# Patient Record
Sex: FEMALE | Race: WHITE | Employment: OTHER | URBAN - METROPOLITAN AREA
[De-identification: names, ages, dates, MRNs, and addresses within clinical notes are randomized per-mention and may not be internally consistent; named-entity substitution may affect disease eponyms.]

---

## 2017-01-27 ENCOUNTER — CARE COORDINATION (OUTPATIENT)
Dept: CARE COORDINATION | Age: 37
End: 2017-01-27

## 2017-03-21 ENCOUNTER — CARE COORDINATION (OUTPATIENT)
Dept: CARE COORDINATION | Age: 37
End: 2017-03-21

## 2017-05-11 ENCOUNTER — CARE COORDINATION (OUTPATIENT)
Dept: CARE COORDINATION | Age: 37
End: 2017-05-11

## 2017-06-30 LAB — DIABETIC RETINOPATHY: NORMAL

## 2017-07-05 ENCOUNTER — CARE COORDINATION (OUTPATIENT)
Dept: CARE COORDINATION | Age: 37
End: 2017-07-05

## 2017-07-24 ENCOUNTER — OFFICE VISIT (OUTPATIENT)
Dept: FAMILY MEDICINE CLINIC | Age: 37
End: 2017-07-24

## 2017-07-24 VITALS
OXYGEN SATURATION: 98 % | WEIGHT: 216.2 LBS | DIASTOLIC BLOOD PRESSURE: 78 MMHG | HEIGHT: 61 IN | HEART RATE: 88 BPM | BODY MASS INDEX: 40.82 KG/M2 | SYSTOLIC BLOOD PRESSURE: 112 MMHG

## 2017-07-24 DIAGNOSIS — E11.9 DIABETES MELLITUS WITH COINCIDENT HYPERTENSION (HCC): ICD-10-CM

## 2017-07-24 DIAGNOSIS — E66.01 MORBID OBESITY WITH BMI OF 40.0-44.9, ADULT (HCC): ICD-10-CM

## 2017-07-24 DIAGNOSIS — G89.29 CHRONIC NECK PAIN: ICD-10-CM

## 2017-07-24 DIAGNOSIS — I10 DIABETES MELLITUS WITH COINCIDENT HYPERTENSION (HCC): ICD-10-CM

## 2017-07-24 DIAGNOSIS — E78.5 HYPERLIPIDEMIA ASSOCIATED WITH TYPE 2 DIABETES MELLITUS (HCC): ICD-10-CM

## 2017-07-24 DIAGNOSIS — M54.2 CHRONIC NECK PAIN: ICD-10-CM

## 2017-07-24 DIAGNOSIS — E11.69 HYPERLIPIDEMIA ASSOCIATED WITH TYPE 2 DIABETES MELLITUS (HCC): ICD-10-CM

## 2017-07-24 LAB
CREATININE URINE POCT: 100
HBA1C MFR BLD: 7.8 %
MICROALBUMIN/CREAT 24H UR: 30 MG/G{CREAT}
MICROALBUMIN/CREAT UR-RTO: NORMAL

## 2017-07-24 PROCEDURE — 3046F HEMOGLOBIN A1C LEVEL >9.0%: CPT | Performed by: FAMILY MEDICINE

## 2017-07-24 PROCEDURE — G8417 CALC BMI ABV UP PARAM F/U: HCPCS | Performed by: FAMILY MEDICINE

## 2017-07-24 PROCEDURE — 99214 OFFICE O/P EST MOD 30 MIN: CPT | Performed by: FAMILY MEDICINE

## 2017-07-24 PROCEDURE — 4004F PT TOBACCO SCREEN RCVD TLK: CPT | Performed by: FAMILY MEDICINE

## 2017-07-24 PROCEDURE — G8427 DOCREV CUR MEDS BY ELIG CLIN: HCPCS | Performed by: FAMILY MEDICINE

## 2017-07-24 PROCEDURE — 83036 HEMOGLOBIN GLYCOSYLATED A1C: CPT | Performed by: FAMILY MEDICINE

## 2017-07-24 PROCEDURE — 82044 UR ALBUMIN SEMIQUANTITATIVE: CPT | Performed by: FAMILY MEDICINE

## 2017-07-24 RX ORDER — TIZANIDINE 4 MG/1
4 TABLET ORAL EVERY 8 HOURS PRN
Qty: 60 TABLET | Refills: 1 | Status: SHIPPED | OUTPATIENT
Start: 2017-07-24 | End: 2021-04-03

## 2017-07-24 RX ORDER — NAPROXEN 500 MG/1
500 TABLET ORAL 2 TIMES DAILY
Qty: 15 TABLET | Refills: 0 | Status: CANCELLED | OUTPATIENT
Start: 2017-07-24

## 2017-07-24 RX ORDER — ATORVASTATIN CALCIUM 40 MG/1
40 TABLET, FILM COATED ORAL DAILY
Qty: 90 TABLET | Refills: 1 | Status: SHIPPED | OUTPATIENT
Start: 2017-07-24 | End: 2017-10-02 | Stop reason: SDUPTHER

## 2017-07-24 RX ORDER — GLIPIZIDE 5 MG/1
5 TABLET ORAL DAILY
Qty: 180 TABLET | Refills: 1 | Status: SHIPPED | OUTPATIENT
Start: 2017-07-24 | End: 2017-10-02 | Stop reason: SDUPTHER

## 2017-07-24 RX ORDER — LISINOPRIL 2.5 MG/1
TABLET ORAL
Qty: 90 TABLET | Refills: 1 | Status: SHIPPED | OUTPATIENT
Start: 2017-07-24 | End: 2017-10-02 | Stop reason: SDUPTHER

## 2017-08-04 ENCOUNTER — CARE COORDINATION (OUTPATIENT)
Dept: CASE MANAGEMENT | Age: 37
End: 2017-08-04

## 2017-08-05 ENCOUNTER — CARE COORDINATION (OUTPATIENT)
Dept: CASE MANAGEMENT | Age: 37
End: 2017-08-05

## 2017-08-07 ENCOUNTER — CARE COORDINATION (OUTPATIENT)
Dept: CARE COORDINATION | Age: 37
End: 2017-08-07

## 2017-08-09 ENCOUNTER — OFFICE VISIT (OUTPATIENT)
Dept: BARIATRICS/WEIGHT MGMT | Age: 37
End: 2017-08-09

## 2017-08-09 ENCOUNTER — HOSPITAL ENCOUNTER (OUTPATIENT)
Dept: OTHER | Age: 37
Discharge: HOME | End: 2017-08-09
Attending: NURSE PRACTITIONER | Admitting: NURSE PRACTITIONER

## 2017-08-09 ENCOUNTER — OFFICE VISIT (OUTPATIENT)
Dept: OTHER | Age: 37
End: 2017-08-09

## 2017-08-09 VITALS
DIASTOLIC BLOOD PRESSURE: 75 MMHG | BODY MASS INDEX: 41.22 KG/M2 | HEART RATE: 76 BPM | SYSTOLIC BLOOD PRESSURE: 121 MMHG | WEIGHT: 218.3 LBS | HEIGHT: 61 IN

## 2017-08-09 VITALS
WEIGHT: 216 LBS | BODY MASS INDEX: 40.81 KG/M2 | SYSTOLIC BLOOD PRESSURE: 100 MMHG | DIASTOLIC BLOOD PRESSURE: 59 MMHG | OXYGEN SATURATION: 97 % | HEART RATE: 73 BPM

## 2017-08-09 DIAGNOSIS — M72.6 NECROTIZING FASCIITIS (HCC): ICD-10-CM

## 2017-08-09 DIAGNOSIS — E66.01 MORBID OBESITY WITH BMI OF 40.0-44.9, ADULT (HCC): Primary | ICD-10-CM

## 2017-08-09 DIAGNOSIS — L03.315 CELLULITIS OF PERINEUM: Primary | ICD-10-CM

## 2017-08-09 DIAGNOSIS — A41.9 SEPSIS, DUE TO UNSPECIFIED ORGANISM: ICD-10-CM

## 2017-08-09 DIAGNOSIS — R11.0 NAUSEA: ICD-10-CM

## 2017-08-09 DIAGNOSIS — L02.215 ABSCESS, PERINEUM: ICD-10-CM

## 2017-08-09 PROCEDURE — 1111F DSCHRG MED/CURRENT MED MERGE: CPT | Performed by: NURSE PRACTITIONER

## 2017-08-09 PROCEDURE — 99214 OFFICE O/P EST MOD 30 MIN: CPT | Performed by: SURGERY

## 2017-08-09 PROCEDURE — G8427 DOCREV CUR MEDS BY ELIG CLIN: HCPCS | Performed by: SURGERY

## 2017-08-09 PROCEDURE — 4004F PT TOBACCO SCREEN RCVD TLK: CPT | Performed by: NURSE PRACTITIONER

## 2017-08-09 PROCEDURE — G8427 DOCREV CUR MEDS BY ELIG CLIN: HCPCS | Performed by: NURSE PRACTITIONER

## 2017-08-09 PROCEDURE — G8417 CALC BMI ABV UP PARAM F/U: HCPCS | Performed by: SURGERY

## 2017-08-09 PROCEDURE — 99214 OFFICE O/P EST MOD 30 MIN: CPT | Performed by: NURSE PRACTITIONER

## 2017-08-09 PROCEDURE — 4004F PT TOBACCO SCREEN RCVD TLK: CPT | Performed by: SURGERY

## 2017-08-09 PROCEDURE — G8417 CALC BMI ABV UP PARAM F/U: HCPCS | Performed by: NURSE PRACTITIONER

## 2017-08-09 PROCEDURE — 1111F DSCHRG MED/CURRENT MED MERGE: CPT | Performed by: SURGERY

## 2017-08-09 PROCEDURE — 15852 DRESSING CHANGE NOT FOR BURN: CPT | Performed by: NURSE PRACTITIONER

## 2017-08-09 RX ORDER — ONDANSETRON 4 MG/1
4 TABLET, FILM COATED ORAL EVERY 8 HOURS PRN
Qty: 30 TABLET | Refills: 0 | Status: SHIPPED | OUTPATIENT
Start: 2017-08-09 | End: 2020-05-05 | Stop reason: ALTCHOICE

## 2017-08-09 ASSESSMENT — ENCOUNTER SYMPTOMS
BACK PAIN: 0
BLOOD IN STOOL: 0
TROUBLE SWALLOWING: 0
SHORTNESS OF BREATH: 0
COUGH: 0
SORE THROAT: 0
COLOR CHANGE: 0
DIARRHEA: 0
VOMITING: 1
WHEEZING: 0
NAUSEA: 1
PHOTOPHOBIA: 0
ANAL BLEEDING: 0
VOMITING: 0
CONSTIPATION: 0
ABDOMINAL PAIN: 0
VOICE CHANGE: 0
WHEEZING: 0
DIARRHEA: 0
NAUSEA: 0
SORE THROAT: 0
COUGH: 0
SHORTNESS OF BREATH: 0
RESPIRATORY NEGATIVE: 1

## 2017-08-14 ENCOUNTER — CARE COORDINATION (OUTPATIENT)
Dept: CARE COORDINATION | Age: 37
End: 2017-08-14

## 2017-08-17 ENCOUNTER — CARE COORDINATION (OUTPATIENT)
Dept: CARE COORDINATION | Age: 37
End: 2017-08-17

## 2017-08-23 ENCOUNTER — OFFICE VISIT (OUTPATIENT)
Dept: BARIATRICS/WEIGHT MGMT | Age: 37
End: 2017-08-23

## 2017-08-23 VITALS
SYSTOLIC BLOOD PRESSURE: 110 MMHG | WEIGHT: 216.9 LBS | HEART RATE: 65 BPM | DIASTOLIC BLOOD PRESSURE: 72 MMHG | HEIGHT: 61 IN | BODY MASS INDEX: 40.95 KG/M2

## 2017-08-23 DIAGNOSIS — M72.6 NECROTIZING FASCIITIS (HCC): ICD-10-CM

## 2017-08-23 DIAGNOSIS — L02.214 GROIN ABSCESS: Primary | ICD-10-CM

## 2017-08-23 PROCEDURE — 1111F DSCHRG MED/CURRENT MED MERGE: CPT | Performed by: SURGERY

## 2017-08-23 PROCEDURE — 99213 OFFICE O/P EST LOW 20 MIN: CPT | Performed by: SURGERY

## 2017-08-23 PROCEDURE — G8427 DOCREV CUR MEDS BY ELIG CLIN: HCPCS | Performed by: SURGERY

## 2017-08-23 PROCEDURE — G8417 CALC BMI ABV UP PARAM F/U: HCPCS | Performed by: SURGERY

## 2017-08-23 PROCEDURE — 4004F PT TOBACCO SCREEN RCVD TLK: CPT | Performed by: SURGERY

## 2017-09-05 ENCOUNTER — CARE COORDINATION (OUTPATIENT)
Dept: CARE COORDINATION | Age: 37
End: 2017-09-05

## 2017-10-02 ENCOUNTER — CARE COORDINATOR VISIT (OUTPATIENT)
Dept: CARE COORDINATION | Age: 37
End: 2017-10-02

## 2017-10-02 ENCOUNTER — OFFICE VISIT (OUTPATIENT)
Dept: FAMILY MEDICINE CLINIC | Age: 37
End: 2017-10-02

## 2017-10-02 VITALS
SYSTOLIC BLOOD PRESSURE: 120 MMHG | OXYGEN SATURATION: 98 % | BODY MASS INDEX: 42.18 KG/M2 | DIASTOLIC BLOOD PRESSURE: 70 MMHG | HEART RATE: 77 BPM | HEIGHT: 61 IN | RESPIRATION RATE: 16 BRPM | WEIGHT: 223.4 LBS

## 2017-10-02 DIAGNOSIS — E11.69 HYPERLIPIDEMIA ASSOCIATED WITH TYPE 2 DIABETES MELLITUS (HCC): ICD-10-CM

## 2017-10-02 DIAGNOSIS — I10 DIABETES MELLITUS WITH COINCIDENT HYPERTENSION (HCC): ICD-10-CM

## 2017-10-02 DIAGNOSIS — E78.5 HYPERLIPIDEMIA ASSOCIATED WITH TYPE 2 DIABETES MELLITUS (HCC): ICD-10-CM

## 2017-10-02 DIAGNOSIS — Z23 NEEDS FLU SHOT: ICD-10-CM

## 2017-10-02 DIAGNOSIS — E11.9 DIABETES MELLITUS WITH COINCIDENT HYPERTENSION (HCC): ICD-10-CM

## 2017-10-02 PROBLEM — L02.215 ABSCESS, PERINEUM: Status: RESOLVED | Noted: 2017-08-09 | Resolved: 2017-10-02

## 2017-10-02 LAB — HBA1C MFR BLD: 7.4 %

## 2017-10-02 PROCEDURE — G8417 CALC BMI ABV UP PARAM F/U: HCPCS | Performed by: FAMILY MEDICINE

## 2017-10-02 PROCEDURE — 83036 HEMOGLOBIN GLYCOSYLATED A1C: CPT | Performed by: FAMILY MEDICINE

## 2017-10-02 PROCEDURE — G0008 ADMIN INFLUENZA VIRUS VAC: HCPCS | Performed by: FAMILY MEDICINE

## 2017-10-02 PROCEDURE — 3046F HEMOGLOBIN A1C LEVEL >9.0%: CPT | Performed by: FAMILY MEDICINE

## 2017-10-02 PROCEDURE — 90688 IIV4 VACCINE SPLT 0.5 ML IM: CPT | Performed by: FAMILY MEDICINE

## 2017-10-02 PROCEDURE — 1036F TOBACCO NON-USER: CPT | Performed by: FAMILY MEDICINE

## 2017-10-02 PROCEDURE — 99213 OFFICE O/P EST LOW 20 MIN: CPT | Performed by: FAMILY MEDICINE

## 2017-10-02 PROCEDURE — G8484 FLU IMMUNIZE NO ADMIN: HCPCS | Performed by: FAMILY MEDICINE

## 2017-10-02 PROCEDURE — G8427 DOCREV CUR MEDS BY ELIG CLIN: HCPCS | Performed by: FAMILY MEDICINE

## 2017-10-02 RX ORDER — GLIPIZIDE 5 MG/1
5 TABLET ORAL DAILY
Qty: 180 TABLET | Refills: 1 | Status: SHIPPED | OUTPATIENT
Start: 2017-10-02 | End: 2018-07-16 | Stop reason: SDUPTHER

## 2017-10-02 RX ORDER — LISINOPRIL 2.5 MG/1
TABLET ORAL
Qty: 90 TABLET | Refills: 3 | Status: SHIPPED | OUTPATIENT
Start: 2017-10-02 | End: 2018-01-02 | Stop reason: SDUPTHER

## 2017-10-02 RX ORDER — ATORVASTATIN CALCIUM 40 MG/1
40 TABLET, FILM COATED ORAL DAILY
Qty: 90 TABLET | Refills: 3 | Status: SHIPPED | OUTPATIENT
Start: 2017-10-02 | End: 2018-01-02 | Stop reason: SDUPTHER

## 2017-10-02 NOTE — PROGRESS NOTES
SpO2 98%  BMI 42.21 kg/m2  BP Readings from Last 3 Encounters:   10/02/17 120/70   08/23/17 110/72   08/09/17 121/75     Wt Readings from Last 3 Encounters:   10/02/17 223 lb 6.4 oz (101.3 kg)   08/23/17 216 lb 14.4 oz (98.4 kg)   08/09/17 218 lb 4.8 oz (99 kg)     Body mass index is 42.21 kg/(m^2). Constitutional: She is oriented to person, place, and time. She  In good spirits today. Morbid obese habitus but apparently has lost weight. Here with mom   CASSANDRA: Head: Normocephalic and atraumatic. Ears normal bilaterally Nose: Nose normal. No sinus tenderness  Mouth/Throat: Oropharynx is clear and moist.   Eyes: Conjunctivae, EOM and lids are normal. Pupils are equal, round, and reactive to light. Neck: Trachea normal. Neck supple. Mild TTP paraspinal cervical muscles. Cardiovascular: Normal rate, regular rhythm, S1 normal, S2 normal and normal heart sounds. No murmur heard. Pulmonary/Chest: Breath sounds normal. No accessory muscle usage. No respiratory distress. She  has no wheezes. Abdominal: Soft. Normal appearance and bowel sounds are normal.  No tenderness. Neurological: She  is alert and oriented to person, place, and time. Skin: She  is not diaphoretic. Psychiatric: She  has a normal mood and affect. Musculoskeletal:  Walks without antalgic gait. No LE edema      Diagnosis Assessment and Associated Orders:     1. Uncontrolled type 2 diabetes mellitus with complication, with long-term current use of insulin ([facility])  POCT glycosylated hemoglobin (Hb A1C)   Praised patient for ongoing efforts. She will continue to see Dr. Duc Montague. Glipizide, novolog, and lantus. glipiZIDE (GLUCOTROL) 5 MG tablet    lisinopril (PRINIVIL;ZESTRIL) 2.5 MG tablet   2. Hyperlipidemia associated with type 2 diabetes mellitus ([address] - Stable on current regimen. Refill given on current medication   atorvastatin (LIPITOR) 40 MG tablet   3. Diabetes mellitus with coincident hypertension ([address] - controlled.  Stable on current

## 2017-10-02 NOTE — CARE COORDINATION
Ambulatory Care Coordination Note  10/2/2017  CM Risk Score: 2  Tracey Mortality Risk Score:      ACC: Tedra Schirmer, RN    Summary Note: Met with pt at her PCP OV. Pt is here with her daughter and granddaughter. Pt states she is doing better. A1C today is 7.4, down from 7.8 in July. Reviewed with pt that goal for A1C is 7.0 or less. Pt states she continues to watch portion sizes and is eliminating sweets from her diet & that is what she credits the improvement to her A1C. Pt denies any skin issues or other health concerns at this time. We discussed that slow healing wounds is common with diabetes so prevention is key. Also reminded pt about keeping up with vaccinations such as the flu vaccine & pt states she got it at today's appt. Pt continues to follow with Dr. Ritu Escobar. Will continue to monitor for additional needs. Care Coordination Interventions    Program Enrollment:  Rising Risk   Referral from Primary Care Provider:  No   Suggested Interventions and Community Resources   Diabetes Education: In Process   Zone Management Tools: In Process             Goals Addressed             Most Recent       Patient Stated     A1C less than 7.0 (pt-stated)   Improving (10/2/2017)             Patient will achieve an A1C of less than 7.0    Barriers: lack of support  Plan for overcoming my barriers: Patient will work with Care Coordinator to get support in following a diabetic diet and medication management. Confidence: 6/10  Anticipated Goal Completion Date: 10/17/2017         Other     Care Coordination Self Management   Improving (10/2/2017)             CC Self Management Goal  Patient Goal (What steps will patient take to achieve goal?)  Patient is able to discuss self-management of condition(s):  Pt demonstrates adherence to medications  Pt demonstrates understanding of self-monitoring  Weight less than 200 lbs.        Reduce pop intake to 1 can per day   On track (10/2/2017)          Prior to barriers with adherence to non-pharmacologic self-management interventions?  (Nutrition/Exercise/Self-Monitoring):  Yes   Have you ever had to go to the ED for symptoms of low blood sugar?:  No      No patient-reported symptoms   Do you have hyperglycemia symptoms?:  No   Do you have hypoglycemia symptoms?:  No   Last Blood Sugar Value:  117   Blood Sugar Monitoring Regimen:  Morning Fasting, Before Meals, At Bedtime   Blood Sugar Trends:  No Change           Moncho Purdy RN  Nurse Care Coordinator  [phone]

## 2017-10-02 NOTE — MR AVS SNAPSHOT
your BMI, the greater your risk of heart disease, high blood pressure, type 2 diabetes, stroke, gallstones, arthritis, sleep apnea, and certain cancers. BMI is not perfect. It may overestimate body fat in athletes and people who are more muscular. Even a small weight loss (between 5 and 10 percent of your current weight) by decreasing your calorie intake and becoming more physically active will help lower your risk of developing or worsening diseases associated with obesity. Learn more at: [email or web address]             Today's Medication Changes          These changes are accurate as of: 10/2/17 11:22 AM.  If you have any questions, ask your nurse or doctor. STOP taking these medications           metFORMIN 500 MG tablet   Commonly known as:  GLUCOPHAGE   Stopped by:  Ryland Tong MD            Where to Get Your Medications      These medications were sent to [facility], [address] [place] [address]. [address] [place], [address] [place] [address] Dr     Phone:  [phone]     atorvastatin 40 MG tablet    glipiZIDE 5 MG tablet    lisinopril 2.5 MG tablet               Your Current Medications Are              glipiZIDE (GLUCOTROL) 5 MG tablet Take 1 tablet by mouth daily With largest meal    atorvastatin (LIPITOR) 40 MG tablet Take 1 tablet by mouth daily    lisinopril (PRINIVIL;ZESTRIL) 2.5 MG tablet TAKE ONE (1) TABLET BY MOUTH DAILY.     ondansetron (ZOFRAN) 4 MG tablet Take 1 tablet by mouth every 8 hours as needed for Nausea or Vomiting    insulin glargine (LANTUS SOLOSTAR) 100 UNIT/ML injection pen Inject 15 Units into the skin nightly Injection 10-30 units nightly as instructed    tiZANidine (ZANAFLEX) 4 MG tablet Take 1 tablet by mouth every 8 hours as needed (for muscle spasms)    fluticasone (FLONASE) 50 MCG/ACT nasal spray 2 sprays by Nasal route daily as needed for Rhinitis Care Coordination Self Management   No change        Notes    CC Self Management Goal  Patient Goal (What steps will patient take to achieve goal?)  Patient is able to discuss self-management of condition(s):  Pt demonstrates adherence to medications  Pt demonstrates understanding of self-monitoring  Weight less than 200 lbs. Immunizations as of 10/2/2017     Name Date    Influenza Virus Vaccine 10/13/2015, 10/6/2014, 11/4/2013, 9/15/2012, 9/13/2011    Influenza, [place] Sandrine, 3 Years and older, IM 9/20/2016    Pneumococcal Polysaccharide ([id number]) 10/13/2015    Tdap (Boostrix, Adacel) 9/20/2016      Preventive Care        Date Due    HIV screening is recommended for all people regardless of risk factors  aged 15-65 years at least once (lifetime) who have never been HIV tested. 9/22/1993    Pap Smear 9/22/1999    Cholesterol Screening 7/30/2014    Diabetic Foot Exam 6/9/2017    Yearly Flu Vaccine (1) 9/1/2017    Eye Exam By An Eye Doctor 12/20/2017    Hemoglobin A1C (Test For Long-Term Glucose Control) 7/24/2018    Urine Check For Kidney Problems 7/24/2018    Tetanus Combination Vaccine (2 - Td) 9/20/2026            MyChart Signup           [employer] allows you to send messages to your doctor, view your test results, renew your prescriptions, schedule appointments, view visit notes, and more. How Do I Sign Up? 1. In your Internet browser, go to [email or web address]  2. Click on the Sign Up Now link in the Sign In box. You will see the New Member Sign Up page. 3. Enter your [employer] Access Code exactly as it appears below. You will not need to use this code after youve completed the sign-up process. If you do not sign up before the expiration date, you must request a new code. [employer] Access Code: [email or web address]  Expires: 12/1/2017 11:22 AM    4. Enter your Social Security Number ([id number]) and Date of Birth (mm/dd/yyyy) as indicated and click Submit.  You will be taken to the next

## 2017-10-18 LAB
ALBUMIN SERPL-MCNC: 4.1 G/DL
ALP BLD-CCNC: 78 U/L
ALT SERPL-CCNC: 26 U/L
ANION GAP SERPL CALCULATED.3IONS-SCNC: NORMAL MMOL/L
AST SERPL-CCNC: 17 U/L
AVERAGE GLUCOSE: 174
BILIRUB SERPL-MCNC: 0.9 MG/DL (ref 0.1–1.4)
BUN BLDV-MCNC: 6 MG/DL
CALCIUM SERPL-MCNC: 9.2 MG/DL
CHLORIDE BLD-SCNC: 100 MMOL/L
CHOLESTEROL, TOTAL: 168 MG/DL
CHOLESTEROL/HDL RATIO: 3.2
CO2: 29 MMOL/L
CREAT SERPL-MCNC: 0.6 MG/DL
GFR CALCULATED: NORMAL
GLUCOSE BLD-MCNC: 184 MG/DL
HBA1C MFR BLD: 7.7 %
HDLC SERPL-MCNC: 53 MG/DL (ref 35–70)
LDL CHOLESTEROL CALCULATED: 100 MG/DL (ref 0–160)
POTASSIUM SERPL-SCNC: 3.8 MMOL/L
SODIUM BLD-SCNC: 139 MMOL/L
TOTAL PROTEIN: 7.1
TRIGL SERPL-MCNC: 76 MG/DL
VLDLC SERPL CALC-MCNC: 15 MG/DL

## 2017-11-22 ENCOUNTER — CARE COORDINATION (OUTPATIENT)
Dept: CARE COORDINATION | Age: 37
End: 2017-11-22

## 2017-11-22 NOTE — CARE COORDINATION
Call to pt to follow up on POC. SHAQ Patel who states that pt is not home right now, won't be back until late tonight. Left contact information in case pt has needs, otherwise will plan outreach for next week.   Brook Fowler RN  Nurse Care Coordinator  [phone]

## 2018-01-02 ENCOUNTER — OFFICE VISIT (OUTPATIENT)
Dept: FAMILY MEDICINE CLINIC | Age: 38
End: 2018-01-02

## 2018-01-02 VITALS
SYSTOLIC BLOOD PRESSURE: 126 MMHG | OXYGEN SATURATION: 98 % | BODY MASS INDEX: 43.61 KG/M2 | WEIGHT: 231 LBS | HEART RATE: 76 BPM | DIASTOLIC BLOOD PRESSURE: 74 MMHG | RESPIRATION RATE: 18 BRPM | HEIGHT: 61 IN

## 2018-01-02 DIAGNOSIS — E11.9 DIABETES MELLITUS WITH COINCIDENT HYPERTENSION (HCC): ICD-10-CM

## 2018-01-02 DIAGNOSIS — E78.5 HYPERLIPIDEMIA ASSOCIATED WITH TYPE 2 DIABETES MELLITUS (HCC): ICD-10-CM

## 2018-01-02 DIAGNOSIS — E11.69 HYPERLIPIDEMIA ASSOCIATED WITH TYPE 2 DIABETES MELLITUS (HCC): ICD-10-CM

## 2018-01-02 DIAGNOSIS — E66.01 MORBID OBESITY WITH BMI OF 40.0-44.9, ADULT (HCC): ICD-10-CM

## 2018-01-02 DIAGNOSIS — I10 DIABETES MELLITUS WITH COINCIDENT HYPERTENSION (HCC): ICD-10-CM

## 2018-01-02 PROCEDURE — 99214 OFFICE O/P EST MOD 30 MIN: CPT | Performed by: FAMILY MEDICINE

## 2018-01-02 RX ORDER — LISINOPRIL 2.5 MG/1
TABLET ORAL
Qty: 90 TABLET | Refills: 3 | Status: SHIPPED | OUTPATIENT
Start: 2018-01-02 | End: 2018-10-15 | Stop reason: SDUPTHER

## 2018-01-02 RX ORDER — ATORVASTATIN CALCIUM 40 MG/1
40 TABLET, FILM COATED ORAL DAILY
Qty: 90 TABLET | Refills: 3 | Status: SHIPPED | OUTPATIENT
Start: 2018-01-02 | End: 2018-10-15 | Stop reason: SDUPTHER

## 2018-01-02 ASSESSMENT — PATIENT HEALTH QUESTIONNAIRE - PHQ9
SUM OF ALL RESPONSES TO PHQ9 QUESTIONS 1 & 2: 0
1. LITTLE INTEREST OR PLEASURE IN DOING THINGS: 0
2. FEELING DOWN, DEPRESSED OR HOPELESS: 0
SUM OF ALL RESPONSES TO PHQ QUESTIONS 1-9: 0

## 2018-01-02 NOTE — PROGRESS NOTES
Chief Complaint   Patient presents with   Ebb Carty Diabetes     Dr Raudel Mejia takes care of A1C   Seeing Dr. Raudel Mejia every 3 months with upcoming appt in a few weeks. Reports her A1c is between 7 and 8. Has gained some weight since last visit due to increase in regular soda but has now switched to diet soda. Taking Novolog 5 units morning, 5 with lunch 8 units with dinner. Lantus 10 units with bedtime     HTN- Patient denies any exertional chest pain, dyspnea, palpitations, syncope, orthopnea, edema or paroxysmal nocturnal dyspnea. No side effects for blood pressure medications. HLD- on statin and tolerating. Treatment Adherence:   Medication compliance: improved compliance from last visit. reports that she quit smoking about 6 months ago. Her smoking use included Cigarettes. She smoked 0.50 packs per day. She has never used smokeless tobacco.   reports that she does not drink alcohol. Diabetes Mellitus Type 2: Current symptoms/problems include none. Diabetes Management   Topic Date Due    Diabetic foot exam  06/09/2017    Diabetic retinal exam  12/20/2017       Tobacco history: She  reports that she quit smoking about 6 months ago. Her smoking use included Cigarettes. She smoked 0.50 packs per day. She has never used smokeless tobacco.   Daily Aspirin?  Yes  Known diabetic complications: none    Lab Results   Component Value Date    LABA1C 7.7 10/18/2017    LABA1C 7.4 10/02/2017    LABA1C 7.8 07/24/2017     Lab Results   Component Value Date    CREATININE 0.6 10/18/2017     Lab Results   Component Value Date    ALT 26 10/18/2017    AST 17 10/18/2017     Lab Results   Component Value Date    CHOL 168 10/18/2017    TRIG 76 10/18/2017    HDL 53 10/18/2017    LDLCALC 100 10/18/2017    LDLDIRECT 114 (H) 07/13/2012          Physical Exam   Nursing note reviewed  /74 (Site: Left Arm, Position: Sitting, Cuff Size: Large Adult)   Pulse 76   Resp 18   Ht 5' 1\" (1.549 m)   Wt 231 lb

## 2018-01-29 ENCOUNTER — CARE COORDINATION (OUTPATIENT)
Dept: CARE COORDINATION | Age: 38
End: 2018-01-29

## 2018-01-29 NOTE — CARE COORDINATION
medications    Medication Sig Start Date End Date Taking? Authorizing Provider   atorvastatin (LIPITOR) 40 MG tablet Take 1 tablet by mouth daily 1/2/18   Jagdeep Campoverde MD   lisinopril (PRINIVIL;ZESTRIL) 2.5 MG tablet TAKE ONE (1) TABLET BY MOUTH DAILY. 1/2/18   Jagdeep Campoverde MD   glipiZIDE (GLUCOTROL) 5 MG tablet Take 1 tablet by mouth daily With largest meal 10/2/17   Jagdeep Campoverde MD   ondansetron (ZOFRAN) 4 MG tablet Take 1 tablet by mouth every 8 hours as needed for Nausea or Vomiting 8/9/17   Liz Hurst CNP   insulin glargine (LANTUS SOLOSTAR) 100 UNIT/ML injection pen Inject 15 Units into the skin nightly Injection 10-30 units nightly as instructed 7/24/17   Jagdeep Campoverde MD   tiZANidine (ZANAFLEX) 4 MG tablet Take 1 tablet by mouth every 8 hours as needed (for muscle spasms) 7/24/17   Jagdeep Campoverde MD   fluticasone (FLONASE) 50 MCG/ACT nasal spray 2 sprays by Nasal route daily as needed for Rhinitis 9/20/16   Jagdeep Campoverde MD   glucose blood VI test strips (EXACTECH TEST) strip 1 each by In Vitro route daily Embrace test strips to test BS 3x daily 6/9/16   Jagdeep Campoverde MD   Insulin Pen Needle 32G X 5 MM MISC To use with Lantus pen 6/9/16   Jagdeep Campoverde MD   aspirin EC 81 MG EC tablet Take 1 tablet by mouth daily 6/9/16   Jagdeep Campoverde MD   cetirizine (ZYRTEC) 5 MG tablet Take 5 mg by mouth daily.     Historical Provider, MD   insulin aspart (NOVOLOG FLEXPEN) 100 UNIT/ML injection pen 4 units with dinner 12/8/14   Jagdeep Campoverde MD       Future Appointments  Date Time Provider Cynthia Breaux   7/3/2018 11:15 AM Jagdeep Campoverde MD [facility]     General Assessment    Do you have any symptoms that are causing concern?:  No      and   Diabetes Assessment    Medic Alert ID:  No  Meal Planning:  Other, Avoidance of concentrated sweets How often do you test your blood sugar?:  Daily, Meals   Do you have barriers with adherence to non-pharmacologic self-management interventions?  (Nutrition/Exercise/Self-Monitoring):  Yes   Have you ever had to go to the ED for symptoms of low blood sugar?:  No       No patient-reported symptoms      Ethel Dangelo, RN  Nurse Care Coordinator  [phone]

## 2018-04-05 ENCOUNTER — CARE COORDINATION (OUTPATIENT)
Dept: FAMILY MEDICINE CLINIC | Age: 38
End: 2018-04-05

## 2018-05-29 ENCOUNTER — CARE COORDINATION (OUTPATIENT)
Dept: FAMILY MEDICINE CLINIC | Age: 38
End: 2018-05-29

## 2018-07-16 ENCOUNTER — CARE COORDINATION (OUTPATIENT)
Dept: FAMILY MEDICINE CLINIC | Age: 38
End: 2018-07-16

## 2018-07-16 ENCOUNTER — OFFICE VISIT (OUTPATIENT)
Dept: FAMILY MEDICINE CLINIC | Age: 38
End: 2018-07-16

## 2018-07-16 VITALS
WEIGHT: 226.4 LBS | HEART RATE: 87 BPM | BODY MASS INDEX: 42.78 KG/M2 | DIASTOLIC BLOOD PRESSURE: 82 MMHG | OXYGEN SATURATION: 99 % | SYSTOLIC BLOOD PRESSURE: 100 MMHG

## 2018-07-16 PROCEDURE — 99213 OFFICE O/P EST LOW 20 MIN: CPT | Performed by: FAMILY MEDICINE

## 2018-07-16 PROCEDURE — 1036F TOBACCO NON-USER: CPT | Performed by: FAMILY MEDICINE

## 2018-07-16 PROCEDURE — G8427 DOCREV CUR MEDS BY ELIG CLIN: HCPCS | Performed by: FAMILY MEDICINE

## 2018-07-16 PROCEDURE — 3046F HEMOGLOBIN A1C LEVEL >9.0%: CPT | Performed by: FAMILY MEDICINE

## 2018-07-16 PROCEDURE — G8417 CALC BMI ABV UP PARAM F/U: HCPCS | Performed by: FAMILY MEDICINE

## 2018-07-16 PROCEDURE — 2022F DILAT RTA XM EVC RTNOPTHY: CPT | Performed by: FAMILY MEDICINE

## 2018-07-16 RX ORDER — GLIPIZIDE 5 MG/1
5 TABLET ORAL DAILY
Qty: 180 TABLET | Refills: 1 | Status: SHIPPED | OUTPATIENT
Start: 2018-07-16 | End: 2018-10-15 | Stop reason: ALTCHOICE

## 2018-07-16 RX ORDER — LANCETS 30 GAUGE
EACH MISCELLANEOUS
Qty: 300 EACH | Refills: 5 | Status: SHIPPED | OUTPATIENT
Start: 2018-07-16 | End: 2021-04-14 | Stop reason: SDUPTHER

## 2018-07-16 NOTE — CARE COORDINATION
her medications for diabetes for several months. Prior to Admission medications    Medication Sig Start Date End Date Taking? Authorizing Provider   glipiZIDE (GLUCOTROL) 5 MG tablet Take 1 tablet by mouth daily With largest meal 7/16/18   Naomi Foster MD   insulin glargine (LANTUS SOLOSTAR) 100 UNIT/ML injection pen Inject 10 Units into the skin nightly Injection 10-30 units nightly as instructed 7/16/18   Naomi Foster MD   insulin aspart (NOVOLOG FLEXPEN) 100 UNIT/ML injection pen 5 units with each meal three times per day 7/16/18   Naomi Foster MD   Insulin Pen Needle 32G X 5 MM MISC To use with Lantus pen and Novolog pen 7/16/18   Naomi Foster MD   blood glucose test strips (EXACTECH TEST) strip 1 each by In Vitro route daily Embrace test strips to test BS 4x daily 7/16/18   Naomi Foster MD   Lancets MISC To test up to 4 times per day- nighttime and with each meal 7/16/18   Naomi Foster MD   atorvastatin (LIPITOR) 40 MG tablet Take 1 tablet by mouth daily 1/2/18   Naomi Foster MD   lisinopril (PRINIVIL;ZESTRIL) 2.5 MG tablet TAKE ONE (1) TABLET BY MOUTH DAILY. 1/2/18   Naomi Foster MD   ondansetron (ZOFRAN) 4 MG tablet Take 1 tablet by mouth every 8 hours as needed for Nausea or Vomiting 8/9/17   ALISTAIR Winn CNP   tiZANidine (ZANAFLEX) 4 MG tablet Take 1 tablet by mouth every 8 hours as needed (for muscle spasms) 7/24/17   Naomi Foster MD   aspirin EC 81 MG EC tablet Take 1 tablet by mouth daily 6/9/16   Naomi Foster MD   cetirizine (ZYRTEC) 5 MG tablet Take 5 mg by mouth daily.     Historical Provider, MD       Future Appointments  Date Time Provider Cynthia Breaux   10/15/2018 10:30 AM Naomi Foster MD [facility]     ,   Diabetes Assessment    Medic Alert ID:  No  Meal Planning:  Other, Avoidance of concentrated sweets   How often do you test your blood sugar?:  Daily, Meals   Do you have barriers with adherence to non-pharmacologic self-management interventions? (Nutrition/Exercise/Self-Monitoring):  Yes   Have you ever had to go to the ED for symptoms of low blood sugar?:  No       No patient-reported symptoms       and   General Assessment    Do you have any symptoms that are causing concern?:  No       Adam Oviedo RN  Nurse Care Coordinator  [phone] [email or web address]

## 2018-07-16 NOTE — PROGRESS NOTES
Chief Complaint   Patient presents with    Diabetes     States she has not been to see Dr. Jarno Myrick in three months. States she has been callling to get medication and a new appointment, has been out of medication for three months. Would like for Dr. Corine Lorenzana to restart DM care again. she was tking Novolog 5 units morning, 5 with lunch 5 units with dinner. Lantus 10 units with bedtime   SHe is not checking her blood sugars and reports running out of her supplies. HTN- Patient denies any exertional chest pain, dyspnea, palpitations, syncope, orthopnea, edema or paroxysmal nocturnal dyspnea. No side effects for blood pressure medications. HLD- on statin and tolerating. reports that she quit smoking about 12 months ago. Her smoking use included Cigarettes. She smoked 0.50 packs per day. She has never used smokeless tobacco.   reports that she does not drink alcohol. Diabetes Mellitus Type 2: Current symptoms/problems include none. Diabetes Management   Topic Date Due    Diabetic foot exam  06/09/2017    Diabetic retinal exam  06/30/2018    Diabetic microalbuminuria test  07/24/2018       Tobacco history: She  reports that she quit smoking about 12 months ago. Her smoking use included Cigarettes. She smoked 0.50 packs per day. She has never used smokeless tobacco.   Daily Aspirin?  Yes  Known diabetic complications: none    Lab Results   Component Value Date    LABA1C 7.7 10/18/2017    LABA1C 7.4 10/02/2017    LABA1C 7.8 07/24/2017     Lab Results   Component Value Date    CREATININE 0.6 10/18/2017     Lab Results   Component Value Date    ALT 26 10/18/2017    AST 17 10/18/2017     Lab Results   Component Value Date    CHOL 168 10/18/2017    TRIG 76 10/18/2017    HDL 53 10/18/2017    LDLCALC 100 10/18/2017    LDLDIRECT 114 (H) 07/13/2012          Physical Exam   Nursing note reviewed  /82 (Site: Left Arm, Position: Sitting, Cuff Size: Medium Adult)   Pulse 87   Wt 226 lb 6.4 oz (102.7 kg) for DM, HTN .

## 2018-08-23 ENCOUNTER — CARE COORDINATION (OUTPATIENT)
Dept: CARE COORDINATION | Age: 38
End: 2018-08-23

## 2018-08-23 NOTE — CARE COORDINATION
Ambulatory Care Coordination Note  8/23/2018  CM Risk Score: 2  Tracey Mortality Risk Score:      ACC: Mich Ham RN    Summary Note: Call to check on pt status. Pt has restarted diabetic medications since last OV. Pt remains non-compliant with diabetic diet, states she tries to avoid sweets but only because her son eats them first, still consuming regular soda on a regular basis as well as not considering carb intake. Educated pt on risks of continued dietary non-compliance. Pt is open to education regarding carb control diet, suggestions provided to replace soda. Pt denies barriers to medications. Will follow up this encounter by sending carb control diet information for pt to review & discuss at next encounter. Care Coordination Interventions    Program Enrollment:  Rising Risk  Referral from Primary Care Provider:  No  Suggested Interventions and Community Resources  Diabetes Education: In Process  Zone Management Tools: In Process         Goals Addressed             Most Recent       Patient Stated     A1C less than 7.0 (pt-stated)   No change (8/23/2018)             Patient will achieve an A1C of less than 7.0    Barriers: lack of support  Plan for overcoming my barriers: Patient will work with Care Coordinator to get support in following a diabetic diet and medication management. Confidence: 6/10  Anticipated Goal Completion Date: 09/16/18 7/16/18 - No A1C testing this date. Prior to Admission medications    Medication Sig Start Date End Date Taking?  Authorizing Provider   glipiZIDE (GLUCOTROL) 5 MG tablet Take 1 tablet by mouth daily With largest meal 7/16/18   Neo Ku MD   insulin glargine (LANTUS SOLOSTAR) 100 UNIT/ML injection pen Inject 10 Units into the skin nightly Injection 10-30 units nightly as instructed 7/16/18   Neo Ku MD   insulin aspart (NOVOLOG FLEXPEN) 100 UNIT/ML injection pen 5 units with each meal three times

## 2018-10-12 ENCOUNTER — CARE COORDINATION (OUTPATIENT)
Dept: CARE COORDINATION | Age: 38
End: 2018-10-12

## 2018-10-12 NOTE — CARE COORDINATION
Call to check on pt status. Left message with contact information & asked pt to return call. Noted that pt is scheduled for PCP OV on 10/15, will plan to meet with pt then. Abimael Hidalgo RN  Nurse Care Coordinator  [phone] [email or web address]

## 2018-10-15 ENCOUNTER — OFFICE VISIT (OUTPATIENT)
Dept: FAMILY MEDICINE CLINIC | Age: 38
End: 2018-10-15
Payer: MEDICARE

## 2018-10-15 VITALS
SYSTOLIC BLOOD PRESSURE: 116 MMHG | BODY MASS INDEX: 43.01 KG/M2 | HEIGHT: 61 IN | DIASTOLIC BLOOD PRESSURE: 78 MMHG | OXYGEN SATURATION: 97 % | WEIGHT: 227.8 LBS | HEART RATE: 99 BPM

## 2018-10-15 DIAGNOSIS — E11.69 HYPERLIPIDEMIA ASSOCIATED WITH TYPE 2 DIABETES MELLITUS (HCC): ICD-10-CM

## 2018-10-15 DIAGNOSIS — Z23 NEED FOR INFLUENZA VACCINATION: ICD-10-CM

## 2018-10-15 DIAGNOSIS — I10 DIABETES MELLITUS WITH COINCIDENT HYPERTENSION (HCC): ICD-10-CM

## 2018-10-15 DIAGNOSIS — E78.5 HYPERLIPIDEMIA ASSOCIATED WITH TYPE 2 DIABETES MELLITUS (HCC): ICD-10-CM

## 2018-10-15 DIAGNOSIS — E11.9 DIABETES MELLITUS WITH COINCIDENT HYPERTENSION (HCC): ICD-10-CM

## 2018-10-15 LAB
CREATININE URINE POCT: ABNORMAL
HBA1C MFR BLD: 12.9 %
MICROALBUMIN/CREAT 24H UR: ABNORMAL MG/G{CREAT}
MICROALBUMIN/CREAT UR-RTO: ABNORMAL

## 2018-10-15 PROCEDURE — G8482 FLU IMMUNIZE ORDER/ADMIN: HCPCS | Performed by: FAMILY MEDICINE

## 2018-10-15 PROCEDURE — 82044 UR ALBUMIN SEMIQUANTITATIVE: CPT | Performed by: FAMILY MEDICINE

## 2018-10-15 PROCEDURE — G0008 ADMIN INFLUENZA VIRUS VAC: HCPCS | Performed by: FAMILY MEDICINE

## 2018-10-15 PROCEDURE — 99214 OFFICE O/P EST MOD 30 MIN: CPT | Performed by: FAMILY MEDICINE

## 2018-10-15 PROCEDURE — 2022F DILAT RTA XM EVC RTNOPTHY: CPT | Performed by: FAMILY MEDICINE

## 2018-10-15 PROCEDURE — 90686 IIV4 VACC NO PRSV 0.5 ML IM: CPT | Performed by: FAMILY MEDICINE

## 2018-10-15 PROCEDURE — 83036 HEMOGLOBIN GLYCOSYLATED A1C: CPT | Performed by: FAMILY MEDICINE

## 2018-10-15 PROCEDURE — G8427 DOCREV CUR MEDS BY ELIG CLIN: HCPCS | Performed by: FAMILY MEDICINE

## 2018-10-15 PROCEDURE — 1036F TOBACCO NON-USER: CPT | Performed by: FAMILY MEDICINE

## 2018-10-15 PROCEDURE — G8417 CALC BMI ABV UP PARAM F/U: HCPCS | Performed by: FAMILY MEDICINE

## 2018-10-15 PROCEDURE — 3046F HEMOGLOBIN A1C LEVEL >9.0%: CPT | Performed by: FAMILY MEDICINE

## 2018-10-15 RX ORDER — ATORVASTATIN CALCIUM 40 MG/1
40 TABLET, FILM COATED ORAL DAILY
Qty: 90 TABLET | Refills: 3 | Status: SHIPPED | OUTPATIENT
Start: 2018-10-15 | End: 2019-10-08 | Stop reason: SDUPTHER

## 2018-10-15 RX ORDER — GLIPIZIDE 5 MG/1
5 TABLET ORAL DAILY
Qty: 180 TABLET | Refills: 3 | Status: CANCELLED | OUTPATIENT
Start: 2018-10-15

## 2018-10-15 RX ORDER — GLIPIZIDE 10 MG/1
10 TABLET, FILM COATED, EXTENDED RELEASE ORAL DAILY
Qty: 90 TABLET | Refills: 3 | Status: SHIPPED | OUTPATIENT
Start: 2018-10-15 | End: 2019-10-08 | Stop reason: SDUPTHER

## 2018-10-15 RX ORDER — LISINOPRIL 2.5 MG/1
TABLET ORAL
Qty: 90 TABLET | Refills: 3 | Status: SHIPPED | OUTPATIENT
Start: 2018-10-15 | End: 2019-10-08 | Stop reason: SDUPTHER

## 2018-11-16 ENCOUNTER — OFFICE VISIT (OUTPATIENT)
Dept: FAMILY MEDICINE CLINIC | Age: 38
End: 2018-11-16
Payer: MEDICARE

## 2018-11-16 ENCOUNTER — CARE COORDINATION (OUTPATIENT)
Dept: CARE COORDINATION | Age: 38
End: 2018-11-16

## 2018-11-16 VITALS
OXYGEN SATURATION: 99 % | HEART RATE: 87 BPM | HEIGHT: 63 IN | DIASTOLIC BLOOD PRESSURE: 82 MMHG | SYSTOLIC BLOOD PRESSURE: 124 MMHG | WEIGHT: 227.4 LBS | BODY MASS INDEX: 40.29 KG/M2 | TEMPERATURE: 98.3 F

## 2018-11-16 DIAGNOSIS — N76.0 VAGINITIS AND VULVOVAGINITIS: ICD-10-CM

## 2018-11-16 DIAGNOSIS — Z20.2 EXPOSURE TO SEXUALLY TRANSMITTED DISEASE (STD): ICD-10-CM

## 2018-11-16 DIAGNOSIS — R39.9 UTI SYMPTOMS: Primary | ICD-10-CM

## 2018-11-16 LAB
BILIRUBIN, POC: NEGATIVE
BLOOD URINE, POC: ABNORMAL
CLARITY, POC: ABNORMAL
COLOR, POC: YELLOW
GLUCOSE URINE, POC: ABNORMAL
KETONES, POC: NEGATIVE
LEUKOCYTE EST, POC: ABNORMAL
NITRITE, POC: NEGATIVE
PH, POC: 5.5
PROTEIN, POC: NEGATIVE
SPECIFIC GRAVITY, POC: 1.01
UROBILINOGEN, POC: ABNORMAL

## 2018-11-16 PROCEDURE — 81002 URINALYSIS NONAUTO W/O SCOPE: CPT | Performed by: NURSE PRACTITIONER

## 2018-11-16 PROCEDURE — 99213 OFFICE O/P EST LOW 20 MIN: CPT | Performed by: NURSE PRACTITIONER

## 2018-11-16 RX ORDER — LIDOCAINE HCL 4 %
1 CREAM (GRAM) TOPICAL DAILY
Qty: 15 CAPSULE | Refills: 0 | Status: SHIPPED | OUTPATIENT
Start: 2018-11-16 | End: 2018-12-11 | Stop reason: ALTCHOICE

## 2018-11-16 ASSESSMENT — ENCOUNTER SYMPTOMS
CHEST TIGHTNESS: 0
NAUSEA: 0
WHEEZING: 0
ABDOMINAL PAIN: 1
ABDOMINAL PAIN: 0
SHORTNESS OF BREATH: 0
COLOR CHANGE: 0
VOMITING: 0
COUGH: 0

## 2018-11-16 NOTE — PROGRESS NOTES
with plan. Medications reviewed and reconciled. Continue current medications. Appropriate prescriptions are ordered. Risks and benefits of meds are discussed. After visit summary provided.

## 2018-11-17 LAB
CANDIDA SPECIES, DNA PROBE: ABNORMAL
GARDNERELLA VAGINALIS, DNA PROBE: ABNORMAL
TRICHOMONAS VAGINALIS DNA: ABNORMAL

## 2018-11-18 LAB
ORGANISM: ABNORMAL
URINE CULTURE, ROUTINE: ABNORMAL
URINE CULTURE, ROUTINE: ABNORMAL

## 2018-11-19 LAB
C. TRACHOMATIS DNA ,URINE: NEGATIVE
N. GONORRHOEAE DNA, URINE: NEGATIVE

## 2018-11-20 DIAGNOSIS — A59.9 TRICHOMONIASIS: Primary | ICD-10-CM

## 2018-11-20 RX ORDER — METRONIDAZOLE 500 MG/1
500 TABLET ORAL 2 TIMES DAILY
Qty: 14 TABLET | Refills: 0 | Status: SHIPPED | OUTPATIENT
Start: 2018-11-20 | End: 2018-11-27

## 2018-11-23 ENCOUNTER — OFFICE VISIT (OUTPATIENT)
Dept: FAMILY MEDICINE CLINIC | Age: 38
End: 2018-11-23
Payer: MEDICARE

## 2018-11-23 VITALS
HEART RATE: 84 BPM | SYSTOLIC BLOOD PRESSURE: 128 MMHG | HEIGHT: 62 IN | TEMPERATURE: 98.6 F | BODY MASS INDEX: 42.14 KG/M2 | WEIGHT: 229 LBS | DIASTOLIC BLOOD PRESSURE: 86 MMHG | OXYGEN SATURATION: 98 %

## 2018-11-23 DIAGNOSIS — R30.0 DYSURIA: ICD-10-CM

## 2018-11-23 DIAGNOSIS — A59.01 TRICHOMONAL VAGINITIS: Primary | ICD-10-CM

## 2018-11-23 PROCEDURE — 99213 OFFICE O/P EST LOW 20 MIN: CPT | Performed by: NURSE PRACTITIONER

## 2018-11-23 ASSESSMENT — ENCOUNTER SYMPTOMS
ABDOMINAL PAIN: 0
SHORTNESS OF BREATH: 0
VOMITING: 0
NAUSEA: 0
CHEST TIGHTNESS: 0
COUGH: 0

## 2018-11-23 NOTE — PROGRESS NOTES
Sage Jaramillo  1980  34 y.o. SUBJECT MARIZOL:    Chief Complaint   Patient presents with    Vaginal Discharge     Pt here for 1 week f/u . c/o Vaginal d/c and burning, symptoms not improved. Marilyn is a 34year old in for follow up regarding burning when she urinates. Also having continued vaginal discharge with itching. She did not obtain the antibiotic. She states she did not remember any call letting her know that she has an infection. Vaginal Discharge   The patient's primary symptoms include genital itching and vaginal discharge. The current episode started 1 to 4 weeks ago. The problem occurs constantly. The problem has been gradually worsening. The pain is moderate. She is not pregnant. Pertinent negatives include no abdominal pain, chills, dysuria, fever, flank pain, frequency, nausea, rash, urgency or vomiting. The vaginal discharge was clear. She has not been passing clots. She has not been passing tissue. It is unknown whether or not her partner has an STD. She uses nothing for contraception. There is no history of herpes simplex or an STD. Current Outpatient Prescriptions on File Prior to Visit   Medication Sig Dispense Refill    metroNIDAZOLE (FLAGYL) 500 MG tablet Take 1 tablet by mouth 2 times daily for 7 days 14 tablet 0    Cranberry-Vitamin C (AZO CRANBERRY URINARY TRACT) 250-60 MG CAPS Take 1 capsule by mouth daily for 15 days 15 capsule 0    lisinopril (PRINIVIL;ZESTRIL) 2.5 MG tablet TAKE ONE (1) TABLET BY MOUTH DAILY.  90 tablet 3    atorvastatin (LIPITOR) 40 MG tablet Take 1 tablet by mouth daily 90 tablet 3    insulin glargine (LANTUS SOLOSTAR) 100 UNIT/ML injection pen Inject 20 Units into the skin nightly 10 pen 3    insulin aspart (NOVOLOG FLEXPEN) 100 UNIT/ML injection pen 5-20 total units per Dr. Javier Newman sliding scale (base units + additional sliding scale units) 15 pen 5    glipiZIDE (GLUCOTROL XL) 10 MG extended release tablet Take 1 tablet by mouth daily Dose

## 2018-11-26 ENCOUNTER — HOSPITAL ENCOUNTER (EMERGENCY)
Age: 38
Discharge: HOME | End: 2018-11-26
Payer: MEDICARE

## 2018-11-26 VITALS
WEIGHT: 229 LBS | BODY MASS INDEX: 42.14 KG/M2 | HEART RATE: 113 BPM | OXYGEN SATURATION: 97 % | TEMPERATURE: 98.7 F | DIASTOLIC BLOOD PRESSURE: 91 MMHG | SYSTOLIC BLOOD PRESSURE: 134 MMHG | HEIGHT: 62 IN | RESPIRATION RATE: 16 BRPM

## 2018-11-26 DIAGNOSIS — L73.9 FOLLICULITIS: ICD-10-CM

## 2018-11-26 DIAGNOSIS — L02.91 ABSCESS: Primary | ICD-10-CM

## 2018-11-26 LAB
BACTERIA: ABNORMAL /HPF
BILIRUBIN URINE: NEGATIVE MG/DL
BLOOD, URINE: ABNORMAL
CLARITY: CLEAR
COLOR: YELLOW
GLUCOSE, URINE: >500 MG/DL
KETONES, URINE: NEGATIVE MG/DL
LEUKOCYTE ESTERASE, URINE: ABNORMAL
MUCUS: ABNORMAL HPF
NITRITE URINE, QUANTITATIVE: NEGATIVE
PH, URINE: 5 (ref 5–8)
PROTEIN UA: NEGATIVE MG/DL
RBC URINE: 1 /HPF (ref 0–6)
SPECIFIC GRAVITY UA: 1.03 (ref 1–1.03)
SQUAMOUS EPITHELIAL: 2 /HPF
TRANSITIONAL EPITHELIAL: <1 /HPF
TRICHOMONAS: ABNORMAL /HPF
UROBILINOGEN, URINE: NORMAL MG/DL (ref 0.2–1)
WBC UA: 1 /HPF (ref 0–5)

## 2018-11-26 PROCEDURE — 6370000000 HC RX 637 (ALT 250 FOR IP): Performed by: PHYSICIAN ASSISTANT

## 2018-11-26 PROCEDURE — 4500000027

## 2018-11-26 PROCEDURE — 99283 EMERGENCY DEPT VISIT LOW MDM: CPT

## 2018-11-26 PROCEDURE — 2500000003 HC RX 250 WO HCPCS: Performed by: PHYSICIAN ASSISTANT

## 2018-11-26 PROCEDURE — 81001 URINALYSIS AUTO W/SCOPE: CPT

## 2018-11-26 RX ORDER — DOXYCYCLINE HYCLATE 100 MG
100 TABLET ORAL 2 TIMES DAILY
Qty: 20 TABLET | Refills: 0 | Status: SHIPPED | OUTPATIENT
Start: 2018-11-26 | End: 2018-12-06

## 2018-11-26 RX ORDER — DOXYCYCLINE HYCLATE 100 MG
100 TABLET ORAL ONCE
Status: COMPLETED | OUTPATIENT
Start: 2018-11-26 | End: 2018-11-26

## 2018-11-26 RX ORDER — LIDOCAINE HYDROCHLORIDE 20 MG/ML
10 INJECTION, SOLUTION EPIDURAL; INFILTRATION; INTRACAUDAL; PERINEURAL ONCE
Status: COMPLETED | OUTPATIENT
Start: 2018-11-26 | End: 2018-11-26

## 2018-11-26 RX ORDER — HYDROCODONE BITARTRATE AND ACETAMINOPHEN 5; 325 MG/1; MG/1
1 TABLET ORAL ONCE
Status: COMPLETED | OUTPATIENT
Start: 2018-11-26 | End: 2018-11-26

## 2018-11-26 RX ADMIN — DOXYCYCLINE HYCLATE 100 MG: 100 TABLET, COATED ORAL at 16:08

## 2018-11-26 RX ADMIN — LIDOCAINE HYDROCHLORIDE 10 ML: 20 INJECTION, SOLUTION EPIDURAL; INFILTRATION; INTRACAUDAL; PERINEURAL at 15:04

## 2018-11-26 RX ADMIN — HYDROCODONE BITARTRATE AND ACETAMINOPHEN 1 TABLET: 5; 325 TABLET ORAL at 15:04

## 2018-11-26 ASSESSMENT — PAIN SCALES - GENERAL
PAINLEVEL_OUTOF10: 9
PAINLEVEL_OUTOF10: 9
PAINLEVEL_OUTOF10: 5

## 2018-11-26 ASSESSMENT — PAIN DESCRIPTION - LOCATION: LOCATION: GROIN

## 2018-11-26 ASSESSMENT — PAIN DESCRIPTION - ORIENTATION: ORIENTATION: RIGHT

## 2018-11-26 ASSESSMENT — PAIN DESCRIPTION - PAIN TYPE: TYPE: ACUTE PAIN

## 2018-11-26 NOTE — ED PROVIDER NOTES
Anxiety 10/24/2012    Atopic dermatitis     Dr. Shahida Sunshine- claritin    Back pain 10/24/2012    Depression     Diabetes mellitus ([facility]     Diabetes mellitus type II, uncontrolled ([facility]     Diabetic eye exam: 2011, Dr. Leilani Flores , needs record [V72.0T][    GERD (gastroesophageal reflux disease)     Headache(784.0)     Hyperlipidemia LDL goal < 100 2012    Hypertension     Morbid obesity ([facility]     No retinopathy on exam     Dr Leilani Flores 13 Diabetes with no significatn diabetic retinopathy found in both eyes    Non compliance w medication regimen 2014    Sprain of neck 10/4/2011       SURGICAL HISTORY    Past Surgical History:   Procedure Laterality Date     SECTION      HYSTERECTOMY      OTHER SURGICAL HISTORY  2017    I&D of right perineal abcess        CURRENT MEDICATIONS    Current Outpatient Rx   Medication Sig Dispense Refill    doxycycline hyclate (VIBRA-TABS) 100 MG tablet Take 1 tablet by mouth 2 times daily for 10 days 20 tablet 0    metroNIDAZOLE (FLAGYL) 500 MG tablet Take 1 tablet by mouth 2 times daily for 7 days 14 tablet 0    Cranberry-Vitamin C (AZO CRANBERRY URINARY TRACT) 250-60 MG CAPS Take 1 capsule by mouth daily for 15 days 15 capsule 0    lisinopril (PRINIVIL;ZESTRIL) 2.5 MG tablet TAKE ONE (1) TABLET BY MOUTH DAILY.  90 tablet 3    atorvastatin (LIPITOR) 40 MG tablet Take 1 tablet by mouth daily 90 tablet 3    insulin glargine (LANTUS SOLOSTAR) 100 UNIT/ML injection pen Inject 20 Units into the skin nightly 10 pen 3    insulin aspart (NOVOLOG FLEXPEN) 100 UNIT/ML injection pen 5-20 total units per Dr. Renetta Castillo sliding scale (base units + additional sliding scale units) 15 pen 5    glipiZIDE (GLUCOTROL XL) 10 MG extended release tablet Take 1 tablet by mouth daily Dose change as of 10/15/18 90 tablet 3    Insulin Pen Needle 32G X 5 MM MISC To use with Lantus pen and Novolog pen 30 each 11    blood glucose test strips (EXACTECH TEST) strip 1 Folliculitis           Patient agrees to have wound check in 48-72 hours. I discussed the recommendation of application of tea tree oil, especially in the early phase-patient can also use in bath during soak. Again the diagnosis, plan, medications, wound care and close followup were discussed in detail with patient who understands and agrees with the plan. Diagnosis and plan discussed in detail with patient who understands and agrees. Patient agrees to return emergency department if symptoms worsen or any new symptoms develop. Comment: Please note this report has been produced using speech recognition software and may contain errors related to that system including errors in grammar, punctuation, and spelling, as well as words and phrases that may be inappropriate. If there are any questions or concerns please feel free to contact the dictating provider for clarification.         Jared Chavez PA-C  11/26/18 [id number]

## 2018-11-26 NOTE — ED TRIAGE NOTES
Pt presents to ED for abscess to right groin that started on Saturday. Pt states she was started on an antibiotic 2 weeks ago for UTI and they called Friday and started her on Flagyl. Pt states she had abscess in the same area 1 years ago and required surgery for it.

## 2018-12-11 ENCOUNTER — OFFICE VISIT (OUTPATIENT)
Dept: FAMILY MEDICINE CLINIC | Age: 38
End: 2018-12-11
Payer: MEDICARE

## 2018-12-11 VITALS
HEART RATE: 100 BPM | DIASTOLIC BLOOD PRESSURE: 68 MMHG | SYSTOLIC BLOOD PRESSURE: 114 MMHG | HEIGHT: 64 IN | BODY MASS INDEX: 39.3 KG/M2 | OXYGEN SATURATION: 98 % | WEIGHT: 230.2 LBS

## 2018-12-11 LAB — HBA1C MFR BLD: 10.8 %

## 2018-12-11 PROCEDURE — 99213 OFFICE O/P EST LOW 20 MIN: CPT | Performed by: FAMILY MEDICINE

## 2018-12-11 PROCEDURE — 83036 HEMOGLOBIN GLYCOSYLATED A1C: CPT | Performed by: FAMILY MEDICINE

## 2018-12-11 PROCEDURE — 2022F DILAT RTA XM EVC RTNOPTHY: CPT | Performed by: FAMILY MEDICINE

## 2018-12-11 PROCEDURE — G8417 CALC BMI ABV UP PARAM F/U: HCPCS | Performed by: FAMILY MEDICINE

## 2018-12-11 PROCEDURE — G8427 DOCREV CUR MEDS BY ELIG CLIN: HCPCS | Performed by: FAMILY MEDICINE

## 2018-12-11 PROCEDURE — 3046F HEMOGLOBIN A1C LEVEL >9.0%: CPT | Performed by: FAMILY MEDICINE

## 2018-12-11 PROCEDURE — G8482 FLU IMMUNIZE ORDER/ADMIN: HCPCS | Performed by: FAMILY MEDICINE

## 2018-12-11 PROCEDURE — 1036F TOBACCO NON-USER: CPT | Performed by: FAMILY MEDICINE

## 2019-01-17 ENCOUNTER — CARE COORDINATION (OUTPATIENT)
Dept: CARE COORDINATION | Age: 39
End: 2019-01-17

## 2019-02-21 ENCOUNTER — CARE COORDINATION (OUTPATIENT)
Dept: CARE COORDINATION | Age: 39
End: 2019-02-21

## 2019-03-14 ENCOUNTER — TELEPHONE (OUTPATIENT)
Dept: FAMILY MEDICINE CLINIC | Age: 39
End: 2019-03-14

## 2019-03-21 ENCOUNTER — CARE COORDINATION (OUTPATIENT)
Dept: CARE COORDINATION | Age: 39
End: 2019-03-21

## 2019-04-01 ENCOUNTER — OFFICE VISIT (OUTPATIENT)
Dept: FAMILY MEDICINE CLINIC | Age: 39
End: 2019-04-01
Payer: MEDICARE

## 2019-04-01 VITALS
RESPIRATION RATE: 14 BRPM | BODY MASS INDEX: 39.88 KG/M2 | SYSTOLIC BLOOD PRESSURE: 130 MMHG | WEIGHT: 233.6 LBS | HEIGHT: 64 IN | OXYGEN SATURATION: 98 % | DIASTOLIC BLOOD PRESSURE: 72 MMHG | HEART RATE: 82 BPM

## 2019-04-01 DIAGNOSIS — E66.01 MORBID OBESITY WITH BMI OF 40.0-44.9, ADULT (HCC): ICD-10-CM

## 2019-04-01 DIAGNOSIS — I10 DIABETES MELLITUS WITH COINCIDENT HYPERTENSION (HCC): ICD-10-CM

## 2019-04-01 DIAGNOSIS — E11.9 DIABETES MELLITUS WITH COINCIDENT HYPERTENSION (HCC): ICD-10-CM

## 2019-04-01 DIAGNOSIS — E78.5 HYPERLIPIDEMIA ASSOCIATED WITH TYPE 2 DIABETES MELLITUS (HCC): ICD-10-CM

## 2019-04-01 DIAGNOSIS — E11.69 HYPERLIPIDEMIA ASSOCIATED WITH TYPE 2 DIABETES MELLITUS (HCC): ICD-10-CM

## 2019-04-01 LAB — HBA1C MFR BLD: 11.9 %

## 2019-04-01 PROCEDURE — G8417 CALC BMI ABV UP PARAM F/U: HCPCS | Performed by: FAMILY MEDICINE

## 2019-04-01 PROCEDURE — 99214 OFFICE O/P EST MOD 30 MIN: CPT | Performed by: FAMILY MEDICINE

## 2019-04-01 PROCEDURE — 1036F TOBACCO NON-USER: CPT | Performed by: FAMILY MEDICINE

## 2019-04-01 PROCEDURE — 3046F HEMOGLOBIN A1C LEVEL >9.0%: CPT | Performed by: FAMILY MEDICINE

## 2019-04-01 PROCEDURE — 2022F DILAT RTA XM EVC RTNOPTHY: CPT | Performed by: FAMILY MEDICINE

## 2019-04-01 PROCEDURE — 83036 HEMOGLOBIN GLYCOSYLATED A1C: CPT | Performed by: FAMILY MEDICINE

## 2019-04-01 PROCEDURE — G8427 DOCREV CUR MEDS BY ELIG CLIN: HCPCS | Performed by: FAMILY MEDICINE

## 2019-04-01 ASSESSMENT — PATIENT HEALTH QUESTIONNAIRE - PHQ9
SUM OF ALL RESPONSES TO PHQ9 QUESTIONS 1 & 2: 0
SUM OF ALL RESPONSES TO PHQ QUESTIONS 1-9: 0
2. FEELING DOWN, DEPRESSED OR HOPELESS: 0
SUM OF ALL RESPONSES TO PHQ QUESTIONS 1-9: 0
1. LITTLE INTEREST OR PLEASURE IN DOING THINGS: 0

## 2019-04-01 NOTE — PROGRESS NOTES
Chief Complaint   Patient presents with    3 Month Follow-Up    Diabetes     Only taking glipizide at this point. Stopped lantus and novolog 4 week ago due to skin issues with injection    HTN- Patient denies any exertional chest pain, dyspnea, palpitations, syncope, orthopnea, edema or paroxysmal nocturnal dyspnea. No side effects for blood pressure medications. HLD- on statin and tolerating. reports that she quit smoking about 21 months ago. Her smoking use included cigarettes. She smoked 0.50 packs per day. She has never used smokeless tobacco.   reports that she drinks alcohol. Diabetes Mellitus Type 2: Current symptoms/problems include none. Diabetes Management   Topic Date Due    Diabetic foot exam  06/09/2017    Lipid screen  10/18/2018       Tobacco history: She  reports that she quit smoking about 21 months ago. Her smoking use included cigarettes. She smoked 0.50 packs per day. She has never used smokeless tobacco.   Daily Aspirin?  Yes  Known diabetic complications: none    Lab Results   Component Value Date    LABA1C 11.9 04/01/2019    LABA1C 10.8 12/11/2018    LABA1C 12.9 10/15/2018     Lab Results   Component Value Date    CREATININE 0.6 10/18/2017     Lab Results   Component Value Date    ALT 26 10/18/2017    AST 17 10/18/2017     Lab Results   Component Value Date    CHOL 168 10/18/2017    TRIG 76 10/18/2017    HDL 53 10/18/2017    LDLCALC 100 10/18/2017    LDLDIRECT 114 (H) 07/13/2012          Physical Exam   Nursing note reviewed  /72 (Site: Right Upper Arm, Position: Sitting, Cuff Size: Large Adult)   Pulse 82   Resp 14   Ht 5' 3.5\" (1.613 m)   Wt 233 lb 9.6 oz (106 kg)   SpO2 98%   BMI 40.73 kg/m²   BP Readings from Last 3 Encounters:   04/01/19 130/72   12/11/18 114/68   11/26/18 (!) 134/91     Wt Readings from Last 3 Encounters:   04/01/19 233 lb 9.6 oz (106 kg)   12/11/18 230 lb 3.2 oz (104.4 kg)   11/26/18 229 lb (103.9 kg)     Body mass index is 40.73 kg/m². Constitutional: She is oriented to person, place, and time. She  In good spirits today. HENT: Head: Normocephalic and atraumatic. Ears normal bilaterally Nose: Nose normal. No sinus tenderness  Mouth/Throat: Oropharynx is clear and moist.   Cardiovascular: Normal rate, regular rhythm, S1 normal, S2 normal and normal heart sounds. No murmur heard. Pulmonary/Chest: Breath sounds normal. No accessory muscle usage. No respiratory distress. She  has no wheezes. Abdominal: Soft. Normal appearance and bowel sounds are normal.  No tenderness. Neurological: She  is alert and oriented to person, place, and time. Skin: She  is not diaphoretic. Psychiatric: She  has a normal mood and affect. Musculoskeletal:  Walks without antalgic gait. No LE edema      Diagnosis Assessment and Associated Orders:      Diagnosis Orders   1. Uncontrolled type 2 diabetes mellitus with complication, with long-term current use of insulin ([facility])  POCT glycosylated hemoglobin (Hb A1C)    insulin glargine (LANTUS SOLOSTAR) 100 UNIT/ML injection pen    insulin aspart (NOVOLOG FLEXPEN) 100 UNIT/ML injection pen   2. Morbid obesity with BMI of 40.0-44.9, adult ([address]     3. Morbid obesity with BMI of 40.0-44.9, adult ([address]     4. Hyperlipidemia associated with type 2 diabetes mellitus ([address]     5. Diabetes mellitus with coincident hypertension ([address]         Noncompliant with insulins due to fear of skin infection from last injection in Feb. Patient will plan to restart her insulins with lantus at 25 units and novology  Base units + SSI per Dr. Durham Hines prevoius instructions and switch her to coverage during lunch and dinner, her two biggest meals   She will check into [facility] coverage. Quality & Risk Score Accuracy    Visit Dx:  T02.80, Z68.41 - Morbid obesity with BMI of 40.0-44.9, adult ([address]  The current BMI is 40.73 kg/m2 as of 04/02/19 13:01 EDT  Assessment and plan:   Worsening based upon symptoms and exam. Patient will work on cutting down portions  Visit Dx:  E11.69, E78.5 - Hyperlipidemia associated with type 2 diabetes mellitus ([email or web address]  Assessment and plan:  Stable based upon symptoms and exam. Continue current treatment plan with statin and follow up at least yearly. Visit Dx:  E11.9, I10 - Diabetes mellitus with coincident hypertension ([facility]  Assessment and plan:  Stable based upon symptoms and exam. Continue current treatment plan and follow up at least yearly. Last edited 04/02/19 13:03 EDT by Samira Harris MD             All patient questions answered. Pt voiced understanding. Return in about 3 months (around 7/1/2019) for diabetes, HTN, and will do blood work at that visit .

## 2019-05-13 ENCOUNTER — CARE COORDINATION (OUTPATIENT)
Dept: CARE COORDINATION | Age: 39
End: 2019-05-13

## 2019-05-13 NOTE — CARE COORDINATION
barriers: Patient will seek assistance from [address] [place] [address] to encourage adherence to recommended diet. Confidence: 5/10  Anticipated Goal Completion Date: 7/13/2019 5/13/19 - Pt reports improvement in diet, decreased appetite & items high in sugar no longer taste the same. Will await updated A1C before completing this goal.            Prior to Admission medications    Medication Sig Start Date End Date Taking? Authorizing Provider   insulin glargine (LANTUS SOLOSTAR) 100 UNIT/ML injection pen Inject 25 Units into the skin nightly As of 12/11/2018 4/1/19  Yes Mark To MD   Dulaglutide 0.75 MG/0.5ML SOPN Inject 0.75 mg into the skin every 7 days 4/1/19  Yes Mark To MD   lisinopril (PRINIVIL;ZESTRIL) 2.5 MG tablet TAKE ONE (1) TABLET BY MOUTH DAILY. 10/15/18  Yes Mark To MD   atorvastatin (LIPITOR) 40 MG tablet Take 1 tablet by mouth daily 10/15/18  Yes Mark To MD   glipiZIDE (GLUCOTROL XL) 10 MG extended release tablet Take 1 tablet by mouth daily Dose change as of 10/15/18 10/15/18  Yes Mark To MD   tiZANidine (ZANAFLEX) 4 MG tablet Take 1 tablet by mouth every 8 hours as needed (for muscle spasms) 7/24/17  Yes Mark To MD   aspirin EC 81 MG EC tablet Take 1 tablet by mouth daily 6/9/16  Yes Mark To MD   cetirizine (ZYRTEC) 5 MG tablet Take 5 mg by mouth daily.    Yes Historical Provider, MD   insulin aspart (NOVOLOG FLEXPEN) 100 UNIT/ML injection pen 5-20 total units per Dr. Bernardo Wise sliding scale (base units + additional sliding scale units) 4/1/19   Mark To MD   Insulin Pen Needle 32G X 5 MM MISC To use with Lantus pen and Novolog pen 7/16/18   Mark To MD   blood glucose test strips (EXACTECH TEST) strip 1 each by In Vitro route daily Embrace test strips to test BS 4x daily 7/16/18   Mark To MD Lancets MISC To test up to 4 times per day- nighttime and with each meal 7/16/18   Artur Lr MD   ondansetron [facility]) 4 MG tablet Take 1 tablet by mouth every 8 hours as needed for Nausea or Vomiting 8/9/17   ALISTAIR Perez - CNP       Future Appointments   Date Time Provider Cynthia Namita   7/1/2019 11:30 AM Artur Lr MD [facility]     ,   Diabetes Assessment    Medic Alert ID:  No  Meal Planning:  Avoidance of concentrated sweets   How often do you test your blood sugar?:  Daily (Comment: 1-2x daily)   Do you have barriers with adherence to non-pharmacologic self-management interventions? (Nutrition/Exercise/Self-Monitoring):  No   Have you ever had to go to the ED for symptoms of low blood sugar?:  No       Increase or Decrease trend in Blood Sugars   Do you have hyperglycemia symptoms?:  No   Do you have hypoglycemia symptoms?:  No   Last Blood Sugar Value:  120   Blood Sugar Trends:  Steady Decrease (Comment: fasting averages  & evening readings no more than 135)       and   General Assessment    Do you have any symptoms that are causing concern?:  No     Cecile Griggs RN  Nurse Care Coordinator  [phone] [email or web address]

## 2019-06-25 ENCOUNTER — CARE COORDINATION (OUTPATIENT)
Dept: CARE COORDINATION | Age: 39
End: 2019-06-25

## 2019-06-25 NOTE — CARE COORDINATION
Ambulatory Care Coordination Note  6/25/2019  CM Risk Score: 2  Tracey Mortality Risk Score:      ACC: Rockford Saint, RN    Summary Note: Call to check on pt status. Reminded pt of upcoming PCP OV scheduled for 7/1. Pt reports she is doing well. Home glucose readings are averaging between 100-150 per pt report. Pt denies any barriers to accessing medications or care. Will continue to monitor pending updated A1C at next OV. Care Coordination Interventions    Program Enrollment:  Rising Risk  Referral from Primary Care Provider:  No  Suggested Interventions and Community Resources  Diabetes Education: In Process  Registered Dietician:  Not Started         Goals Addressed                 This Visit's Progress       Patient Stated     Patient will achieve an A1C less than or equal to 9% (pt-stated)   Improving     Patient will achieve an A1C less than or equal to 9%    Barriers: lack of motivation and non adherent to recommended diet & medications  Plan for overcoming my barriers: Patient will be adherent to diet & medication recommendations. Confidence: 5/10  Anticipated Goal Completion Date: 7/13/2019 6/25/19 - Pt reports average glucose readings at home are between 100-150. Awaiting new A1C check at next OV scheduled for 7/1/19.  5/13/19 - Patient reports improvement in home glucose readings with addition of Trulicity, awaiting new A1C at next OV in July 2019. Other     Medication Management   Improving     I will notify my provider for advice before I stop taking any of my medication. Barriers: lack of education  Plan for overcoming my barriers: Patient will seek assistance from [facility] to communicate issues with medication compliance. Confidence: 6/10  Anticipated Goal Completion Date: 7/13/2019 6/25/19 - Pt reports compliance with all medications  5/13/19 - Pending review at next OV            Prior to Admission medications    Medication Sig Start Date End Date Taking?  Authorizing No  Meal Planning:  Avoidance of concentrated sweets   How often do you test your blood sugar?:  Daily (Comment: 1-2x daily)   Do you have barriers with adherence to non-pharmacologic self-management interventions? (Nutrition/Exercise/Self-Monitoring):  No   Have you ever had to go to the ED for symptoms of low blood sugar?:  No       No patient-reported symptoms   Blood Sugar Trends:  Steady Decrease (Comment: average readings between 100-150 per pt)       and   General Assessment    Do you have any symptoms that are causing concern?:  No     Yen Harris RN  Nurse Care Coordinator  [phone] office/cell  [email or web address]

## 2019-07-01 ENCOUNTER — OFFICE VISIT (OUTPATIENT)
Dept: FAMILY MEDICINE CLINIC | Age: 39
End: 2019-07-01
Payer: MEDICARE

## 2019-07-01 VITALS
HEART RATE: 83 BPM | WEIGHT: 232 LBS | OXYGEN SATURATION: 98 % | DIASTOLIC BLOOD PRESSURE: 78 MMHG | BODY MASS INDEX: 41.11 KG/M2 | HEIGHT: 63 IN | SYSTOLIC BLOOD PRESSURE: 110 MMHG

## 2019-07-01 DIAGNOSIS — Z00.00 ROUTINE GENERAL MEDICAL EXAMINATION AT A HEALTH CARE FACILITY: Primary | ICD-10-CM

## 2019-07-01 DIAGNOSIS — S81.811A SKIN TEAR OF LOWER LEG WITHOUT COMPLICATION, RIGHT, INITIAL ENCOUNTER: ICD-10-CM

## 2019-07-01 LAB
ANION GAP SERPL CALCULATED.3IONS-SCNC: 17 MMOL/L (ref 3–16)
BUN BLDV-MCNC: 7 MG/DL (ref 7–20)
CALCIUM SERPL-MCNC: 9.5 MG/DL (ref 8.3–10.6)
CHLORIDE BLD-SCNC: 106 MMOL/L (ref 99–110)
CHOLESTEROL, TOTAL: 86 MG/DL (ref 0–199)
CO2: 21 MMOL/L (ref 21–32)
CREAT SERPL-MCNC: 0.5 MG/DL (ref 0.6–1.1)
GFR AFRICAN AMERICAN: >60
GFR NON-AFRICAN AMERICAN: >60
GLUCOSE BLD-MCNC: 165 MG/DL (ref 70–99)
HBA1C MFR BLD: 9.4 %
HDLC SERPL-MCNC: 40 MG/DL (ref 40–60)
LDL CHOLESTEROL CALCULATED: 31 MG/DL
POTASSIUM SERPL-SCNC: 4 MMOL/L (ref 3.5–5.1)
SODIUM BLD-SCNC: 144 MMOL/L (ref 136–145)
TRIGL SERPL-MCNC: 73 MG/DL (ref 0–150)
VLDLC SERPL CALC-MCNC: 15 MG/DL

## 2019-07-01 PROCEDURE — G8417 CALC BMI ABV UP PARAM F/U: HCPCS | Performed by: FAMILY MEDICINE

## 2019-07-01 PROCEDURE — 3046F HEMOGLOBIN A1C LEVEL >9.0%: CPT | Performed by: FAMILY MEDICINE

## 2019-07-01 PROCEDURE — 1036F TOBACCO NON-USER: CPT | Performed by: FAMILY MEDICINE

## 2019-07-01 PROCEDURE — 83036 HEMOGLOBIN GLYCOSYLATED A1C: CPT | Performed by: FAMILY MEDICINE

## 2019-07-01 PROCEDURE — 2022F DILAT RTA XM EVC RTNOPTHY: CPT | Performed by: FAMILY MEDICINE

## 2019-07-01 PROCEDURE — 36415 COLL VENOUS BLD VENIPUNCTURE: CPT | Performed by: FAMILY MEDICINE

## 2019-07-01 PROCEDURE — G8427 DOCREV CUR MEDS BY ELIG CLIN: HCPCS | Performed by: FAMILY MEDICINE

## 2019-07-01 PROCEDURE — 99213 OFFICE O/P EST LOW 20 MIN: CPT | Performed by: FAMILY MEDICINE

## 2019-07-01 PROCEDURE — G0438 PPPS, INITIAL VISIT: HCPCS | Performed by: FAMILY MEDICINE

## 2019-07-01 ASSESSMENT — LIFESTYLE VARIABLES: HOW OFTEN DO YOU HAVE A DRINK CONTAINING ALCOHOL: 0

## 2019-07-01 ASSESSMENT — PATIENT HEALTH QUESTIONNAIRE - PHQ9
SUM OF ALL RESPONSES TO PHQ QUESTIONS 1-9: 0
SUM OF ALL RESPONSES TO PHQ QUESTIONS 1-9: 0

## 2019-07-01 ASSESSMENT — ANXIETY QUESTIONNAIRES: GAD7 TOTAL SCORE: 0

## 2019-07-01 NOTE — PROGRESS NOTES
[address] 10/18/2017    LDLDIRECT 114 (H) 07/13/2012          PHQ Scores 7/1/2019 4/1/2019 1/2/2018 12/19/2016 10/14/2015 10/13/2015 1/5/2015   PHQ2 Score 0 0 0 0 0 0 0   PHQ9 Score 0 0 0 0 3 0 3     Interpretation of Total Score Depression Severity: 1-4 = Minimal depression, 5-9 = Mild depression, 10-14 = Moderate depression, 15-19 = Moderately severe depression, 20-27 = Severe depression      ROS: Pertinent items are noted in HPI. All other ROS negative     reports that she quit smoking about 2 years ago. Her smoking use included cigarettes. She smoked 0.50 packs per day. She has never used smokeless tobacco.   reports that she drinks alcohol. Physical Exam   Nursing note reviewed  /78 (Site: Right Upper Arm, Position: Sitting, Cuff Size: Large Adult)   Pulse 83   Ht 5' 2.6\" (1.59 m)   Wt 232 lb (105.2 kg)   SpO2 98%   BMI 41.63 kg/m²   BP Readings from Last 3 Encounters:   07/01/19 110/78   04/01/19 130/72   12/11/18 114/68     Wt Readings from Last 3 Encounters:   07/01/19 232 lb (105.2 kg)   04/01/19 233 lb 9.6 oz (106 kg)   12/11/18 230 lb 3.2 oz (104.4 kg)     Results for POC orders placed in visit on 07/01/19   POCT glycosylated hemoglobin (Hb A1C)   Result Value Ref Range    Hemoglobin A1C 9.4 %       General appearance: cooperative   Neck: supple, symmetrical, trachea midline  Lungs: clear to auscultation bilaterally  Heart: regular rate and rhythm, S1, S2 normal,  Abdomen:  bowel sounds normal and soft, non-tender  \Neurologic: Grossly normal   Psych: Alert and oriented, appropriate affect.   Physical Exam   Musculoskeletal:        Feet:        Assessment and Plan: See above
9.6 oz (106 kg)   12/11/18 230 lb 3.2 oz (104.4 kg)     Vitals:    07/01/19 1127   BP: 110/78   Site: Right Upper Arm   Position: Sitting   Cuff Size: Large Adult   Pulse: 83   SpO2: 98%   Weight: 232 lb (105.2 kg)   Height: 5' 2.6\" (1.59 m)     Body mass index is 41.63 kg/m². Based upon direct observation of the patient, evaluation of cognition reveals recent and remote memory intact. Patient's complete Health Risk Assessment and screening values have been reviewed and are found in Flowsheets. The following problems were reviewed today and where indicated follow up appointments were made and/or referrals ordered. Positive Risk Factor Screenings with Interventions:     General Health:  General  In general, how would you say your health is?: Fair  In the past 7 days, have you experienced any of the following? New or Increased Pain, New or Increased Fatigue, Loneliness, Social Isolation, Stress or Anger?: (!) New or Increased Pain  Do you get the social and emotional support that you need?: (!) No  Do you have a Living Will?: (!) No  General Health Risk Interventions:  · see OV note for increased pain    Health Habits/Nutrition:  Health Habits/Nutrition  Do you exercise for at least 20 minutes 2-3 times per week?: Yes  Have you lost any weight without trying in the past 3 months?: (!) Yes  Do you eat fewer than 2 meals per day?: (!) Yes  Have you seen a dentist within the past year?: (!) No  Body mass index is 41.63 kg/m².   Health Habits/Nutrition Interventions:  · Inadequate physical activity:  patient is not ready to increase his/her physical activity level at this time    Safety:  Safety  Do you have working smoke detectors?: Yes  Have all throw rugs been removed or fastened?: Yes  Do you have non-slip mats in all bathtubs?: (!) No  Do all of your stairways have a railing or banister?: Yes  Are your doorways, halls and stairs free of clutter?: Yes  Do you always fasten your seatbelt when you are in a car?:

## 2019-07-09 RX ORDER — DULAGLUTIDE 0.75 MG/.5ML
INJECTION, SOLUTION SUBCUTANEOUS
Qty: 2 ML | Refills: 2 | Status: SHIPPED | OUTPATIENT
Start: 2019-07-09 | End: 2019-11-01 | Stop reason: SDUPTHER

## 2019-08-07 ENCOUNTER — CARE COORDINATION (OUTPATIENT)
Dept: CARE COORDINATION | Age: 39
End: 2019-08-07

## 2019-10-02 ENCOUNTER — CARE COORDINATION (OUTPATIENT)
Dept: CARE COORDINATION | Age: 39
End: 2019-10-02

## 2019-10-08 ENCOUNTER — OFFICE VISIT (OUTPATIENT)
Dept: FAMILY MEDICINE CLINIC | Age: 39
End: 2019-10-08
Payer: MEDICARE

## 2019-10-08 VITALS
BODY MASS INDEX: 39.19 KG/M2 | OXYGEN SATURATION: 99 % | HEIGHT: 63 IN | WEIGHT: 221.2 LBS | SYSTOLIC BLOOD PRESSURE: 120 MMHG | HEART RATE: 91 BPM | DIASTOLIC BLOOD PRESSURE: 68 MMHG

## 2019-10-08 DIAGNOSIS — I10 DIABETES MELLITUS WITH COINCIDENT HYPERTENSION (HCC): ICD-10-CM

## 2019-10-08 DIAGNOSIS — Z12.31 ENCOUNTER FOR SCREENING MAMMOGRAM FOR BREAST CANCER: ICD-10-CM

## 2019-10-08 DIAGNOSIS — E11.9 DIABETES MELLITUS WITH COINCIDENT HYPERTENSION (HCC): ICD-10-CM

## 2019-10-08 DIAGNOSIS — Z23 NEED FOR INFLUENZA VACCINATION: ICD-10-CM

## 2019-10-08 DIAGNOSIS — E78.5 HYPERLIPIDEMIA ASSOCIATED WITH TYPE 2 DIABETES MELLITUS (HCC): ICD-10-CM

## 2019-10-08 DIAGNOSIS — E11.69 HYPERLIPIDEMIA ASSOCIATED WITH TYPE 2 DIABETES MELLITUS (HCC): ICD-10-CM

## 2019-10-08 LAB — HBA1C MFR BLD: 9.1 %

## 2019-10-08 PROCEDURE — G8482 FLU IMMUNIZE ORDER/ADMIN: HCPCS | Performed by: FAMILY MEDICINE

## 2019-10-08 PROCEDURE — 1036F TOBACCO NON-USER: CPT | Performed by: FAMILY MEDICINE

## 2019-10-08 PROCEDURE — 3046F HEMOGLOBIN A1C LEVEL >9.0%: CPT | Performed by: FAMILY MEDICINE

## 2019-10-08 PROCEDURE — G0008 ADMIN INFLUENZA VIRUS VAC: HCPCS | Performed by: FAMILY MEDICINE

## 2019-10-08 PROCEDURE — 90686 IIV4 VACC NO PRSV 0.5 ML IM: CPT | Performed by: FAMILY MEDICINE

## 2019-10-08 PROCEDURE — 83036 HEMOGLOBIN GLYCOSYLATED A1C: CPT | Performed by: FAMILY MEDICINE

## 2019-10-08 PROCEDURE — G8427 DOCREV CUR MEDS BY ELIG CLIN: HCPCS | Performed by: FAMILY MEDICINE

## 2019-10-08 PROCEDURE — 99214 OFFICE O/P EST MOD 30 MIN: CPT | Performed by: FAMILY MEDICINE

## 2019-10-08 PROCEDURE — G8417 CALC BMI ABV UP PARAM F/U: HCPCS | Performed by: FAMILY MEDICINE

## 2019-10-08 PROCEDURE — 2022F DILAT RTA XM EVC RTNOPTHY: CPT | Performed by: FAMILY MEDICINE

## 2019-10-08 RX ORDER — ATORVASTATIN CALCIUM 40 MG/1
40 TABLET, FILM COATED ORAL EVERY MORNING
Qty: 90 TABLET | Refills: 3 | Status: SHIPPED | OUTPATIENT
Start: 2019-10-08 | End: 2020-09-11 | Stop reason: SDUPTHER

## 2019-10-08 RX ORDER — LISINOPRIL 2.5 MG/1
2.5 TABLET ORAL EVERY MORNING
Qty: 90 TABLET | Refills: 3 | Status: SHIPPED | OUTPATIENT
Start: 2019-10-08 | End: 2020-09-11 | Stop reason: SDUPTHER

## 2019-10-08 RX ORDER — GLIPIZIDE 10 MG/1
10 TABLET, FILM COATED, EXTENDED RELEASE ORAL EVERY MORNING
Qty: 90 TABLET | Refills: 3 | Status: SHIPPED | OUTPATIENT
Start: 2019-10-08 | End: 2020-09-11 | Stop reason: SDUPTHER

## 2019-10-28 ENCOUNTER — TELEPHONE (OUTPATIENT)
Dept: FAMILY MEDICINE CLINIC | Age: 39
End: 2019-10-28

## 2019-10-28 DIAGNOSIS — N63.20 LEFT BREAST MASS: Primary | ICD-10-CM

## 2019-10-28 DIAGNOSIS — N62 HYPERTROPHY OF BREAST: ICD-10-CM

## 2019-11-01 ENCOUNTER — HOSPITAL ENCOUNTER (OUTPATIENT)
Age: 39
Setting detail: SPECIMEN
Discharge: HOME | End: 2019-11-01
Payer: MEDICARE

## 2019-11-01 ENCOUNTER — HOSPITAL ENCOUNTER (OUTPATIENT)
Dept: ULTRASOUND IMAGING | Age: 39
Discharge: HOME | End: 2019-11-01
Payer: MEDICARE

## 2019-11-01 ENCOUNTER — HOSPITAL ENCOUNTER (OUTPATIENT)
Dept: WOMENS IMAGING | Age: 39
Discharge: HOME | End: 2019-11-01
Payer: MEDICARE

## 2019-11-01 DIAGNOSIS — N62 HYPERTROPHY OF BREAST: ICD-10-CM

## 2019-11-01 DIAGNOSIS — N63.20 LEFT BREAST MASS: ICD-10-CM

## 2019-11-01 LAB
CREATININE URINE: 200.7 MG/DL (ref 28–217)
MICROALBUMIN/CREAT 24H UR: 2.5 MG/DL
MICROALBUMIN/CREAT UR-RTO: 12.5 MG/G CREAT (ref 0–30)

## 2019-11-01 PROCEDURE — 82570 ASSAY OF URINE CREATININE: CPT

## 2019-11-01 PROCEDURE — 82043 UR ALBUMIN QUANTITATIVE: CPT

## 2019-11-01 PROCEDURE — 77066 DX MAMMO INCL CAD BI: CPT

## 2019-11-01 PROCEDURE — 76642 ULTRASOUND BREAST LIMITED: CPT

## 2019-11-01 RX ORDER — DULAGLUTIDE 0.75 MG/.5ML
INJECTION, SOLUTION SUBCUTANEOUS
Qty: 2 PEN | Refills: 2 | Status: SHIPPED | OUTPATIENT
Start: 2019-11-01 | End: 2020-01-09 | Stop reason: DRUGHIGH

## 2019-11-08 ENCOUNTER — CARE COORDINATION (OUTPATIENT)
Dept: CARE COORDINATION | Age: 39
End: 2019-11-08

## 2019-12-06 ENCOUNTER — CARE COORDINATION (OUTPATIENT)
Dept: CARE COORDINATION | Age: 39
End: 2019-12-06

## 2020-01-09 ENCOUNTER — OFFICE VISIT (OUTPATIENT)
Dept: FAMILY MEDICINE CLINIC | Age: 40
End: 2020-01-09
Payer: MEDICARE

## 2020-01-09 VITALS
SYSTOLIC BLOOD PRESSURE: 124 MMHG | WEIGHT: 217.2 LBS | HEART RATE: 82 BPM | RESPIRATION RATE: 16 BRPM | BODY MASS INDEX: 38.48 KG/M2 | DIASTOLIC BLOOD PRESSURE: 76 MMHG | OXYGEN SATURATION: 97 % | HEIGHT: 63 IN

## 2020-01-09 PROBLEM — M54.31 BILATERAL SCIATICA: Status: ACTIVE | Noted: 2020-01-09

## 2020-01-09 PROBLEM — M54.32 BILATERAL SCIATICA: Status: ACTIVE | Noted: 2020-01-09

## 2020-01-09 LAB — HBA1C MFR BLD: 10.3 %

## 2020-01-09 PROCEDURE — G8482 FLU IMMUNIZE ORDER/ADMIN: HCPCS | Performed by: FAMILY MEDICINE

## 2020-01-09 PROCEDURE — 2022F DILAT RTA XM EVC RTNOPTHY: CPT | Performed by: FAMILY MEDICINE

## 2020-01-09 PROCEDURE — G8427 DOCREV CUR MEDS BY ELIG CLIN: HCPCS | Performed by: FAMILY MEDICINE

## 2020-01-09 PROCEDURE — 3046F HEMOGLOBIN A1C LEVEL >9.0%: CPT | Performed by: FAMILY MEDICINE

## 2020-01-09 PROCEDURE — 83036 HEMOGLOBIN GLYCOSYLATED A1C: CPT | Performed by: FAMILY MEDICINE

## 2020-01-09 PROCEDURE — 1036F TOBACCO NON-USER: CPT | Performed by: FAMILY MEDICINE

## 2020-01-09 PROCEDURE — G8417 CALC BMI ABV UP PARAM F/U: HCPCS | Performed by: FAMILY MEDICINE

## 2020-01-09 PROCEDURE — 99214 OFFICE O/P EST MOD 30 MIN: CPT | Performed by: FAMILY MEDICINE

## 2020-01-09 ASSESSMENT — PATIENT HEALTH QUESTIONNAIRE - PHQ9
1. LITTLE INTEREST OR PLEASURE IN DOING THINGS: 0
SUM OF ALL RESPONSES TO PHQ QUESTIONS 1-9: 0
2. FEELING DOWN, DEPRESSED OR HOPELESS: 0
SUM OF ALL RESPONSES TO PHQ9 QUESTIONS 1 & 2: 0
SUM OF ALL RESPONSES TO PHQ QUESTIONS 1-9: 0

## 2020-05-05 ENCOUNTER — VIRTUAL VISIT (OUTPATIENT)
Dept: FAMILY MEDICINE CLINIC | Age: 40
End: 2020-05-05
Payer: MEDICARE

## 2020-05-05 PROCEDURE — G8428 CUR MEDS NOT DOCUMENT: HCPCS | Performed by: FAMILY MEDICINE

## 2020-05-05 PROCEDURE — 2022F DILAT RTA XM EVC RTNOPTHY: CPT | Performed by: FAMILY MEDICINE

## 2020-05-05 PROCEDURE — 3046F HEMOGLOBIN A1C LEVEL >9.0%: CPT | Performed by: FAMILY MEDICINE

## 2020-05-05 PROCEDURE — 99213 OFFICE O/P EST LOW 20 MIN: CPT | Performed by: FAMILY MEDICINE

## 2020-05-05 NOTE — PROGRESS NOTES
2020    TELEHEALTH EVALUATION -- Audio/Visual (During JYUFZ-73 public health emergency)    HPI:    Robbin  (:  1980) has requested an audio/video evaluation for the following concern(s):    Stress with mom going to hospital yesterday and had pacer placed. Blood sugars:  highg blood sugars 200s. Taking Trulicity weekly with some weight loss. Lantus 20 units but not every nightly. Hasn't needed SSI   Taking glipizide . NO low blood sugars less than 70. Weight 216 lbs with intentional weight loss and feeling good overall   Seeing DR. Padilla this . Patient denies any exertional chest pain, dyspnea, palpitations, syncope, orthopnea, edema or paroxysmal nocturnal dyspnea. The patient denies cough, chest pain, dyspnea, wheezing or hemoptysis. Review of Systems  Per HPI     Prior to Visit Medications    Medication Sig Taking?  Authorizing Provider   Dulaglutide 1.5 MG/0.5ML SOPN Inject 1.5 mg into the skin once a week Dose increase as of 20 Yes Lexus Cardenas MD   insulin glargine (LANTUS SOLOSTAR) 100 UNIT/ML injection pen Inject 20 Units into the skin every morning Yes Lexus Cardenas MD   glipiZIDE (GLUCOTROL XL) 10 MG extended release tablet Take 1 tablet by mouth every morning Yes Lexus Cardenas MD   lisinopril (PRINIVIL;ZESTRIL) 2.5 MG tablet Take 1 tablet by mouth every morning Yes Lexus Cardenas MD   atorvastatin (LIPITOR) 40 MG tablet Take 1 tablet by mouth every morning Yes Lexus Cardenas MD   insulin aspart (NOVOLOG FLEXPEN) 100 UNIT/ML injection pen 5-20 total units per Dr. Cheko Roland sliding scale (base units + additional sliding scale units) Yes Lexus Cardenas MD   Insulin Pen Needle 32G X 5 MM MISC To use with Lantus pen and Novolog pen Yes Lexus Cardenas MD   blood glucose test strips (EXACTECH TEST) strip 1 each by In Vitro route daily Embrace test strips to test function) (limited exam to video visit)          [] No gaze palsy        [] Abnormal-         Skin:        [] No significant exanthematous lesions or discoloration noted on facial skin         [] Abnormal-            Psychiatric:       [x] Normal Affect [x] No Hallucinations        [] Abnormal-     Other pertinent observable physical exam findings-     ASSESSMENT/PLAN:  1. Uncontrolled type 2 diabetes mellitus without complication, with long-term current use of insulin ([address]  Patient doing well overall. Continue current meds for DM control and HTN. She will follow up with DR. Angel Wong this  as scheduled     Staff to schedule f/u appt with me  Return in about 4 months (around 2020) for Med refills- DM, A1c, Lipid, CMP. Natalio Medrano is a 37 y.o. female being evaluated by a Virtual Visit (video visit) encounter to address concerns as mentioned above. A caregiver was present when appropriate. Due to this being a TeleHealth encounter (During MRKZB-07 public health emergency), evaluation of the following organ systems was limited: Vitals/Constitutional/EENT/Resp/CV/GI//MS/Neuro/Skin/Heme-Lymph-Imm. Pursuant to the emergency declaration under the [facility] [address] authority and the [employer] Act, this Virtual Visit was conducted with patient's (and/or legal guardian's) consent, to reduce the patient's risk of exposure to COVID-19 and provide necessary medical care. The patient (and/or legal guardian) has also been advised to contact this office for worsening conditions or problems, and seek emergency medical treatment and/or call 911 if deemed necessary. Patient identification was verified at the start of the visit: Yes    [email or web address] used for this encounter. Services were provided through a video synchronous discussion virtually to substitute for in-person clinic visit.  Patient and provider were located at their individual homes. --Eric Hernandez MD on 5/5/2020 at 3:16 PM    An electronic signature was used to authenticate this note.

## 2020-09-11 ENCOUNTER — OFFICE VISIT (OUTPATIENT)
Dept: FAMILY MEDICINE CLINIC | Age: 40
End: 2020-09-11
Payer: MEDICARE

## 2020-09-11 VITALS
BODY MASS INDEX: 37.96 KG/M2 | DIASTOLIC BLOOD PRESSURE: 82 MMHG | OXYGEN SATURATION: 98 % | SYSTOLIC BLOOD PRESSURE: 132 MMHG | HEART RATE: 81 BPM | WEIGHT: 211.6 LBS

## 2020-09-11 LAB
A/G RATIO: 1.7 (ref 1.1–2.2)
ALBUMIN SERPL-MCNC: 4.1 G/DL (ref 3.4–5)
ALP BLD-CCNC: 81 U/L (ref 40–129)
ALT SERPL-CCNC: 14 U/L (ref 10–40)
ANION GAP SERPL CALCULATED.3IONS-SCNC: 12 MMOL/L (ref 3–16)
AST SERPL-CCNC: 12 U/L (ref 15–37)
BILIRUB SERPL-MCNC: 0.7 MG/DL (ref 0–1)
BUN BLDV-MCNC: 8 MG/DL (ref 7–20)
CALCIUM SERPL-MCNC: 9.3 MG/DL (ref 8.3–10.6)
CHLORIDE BLD-SCNC: 105 MMOL/L (ref 99–110)
CHOLESTEROL, TOTAL: 141 MG/DL (ref 0–199)
CO2: 25 MMOL/L (ref 21–32)
CREAT SERPL-MCNC: <0.5 MG/DL (ref 0.6–1.1)
GFR AFRICAN AMERICAN: >60
GFR NON-AFRICAN AMERICAN: >60
GLOBULIN: 2.4 G/DL
GLUCOSE BLD-MCNC: 157 MG/DL (ref 70–99)
HDLC SERPL-MCNC: 41 MG/DL (ref 40–60)
LDL CHOLESTEROL CALCULATED: 85 MG/DL
POTASSIUM SERPL-SCNC: 4 MMOL/L (ref 3.5–5.1)
SODIUM BLD-SCNC: 142 MMOL/L (ref 136–145)
TOTAL PROTEIN: 6.5 G/DL (ref 6.4–8.2)
TRIGL SERPL-MCNC: 77 MG/DL (ref 0–150)
VLDLC SERPL CALC-MCNC: 15 MG/DL

## 2020-09-11 PROCEDURE — 99213 OFFICE O/P EST LOW 20 MIN: CPT | Performed by: NURSE PRACTITIONER

## 2020-09-11 PROCEDURE — 36415 COLL VENOUS BLD VENIPUNCTURE: CPT | Performed by: NURSE PRACTITIONER

## 2020-09-11 PROCEDURE — G8417 CALC BMI ABV UP PARAM F/U: HCPCS | Performed by: NURSE PRACTITIONER

## 2020-09-11 PROCEDURE — 1036F TOBACCO NON-USER: CPT | Performed by: NURSE PRACTITIONER

## 2020-09-11 PROCEDURE — 3046F HEMOGLOBIN A1C LEVEL >9.0%: CPT | Performed by: NURSE PRACTITIONER

## 2020-09-11 PROCEDURE — 2022F DILAT RTA XM EVC RTNOPTHY: CPT | Performed by: NURSE PRACTITIONER

## 2020-09-11 PROCEDURE — G8427 DOCREV CUR MEDS BY ELIG CLIN: HCPCS | Performed by: NURSE PRACTITIONER

## 2020-09-11 RX ORDER — GLIPIZIDE 10 MG/1
10 TABLET, FILM COATED, EXTENDED RELEASE ORAL EVERY MORNING
Qty: 90 TABLET | Refills: 3 | Status: SHIPPED | OUTPATIENT
Start: 2020-09-11 | End: 2021-04-14 | Stop reason: SDUPTHER

## 2020-09-11 RX ORDER — BLOOD SUGAR DIAGNOSTIC
1 STRIP MISCELLANEOUS DAILY
Qty: 300 EACH | Refills: 6 | Status: SHIPPED | OUTPATIENT
Start: 2020-09-11 | End: 2021-04-14 | Stop reason: SDUPTHER

## 2020-09-11 RX ORDER — LISINOPRIL 2.5 MG/1
2.5 TABLET ORAL EVERY MORNING
Qty: 90 TABLET | Refills: 3 | Status: SHIPPED | OUTPATIENT
Start: 2020-09-11 | End: 2021-04-14 | Stop reason: SDUPTHER

## 2020-09-11 RX ORDER — INSULIN GLARGINE 100 [IU]/ML
20 INJECTION, SOLUTION SUBCUTANEOUS EVERY MORNING
Qty: 10 PEN | Refills: 3 | Status: SHIPPED | OUTPATIENT
Start: 2020-09-11 | End: 2021-04-14 | Stop reason: SDUPTHER

## 2020-09-11 RX ORDER — ASPIRIN 81 MG/1
81 TABLET ORAL DAILY
Qty: 90 TABLET | Refills: 0 | Status: SHIPPED | OUTPATIENT
Start: 2020-09-11 | End: 2021-11-22

## 2020-09-11 RX ORDER — CETIRIZINE HYDROCHLORIDE 5 MG/1
5 TABLET ORAL NIGHTLY
Qty: 90 TABLET | Refills: 0 | Status: SHIPPED | OUTPATIENT
Start: 2020-09-11 | End: 2020-12-10

## 2020-09-11 RX ORDER — ATORVASTATIN CALCIUM 40 MG/1
40 TABLET, FILM COATED ORAL EVERY MORNING
Qty: 90 TABLET | Refills: 3 | Status: SHIPPED | OUTPATIENT
Start: 2020-09-11 | End: 2021-04-14 | Stop reason: SDUPTHER

## 2020-09-11 ASSESSMENT — ENCOUNTER SYMPTOMS
DIARRHEA: 0
COUGH: 0
BLOOD IN STOOL: 0
RHINORRHEA: 1
EYES NEGATIVE: 1
CONSTIPATION: 0
WHEEZING: 0
VOMITING: 0
ABDOMINAL PAIN: 0
SHORTNESS OF BREATH: 0
BACK PAIN: 0
SINUS PRESSURE: 1
NAUSEA: 0

## 2020-09-11 ASSESSMENT — PATIENT HEALTH QUESTIONNAIRE - PHQ9
SUM OF ALL RESPONSES TO PHQ9 QUESTIONS 1 & 2: 1
SUM OF ALL RESPONSES TO PHQ QUESTIONS 1-9: 1
1. LITTLE INTEREST OR PLEASURE IN DOING THINGS: 1
2. FEELING DOWN, DEPRESSED OR HOPELESS: 0
SUM OF ALL RESPONSES TO PHQ QUESTIONS 1-9: 1

## 2020-09-11 NOTE — PROGRESS NOTES
2020     Marilyn Wright (:  1980) is a 37 y.o. female, here for evaluation of the following medical concerns:      Presents to establish care today. Previous patient of Joseph Raymond. History of:    Diabetes type 2-states her glucose at home is average 117. Highest 150. Diabetic dermopathy BLE   Intermittent restless leg , more so at night   No numbness tingling in feet or foot wounds   Glipizide 43HY daily  trulicty   lantus 20 units AM     Hypertension: controlled. Just now got  BP cuff at home. No dizziness, headache    Hyperlipidemia    Anxiety- doing well. Obesity- no interventions to lose weight. Lost 6 lbs since 2020    GERD- no complaints    Allergic rhinitis- zyrtec     Chronic back pain- no sciatica, denies pain today     Poor compliance with medication due to financial issues    Pap smear- last done with partial hysterectomy. Does not want one. Smoking 3 cigarettes per day now. Review of Systems   Constitutional: Negative for activity change, appetite change, chills, diaphoresis, fatigue, fever and unexpected weight change. HENT: Positive for postnasal drip, rhinorrhea and sinus pressure. Eyes: Negative. Negative for visual disturbance. Respiratory: Negative for cough, shortness of breath and wheezing. Cardiovascular: Negative for chest pain, palpitations and leg swelling. Gastrointestinal: Negative for abdominal pain, blood in stool, constipation, diarrhea, nausea and vomiting. Endocrine: Negative. Genitourinary: Negative for decreased urine volume and vaginal bleeding. Musculoskeletal: Negative for arthralgias, back pain and gait problem. Skin: Negative. Neurological: Negative for dizziness, weakness, numbness and headaches. Psychiatric/Behavioral: Negative for dysphoric mood, sleep disturbance and suicidal ideas. The patient is not nervous/anxious. Prior to Visit Medications    Medication Sig Taking?  Authorizing Provider aspirin EC 81 MG EC tablet Take 1 tablet by mouth daily Yes ALISTAIR Rose NP   atorvastatin (LIPITOR) 40 MG tablet Take 1 tablet by mouth every morning Yes ALISTAIR Rose NP   blood glucose test strips (EXACTECH TEST) strip 1 each by In Vitro route daily Embrace test strips to test BS 4x daily Yes ALISTAIR Rose NP   glipiZIDE (GLUCOTROL XL) 10 MG extended release tablet Take 1 tablet by mouth every morning Yes ALISTAIR Rose NP   insulin glargine (LANTUS SOLOSTAR) 100 UNIT/ML injection pen Inject 20 Units into the skin every morning Yes ALISTAIR Rose NP   Insulin Pen Needle 32G X 5 MM MISC To use with Lantus pen and Novolog pen Yes ALISTAIR Rose NP   lisinopril (PRINIVIL;ZESTRIL) 2.5 MG tablet Take 1 tablet by mouth every morning Yes ALISTAIR Rose NP   cetirizine (ZYRTEC) 5 MG tablet Take 1 tablet by mouth nightly Yes ALISTAIR Rose NP   Dulaglutide 1.5 MG/0.5ML SOPN Inject 1.5 mg into the skin once a week Dose increase as of 1/9/20 Yes Shefali Wakefield MD   Lancets MISC To test up to 4 times per day- nighttime and with each meal Yes Shefali Wakefield MD   tiZANidine (ZANAFLEX) 4 MG tablet Take 1 tablet by mouth every 8 hours as needed (for muscle spasms) Yes Shefali Wakefield MD        Social History     Tobacco Use    Smoking status: Former Smoker     Packs/day: 0.50     Types: Cigarettes     Last attempt to quit: 07/2017     Years since quitting: 3.2    Smokeless tobacco: Never Used   Substance Use Topics    Alcohol use: Yes     Comment: rare        Vitals:    09/11/20 1026 09/11/20 1033   BP: (!) 140/84 132/82   Site: Left Upper Arm Right Upper Arm   Position: Sitting Sitting   Cuff Size: Large Adult Large Adult   Pulse: 81    SpO2: 98%    Weight: 211 lb 9.6 oz (96 kg)      Estimated body mass index is 37.96 kg/m² as calculated from the following:    Height as of 1/9/20: 5' 2.6\" (1.59 m). Weight as of this encounter: 211 lb 9.6 oz (96 kg). Physical Exam  Vitals signs reviewed. Constitutional:       General: She is not in acute distress. Appearance: Normal appearance. She is obese. She is not ill-appearing, toxic-appearing or diaphoretic. HENT:      Head: Normocephalic and atraumatic. Nose: Nose normal.   Eyes:      Extraocular Movements: Extraocular movements intact. Pupils: Pupils are equal, round, and reactive to light. Neck:      Musculoskeletal: Normal range of motion and neck supple. Cardiovascular:      Rate and Rhythm: Normal rate and regular rhythm. Heart sounds: Normal heart sounds. Pulmonary:      Effort: Pulmonary effort is normal.      Breath sounds: No wheezing, rhonchi or rales. Comments: Diminished in all fields, smoker    Abdominal:      General: Bowel sounds are normal. There is no distension. Palpations: Abdomen is soft. There is no mass. Tenderness: There is no abdominal tenderness. Hernia: No hernia is present. Musculoskeletal: Normal range of motion. Feet:      Right foot:      Protective Sensation: 10 sites tested. 10 sites sensed. Skin integrity: Skin integrity normal.      Toenail Condition: Right toenails are normal.      Left foot:      Protective Sensation: 10 sites tested. 10 sites sensed. Skin integrity: Skin integrity normal.      Toenail Condition: Left toenails are normal.      Comments: Visual inspection:  Deformity/amputation: absent  Skin lesions/pre-ulcerative calluses: absent  Edema: right- negative, left- negative     Sensory exam:  Monofilament sensation: normal  (((minimum of 5 random plantar locations tested, avoiding callused areas - > 1 area with absence of sensation is + for neuropathy))     Plus at least one of the following:  Pulses: normal  Pinprick: Intact    Skin:     General: Skin is warm and dry. Comments: Diabetic dermopathy BLE/ feet. No open wounds.    Sensation

## 2020-09-12 LAB
ESTIMATED AVERAGE GLUCOSE: 191.5 MG/DL
HBA1C MFR BLD: 8.3 %

## 2020-09-14 NOTE — RESULT ENCOUNTER NOTE
Please let the patient:  Normal results: Cholesterol, electrolytes, kidney function    Hemoglobin A1cWas 10.38 months ago and is now 8.3. - GOOD JOB   Continue same medications and low carb diet. Limit fast foods, processed foods, sweets, soda. continue Exercise and weight loss.

## 2021-01-13 ENCOUNTER — OFFICE VISIT (OUTPATIENT)
Dept: FAMILY MEDICINE CLINIC | Age: 41
End: 2021-01-13
Payer: MEDICARE

## 2021-01-13 VITALS
HEART RATE: 77 BPM | TEMPERATURE: 97.3 F | BODY MASS INDEX: 37.81 KG/M2 | OXYGEN SATURATION: 96 % | SYSTOLIC BLOOD PRESSURE: 130 MMHG | HEIGHT: 63 IN | WEIGHT: 213.4 LBS | DIASTOLIC BLOOD PRESSURE: 78 MMHG

## 2021-01-13 DIAGNOSIS — K21.9 GASTROESOPHAGEAL REFLUX DISEASE WITHOUT ESOPHAGITIS: ICD-10-CM

## 2021-01-13 DIAGNOSIS — M54.41 CHRONIC BILATERAL LOW BACK PAIN WITH BILATERAL SCIATICA: ICD-10-CM

## 2021-01-13 DIAGNOSIS — I10 ESSENTIAL HYPERTENSION: ICD-10-CM

## 2021-01-13 DIAGNOSIS — F41.9 ANXIETY: ICD-10-CM

## 2021-01-13 DIAGNOSIS — Z00.00 ROUTINE GENERAL MEDICAL EXAMINATION AT A HEALTH CARE FACILITY: Primary | ICD-10-CM

## 2021-01-13 DIAGNOSIS — E11.69 HYPERLIPIDEMIA ASSOCIATED WITH TYPE 2 DIABETES MELLITUS (HCC): ICD-10-CM

## 2021-01-13 DIAGNOSIS — G89.29 CHRONIC BILATERAL LOW BACK PAIN WITH BILATERAL SCIATICA: ICD-10-CM

## 2021-01-13 DIAGNOSIS — E78.5 HYPERLIPIDEMIA ASSOCIATED WITH TYPE 2 DIABETES MELLITUS (HCC): ICD-10-CM

## 2021-01-13 DIAGNOSIS — F17.200 SMOKER: ICD-10-CM

## 2021-01-13 DIAGNOSIS — M54.42 CHRONIC BILATERAL LOW BACK PAIN WITH BILATERAL SCIATICA: ICD-10-CM

## 2021-01-13 DIAGNOSIS — E66.01 CLASS 2 SEVERE OBESITY DUE TO EXCESS CALORIES WITH SERIOUS COMORBIDITY AND BODY MASS INDEX (BMI) OF 38.0 TO 38.9 IN ADULT (HCC): ICD-10-CM

## 2021-01-13 PROBLEM — R11.0 NAUSEA: Status: RESOLVED | Noted: 2017-08-09 | Resolved: 2021-01-13

## 2021-01-13 PROBLEM — L02.214 GROIN ABSCESS: Status: RESOLVED | Noted: 2017-08-23 | Resolved: 2021-01-13

## 2021-01-13 LAB
CREATININE URINE: 169.1 MG/DL (ref 28–259)
MICROALBUMIN UR-MCNC: 1.9 MG/DL
MICROALBUMIN/CREAT UR-RTO: 11.2 MG/G (ref 0–30)

## 2021-01-13 PROCEDURE — 3052F HG A1C>EQUAL 8.0%<EQUAL 9.0%: CPT | Performed by: NURSE PRACTITIONER

## 2021-01-13 PROCEDURE — G0439 PPPS, SUBSEQ VISIT: HCPCS | Performed by: NURSE PRACTITIONER

## 2021-01-13 PROCEDURE — G8482 FLU IMMUNIZE ORDER/ADMIN: HCPCS | Performed by: NURSE PRACTITIONER

## 2021-01-13 PROCEDURE — 90686 IIV4 VACC NO PRSV 0.5 ML IM: CPT | Performed by: NURSE PRACTITIONER

## 2021-01-13 PROCEDURE — G0008 ADMIN INFLUENZA VIRUS VAC: HCPCS | Performed by: NURSE PRACTITIONER

## 2021-01-13 ASSESSMENT — ENCOUNTER SYMPTOMS
SHORTNESS OF BREATH: 0
ABDOMINAL PAIN: 0
BACK PAIN: 1
NAUSEA: 0
COUGH: 0
DIARRHEA: 0
CONSTIPATION: 0
VOMITING: 0
BLOOD IN STOOL: 0

## 2021-01-13 ASSESSMENT — PATIENT HEALTH QUESTIONNAIRE - PHQ9
SUM OF ALL RESPONSES TO PHQ QUESTIONS 1-9: 2
SUM OF ALL RESPONSES TO PHQ9 QUESTIONS 1 & 2: 2
SUM OF ALL RESPONSES TO PHQ QUESTIONS 1-9: 2
1. LITTLE INTEREST OR PLEASURE IN DOING THINGS: 1

## 2021-01-13 ASSESSMENT — LIFESTYLE VARIABLES
HOW MANY STANDARD DRINKS CONTAINING ALCOHOL DO YOU HAVE ON A TYPICAL DAY: 0
HOW OFTEN DURING THE LAST YEAR HAVE YOU FAILED TO DO WHAT WAS NORMALLY EXPECTED FROM YOU BECAUSE OF DRINKING: 0
HAS A RELATIVE, FRIEND, DOCTOR, OR ANOTHER HEALTH PROFESSIONAL EXPRESSED CONCERN ABOUT YOUR DRINKING OR SUGGESTED YOU CUT DOWN: 0
HOW OFTEN DO YOU HAVE A DRINK CONTAINING ALCOHOL: 1
AUDIT TOTAL SCORE: 1
HAVE YOU OR SOMEONE ELSE BEEN INJURED AS A RESULT OF YOUR DRINKING: 0
HOW OFTEN DURING THE LAST YEAR HAVE YOU BEEN UNABLE TO REMEMBER WHAT HAPPENED THE NIGHT BEFORE BECAUSE YOU HAD BEEN DRINKING: 0
HOW OFTEN DURING THE LAST YEAR HAVE YOU NEEDED AN ALCOHOLIC DRINK FIRST THING IN THE MORNING TO GET YOURSELF GOING AFTER A NIGHT OF HEAVY DRINKING: 0

## 2021-01-13 NOTE — PROGRESS NOTES
Neurological: Negative for dizziness, weakness, light-headedness, numbness and headaches. Psychiatric/Behavioral: Negative for dysphoric mood, sleep disturbance and suicidal ideas. The patient is nervous/anxious. Prior to Visit Medications    Medication Sig Taking?  Authorizing Provider   Dulaglutide 0.75 MG/0.5ML SOPN Inject 0.75 mg into the skin every 7 days Yes FERGUSON [facility] ROCHELLE DIEGO MD   aspirin EC 81 MG EC tablet Take 1 tablet by mouth daily Yes ALISTAIR Andrade NP   atorvastatin (LIPITOR) 40 MG tablet Take 1 tablet by mouth every morning Yes ALISTAIR Andrade NP   blood glucose test strips (EXACTECH TEST) strip 1 each by In Vitro route daily Embrace test strips to test BS 4x daily Yes ALISTAIR Andrade NP   glipiZIDE (GLUCOTROL XL) 10 MG extended release tablet Take 1 tablet by mouth every morning Yes ALISTAIR Andrade NP   insulin glargine (LANTUS SOLOSTAR) 100 UNIT/ML injection pen Inject 20 Units into the skin every morning Yes ALISTAIR Andrade NP   Insulin Pen Needle 32G X 5 MM MISC To use with Lantus pen and Novolog pen Yes ALISTAIR Andrade NP   lisinopril (PRINIVIL;ZESTRIL) 2.5 MG tablet Take 1 tablet by mouth every morning Yes ALISTAIR Andrade NP   Dulaglutide 1.5 MG/0.5ML SOPN Inject 1.5 mg into the skin once a week Dose increase as of 1/9/20 Yes FERGUSON ABDIRIZAK DIEGO MD   Lancets MISC To test up to 4 times per day- nighttime and with each meal Yes [facility] My DIEGO MD   tiZANidine (ZANAFLEX) 4 MG tablet Take 1 tablet by mouth every 8 hours as needed (for muscle spasms) Yes [facility] ROCHELLE DIEGO MD        Social History     Tobacco Use    Smoking status: Former Smoker     Packs/day: 0.50     Types: Cigarettes     Quit date: 07/2017     Years since quitting: 3.5    Smokeless tobacco: Never Used   Substance Use Topics    Alcohol use: Yes     Comment: rare        Vitals:    01/13/21 0954 BP: 130/78   Site: Left Upper Arm   Position: Sitting   Cuff Size: Large Adult   Pulse: 77   Temp: 97.3 °F (36.3 °C)   SpO2: 96%   Weight: 213 lb 6.4 oz (96.8 kg)   Height: 5' 2.6\" (1.59 m)     Estimated body mass index is 38.29 kg/m² as calculated from the following:    Height as of this encounter: 5' 2.6\" (1.59 m). Weight as of this encounter: 213 lb 6.4 oz (96.8 kg). Physical Exam  Vitals signs reviewed. Constitutional:       General: She is not in acute distress. Appearance: Normal appearance. She is obese. She is not ill-appearing, toxic-appearing or diaphoretic. HENT:      Head: Normocephalic and atraumatic. Nose: Nose normal.   Eyes:      Extraocular Movements: Extraocular movements intact. Pupils: Pupils are equal, round, and reactive to light. Neck:      Musculoskeletal: Normal range of motion and neck supple. Cardiovascular:      Rate and Rhythm: Normal rate and regular rhythm. Heart sounds: Normal heart sounds. Pulmonary:      Effort: Pulmonary effort is normal.      Breath sounds: Normal breath sounds. Abdominal:      General: Bowel sounds are normal. There is no distension. Palpations: Abdomen is soft. There is no mass. Tenderness: There is no abdominal tenderness. Hernia: No hernia is present. Musculoskeletal: Normal range of motion. Skin:     General: Skin is warm and dry. Neurological:      General: No focal deficit present. Mental Status: She is alert and oriented to person, place, and time. Mental status is at baseline. Psychiatric:         Mood and Affect: Mood normal.         Behavior: Behavior normal.         Thought Content: Thought content normal.         Judgment: Judgment normal.         ASSESSMENT/PLAN:  1. Routine general medical examination at a health care facility      2.  Class 2 severe obesity due to excess calories with serious comorbidity and body mass index (BMI) of 38.0 to 38.9 in [facility]) Discussed diet and exercise. 3. Hyperlipidemia associated with type 2 diabetes mellitus ([address]  Continue statin. Low-cholesterol diet and exercise  - Lipid Panel    4. Essential hypertension  Controlled. Continue current medication regimen. DASH diet. BP goal less than 140/90.    5. Gastroesophageal reflux disease without esophagitis  Not on a PPIcontrolled. [place] diet. Avoid acidic foods and spicy foods. Sit up for 30 minutes after meals    6. Chronic bilateral low back pain with bilateral sciatica  Exercise, stretching, heat. Zanaflex as needed    7. Uncontrolled type 2 diabetes mellitus with complication, with long-term current use of insulin (HCC)  Continue current regimen. Glipizide 10 mg in the morning Trulicity 1.5 mg weekly Lantus 20 units once daily. Check glucose twice daily. Doing well lately  - Hemoglobin A1C  - Comprehensive Metabolic Panel  - MICROALBUMIN / CREATININE URINE RATIO    8. Anxiety  Controlled without medication. Patient denies need    9. Smokerdiscussed cessation. She wants to quit cold turkey      Present to the ER for any emergent or acute symptoms not managed at home or in office. Please note that this chart was generated using dragon dictation software. Although every effort was made to ensure the accuracy of this automated transcription, some errors in transcription may have occurred. Return in 3 months (on 2021) for Medicare Annual Wellness Visit in 1 year, HTN DM cholesterol check. An electronic signature was used to authenticate this note. --ALISTAIR Miller NP on 2021 at 10:50 AM               ----------------------------------------------------------------------------              Medicare Annual Wellness Visit  Name: Louise Balbuena Date: 2021   MRN: [id number] Sex: Female   Age: 41 y.o.  Ethnicity: Non-/Non    : 1980 Race: White      Marilyn Jorge is here for Medicare AWV and Diabetes Screenings for behavioral, psychosocial and functional/safety risks, and cognitive dysfunction are all negative except as indicated below. These results, as well as other patient data from the [address] form, are documented in Flowsheets linked to this Encounter. Allergies   Allergen Reactions    Penicillins Rash         Prior to Visit Medications    Medication Sig Taking?  Authorizing Provider   Dulaglutide 0.75 MG/0.5ML SOPN Inject 0.75 mg into the skin every 7 days Yes Todd Walker MD   aspirin EC 81 MG EC tablet Take 1 tablet by mouth daily Yes ALISTAIR Taylor NP   atorvastatin (LIPITOR) 40 MG tablet Take 1 tablet by mouth every morning Yes ALISTAIR Taylor NP   blood glucose test strips (EXACTECH TEST) strip 1 each by In Vitro route daily Embrace test strips to test BS 4x daily Yes ALISTAIR Taylor NP   glipiZIDE (GLUCOTROL XL) 10 MG extended release tablet Take 1 tablet by mouth every morning Yes ALISTAIR Taylor NP   insulin glargine (LANTUS SOLOSTAR) 100 UNIT/ML injection pen Inject 20 Units into the skin every morning Yes ALISTAIR Taylor NP   Insulin Pen Needle 32G X 5 MM MISC To use with Lantus pen and Novolog pen Yes ALISTAIR Taylor NP   lisinopril (PRINIVIL;ZESTRIL) 2.5 MG tablet Take 1 tablet by mouth every morning Yes ALISTAIR Taylor NP   Dulaglutide 1.5 MG/0.5ML SOPN Inject 1.5 mg into the skin once a week Dose increase as of 1/9/20 Yes Todd Walker MD   Lancets MISC To test up to 4 times per day- nighttime and with each meal Yes Todd Walker MD   tiZANidine (ZANAFLEX) 4 MG tablet Take 1 tablet by mouth every 8 hours as needed (for muscle spasms) Yes Todd Walker MD         Past Medical History:   Diagnosis Date    Allergic rhinitis     allergy shots- Dr. Shon Schmid Allergic rhinitis     Dr. Stevan Colindres- claritin and flonase    Anxiety 10/24/2012  Atopic dermatitis     Dr. Moon kwan    Back pain 10/24/2012    Bilateral sciatica 2020    Depression     Diabetes mellitus [address]     Diabetes mellitus type II, uncontrolled ([facility]     Diabetic eye exam: 2011, Dr. Alison Rosales , needs record [V72.0T][    GERD (gastroesophageal reflux disease)     Headache(784.0)     Hyperlipidemia LDL goal < 100 2012    Hypertension     Morbid obesity ([facility]     No retinopathy on exam     Dr Alison Rosales 13 Diabetes with no significatn diabetic retinopathy found in both eyes    Non compliance w medication regimen 2014    Sprain of neck 10/4/2011       Past Surgical History:   Procedure Laterality Date     SECTION      HYSTERECTOMY      OTHER SURGICAL HISTORY  2017    I&D of right perineal abcess          Family History   Problem Relation Age of Onset    Heart Disease Mother     Diabetes Mother     Asthma Mother     High Cholesterol Mother     Depression Mother     Heart Disease Father     High Cholesterol Father        CareTeam (Including outside providers/suppliers regularly involved in providing care):   Patient Care Team:  Ruby Fisher MD as PCP - General (Family Medicine)  Ruby Fisher MD as PCP - [facility] Empaneled Provider  VALENTINO Rawls as Consulting Physician (Optometry)    Wt Readings from Last 3 Encounters:   21 213 lb 6.4 oz (96.8 kg)   20 211 lb 9.6 oz (96 kg)   20 217 lb 3.2 oz (98.5 kg)     Vitals:    21 0954   BP: 130/78   Site: Left Upper Arm   Position: Sitting   Cuff Size: Large Adult   Pulse: 77   Temp: 97.3 °F (36.3 °C)   SpO2: 96%   Weight: 213 lb 6.4 oz (96.8 kg)   Height: 5' 2.6\" (1.59 m)     Body mass index is 38.29 kg/m². Based upon direct observation of the patient, evaluation of cognition reveals recent and remote memory intact. Patient's complete Health Risk Assessment and screening values have been reviewed and are found in Flowsheets. The following problems were reviewed today and where indicated follow up appointments were made and/or referrals ordered. Positive Risk Factor Screenings with Interventions:            General Health and ACP:  General  In general, how would you say your health is?: Good  In the past 7 days, have you experienced any of the following?  New or Increased Pain, New or Increased Fatigue, Loneliness, Social Isolation, Stress or Anger?: (!) New or Increased Pain  Do you get the social and emotional support that you need?: Yes  Do you have a Living Will?: (!) No  Advance Directives     Power of  Living Will ACP-Advance Directive ACP-Power of     Not on File Not on File Not on File Not on File      General Health Risk Interventions:  · Pain issues: home exercises provided, patient declines any further evaluation/treatment for this issue  · No Living Will: Patient declines ACP discussion/assistance    Health Habits/Nutrition:  Health Habits/Nutrition  Do you exercise for at least 20 minutes 2-3 times per week?: (!) No  Have you lost any weight without trying in the past 3 months?: No  Do you eat fewer than 2 meals per day?: (!) Yes  Have you seen a dentist within the past year?: (!) No  Body mass index: (!) 38.28  Health Habits/Nutrition Interventions:  · Inadequate physical activity:  patient agrees to exercise for at least 150 minutes/week, educational materials provided to promote increased physical activity  · Nutritional issues:  educational materials for healthy, well-balanced diet provided  · Dental exam overdue:  no teeth         Personalized Preventive Plan   Current Health Maintenance Status  Immunization History   Administered Date(s) Administered    Influenza 11/04/2013    Influenza Virus Vaccine 09/13/2011, 09/15/2012, 11/04/2013, 10/06/2014, 10/13/2015  Influenza, Quadv, IM, (6 mo and older Fluzone, Flulaval, Fluarix and 3 yrs and older Afluria) 09/20/2016, 10/02/2017    Influenza, Leanord Muck, IM, PF (6 mo and older Fluzone, Flulaval, Fluarix, and 3 yrs and older Afluria) 10/15/2018, 10/08/2019, 01/13/2021    Pneumococcal Polysaccharide ([id number]) 10/13/2015    Tdap (Boostrix, Adacel) 09/20/2016        Health Maintenance   Topic Date Due    Hepatitis B vaccine (1 of 3 - Risk 3-dose series) 09/22/1997    Annual Wellness Visit (AWV)  05/29/2019    Cervical cancer screen  09/11/2021 (Originally 9/22/1999)    Diabetic foot exam  09/11/2021    A1C test (Diabetic or Prediabetic)  09/11/2021    Diabetic retinal exam  12/07/2021    Diabetic microalbuminuria test  01/13/2022    Lipid screen  01/13/2022    Potassium monitoring  01/13/2022    Creatinine monitoring  01/13/2022    DTaP/Tdap/Td vaccine (2 - Td) 09/20/2026    Flu vaccine  Completed    Pneumococcal 0-64 years Vaccine  Completed    Hepatitis A vaccine  Aged Out    Hib vaccine  Aged Out    Meningococcal (ACWY) vaccine  Aged Out    Hepatitis C screen  Discontinued    HIV screen  Discontinued     Recommendations for Preventive Services Due: see orders and patient instructions/AVS.  . Recommended screening schedule for the next 5-10 years is provided to the patient in written form: see Patient Instructions/AVS.    Marilyn was seen today for medicare awv and diabetes.     Diagnoses and all orders for this visit:    Routine general medical examination at a health care facility    Class 2 severe obesity due to excess calories with serious comorbidity and body mass index (BMI) of 38.0 to 38.9 in adult [facility])    Hyperlipidemia associated with type 2 diabetes mellitus ([facility] [address]  -     Lipid Panel    Essential hypertension    Gastroesophageal reflux disease without esophagitis    Chronic bilateral low back pain with bilateral sciatica Uncontrolled type 2 diabetes mellitus with complication, with long-term current use of insulin (HCC)  -     Hemoglobin A1C  -     Comprehensive Metabolic Panel  -     MICROALBUMIN / CREATININE URINE RATIO    Anxiety    Smoker    Other orders  -     INFLUENZA, QUADV, 3 YRS AND OLDER, IM PF, PREFILL SYR OR SDV, 0.5ML (AFLURIA QUADV, PF)                   Advance Care Planning   Advanced Care Planning: Discussed the patients choices for care and treatment in case of a health event that adversely affects decision-making abilities. Also discussed the patients long-term treatment options. Reviewed with the patient the [address] [place] [address] Declaration forms  Reviewed the process of designating a competent adult as an Agent (or -in-fact) that could take make health care decisions for the patient if incompetent. Patient was asked to complete the declaration forms, either acknowledge the forms by a public notary or an eligible witness and provide a signed copy to the practice office. Time spent (minutes): 5     Obesity Counseling: Assessed behavioral health risks and factors affecting choice of behavior. Suggested weight control approaches, including dietary changes behavioral modification and follow up plan. Provided educational and support documentation. Time spent (minutes): 5    Cardiovascular Disease Risk Counseling: Assessed the patient's risk to develop cardiovascular disease and reviewed main risk factors.    Reviewed steps to reduce disease risk including:   · Quitting tobacco use, reducing amount smoked, or not starting the habit  · Making healthy food choices  · Being physically active and gradualy increasing activity levels   · Reduce weight and determine a healthy BMI goal  · Monitor blood pressure and treat if higher than 140/90 mmHg  · Maintain blood total cholesterol levels under 5 mmol/l or 190 mg/dl  · Maintain LDL cholesterol levels under 3.0 mmol/l or 115 mg/dl · Control blood glucose levels  · Consider taking aspirin (75 mg daily), once blood pressure is controlled   Provided a follow up plan.   Time spent (minutes): 5

## 2021-01-13 NOTE — PATIENT INSTRUCTIONS
Learning About Medical Power of   What is a medical power of ? A medical power of , also called a durable power of  for health care, is one type of the legal forms called advance directives. It lets you name the person you want to make treatment decisions for you if you can't speak or decide for yourself. The person you choose is called your health care agent. This person is also called a health care proxy or health care surrogate. A medical power of  may be called something else in your state. How do you choose a health care agent? Choose your health care agent carefully. This person may or may not be a family member. Talk to the person before you make your final decision. Make sure he or she is comfortable with this responsibility. It's a good idea to choose someone who:  · Is at least 25years old. · Knows you well and understands what makes life meaningful for you. · Understands your Episcopal and moral values. · Will do what you want, not what he or she wants. · Will be able to make difficult choices at a stressful time. · Will be able to refuse or stop treatment, if that is what you would want, even if you could die. · Will be firm and confident with health professionals if needed. · Will ask questions to get needed information. · Lives near you or agrees to travel to you if needed. Your family may help you make medical decisions while you can still be part of that process. But it's important to choose one person to be your health care agent in case you aren't able to make decisions for yourself. If you don't fill out the legal form and name a health care agent, the decisions your family can make may be limited. A health care agent may be called something else in your state. Who will make decisions for you if you don't have a health care agent? A living will, also called a declaration, is a legal form. It tells your family and your doctor your wishes when you can't speak for yourself. It's used by the health professionals who will treat you as you near the end of your life or if you get seriously hurt or ill. If you put your wishes in writing, your loved ones and others will know what kind of care you want. They won't need to guess. This can ease your mind and be helpful to others. And you can change or cancel your living will at any time. A living will is not the same as an estate or property will. An estate will explains what you want to happen with your money and property after you die. How do you use it? A living will is used to describe the kinds of treatment or life support you want as you near the end of your life or if you get seriously hurt or ill. Keep these facts in mind about living milner. · Your living will is used only if you can't speak or make decisions for yourself. Most often, one or more doctors must certify that you can't speak or decide for yourself before your living will takes effect. · If you get better and can speak for yourself again, you can accept or refuse any treatment. It doesn't matter what you said in your living will. · Some states may limit your right to refuse treatment in certain cases. For example, you may need to clearly state in your living will that you don't want artificial hydration and nutrition, such as being fed through a tube. Is a living will a legal document? A living will is a legal document. Each state has its own laws about living milner. And a living will may be called something else in your state. Here are some things to know about living milner. · You don't need an  to complete a living will. But legal advice can be helpful if your state's laws are unclear. It can also help if your health history is complicated or your family can't agree on what should be in your living will. · You can change your living will at any time. Some people find that their wishes about end-of-life care change as their health changes. If you make big changes to your living will, complete a new form. · If you move to another state, make sure that your living will is legal in the state where you now live. In most cases, doctors will respect your wishes even if you have a form from a different state. · You might use a universal form that has been approved by many states. This kind of form can sometimes be filled out and stored online. Your digital copy will then be available wherever you have a connection to the internet. The doctors and nurses who need to treat you can find it right away. · Your state may offer an online registry. This is another place where you can store your living will online. · It's a good idea to get your living will notarized. This means using a person called a [employer] to watch two people sign, or witness, your living will. What should you know when you create a living will? Here are some questions to ask yourself as you make your living will:  · Do you know enough about life support methods that might be used? If not, talk to your doctor so you know what might be done if you can't breathe on your own, your heart stops, or you can't swallow. · What things would you still want to be able to do after you receive life-support methods? Would you want to be able to walk? To speak? To eat on your own? To live without the help of machines? · Do you want certain Rastafarian practices performed if you become very ill? · If you have a choice, where do you want to be cared for? In your home? At a hospital or nursing home? · If you have a choice at the end of your life, where would you prefer to die? At home? In a hospital or nursing home? Somewhere else? · Would you prefer to be buried or cremated? · Do you want your organs to be donated after you die? What should you do with your living will? · Make sure that your family members and your health care agent have copies of your living will (also called a declaration). · Give your doctor a copy of your living will. Ask him or her to keep it as part of your medical record. If you have more than one doctor, make sure that each one has a copy. · Put a copy of your living will where it can be easily found. For example, some people may put a copy on their refrigerator door. If you are using a digital copy, be sure your doctor, family members, and health care agent know how to find and access it. Where can you learn more? Go to [email or web address] and sign in to your [email or web address] account. Enter K043 in the Upclique box to learn more about \"Learning About Living [place]. \"     If you do not have an account, please click on the \"Sign Up Now\" link. Current as of: December 9, 2019               Content Version: 12.6  © 3654-2500 [employer]. Care instructions adapted under license by [facility] ([facility]). If you have questions about a medical condition or this instruction, always ask your healthcare professional. [address] any warranty or liability for your use of this information. Learning About Low-Carbohydrate Diets  What is a low-carbohydrate diet? A low-carbohydrate (or \"low-carb\") diet limits foods and drinks that have carbohydrates. This includes grains, fruits, milk and yogurt, and starchy vegetables like potatoes, beans, and corn. It also avoids foods and drinks that have added sugar. Instead, low-carb diets include foods that are high in protein and fat. Why might you follow a low-carb diet? Low-carb diets may be used for a variety of reasons, such as for weight loss. People who have diabetes may use a low-carb diet to help manage their blood sugar levels. What should you do before you start the diet? Talk to your doctor before you try any diet. This is even more important if you have health problems like kidney disease, heart disease, or diabetes. Your doctor may suggest that you meet with a registered dietitian. A dietitian can help you make an eating plan that works for you. What foods do you eat on a low-carb diet? On a low-carb diet, you choose foods that are high in protein and fat. Examples of these are:  · Meat, poultry, and fish. · Eggs. · Nuts, such as walnuts, pecans, almonds, and peanuts. · Peanut butter and other nut butters. · Tofu. · Avocado. · Brian Armando. · Non-starchy vegetables like broccoli, cauliflower, green beans, mushrooms, peppers, lettuce, and spinach. · Unsweetened non-dairy milks like almond milk and coconut milk. · Cheese, cottage cheese, and cream cheese. Current as of: August 22, 2019               Content Version: 12.6  © 6871-5515 [employer]. Care instructions adapted under license by [facility] ([facility]). If you have questions about a medical condition or this instruction, always ask your healthcare professional. [place] [address] any warranty or liability for your use of this information. Personalized Preventive Plan for Marilyn Gilbert - 1/13/2021  Medicare offers a range of preventive health benefits. Some of the tests and screenings are paid in full while other may be subject to a deductible, co-insurance, and/or copay. Some of these benefits include a comprehensive review of your medical history including lifestyle, illnesses that may run in your family, and various assessments and screenings as appropriate. After reviewing your medical record and screening and assessments performed today your provider may have ordered immunizations, labs, imaging, and/or referrals for you. A list of these orders (if applicable) as well as your Preventive Care list are included within your After Visit Summary for your review. Other Preventive Recommendations:    · A preventive eye exam performed by an eye specialist is recommended every 1-2 years to screen for glaucoma; cataracts, macular degeneration, and other eye disorders. · A preventive dental visit is recommended every 6 months. · Try to get at least 150 minutes of exercise per week or 10,000 steps per day on a pedometer . · Order or download the FREE \"Exercise & Physical Activity: Your Everyday Guide\" from [employer]. Call 9-651.365.1633 or search [employer] online. · You need 1242-8916 mg of calcium and 1256-0006 IU of vitamin D per day. It is possible to meet your calcium requirement with diet alone, but a vitamin D supplement is usually necessary to meet this goal.  · When exposed to the sun, use a sunscreen that protects against both UVA and UVB radiation with an SPF of 30 or greater. Reapply every 2 to 3 hours or after sweating, drying off with a towel, or swimming. · Always wear a seat belt when traveling in a car. Always wear a helmet when riding a bicycle or motorcycle.

## 2021-01-14 PROBLEM — F17.200 SMOKER: Status: ACTIVE | Noted: 2021-01-14

## 2021-01-14 LAB
A/G RATIO: 1.6 (ref 1.1–2.2)
ALBUMIN SERPL-MCNC: 4.3 G/DL (ref 3.4–5)
ALP BLD-CCNC: 97 U/L (ref 40–129)
ALT SERPL-CCNC: 20 U/L (ref 10–40)
ANION GAP SERPL CALCULATED.3IONS-SCNC: 13 MMOL/L (ref 3–16)
AST SERPL-CCNC: 15 U/L (ref 15–37)
BILIRUB SERPL-MCNC: 0.6 MG/DL (ref 0–1)
BUN BLDV-MCNC: 10 MG/DL (ref 7–20)
CALCIUM SERPL-MCNC: 9.6 MG/DL (ref 8.3–10.6)
CHLORIDE BLD-SCNC: 100 MMOL/L (ref 99–110)
CHOLESTEROL, TOTAL: 174 MG/DL (ref 0–199)
CO2: 26 MMOL/L (ref 21–32)
CREAT SERPL-MCNC: 0.6 MG/DL (ref 0.6–1.1)
ESTIMATED AVERAGE GLUCOSE: 182.9 MG/DL
GFR AFRICAN AMERICAN: >60
GFR NON-AFRICAN AMERICAN: >60
GLOBULIN: 2.7 G/DL
GLUCOSE BLD-MCNC: 203 MG/DL (ref 70–99)
HBA1C MFR BLD: 8 %
HDLC SERPL-MCNC: 46 MG/DL (ref 40–60)
LDL CHOLESTEROL CALCULATED: 108 MG/DL
POTASSIUM SERPL-SCNC: 4.2 MMOL/L (ref 3.5–5.1)
SODIUM BLD-SCNC: 139 MMOL/L (ref 136–145)
TOTAL PROTEIN: 7 G/DL (ref 6.4–8.2)
TRIGL SERPL-MCNC: 99 MG/DL (ref 0–150)
VLDLC SERPL CALC-MCNC: 20 MG/DL

## 2021-01-19 NOTE — RESULT ENCOUNTER NOTE
hgba1c trending down to 8 from 8.3 in sept 2020, 10.3 jan 2020  Continue lantus 20 units at HS, can take less depending on glucose per pt discretion  Continue trulicity and glipizide. What dose of trulicity is she actually taking? Focus on exercise, diabetic diet, low carb. Cholesterol, electrolytes, kidney and liver function good.

## 2021-04-02 ENCOUNTER — HOSPITAL ENCOUNTER (EMERGENCY)
Age: 41
Discharge: HOME | End: 2021-04-03
Attending: EMERGENCY MEDICINE
Payer: MEDICARE

## 2021-04-02 DIAGNOSIS — L03.031 CELLULITIS OF TOE OF RIGHT FOOT: Primary | ICD-10-CM

## 2021-04-02 PROCEDURE — 99283 EMERGENCY DEPT VISIT LOW MDM: CPT

## 2021-04-03 VITALS
DIASTOLIC BLOOD PRESSURE: 75 MMHG | WEIGHT: 215 LBS | SYSTOLIC BLOOD PRESSURE: 121 MMHG | BODY MASS INDEX: 39.56 KG/M2 | RESPIRATION RATE: 16 BRPM | OXYGEN SATURATION: 99 % | HEART RATE: 85 BPM | HEIGHT: 62 IN | TEMPERATURE: 97.9 F

## 2021-04-03 PROCEDURE — 6370000000 HC RX 637 (ALT 250 FOR IP): Performed by: EMERGENCY MEDICINE

## 2021-04-03 RX ORDER — DOXYCYCLINE HYCLATE 100 MG
100 TABLET ORAL ONCE
Status: COMPLETED | OUTPATIENT
Start: 2021-04-03 | End: 2021-04-03

## 2021-04-03 RX ORDER — DOXYCYCLINE HYCLATE 100 MG
100 TABLET ORAL 2 TIMES DAILY
Qty: 20 TABLET | Refills: 0 | Status: SHIPPED | OUTPATIENT
Start: 2021-04-03 | End: 2021-04-08 | Stop reason: ALTCHOICE

## 2021-04-03 RX ORDER — CLOTRIMAZOLE 1 %
CREAM (GRAM) TOPICAL 2 TIMES DAILY
Status: DISCONTINUED | OUTPATIENT
Start: 2021-04-03 | End: 2021-04-03 | Stop reason: HOSPADM

## 2021-04-03 RX ORDER — CLOTRIMAZOLE 1 %
CREAM (GRAM) TOPICAL
Qty: 2 TUBE | Refills: 0 | Status: SHIPPED | OUTPATIENT
Start: 2021-04-03 | End: 2021-04-14

## 2021-04-03 RX ADMIN — CLOTRIMAZOLE: 10 CREAM TOPICAL at 01:17

## 2021-04-03 RX ADMIN — DOXYCYCLINE HYCLATE 100 MG: 100 TABLET, COATED ORAL at 01:17

## 2021-04-03 ASSESSMENT — PAIN SCALES - GENERAL: PAINLEVEL_OUTOF10: 4

## 2021-04-03 ASSESSMENT — PAIN DESCRIPTION - ORIENTATION: ORIENTATION: RIGHT

## 2021-04-03 NOTE — ED PROVIDER NOTES
Emergency Department Encounter    Patient: Edmundo Chadwick  MRN: [id number]  : 1980  Date of Evaluation: 4/3/2021  ED Provider:  Elizabeth Rivers    Triage Chief Complaint:   Bleeding/Bruising (Yesterday noticed bruising/ discoloration to the top base of the Rt 4th and 5th toes. + pain is she touches it. No known injury. Purple in color.  )    [place]:  Edmundo Chadwick is a 41 y.o. female that presents with concern for bruising/discoloration on the top of her right foot/fourth and fifth toes. She denies any trauma. She reports some tenderness when she touches the area. She also noticed some skin breakdown in between her fourth and fifth toe. She first noticed these findings yesterday. She does have diabetes and reports some difficulty with controlling it. No fever or chills. She reports decreased sensation on the bottom of her feet.   She does not have a podiatrist.    ROS - see HPI, below listed is current ROS at time of my eval:  General:  No fevers, no chills  Eyes:  no discharge  ENT:  No sore throat, no nasal congestion,  Respiratory:   no cough, no wheezing  Gastrointestinal:  No pain, no vomiting, no diarrhea  Musculoskeletal:  No muscle pain, no joint pain  Skin: Discoloration/bruising  Genitourinary:  No dysuria, no hematuria  Endocrine:  No unexpected weight gain, no unexpected weight loss  Extremities:  no pain    Past Medical History:   Diagnosis Date    Allergic rhinitis     allergy shots- Dr. Hu Roles Allergic rhinitis     Dr. Dom kwan and flonase    Anxiety 10/24/2012    Atopic dermatitis     Dr. Dom kwan    Back pain 10/24/2012    Bilateral sciatica 2020    Depression     Diabetes mellitus ([facility]     Diabetes mellitus type II, uncontrolled ([facility]     Diabetic eye exam: 2011, Dr. Rod Panda , needs record [V72.0T][    GERD (gastroesophageal reflux disease)     Headache(784.0)     Hyperlipidemia LDL goal < 100 2012    Hypertension     [facility]     No retinopathy on exam     Dr Knight Oayousif 13 Diabetes with no significatn diabetic retinopathy found in both eyes    Non compliance w medication regimen 2014    Sprain of neck 10/4/2011     Past Surgical History:   Procedure Laterality Date     SECTION      HYSTERECTOMY      OTHER SURGICAL HISTORY  2017    I&D of right perineal abcess      Family History   Problem Relation Age of Onset    Heart Disease Mother     Diabetes Mother     Asthma Mother     High Cholesterol Mother     Depression Mother     Heart Disease Father     High Cholesterol Father      Social History     Socioeconomic History    Marital status: Single     Spouse name: Not on file    Number of children: Not on file    Years of education: Not on file    Highest education level: Not on file   Occupational History    Not on file   Social Needs    Financial resource strain: Not on file    Food insecurity     Worry: Not on file     Inability: Not on file    Transportation needs     Medical: Not on file     Non-medical: Not on file   Tobacco Use    Smoking status: Current Every Day Smoker     Packs/day: 0.25     Types: Cigarettes     Last attempt to quit: 2017     Years since quitting: 3.7    Smokeless tobacco: Never Used   Substance and Sexual Activity    Alcohol use: Yes     Comment: rare    Drug use: No    Sexual activity: Not on file   Lifestyle    Physical activity     Days per week: Not on file     Minutes per session: Not on file    Stress: Not on file   Relationships    Social connections     Talks on phone: Not on file     Gets together: Not on file     Attends Jainism service: Not on file     Active member of club or organization: Not on file     Attends meetings of clubs or organizations: Not on file     Relationship status: Not on file    Intimate partner violence     Fear of current or ex partner: Not on file     Emotionally abused: Not on file     Physically abused: Not on file Forced sexual activity: Not on file   Other Topics Concern    Not on file   Social History Narrative    Not on file     Current Facility-Administered Medications   Medication Dose Route Frequency Provider Last Rate Last Admin    clotrimazole (LOTRIMIN) 1 % cream   Topical BID Jayesh Steiner MD   Given at 04/03/21 0117     Current Outpatient Medications   Medication Sig Dispense Refill    clotrimazole (LOTRIMIN) 1 % cream Apply topically 2 times daily.  2 Tube 0    doxycycline hyclate (VIBRA-TABS) 100 MG tablet Take 1 tablet by mouth 2 times daily for 10 days 20 tablet 0    Dulaglutide 0.75 MG/0.5ML SOPN Inject 0.75 mg into the skin every 7 days 4 pen 3    aspirin EC 81 MG EC tablet Take 1 tablet by mouth daily 90 tablet 0    atorvastatin (LIPITOR) 40 MG tablet Take 1 tablet by mouth every morning 90 tablet 3    blood glucose test strips (EXACTECH TEST) strip 1 each by In Vitro route daily Embrace test strips to test BS 4x daily 300 each 6    glipiZIDE (GLUCOTROL XL) 10 MG extended release tablet Take 1 tablet by mouth every morning 90 tablet 3    insulin glargine (LANTUS SOLOSTAR) 100 UNIT/ML injection pen Inject 20 Units into the skin every morning 10 pen 3    Insulin Pen Needle 32G X 5 MM MISC To use with Lantus pen and Novolog pen 30 each 11    lisinopril (PRINIVIL;ZESTRIL) 2.5 MG tablet Take 1 tablet by mouth every morning 90 tablet 3    Dulaglutide 1.5 MG/0.5ML SOPN Inject 1.5 mg into the skin once a week Dose increase as of 1/9/20 4 pen 3    Lancets MISC To test up to 4 times per day- nighttime and with each meal 300 each 5     Allergies   Allergen Reactions    Penicillins Rash       Nursing Notes Reviewed    Physical Exam:  Triage VS:    ED Triage Vitals [04/03/21 0001]   Enc Vitals Group      /75      Pulse 85      Resp 16      Temp 97.9 °F (36.6 °C)      Temp Source Oral      SpO2 99 %      Weight 215 lb (97.5 kg)      Height 5' 2\" (1.575 m)      Head Circumference       Peak Flow Pain Score       Pain Loc       Pain Edu? Excl. in [address]? My pulse ox interpretation is - normal    General appearance:  No acute distress. Skin:  Warm. Dry. Eye:  Extraocular movements intact. Ears, nose, mouth and throat:  Oral mucosa moist   Neck:  Trachea midline. Extremity: Noted to have some  ecchymoses over her fourth and fifth MTP. Minimal to no edema. She does have macerated skin with a small diabetic ulcer in between her fourth and fifth toes. Heart:  Regular  Perfusion:  intact  Respiratory:  Lungs clear to auscultation bilaterally. Respirations nonlabored. Abdominal:   Non distended. Neurological:  Alert and oriented    I have reviewed and interpreted all of the currently available lab results from this visit (if applicable):  No results found for this visit on 04/02/21. Radiographs (if obtained):  Radiologist's Report Reviewed:  No results found. MDM:  40-year-old female with poorly controlled diabetes type 2 found to have a small diabetic foot ulcer and cellulitis in between her fourth and fifth toes that is causing the discoloration over the fourth and fifth MTPs. Patient was given clotrimazole cream in the emergency department x1. Patient was also given a dose of doxycycline 100 mg p.o. x1 while in emergency department. Patient was given a prescription for both doxycycline and clotrimazole. Discussed importance of establishing care with a podiatrist/wound care. Discussed importance of closely controlling her blood sugars. Discussed importance of taking doxycycline as directed. Discussed importance of keeping wound clean and dry. Discussed the importance of wearing shoes. Strict ED return precautions were given which included but was not limited to worsening redness, swelling, pain or any medical concern. Patient acknowledged understanding and agreement with the plan of care. She was discharged in good condition with stable vitals.     Clinical

## 2021-04-03 NOTE — ED NOTES
+ purple bruising along the top of the 3rd, 4th and 5th toe digits. + soreness at that area also. + deeper ulceration about the size of a Q-tip head in between the 4th and 5th toes is present. Has 2x2 in between the digits.        Go Goddard RN  04/03/21 [id number]

## 2021-04-03 NOTE — ED NOTES
Pt is going home to soak foot in warm soapy water for 5 min, dry with clean towel. She was instructed to apply cream and 2x2's that were given to pt in between Rt 4th and 5th toes, wear clean socks and not to walk on floor without a foot a clean foot covering. Advised pt to call wound clinic and have foot evaluated along with contacting her doctors.        James Reilly RN  04/03/21 5547

## 2021-04-08 ENCOUNTER — OFFICE VISIT (OUTPATIENT)
Dept: FAMILY MEDICINE CLINIC | Age: 41
End: 2021-04-08
Payer: MEDICARE

## 2021-04-08 VITALS
SYSTOLIC BLOOD PRESSURE: 128 MMHG | HEART RATE: 88 BPM | OXYGEN SATURATION: 98 % | TEMPERATURE: 97 F | DIASTOLIC BLOOD PRESSURE: 76 MMHG | BODY MASS INDEX: 40.01 KG/M2 | WEIGHT: 217.4 LBS | HEIGHT: 62 IN

## 2021-04-08 DIAGNOSIS — S91.301S OPEN WOUND OF FOOT, RIGHT, SEQUELA: Primary | ICD-10-CM

## 2021-04-08 PROCEDURE — 99213 OFFICE O/P EST LOW 20 MIN: CPT | Performed by: NURSE PRACTITIONER

## 2021-04-08 PROCEDURE — G8427 DOCREV CUR MEDS BY ELIG CLIN: HCPCS | Performed by: NURSE PRACTITIONER

## 2021-04-08 PROCEDURE — G8417 CALC BMI ABV UP PARAM F/U: HCPCS | Performed by: NURSE PRACTITIONER

## 2021-04-08 PROCEDURE — 4004F PT TOBACCO SCREEN RCVD TLK: CPT | Performed by: NURSE PRACTITIONER

## 2021-04-08 RX ORDER — SULFAMETHOXAZOLE AND TRIMETHOPRIM 800; 160 MG/1; MG/1
1 TABLET ORAL 2 TIMES DAILY
Qty: 20 TABLET | Refills: 0 | Status: SHIPPED | OUTPATIENT
Start: 2021-04-08 | End: 2021-04-18

## 2021-04-08 NOTE — PROGRESS NOTES
2021     Marilyn Talamantes (:  1980) is a 41 y.o. female, here for evaluation of the following medical concerns:    Presents for ER follow up.  - she noticed the top of her right foot/fourth and fifth toes was red and tender. Went to the ER on  and was given doxycycline. Reports the pain has resolved and she has been cleaning and dressing the wound daily. Erythema has stayed the same. No drainage from wound. Minor swelling. No fever sweating chills. States glucose has been arounf 120. .. Does have additional wound on her right heel - grandchild ran into her with the grocery cart. This happened before her toe wound showed up. Does have some neuropathy and reports she does foot checks before bed nightly. Complaint of left forearm redness since yesterday being out in the sun - burnt. Not painful or changing at all. No itching or swelling of her mouth. Review of Systems    Prior to Visit Medications    Medication Sig Taking? Authorizing Provider   sulfamethoxazole-trimethoprim (BACTRIM DS) 800-160 MG per tablet Take 1 tablet by mouth 2 times daily for 10 days Yes ALISTAIR Blanton NP   clotrimazole (LOTRIMIN) 1 % cream Apply topically 2 times daily.  Yes Joce Tomlinson MD   Dulaglutide 0.75 MG/0.5ML SOPN Inject 0.75 mg into the skin every 7 days Yes Giacomo Loving MD   atorvastatin (LIPITOR) 40 MG tablet Take 1 tablet by mouth every morning Yes ALISTAIR Blanton NP   blood glucose test strips (EXACTECH TEST) strip 1 each by In Vitro route daily Embrace test strips to test BS 4x daily Yes ALISTAIR Blanton NP   glipiZIDE (GLUCOTROL XL) 10 MG extended release tablet Take 1 tablet by mouth every morning Yes ALISTAIR Blanton NP   insulin glargine (LANTUS SOLOSTAR) 100 UNIT/ML injection pen Inject 20 Units into the skin every morning Yes ALISTAIR Blanton NP   Insulin Pen Needle 32G X 5 MM MISC To use with Lantus pen and Novolog pen Yes Marce Day, APRN - NP   lisinopril (PRINIVIL;ZESTRIL) 2.5 MG tablet Take 1 tablet by mouth every morning Yes Marce Day, APRN - NP   Dulaglutide 1.5 MG/0.5ML SOPN Inject 1.5 mg into the skin once a week Dose increase as of 1/9/20 Yes Lucila Tillman MD   Lancets MISC To test up to 4 times per day- nighttime and with each meal Yes Lucila Tillman MD   aspirin EC 81 MG EC tablet Take 1 tablet by mouth daily  Marce Day, APRN - NP        Social History     Tobacco Use    Smoking status: Current Every Day Smoker     Packs/day: 0.25     Types: Cigarettes     Last attempt to quit: 07/2017     Years since quitting: 3.7    Smokeless tobacco: Never Used   Substance Use Topics    Alcohol use: Yes     Comment: rare        Vitals:    04/08/21 1007   BP: 128/76   Site: Left Upper Arm   Position: Sitting   Cuff Size: Large Adult   Pulse: 88   Temp: 97 °F (36.1 °C)   SpO2: 98%   Weight: 217 lb 6.4 oz (98.6 kg)   Height: 5' 2\" (1.575 m)     Estimated body mass index is 39.76 kg/m² as calculated from the following:    Height as of this encounter: 5' 2\" (1.575 m). Weight as of this encounter: 217 lb 6.4 oz (98.6 kg). Physical Exam  Vitals signs reviewed. Constitutional:       General: She is not in acute distress. Appearance: Normal appearance. She is normal weight. She is not ill-appearing, toxic-appearing or diaphoretic. HENT:      Head: Normocephalic and atraumatic. Nose: Nose normal.   Eyes:      Extraocular Movements: Extraocular movements intact. Pupils: Pupils are equal, round, and reactive to light. Neck:      Musculoskeletal: Normal range of motion. Pulmonary:      Effort: Pulmonary effort is normal.   Musculoskeletal: Normal range of motion. Feet:    Skin:     Coloration: Skin is not pale. Neurological:      General: No focal deficit present. Mental Status: She is alert and oriented to person, place, and time.  Mental status is at baseline. Psychiatric:         Mood and Affect: Mood normal.         Behavior: Behavior normal.         Thought Content: Thought content normal.         Judgment: Judgment normal.         ASSESSMENT/PLAN:  1. Open wound of foot, right, sequela  Stop doxycycline due to sun sensitivity and left arm \"rash\". Start bactrim. She has been on bactrim in the past. Continue to wash with soap and water daily. Keep covered and dry. Follow up with wound clinic on Tuesday. Follow up with me on 4/14  Stay out of the sun. Discussed osteomyelitis at length and monitoring glucose. Present to the ER for any emergent or acute symptoms not managed at home or in office. An electronic signature was used to authenticate this note.     --ALISTAIR Kendrick NP on 4/8/2021 at 12:56 PM

## 2021-04-13 ENCOUNTER — HOSPITAL ENCOUNTER (OUTPATIENT)
Dept: WOUND CARE | Age: 41
Discharge: HOME | End: 2021-04-13
Payer: MEDICARE

## 2021-04-13 VITALS
DIASTOLIC BLOOD PRESSURE: 92 MMHG | RESPIRATION RATE: 18 BRPM | HEART RATE: 90 BPM | SYSTOLIC BLOOD PRESSURE: 137 MMHG | TEMPERATURE: 97.5 F

## 2021-04-13 DIAGNOSIS — E11.621 DIABETIC ULCER OF RIGHT HEEL ASSOCIATED WITH TYPE 2 DIABETES MELLITUS, WITH FAT LAYER EXPOSED (HCC): ICD-10-CM

## 2021-04-13 DIAGNOSIS — E11.621 DIABETIC ULCER OF RIGHT FIFTH TOE (HCC): ICD-10-CM

## 2021-04-13 DIAGNOSIS — L97.519 DIABETIC ULCER OF RIGHT FIFTH TOE (HCC): ICD-10-CM

## 2021-04-13 DIAGNOSIS — L97.412 DIABETIC ULCER OF RIGHT HEEL ASSOCIATED WITH TYPE 2 DIABETES MELLITUS, WITH FAT LAYER EXPOSED (HCC): ICD-10-CM

## 2021-04-13 PROCEDURE — 87186 SC STD MICRODIL/AGAR DIL: CPT

## 2021-04-13 PROCEDURE — 99203 OFFICE O/P NEW LOW 30 MIN: CPT | Performed by: NURSE PRACTITIONER

## 2021-04-13 PROCEDURE — 99213 OFFICE O/P EST LOW 20 MIN: CPT

## 2021-04-13 PROCEDURE — 87077 CULTURE AEROBIC IDENTIFY: CPT

## 2021-04-13 PROCEDURE — 87070 CULTURE OTHR SPECIMN AEROBIC: CPT

## 2021-04-13 PROCEDURE — 11042 DBRDMT SUBQ TIS 1ST 20SQCM/<: CPT | Performed by: NURSE PRACTITIONER

## 2021-04-13 PROCEDURE — 87075 CULTR BACTERIA EXCEPT BLOOD: CPT

## 2021-04-13 PROCEDURE — 11042 DBRDMT SUBQ TIS 1ST 20SQCM/<: CPT

## 2021-04-13 RX ORDER — LIDOCAINE 50 MG/G
OINTMENT TOPICAL ONCE
Status: CANCELLED | OUTPATIENT
Start: 2021-04-13 | End: 2021-04-13

## 2021-04-13 RX ORDER — GENTAMICIN SULFATE 1 MG/G
OINTMENT TOPICAL ONCE
Status: CANCELLED | OUTPATIENT
Start: 2021-04-13 | End: 2021-04-13

## 2021-04-13 RX ORDER — LIDOCAINE HYDROCHLORIDE 40 MG/ML
SOLUTION TOPICAL ONCE
Status: CANCELLED | OUTPATIENT
Start: 2021-04-13 | End: 2021-04-13

## 2021-04-13 RX ORDER — LIDOCAINE HYDROCHLORIDE 20 MG/ML
JELLY TOPICAL ONCE
Status: CANCELLED | OUTPATIENT
Start: 2021-04-13 | End: 2021-04-13

## 2021-04-13 RX ORDER — BACITRACIN, NEOMYCIN, POLYMYXIN B 400; 3.5; 5 [USP'U]/G; MG/G; [USP'U]/G
OINTMENT TOPICAL ONCE
Status: CANCELLED | OUTPATIENT
Start: 2021-04-13 | End: 2021-04-13

## 2021-04-13 RX ORDER — BACITRACIN ZINC AND POLYMYXIN B SULFATE 500; 1000 [USP'U]/G; [USP'U]/G
OINTMENT TOPICAL ONCE
Status: CANCELLED | OUTPATIENT
Start: 2021-04-13 | End: 2021-04-13

## 2021-04-13 RX ORDER — GINSENG 100 MG
CAPSULE ORAL ONCE
Status: CANCELLED | OUTPATIENT
Start: 2021-04-13 | End: 2021-04-13

## 2021-04-13 RX ORDER — CLOBETASOL PROPIONATE 0.5 MG/G
OINTMENT TOPICAL ONCE
Status: CANCELLED | OUTPATIENT
Start: 2021-04-13 | End: 2021-04-13

## 2021-04-13 RX ORDER — LIDOCAINE 40 MG/G
CREAM TOPICAL ONCE
Status: CANCELLED | OUTPATIENT
Start: 2021-04-13 | End: 2021-04-13

## 2021-04-13 RX ORDER — BETAMETHASONE DIPROPIONATE 0.05 %
OINTMENT (GRAM) TOPICAL ONCE
Status: CANCELLED | OUTPATIENT
Start: 2021-04-13 | End: 2021-04-13

## 2021-04-13 NOTE — PLAN OF CARE
Problem: Wound:  Goal: Will show signs of wound healing; wound closure and no evidence of infection  Description: Will show signs of wound healing; wound closure and no evidence of infection  4/13/2021 1604 by Mehreen Watson LPN  Outcome: Ongoing  4/13/2021 1555 by Nick Norman RN  Outcome: Ongoing

## 2021-04-13 NOTE — PROGRESS NOTES
[facility] Initial Visit      Marilyn Sue  AGE: 41 y.o. GENDER: female  : 1980  EPISODE DATE:  2021   Referred by: ER staff     Subjective:     CHIEF COMPLAINT Non-healing diabetic wounds to right heel and right 5 toe      HISTORY of PRESENT ILLNESS      Lorena Culver is a 41 y.o. female who presents to the [address] [place] [address] for an initial visit for evaluation and treatment of Acute diabetic  ulcer(s) of  R heel and right 5th toe. The condition is of mild severity. The ulcer has been present for 2.5 weeks. The underlying cause is thought to be uncontrolled diabeties. The patients care to date has included a visit to the ED, where she was given doxy and Lotrimin. The doxy appeared ineffective to the patient, so her PCP placed her on Bactrim. She has also been cleaning wound with dial soap daily. The patient has significant underlying medical conditions as below. Patient is a daily smoker, and does not take an blood thinner. She is a diabetic, who recently started taking insulin. She checkgs her BG TID and states she rarely measures over 120. She has improved her A1C from 12 to 8.    Wound Pain Timing/Severity: none  Quality of pain: N/A  Severity of pain:  0 / 10   Modifying Factors: diabetes and poor glucose control  Associated Signs/Symptoms: none        PAST MEDICAL HISTORY        Diagnosis Date    Allergic rhinitis     allergy shots- Dr. Krzysztof Kingston Allergic rhinitis     Dr. Karen kwan and flonase    Anxiety 10/24/2012    Atopic dermatitis     Dr. Karen kwan    Back pain 10/24/2012    Bilateral sciatica 2020    Depression     Diabetes mellitus ([address]     Diabetes mellitus type II, uncontrolled ([address]     Diabetic eye exam: 2011, Dr. Jose G Salas , needs record [V72.0T][    GERD (gastroesophageal reflux disease)     Headache(784.0)     Hyperlipidemia LDL goal < 100 2012    Hypertension     Morbid obesity ([address]     No retinopathy on exam     Dr Jose G Salas 13 Diabetes with no significatn diabetic retinopathy found in both eyes    Non compliance w medication regimen 2014    Sprain of neck 10/4/2011       PAST SURGICAL HISTORY    Past Surgical History:   Procedure Laterality Date     SECTION      HYSTERECTOMY      OTHER SURGICAL HISTORY  2017    I&D of right perineal abcess        FAMILY HISTORY    Family History   Problem Relation Age of Onset    Heart Disease Mother     Diabetes Mother     Asthma Mother     High Cholesterol Mother     Depression Mother     Heart Disease Father     High Cholesterol Father        SOCIAL HISTORY    Social History     Tobacco Use    Smoking status: Current Every Day Smoker     Packs/day: 0.25     Types: Cigarettes     Last attempt to quit: 2017     Years since quitting: 3.7    Smokeless tobacco: Never Used   Substance Use Topics    Alcohol use: Yes     Comment: rare    Drug use: No       ALLERGIES    Allergies   Allergen Reactions    Penicillins Rash       MEDICATIONS    Current Outpatient Medications on File Prior to Encounter   Medication Sig Dispense Refill    sulfamethoxazole-trimethoprim (BACTRIM DS) 800-160 MG per tablet Take 1 tablet by mouth 2 times daily for 10 days 20 tablet 0    clotrimazole (LOTRIMIN) 1 % cream Apply topically 2 times daily.  2 Tube 0    Dulaglutide 0.75 MG/0.5ML SOPN Inject 0.75 mg into the skin every 7 days 4 pen 3    atorvastatin (LIPITOR) 40 MG tablet Take 1 tablet by mouth every morning 90 tablet 3    glipiZIDE (GLUCOTROL XL) 10 MG extended release tablet Take 1 tablet by mouth every morning 90 tablet 3    insulin glargine (LANTUS SOLOSTAR) 100 UNIT/ML injection pen Inject 20 Units into the skin every morning 10 pen 3    lisinopril (PRINIVIL;ZESTRIL) 2.5 MG tablet Take 1 tablet by mouth every morning 90 tablet 3    Dulaglutide 1.5 MG/0.5ML SOPN Inject 1.5 mg into the skin once a week Dose increase as of 20 4 pen 3    aspirin EC 81 MG EC tablet Take 1 tablet by mouth daily 90 tablet 0    blood glucose test strips (EXACTECH TEST) strip 1 each by In Vitro route daily Embrace test strips to test BS 4x daily 300 each 6    Insulin Pen Needle 32G X 5 MM MISC To use with Lantus pen and Novolog pen 30 each 11    Lancets MISC To test up to 4 times per day- nighttime and with each meal 300 each 5     No current facility-administered medications on file prior to encounter.         PROBLEM LIST    Patient Active Problem List   Diagnosis    Uncontrolled type 2 diabetes mellitus without complication, with long-term current use of insulin    Essential hypertension    Gastroesophageal reflux disease without esophagitis    Anxiety    Chronic neck pain    Chronic back pain    Hyperlipidemia associated with type 2 diabetes mellitus (HCC)    Allergic rhinitis    Atopic dermatitis    Class 2 severe obesity due to excess calories with serious comorbidity and body mass index (BMI) of 38.0 to 38.9 in adult [facility])    Uncontrolled type 2 diabetes mellitus with complication, with long-term current use of insulin (HCC)    Poor compliance with medication- due to financial issues    Bilateral sciatica    Smoker    WD-Diabetic ulcer of right heel with fat layer exposed ([address]    WD-Diabetic ulcer of right fifth toe (HCC)       REVIEW OF SYSTEMS    Constitutional: negative for anorexia, chills, fatigue, fevers, malaise, sweats and weight loss  Respiratory: negative for asthma, cough, emphysema, pleurisy/chest pain, shortness of breath, sputum, stridor and wheezing  Cardiovascular: negative for chest pain, claudication, dyspnea, fatigue, near-syncope, orthopnea, palpitations, paroxysmal nocturnal dyspnea, syncope and tachypnea  Integument/breast: positive for skin lesion(s)      Objective:      BP (!) 137/92   Pulse 90   Temp 97.5 °F (36.4 °C) (Temporal)   Resp 18     PHYSICAL EXAM  General Appearance: alert and oriented to person, place and time, well-developed and well-nourished, in no acute distress  Pulmonary/Chest: clear to auscultation bilaterally- no wheezes, rales or rhonchi, normal air movement, no respiratory distress  Cardiovascular: normal rate, normal S1 and S2, no gallops, intact distal pulses and no carotid bruits  Dermatologic exam: Visual inspection of the periwound reveals the skin to be normal in turgor and texture and dry  Wound exam: see wound description below in procedure note      Assessment:       Marilyn Sue  appears to have a non-healing wound of the right 5th toe and right heel. The etiology of the wound is felt to be diabetic. There are multiple complicating factors including diabetes and poor glucose control. A comprehensive wound management program would be helpful to heal this wound. Assessments completed include fall risk and nutritional, functional,and psychological status. At this time appropriate care would include: periodic debridement and wound care as below. Problem List Items Addressed This Visit     WD-Diabetic ulcer of right heel with fat layer exposed ([address]    WD-Diabetic ulcer of right fifth toe ([address]    Relevant Orders    Culture, Wound            Procedure Note    Indications:  Based on my examination of this patient's wound(s) today, sharp excision into necrotic subcutaneous tissue is required to promote healing and evaluate the extent of previous healing. Performed by: ALISTAIR Arambula CNP    Consent obtained: Yes    Time out taken:  Yes    Pain Control: Anesthetic  Anesthetic: 4% Lidocaine Liquid Topical     Debridement:Excisional Debridement    Using curette the wound(s) was/were sharply debrided down through and including the removal of subcutaneous tissue.         Devitalized Tissue Debrided:  fibrin, biofilm and slough    Pre Debridement Measurements:  Are located in the Wound Documentation Flow Sheet    All active wounds listed below with today's date are evaluated  Wound(s)    debrided this date include # : 1 and 2     Post Description Serosanguinous 04/13/21 1503   Odor None 04/13/21 1503   Yamilex-wound Assessment Maceration;Fragile 04/13/21 1503   Margins Defined edges 04/13/21 1503   Number of days: 0       Percent of Wound(s) Debrided: 100%    Total  Area  Debrided:  1.25 sq cm     Bleeding:  Minimal    Hemostasis Achieved:  by pressure    Procedural Pain:  0  / 10     Post Procedural Pain:  0 / 10     Response to treatment:  Well tolerated by patient. Patient appears to be healing. The bactrim she was placed on was an improvement. Educated patient on the risk of chronic nonhealing wounds associated with diabetes. Culture drawn to ensure proper antibiotics coverage. We will follow up with this when resulted. 1 week follow up. Patient is confident she can change dressings on her own. Supplies ordered. We will hold off on antibiotics ointment for now until culture comes back        Plan:     Discharge Instructions       PHYSICIAN ORDERS AND DISCHARGE INSTRUCTIONS    NOTE: Upon discharge from the [address], you will receive a patient experience survey. We would be grateful if you would take the time to fill this survey out. Wound care order history:     ROBIN's   Right 1.17      Left 1.2   Date 4/13/21   Vascular studies:      Cultures:  4/13/21              Antibiotics:                HbA1c:   8 1/13/21              Compression/Lymph Pumps:              Grafts:       Continuing wound care orders and information:              Residence:               Continue home health care with:    Your wound-care supplies will be provided by: Maggie Benitez Wound cleansing:      Do not scrub or use excessive force. Wash hands with soap and water before and after dressing changes. Prior to applying a clean dressing, cleanse wound with normal saline,    wound cleanser, or mild soap and water. Ask your physician or nurse before getting the wound(s) wet in the shower.    Daily Wound management:    Keep weight off wounds and reposition

## 2021-04-14 ENCOUNTER — OFFICE VISIT (OUTPATIENT)
Dept: FAMILY MEDICINE CLINIC | Age: 41
End: 2021-04-14
Payer: MEDICARE

## 2021-04-14 VITALS
HEIGHT: 62 IN | HEART RATE: 72 BPM | TEMPERATURE: 97.3 F | OXYGEN SATURATION: 97 % | DIASTOLIC BLOOD PRESSURE: 76 MMHG | BODY MASS INDEX: 40.08 KG/M2 | SYSTOLIC BLOOD PRESSURE: 130 MMHG | WEIGHT: 217.8 LBS

## 2021-04-14 DIAGNOSIS — F17.200 SMOKER: ICD-10-CM

## 2021-04-14 DIAGNOSIS — S91.301S OPEN WOUND OF FOOT, RIGHT, SEQUELA: ICD-10-CM

## 2021-04-14 DIAGNOSIS — E66.01 CLASS 2 SEVERE OBESITY DUE TO EXCESS CALORIES WITH SERIOUS COMORBIDITY AND BODY MASS INDEX (BMI) OF 38.0 TO 38.9 IN ADULT (HCC): ICD-10-CM

## 2021-04-14 DIAGNOSIS — E78.5 HYPERLIPIDEMIA ASSOCIATED WITH TYPE 2 DIABETES MELLITUS (HCC): ICD-10-CM

## 2021-04-14 DIAGNOSIS — E11.69 HYPERLIPIDEMIA ASSOCIATED WITH TYPE 2 DIABETES MELLITUS (HCC): ICD-10-CM

## 2021-04-14 DIAGNOSIS — I10 ESSENTIAL HYPERTENSION: ICD-10-CM

## 2021-04-14 LAB — HBA1C MFR BLD: 8.9 %

## 2021-04-14 PROCEDURE — 83036 HEMOGLOBIN GLYCOSYLATED A1C: CPT | Performed by: NURSE PRACTITIONER

## 2021-04-14 PROCEDURE — 4004F PT TOBACCO SCREEN RCVD TLK: CPT | Performed by: NURSE PRACTITIONER

## 2021-04-14 PROCEDURE — G8417 CALC BMI ABV UP PARAM F/U: HCPCS | Performed by: NURSE PRACTITIONER

## 2021-04-14 PROCEDURE — 99214 OFFICE O/P EST MOD 30 MIN: CPT | Performed by: NURSE PRACTITIONER

## 2021-04-14 PROCEDURE — G8427 DOCREV CUR MEDS BY ELIG CLIN: HCPCS | Performed by: NURSE PRACTITIONER

## 2021-04-14 PROCEDURE — 3052F HG A1C>EQUAL 8.0%<EQUAL 9.0%: CPT | Performed by: NURSE PRACTITIONER

## 2021-04-14 PROCEDURE — 2022F DILAT RTA XM EVC RTNOPTHY: CPT | Performed by: NURSE PRACTITIONER

## 2021-04-14 RX ORDER — ATORVASTATIN CALCIUM 40 MG/1
40 TABLET, FILM COATED ORAL EVERY MORNING
Qty: 90 TABLET | Refills: 3 | Status: SHIPPED | OUTPATIENT
Start: 2021-04-14 | End: 2022-04-19 | Stop reason: SDUPTHER

## 2021-04-14 RX ORDER — LANCETS 30 GAUGE
EACH MISCELLANEOUS
Qty: 300 EACH | Refills: 5 | Status: SHIPPED | OUTPATIENT
Start: 2021-04-14

## 2021-04-14 RX ORDER — GLIPIZIDE 10 MG/1
10 TABLET, FILM COATED, EXTENDED RELEASE ORAL EVERY MORNING
Qty: 90 TABLET | Refills: 3 | Status: SHIPPED | OUTPATIENT
Start: 2021-04-14 | End: 2021-11-01 | Stop reason: SDUPTHER

## 2021-04-14 RX ORDER — ASPIRIN 81 MG/1
81 TABLET ORAL DAILY
Qty: 90 TABLET | Refills: 0 | Status: CANCELLED | OUTPATIENT
Start: 2021-04-14 | End: 2021-07-13

## 2021-04-14 RX ORDER — INSULIN GLARGINE 100 [IU]/ML
15 INJECTION, SOLUTION SUBCUTANEOUS NIGHTLY
Qty: 10 PEN | Refills: 5 | Status: SHIPPED | OUTPATIENT
Start: 2021-04-14 | End: 2021-11-01 | Stop reason: SDUPTHER

## 2021-04-14 RX ORDER — LISINOPRIL 2.5 MG/1
2.5 TABLET ORAL EVERY MORNING
Qty: 90 TABLET | Refills: 3 | Status: SHIPPED | OUTPATIENT
Start: 2021-04-14 | End: 2022-04-19 | Stop reason: SDUPTHER

## 2021-04-14 RX ORDER — BLOOD SUGAR DIAGNOSTIC
1 STRIP MISCELLANEOUS DAILY
Qty: 300 EACH | Refills: 6 | Status: SHIPPED | OUTPATIENT
Start: 2021-04-14

## 2021-04-14 ASSESSMENT — ENCOUNTER SYMPTOMS
ABDOMINAL PAIN: 0
VOMITING: 0
COUGH: 0
NAUSEA: 0
BLOOD IN STOOL: 0
DIARRHEA: 0
BACK PAIN: 1
CONSTIPATION: 0
SHORTNESS OF BREATH: 0

## 2021-04-14 NOTE — PROGRESS NOTES
2021     Marilyn Das (:  1980) is a 41 y.o. female, here for evaluation of the following medical concerns:      Presents for follow-up on chronic conditions :     Diabetes type 2   taking Trulicity weekly with some weight loss. Lantus 15 units before bed. Hasn't needed SSI - has it ordered, but does not use. Does not always check glucose before meals. Taking glipizide 10mg XL once per day in the AM .   NO low blood sugars less than 70. Highest glucose 120  Lowest 79 one time when waking up in the AM.   Last hemoglobin A1c 8 in 2021  Diabetic dermopathy bilateral lower extremities  neuropathy in feet - three times per day average. Denies numbness. \"feels like needles sticking in my feet\". Goes away on its own. Happens when she is up and moving around or driving. intermittent restless leg    Wound- right foot between 4th and 5th digit - following at wound clinic, improving.      Hypertensioncontrolled and takes blood pressure at home. Less than 140/90. Tolerating medicine well.     Hyperlipidemiatolerating statin well     Anxiety is doing well.not medicated     Obesity.     Chronic back pain right side sciatica intermittent. Denies complaints today. zanaflex PRN.     Smoker- 5 cigarettes per day current. Quit \"cold turkey\" before, but her mother  and she started again.                    Review of Systems   Constitutional: Negative for activity change, appetite change, chills, diaphoresis, fatigue, fever and unexpected weight change. Eyes: Negative for visual disturbance. Respiratory: Negative for cough and shortness of breath. Cardiovascular: Negative for chest pain, palpitations and leg swelling. Gastrointestinal: Negative for abdominal pain, blood in stool, constipation, diarrhea, nausea and vomiting. Genitourinary: Negative for difficulty urinating. Musculoskeletal: Positive for back pain. Negative for arthralgias and gait problem. Skin: Negative. Neurological: Negative for dizziness, weakness, light-headedness, numbness and headaches. BLE neuropathies   Psychiatric/Behavioral: Negative for dysphoric mood, sleep disturbance and suicidal ideas. The patient is not nervous/anxious. Prior to Visit Medications    Medication Sig Taking?  Authorizing Provider   lisinopril (PRINIVIL;ZESTRIL) 2.5 MG tablet Take 1 tablet by mouth every morning Yes ALISTAIR Blanton NP   atorvastatin (LIPITOR) 40 MG tablet Take 1 tablet by mouth every morning Yes ALISTAIR Blanton NP   Dulaglutide 1.5 MG/0.5ML SOPN Inject 1.5 mg into the skin once a week Dose increase as of 1/9/20 Yes ALISTAIR Blanton NP   glipiZIDE (GLUCOTROL XL) 10 MG extended release tablet Take 1 tablet by mouth every morning Yes ALISTAIR Blanton NP   insulin glargine (LANTUS SOLOSTAR) 100 UNIT/ML injection pen Inject 15 Units into the skin nightly Yes ALISTAIR Blanton NP   Lancets MISC To test up to 4 times per day- nighttime and with each meal Yes ALISTAIR Blanton NP   blood glucose test strips (EXACTECH TEST) strip 1 each by In Vitro route daily Embrace test strips to test BS 4x daily Yes ALISTAIR Blanton NP   Insulin Pen Needle 32G X 5 MM MISC To use with Lantus pen and Novolog pen Yes ALISTAIR Blanton NP   sulfamethoxazole-trimethoprim (BACTRIM DS) 800-160 MG per tablet Take 1 tablet by mouth 2 times daily for 10 days Yes ALISTAIR Blanton NP   aspirin EC 81 MG EC tablet Take 1 tablet by mouth daily  ALISTAIR Blanton NP        Social History     Tobacco Use    Smoking status: Current Every Day Smoker     Packs/day: 0.25     Types: Cigarettes     Last attempt to quit: 07/2017     Years since quitting: 3.7    Smokeless tobacco: Never Used   Substance Use Topics    Alcohol use: Yes     Comment: rare        Vitals:    04/14/21 1058   BP: 130/76   Site: Left Upper Arm   Position: Sitting   Cuff Size: Large Adult Pulse: 72   Temp: 97.3 °F (36.3 °C)   SpO2: 97%   Weight: 217 lb 12.8 oz (98.8 kg)   Height: 5' 2\" (1.575 m)     Estimated body mass index is 39.84 kg/m² as calculated from the following:    Height as of this encounter: 5' 2\" (1.575 m). Weight as of this encounter: 217 lb 12.8 oz (98.8 kg). Physical Exam  Vitals signs reviewed. Constitutional:       General: She is not in acute distress. Appearance: Normal appearance. She is obese. She is not ill-appearing, toxic-appearing or diaphoretic. HENT:      Head: Normocephalic and atraumatic. Nose: Nose normal.   Eyes:      Extraocular Movements: Extraocular movements intact. Pupils: Pupils are equal, round, and reactive to light. Neck:      Musculoskeletal: Normal range of motion and neck supple. Cardiovascular:      Rate and Rhythm: Normal rate and regular rhythm. Heart sounds: Normal heart sounds. Pulmonary:      Effort: Pulmonary effort is normal.      Breath sounds: Normal breath sounds. Abdominal:      General: Bowel sounds are normal. There is no distension. Palpations: Abdomen is soft. There is no mass. Tenderness: There is no abdominal tenderness. Hernia: No hernia is present. Musculoskeletal: Normal range of motion. Skin:     General: Skin is warm and dry. Comments: Foot wrapped and dressed- not assessed   Neurological:      General: No focal deficit present. Mental Status: She is alert and oriented to person, place, and time. Mental status is at baseline. Psychiatric:         Mood and Affect: Mood normal.         Behavior: Behavior normal.         Thought Content: Thought content normal.         Judgment: Judgment normal.         ASSESSMENT/PLAN:  1. Uncontrolled type 2 diabetes mellitus with complication, with long-term current use of insulin (HCC)  Cont. Current regimen. - lisinopril (PRINIVIL;ZESTRIL) 2.5 MG tablet; Take 1 tablet by mouth every morning  Dispense: 90 tablet;  Refill: 3  - Dulaglutide 1.5 MG/0.5ML SOPN; Inject 1.5 mg into the skin once a week Dose increase as of 1/9/20  Dispense: 4 pen; Refill: 5  - glipiZIDE (GLUCOTROL XL) 10 MG extended release tablet; Take 1 tablet by mouth every morning  Dispense: 90 tablet; Refill: 3  - insulin glargine (LANTUS SOLOSTAR) 100 UNIT/ML injection pen; Inject 15 Units into the skin nightly  Dispense: 10 pen; Refill: 5  - blood glucose test strips (EXACTECH TEST) strip; 1 each by In Vitro route daily Embrace test strips to test BS 4x daily  Dispense: 300 each; Refill: 6  - Insulin Pen Needle 32G X 5 MM MISC; To use with Lantus pen and Novolog pen  Dispense: 30 each; Refill: 11  - POCT glycosylated hemoglobin (Hb A1C)    2. Diabetes mellitus with coincident hypertension ([address]  Controlled. Continue current medication regimen. DASH diet. BP goal less than 140/90.  - lisinopril (PRINIVIL;ZESTRIL) 2.5 MG tablet; Take 1 tablet by mouth every morning  Dispense: 90 tablet; Refill: 3  - POCT glycosylated hemoglobin (Hb A1C)    3. Hyperlipidemia associated with type 2 diabetes mellitus (HCC)  - atorvastatin (LIPITOR) 40 MG tablet; Take 1 tablet by mouth every morning  Dispense: 90 tablet; Refill: 3    Smoker- advised cessation- wants to quit on her own     obesity - exercise, DM2 diet     Peripheral neuropathy- foot checks. Stated heat helps. Advised tonic water and discussed gabapentin but does not want to start it right now       Present to the ER for any emergent or acute symptoms not managed at home or in office. An electronic signature was used to authenticate this note.     --ALISTAIR Hawkins - NP on 4/14/2021 at 3:48 PM

## 2021-04-19 LAB
CULTURE: ABNORMAL
Lab: ABNORMAL
SPECIMEN: ABNORMAL

## 2021-04-20 ENCOUNTER — HOSPITAL ENCOUNTER (OUTPATIENT)
Dept: WOUND CARE | Age: 41
Discharge: HOME | End: 2021-04-20
Payer: MEDICARE

## 2021-04-20 VITALS
SYSTOLIC BLOOD PRESSURE: 150 MMHG | HEART RATE: 98 BPM | TEMPERATURE: 97.2 F | RESPIRATION RATE: 17 BRPM | DIASTOLIC BLOOD PRESSURE: 97 MMHG

## 2021-04-20 DIAGNOSIS — L97.412 DIABETIC ULCER OF RIGHT HEEL ASSOCIATED WITH TYPE 2 DIABETES MELLITUS, WITH FAT LAYER EXPOSED (HCC): Primary | ICD-10-CM

## 2021-04-20 DIAGNOSIS — L97.519 DIABETIC ULCER OF RIGHT FIFTH TOE (HCC): ICD-10-CM

## 2021-04-20 DIAGNOSIS — E11.621 DIABETIC ULCER OF RIGHT HEEL ASSOCIATED WITH TYPE 2 DIABETES MELLITUS, WITH FAT LAYER EXPOSED (HCC): Primary | ICD-10-CM

## 2021-04-20 DIAGNOSIS — E11.621 DIABETIC ULCER OF RIGHT FIFTH TOE (HCC): ICD-10-CM

## 2021-04-20 PROCEDURE — 11042 DBRDMT SUBQ TIS 1ST 20SQCM/<: CPT | Performed by: NURSE PRACTITIONER

## 2021-04-20 PROCEDURE — 11042 DBRDMT SUBQ TIS 1ST 20SQCM/<: CPT

## 2021-04-20 RX ORDER — CLOBETASOL PROPIONATE 0.5 MG/G
OINTMENT TOPICAL ONCE
Status: CANCELLED | OUTPATIENT
Start: 2021-04-20 | End: 2021-04-20

## 2021-04-20 RX ORDER — BACITRACIN, NEOMYCIN, POLYMYXIN B 400; 3.5; 5 [USP'U]/G; MG/G; [USP'U]/G
OINTMENT TOPICAL ONCE
Status: CANCELLED | OUTPATIENT
Start: 2021-04-20 | End: 2021-04-20

## 2021-04-20 RX ORDER — LIDOCAINE HYDROCHLORIDE 40 MG/ML
SOLUTION TOPICAL ONCE
Status: CANCELLED | OUTPATIENT
Start: 2021-04-20 | End: 2021-04-20

## 2021-04-20 RX ORDER — GENTAMICIN SULFATE 1 MG/G
OINTMENT TOPICAL ONCE
Status: CANCELLED | OUTPATIENT
Start: 2021-04-20 | End: 2021-04-20

## 2021-04-20 RX ORDER — LIDOCAINE HYDROCHLORIDE 20 MG/ML
JELLY TOPICAL ONCE
Status: CANCELLED | OUTPATIENT
Start: 2021-04-20 | End: 2021-04-20

## 2021-04-20 RX ORDER — GINSENG 100 MG
CAPSULE ORAL ONCE
Status: CANCELLED | OUTPATIENT
Start: 2021-04-20 | End: 2021-04-20

## 2021-04-20 RX ORDER — LIDOCAINE HYDROCHLORIDE 40 MG/ML
SOLUTION TOPICAL ONCE
Status: DISCONTINUED | OUTPATIENT
Start: 2021-04-20 | End: 2021-04-21 | Stop reason: HOSPADM

## 2021-04-20 RX ORDER — BACITRACIN ZINC AND POLYMYXIN B SULFATE 500; 1000 [USP'U]/G; [USP'U]/G
OINTMENT TOPICAL ONCE
Status: CANCELLED | OUTPATIENT
Start: 2021-04-20 | End: 2021-04-20

## 2021-04-20 RX ORDER — LIDOCAINE 50 MG/G
OINTMENT TOPICAL ONCE
Status: CANCELLED | OUTPATIENT
Start: 2021-04-20 | End: 2021-04-20

## 2021-04-20 RX ORDER — LIDOCAINE 40 MG/G
CREAM TOPICAL ONCE
Status: CANCELLED | OUTPATIENT
Start: 2021-04-20 | End: 2021-04-20

## 2021-04-20 RX ORDER — BETAMETHASONE DIPROPIONATE 0.05 %
OINTMENT (GRAM) TOPICAL ONCE
Status: CANCELLED | OUTPATIENT
Start: 2021-04-20 | End: 2021-04-20

## 2021-04-20 ASSESSMENT — PAIN SCALES - GENERAL: PAINLEVEL_OUTOF10: 0

## 2021-04-20 NOTE — PROGRESS NOTES
Wound Care Center Progress Note With Procedure    Marilyn Avila  AGE: 41 y.o. GENDER: female  : 1980  EPISODE DATE:  2021     Subjective:     Chief Complaint   Patient presents with    Wound Check     RLE          HISTORY of PRESENT ILLNESS      Henry Martinez is a 41 y.o. female who presents to the [address] [place] [address] for a visit for evaluation and treatment of Acute diabetic  ulcer(s) of  R heel and right 5th toe. The condition is of mild severity. The ulcer has been present for 2.5 weeks. The underlying cause is thought to be uncontrolled diabeties. The patients care to date has included a visit to the ED, where she was given doxy and Lotrimin. The doxy appeared ineffective to the patient, so her PCP placed her on Bactrim. She has also been cleaning wound with dial soap daily. The patient has significant underlying medical conditions as below. Patient is a daily smoker, and does not take an blood thinner. She is a diabetic, who recently started taking insulin. She checkgs her BG TID and states she rarely measures over 120. She has improved her A1C from 12 to 8.    Wound Pain Timing/Severity: none  Quality of pain: N/A  Severity of pain:  0 / 10   Modifying Factors: diabetes and poor glucose control  Associated Signs/Symptoms: none        PAST MEDICAL HISTORY        Diagnosis Date    Allergic rhinitis     allergy shots- Dr. Guanaco Troncoso Allergic rhinitis     Dr. Roberth kwan and flonase    Anxiety 10/24/2012    Atopic dermatitis     Dr. Roberth kwan    Back pain 10/24/2012    Bilateral sciatica 2020    Depression     Diabetes mellitus ([address]     Diabetes mellitus type II, uncontrolled ([address]     Diabetic eye exam: 2011, Dr. Brandon Gottlieb , needs record [V72.0T][    GERD (gastroesophageal reflux disease)     Headache(784.0)     Hyperlipidemia LDL goal < 100 2012    Hypertension     Morbid obesity ([address]     No retinopathy on exam     Dr Brandon Gottlieb 13 Diabetes with no significatn diabetic retinopathy found in both eyes    Non compliance w medication regimen 2014    Sprain of neck 10/4/2011       PAST SURGICAL HISTORY    Past Surgical History:   Procedure Laterality Date     SECTION      HYSTERECTOMY      OTHER SURGICAL HISTORY  2017    I&D of right perineal abcess        FAMILY HISTORY    Family History   Problem Relation Age of Onset    Heart Disease Mother     Diabetes Mother     Asthma Mother     High Cholesterol Mother     Depression Mother     Heart Disease Father     High Cholesterol Father        SOCIAL HISTORY    Social History     Tobacco Use    Smoking status: Current Every Day Smoker     Packs/day: 0.25     Types: Cigarettes     Last attempt to quit: 2017     Years since quitting: 3.8    Smokeless tobacco: Never Used    Tobacco comment: trying to slow down    Substance Use Topics    Alcohol use: Yes     Comment: rare    Drug use: No       ALLERGIES    Allergies   Allergen Reactions    Penicillins Rash       MEDICATIONS    Current Outpatient Medications on File Prior to Encounter   Medication Sig Dispense Refill    lisinopril (PRINIVIL;ZESTRIL) 2.5 MG tablet Take 1 tablet by mouth every morning 90 tablet 3    atorvastatin (LIPITOR) 40 MG tablet Take 1 tablet by mouth every morning 90 tablet 3    Dulaglutide 1.5 MG/0.5ML SOPN Inject 1.5 mg into the skin once a week Dose increase as of 20 4 pen 5    glipiZIDE (GLUCOTROL XL) 10 MG extended release tablet Take 1 tablet by mouth every morning 90 tablet 3    insulin glargine (LANTUS SOLOSTAR) 100 UNIT/ML injection pen Inject 15 Units into the skin nightly 10 pen 5    Lancets MISC To test up to 4 times per day- nighttime and with each meal 300 each 5    blood glucose test strips (EXACTECH TEST) strip 1 each by In Vitro route daily Embrace test strips to test BS 4x daily 300 each 6    Insulin Pen Needle 32G X 5 MM MISC To use with Lantus pen and Novolog pen 30 each 11    today's date are evaluated  Wound(s)    debrided this date include # : 1 and 2     Post  Debridement Measurements:  Wound 04/13/21 Foot #1 4th and 5th toe space (Active)   Wound Image   04/13/21 1503   Dressing Status New dressing applied 04/13/21 1604   Wound Cleansed Cleansed with saline 04/20/21 1539   Offloading for Diabetic Foot Ulcers No offloading required 04/20/21 1539   Wound Length (cm) 0.69 cm 04/20/21 1539   Wound Width (cm) 1.2 cm 04/20/21 1539   Wound Depth (cm) 0.2 cm 04/20/21 1539   Wound Surface Area (cm^2) 0.83 cm^2 04/20/21 1539   Change in Wound Size % (l*w) 30.83 04/20/21 1539   Wound Volume (cm^3) 0.17 cm^3 04/20/21 1539   Wound Healing % 29 04/20/21 1539   Post-Procedure Length (cm) 0.7 cm 04/20/21 1540   Post-Procedure Width (cm) 1.2 cm 04/20/21 1540   Post-Procedure Depth (cm) 0.2 cm 04/20/21 1540   Post-Procedure Surface Area (cm^2) 0.84 cm^2 04/20/21 1540   Post-Procedure Volume (cm^3) 0.17 cm^3 04/20/21 1540   Distance Tunneling (cm) 0 cm 04/20/21 1539   Tunneling Position ___ O'Clock 0 04/20/21 1539   Undermining Starts ___ O'Clock 0 04/20/21 1539   Undermining Ends___ O'Clock 0 04/20/21 1539   Undermining Maxium Distance (cm) 0 04/20/21 1539   Wound Assessment Granulation tissue;Slough 04/20/21 1539   Drainage Amount Moderate 04/20/21 1539   Drainage Description Serosanguinous 04/20/21 1539   Odor None 04/20/21 1539   Yamilex-wound Assessment Maceration;Fragile;Dry/flaky 04/20/21 1539   Margins Defined edges 04/20/21 1539   Wound Thickness Description not for Pressure Injury Full thickness 04/20/21 1539   Number of days: 7       Wound 04/13/21 Heel Right #2 (Active)   Wound Image   04/13/21 1503   Dressing Status New dressing applied 04/13/21 1554   Wound Cleansed Cleansed with saline 04/20/21 1539   Offloading for Diabetic Foot Ulcers No offloading required 04/20/21 1539   Wound Length (cm) 0.4 cm 04/20/21 1539   Wound Width (cm) 0.4 cm 04/20/21 1539   Wound Depth (cm) 0.1 cm 04/20/21 1539   Wound Surface Area (cm^2) 0.16 cm^2 04/20/21 1539   Change in Wound Size % (l*w) -220 04/20/21 1539   Wound Volume (cm^3) 0.02 cm^3 04/20/21 1539   Post-Procedure Length (cm) 0.4 cm 04/20/21 1540   Post-Procedure Width (cm) 0.4 cm 04/20/21 1540   Post-Procedure Depth (cm) 0.1 cm 04/20/21 1540   Post-Procedure Surface Area (cm^2) 0.16 cm^2 04/20/21 1540   Post-Procedure Volume (cm^3) 0.02 cm^3 04/20/21 1540   Distance Tunneling (cm) 0 cm 04/20/21 1539   Tunneling Position ___ O'Clock 0 04/20/21 1539   Undermining Starts ___ O'Clock 0 04/20/21 1539   Undermining Ends___ O'Clock 0 04/20/21 1539   Undermining Maxium Distance (cm) 0 04/20/21 1539   Wound Assessment Granulation tissue 04/20/21 1539   Drainage Amount Moderate 04/20/21 1539   Drainage Description Serosanguinous 04/20/21 1539   Odor None 04/20/21 1539   Yamilex-wound Assessment Dry/flaky 04/20/21 1539   Margins Defined edges 04/20/21 1539   Wound Thickness Description not for Pressure Injury Full thickness 04/20/21 1539   Number of days: 7       Percent of Wound(s) Debrided: approximately 100%    Total  Area  Debrided:  0.99 sq cm     Bleeding:  None    Hemostasis Achieved:  not needed    Procedural Pain:  0  / 10     Post Procedural Pain:  0 / 10     Response to treatment:  Well tolerated by patient. Status of wound progress and description from last visit:   Improving in size. Periwound in between toes was slightly macerated. We will add a more moisture apprpriate dressing and continue PLAN OF CARE otherwise. Heel wound measured larger but it is approved in appearance and is healing well. Reviewed culture. Bactrim is indicated for all growth. Patient was on this med and just finished it yesterday. No further antibiotics needed at this time       Plan:       Discharge Instructions       [address]     NOTE: Upon discharge from the [address], you will receive a patient experience survey.  We would be grateful if you would take the time to fill this survey out.     Wound care order history:                 ROBIN's   Right 1.17      Left 1.2   Date 4/13/21              Vascular studies:                 Cultures:  4/13/21              Antibiotics:                  HbA1c:   8 1/13/21              Compression/Lymph Pumps:              Grafts:                  Continuing wound care orders and information:              Residence:               Continue home health care with:               Your wound-care supplies will be provided by: [facility] Wound cleansing:                           Do not scrub or use excessive force. Wash hands with soap and water before and after dressing changes. Prior to applying a clean dressing, cleanse wound with normal saline,                          wound cleanser, or mild soap and water. Ask your physician or nurse before getting the wound(s) wet in the shower. Daily Wound management:                          Keep weight off wounds and reposition every 2 hours. Avoid standing for long periods of time. Apply wraps/stockings in AM and remove at bedtime. Elevate legs to the level of the heart or above for 30 minutes 4-5 times a day and/or when sitting. When taking antibiotics take entire prescription as ordered by MD do not stop taking until medicine is all gone.                                                                  Orders for this week (4/20/21):     Right 4th toe and Heel - Wash with mild soap and water.  Pat dry with 4x4   Apply ludivina to wound bed  Cover with calcium alginate  Wrap with conform and secure with tape  Tubi F  Change Daily      Follow up with Kam Yanes CNP in 1 week in the wound care center  Call [phone] for any questions or concerns.   Date__________   Time____________           Treatment Note      Written Patient Dismissal Instructions Given            Electronically signed by ALISTAIR Guevara CNP on 4/20/2021 at 4:00 PM

## 2021-04-27 ENCOUNTER — HOSPITAL ENCOUNTER (OUTPATIENT)
Dept: WOUND CARE | Age: 41
Discharge: HOME | End: 2021-04-27
Payer: MEDICARE

## 2021-04-27 VITALS
SYSTOLIC BLOOD PRESSURE: 122 MMHG | HEART RATE: 102 BPM | DIASTOLIC BLOOD PRESSURE: 85 MMHG | RESPIRATION RATE: 16 BRPM | TEMPERATURE: 98.2 F

## 2021-04-27 DIAGNOSIS — E11.621 DIABETIC ULCER OF RIGHT FIFTH TOE (HCC): ICD-10-CM

## 2021-04-27 DIAGNOSIS — L97.412 DIABETIC ULCER OF RIGHT HEEL ASSOCIATED WITH TYPE 2 DIABETES MELLITUS, WITH FAT LAYER EXPOSED (HCC): Primary | ICD-10-CM

## 2021-04-27 DIAGNOSIS — L97.519 DIABETIC ULCER OF RIGHT FIFTH TOE (HCC): ICD-10-CM

## 2021-04-27 DIAGNOSIS — E11.621 DIABETIC ULCER OF RIGHT HEEL ASSOCIATED WITH TYPE 2 DIABETES MELLITUS, WITH FAT LAYER EXPOSED (HCC): Primary | ICD-10-CM

## 2021-04-27 PROCEDURE — 11042 DBRDMT SUBQ TIS 1ST 20SQCM/<: CPT

## 2021-04-27 PROCEDURE — 11042 DBRDMT SUBQ TIS 1ST 20SQCM/<: CPT | Performed by: NURSE PRACTITIONER

## 2021-04-27 RX ORDER — CLOBETASOL PROPIONATE 0.5 MG/G
OINTMENT TOPICAL ONCE
Status: CANCELLED | OUTPATIENT
Start: 2021-04-27 | End: 2021-04-27

## 2021-04-27 RX ORDER — BETAMETHASONE DIPROPIONATE 0.05 %
OINTMENT (GRAM) TOPICAL ONCE
Status: CANCELLED | OUTPATIENT
Start: 2021-04-27 | End: 2021-04-27

## 2021-04-27 RX ORDER — LIDOCAINE HYDROCHLORIDE 40 MG/ML
SOLUTION TOPICAL ONCE
Status: CANCELLED | OUTPATIENT
Start: 2021-04-27 | End: 2021-04-27

## 2021-04-27 RX ORDER — LIDOCAINE 50 MG/G
OINTMENT TOPICAL ONCE
Status: CANCELLED | OUTPATIENT
Start: 2021-04-27 | End: 2021-04-27

## 2021-04-27 RX ORDER — LIDOCAINE HYDROCHLORIDE 20 MG/ML
JELLY TOPICAL ONCE
Status: CANCELLED | OUTPATIENT
Start: 2021-04-27 | End: 2021-04-27

## 2021-04-27 RX ORDER — LIDOCAINE 40 MG/G
CREAM TOPICAL ONCE
Status: CANCELLED | OUTPATIENT
Start: 2021-04-27 | End: 2021-04-27

## 2021-04-27 RX ORDER — BACITRACIN ZINC AND POLYMYXIN B SULFATE 500; 1000 [USP'U]/G; [USP'U]/G
OINTMENT TOPICAL ONCE
Status: CANCELLED | OUTPATIENT
Start: 2021-04-27 | End: 2021-04-27

## 2021-04-27 RX ORDER — GENTAMICIN SULFATE 1 MG/G
OINTMENT TOPICAL ONCE
Status: CANCELLED | OUTPATIENT
Start: 2021-04-27 | End: 2021-04-27

## 2021-04-27 RX ORDER — BACITRACIN, NEOMYCIN, POLYMYXIN B 400; 3.5; 5 [USP'U]/G; MG/G; [USP'U]/G
OINTMENT TOPICAL ONCE
Status: CANCELLED | OUTPATIENT
Start: 2021-04-27 | End: 2021-04-27

## 2021-04-27 RX ORDER — GINSENG 100 MG
CAPSULE ORAL ONCE
Status: CANCELLED | OUTPATIENT
Start: 2021-04-27 | End: 2021-04-27

## 2021-04-27 RX ORDER — LIDOCAINE HYDROCHLORIDE 40 MG/ML
SOLUTION TOPICAL ONCE
Status: DISCONTINUED | OUTPATIENT
Start: 2021-04-27 | End: 2021-04-28 | Stop reason: HOSPADM

## 2021-04-27 NOTE — PROGRESS NOTES
Wound Care Center Progress Note With Procedure    Marilyn Loredo  AGE: 41 y.o. GENDER: female  : 1980  EPISODE DATE:  2021     Subjective:     Chief Complaint   Patient presents with    Wound Check     right foot         HISTORY of PRESENT ILLNESS      Marilyn Rahman is a 41 y. o. female who presents to the [facility] for a visit for evaluation and treatment of Acute diabetic  ulcer(s) of  R heel and right 5th toe.  The condition is of mild severity. The ulcer has been present for 2.5 weeks.  The underlying cause is thought to be uncontrolled diabeties.  The patients care to date has included a visit to the ED, where she was given doxy and Lotrimin. The doxy appeared ineffective to the patient, so her PCP placed her on Bactrim. She has also been cleaning wound with dial soap daily. The patient has significant underlying medical conditions as below. Patient is a daily smoker, and does not take an blood thinner. She is a diabetic, who recently started taking insulin. She checkgs her BG TID and states she rarely measures over 120.  She has improved her A1C from 12 to 8.   Wound Pain Timing/Severity: none  Quality of pain: N/A  Severity of pain:  0 / 10   Modifying Factors: diabetes and poor glucose control  Associated Signs/Symptoms: none        PAST MEDICAL HISTORY        Diagnosis Date    Allergic rhinitis     allergy shots- Dr. Ping Allen Allergic rhinitis     Dr. Rich Smith- aniya and flonase    Anxiety 10/24/2012    Atopic dermatitis     Dr. Rich Smith- claritin    Back pain 10/24/2012    Bilateral sciatica 2020    Depression     Diabetes mellitus ([address]     Diabetes mellitus type II, uncontrolled ([address]     Diabetic eye exam: 2011, Dr. Elena Steel , needs record [V72.0T][    GERD (gastroesophageal reflux disease)     Headache(784.0)     Hyperlipidemia LDL goal < 100 2012    Hypertension     Morbid obesity ([address]     No retinopathy on exam     Dr Elena Steel 13 Diabetes with no significatn diabetic retinopathy found in both eyes    Non compliance w medication regimen 2014    Sprain of neck 10/4/2011       PAST SURGICAL HISTORY    Past Surgical History:   Procedure Laterality Date     SECTION      HYSTERECTOMY      OTHER SURGICAL HISTORY  2017    I&D of right perineal abcess        FAMILY HISTORY    Family History   Problem Relation Age of Onset    Heart Disease Mother     Diabetes Mother     Asthma Mother     High Cholesterol Mother     Depression Mother     Heart Disease Father     High Cholesterol Father        SOCIAL HISTORY    Social History     Tobacco Use    Smoking status: Current Every Day Smoker     Packs/day: 0.25     Types: Cigarettes     Last attempt to quit: 2017     Years since quitting: 3.8    Smokeless tobacco: Never Used    Tobacco comment: trying to slow down    Substance Use Topics    Alcohol use: Yes     Comment: rare    Drug use: No       ALLERGIES    Allergies   Allergen Reactions    Penicillins Rash       MEDICATIONS    Current Outpatient Medications on File Prior to Encounter   Medication Sig Dispense Refill    lisinopril (PRINIVIL;ZESTRIL) 2.5 MG tablet Take 1 tablet by mouth every morning 90 tablet 3    atorvastatin (LIPITOR) 40 MG tablet Take 1 tablet by mouth every morning 90 tablet 3    Dulaglutide 1.5 MG/0.5ML SOPN Inject 1.5 mg into the skin once a week Dose increase as of 20 4 pen 5    glipiZIDE (GLUCOTROL XL) 10 MG extended release tablet Take 1 tablet by mouth every morning 90 tablet 3    insulin glargine (LANTUS SOLOSTAR) 100 UNIT/ML injection pen Inject 15 Units into the skin nightly 10 pen 5    Lancets MISC To test up to 4 times per day- nighttime and with each meal 300 each 5    blood glucose test strips (EXACTECH TEST) strip 1 each by In Vitro route daily Embrace test strips to test BS 4x daily 300 each 6    Insulin Pen Needle 32G X 5 MM MISC To use with Lantus pen and Novolog pen 30 each 11    aspirin EC 81 MG EC tablet Take 1 tablet by mouth daily 90 tablet 0     No current facility-administered medications on file prior to encounter. REVIEW OF SYSTEMS    Pertinent items are noted in HPI. Constitutional: Negative for systemic symptoms including fever, chills and malaise. Objective:      /85   Pulse 102   Temp 98.2 °F (36.8 °C) (Temporal)   Resp 16     PHYSICAL EXAM      General: The patient is in no acute distress. Mental status:  Patient is appropriate, is  oriented to place and plan of care. Dermatologic exam: Visual inspection of the periwound reveals the skin to be normal in turgor and texture and dry  Wound exam: see wound description below in procedure note      Assessment:     Problem List Items Addressed This Visit     WD-Diabetic ulcer of right heel with fat layer exposed ([address] - Primary    Relevant Medications    lidocaine (XYLOCAINE) 4 % external solution    Other Relevant Orders    Initiate Outpatient Wound Care Protocol    WD-Diabetic ulcer of right fifth toe (HCC)    Relevant Medications    lidocaine (XYLOCAINE) 4 % external solution    Other Relevant Orders    Initiate Outpatient Wound Care Protocol        Procedure Note    Indications:  Based on my examination of this patient's wound(s) today, sharp excision into necrotic subcutaneous tissue is required to promote healing and evaluate the extent of previous healing. Performed by: ALISTAIR Veliz - CNP    Consent obtained: Yes    Time out taken:  Yes    Pain Control: Anesthetic  Anesthetic: 4% Lidocaine Liquid Topical     Debridement:Excisional Debridement    Using curette the wound(s) was/were sharply debrided down through and including the removal of subcutaneous tissue.         Devitalized Tissue Debrided:  fibrin, biofilm, slough and exudate    Pre Debridement Measurements:  Are located in the Wound Documentation Flow Sheet    All active wounds listed below with today's date are evaluated  Wound(s) debrided this date include # : 1     Post  Debridement Measurements:  Wound 04/13/21 Foot #1 4th and 5th toe space (Active)   Wound Image   04/13/21 1503   Dressing Status New dressing applied 04/20/21 1708   Wound Cleansed Cleansed with saline 04/27/21 1517   Offloading for Diabetic Foot Ulcers No offloading required 04/27/21 1517   Wound Length (cm) 0.6 cm 04/27/21 1517   Wound Width (cm) 0.8 cm 04/27/21 1517   Wound Depth (cm) 0.2 cm 04/27/21 1517   Wound Surface Area (cm^2) 0.48 cm^2 04/27/21 1517   Change in Wound Size % (l*w) 60 04/27/21 1517   Wound Volume (cm^3) 0.1 cm^3 04/27/21 1517   Wound Healing % 58 04/27/21 1517   Post-Procedure Length (cm) 0.6 cm 04/27/21 1529   Post-Procedure Width (cm) 0.8 cm 04/27/21 1529   Post-Procedure Depth (cm) 0.2 cm 04/27/21 1529   Post-Procedure Surface Area (cm^2) 0.48 cm^2 04/27/21 1529   Post-Procedure Volume (cm^3) 0.1 cm^3 04/27/21 1529   Distance Tunneling (cm) 0 cm 04/27/21 1517   Tunneling Position ___ O'Clock 0 04/27/21 1517   Undermining Starts ___ O'Clock 0 04/27/21 1517   Undermining Ends___ O'Clock 0 04/27/21 1517   Undermining Maxium Distance (cm) 0 04/27/21 1517   Wound Assessment Granulation tissue;Slough 04/27/21 1517   Drainage Amount Moderate 04/27/21 1517   Drainage Description Serosanguinous 04/27/21 1517   Odor None 04/27/21 1517   Yamilex-wound Assessment Maceration 04/27/21 1517   Margins Defined edges; Unattached edges 04/27/21 1517   Wound Thickness Description not for Pressure Injury Full thickness 04/27/21 1517   Number of days: 14       Percent of Wound(s) Debrided: approximately 100%    Total  Area  Debrided:  0.48 sq cm     Bleeding:  Minimal    Hemostasis Achieved:  by pressure    Procedural Pain:  0  / 10     Post Procedural Pain:  0 / 10     Response to treatment:  Well tolerated by patient. Status of wound progress and description from last visit:   Improving. Heel wound is closed at this time.    Appearance is improved and good tissue is Apply ludivina to wound bed  Cover with calcium alginate  Wrap with conform and secure with tape  Tubi F  Change Daily      Follow up with Urban Gandhi CNP in 1 week in the wound care center  Call [phone] for any questions or concerns.   Date__________   Time____________        Treatment Note      Written Patient Dismissal Instructions Given            Electronically signed by ALISTAIR Alejandro CNP on 4/27/2021 at 3:39 PM

## 2021-05-04 ENCOUNTER — HOSPITAL ENCOUNTER (OUTPATIENT)
Dept: WOUND CARE | Age: 41
Discharge: HOME | End: 2021-05-04
Payer: MEDICARE

## 2021-05-04 VITALS
RESPIRATION RATE: 18 BRPM | SYSTOLIC BLOOD PRESSURE: 136 MMHG | TEMPERATURE: 98.1 F | DIASTOLIC BLOOD PRESSURE: 89 MMHG | HEART RATE: 95 BPM

## 2021-05-04 DIAGNOSIS — L97.519 DIABETIC ULCER OF RIGHT FIFTH TOE (HCC): ICD-10-CM

## 2021-05-04 DIAGNOSIS — E11.621 DIABETIC ULCER OF RIGHT FIFTH TOE (HCC): ICD-10-CM

## 2021-05-04 DIAGNOSIS — L97.412 DIABETIC ULCER OF RIGHT HEEL ASSOCIATED WITH TYPE 2 DIABETES MELLITUS, WITH FAT LAYER EXPOSED (HCC): Primary | ICD-10-CM

## 2021-05-04 DIAGNOSIS — E11.621 DIABETIC ULCER OF RIGHT HEEL ASSOCIATED WITH TYPE 2 DIABETES MELLITUS, WITH FAT LAYER EXPOSED (HCC): Primary | ICD-10-CM

## 2021-05-04 PROCEDURE — 11042 DBRDMT SUBQ TIS 1ST 20SQCM/<: CPT

## 2021-05-04 PROCEDURE — 11042 DBRDMT SUBQ TIS 1ST 20SQCM/<: CPT | Performed by: NURSE PRACTITIONER

## 2021-05-04 RX ORDER — BETAMETHASONE DIPROPIONATE 0.05 %
OINTMENT (GRAM) TOPICAL ONCE
Status: CANCELLED | OUTPATIENT
Start: 2021-05-04 | End: 2021-05-04

## 2021-05-04 RX ORDER — BACITRACIN ZINC AND POLYMYXIN B SULFATE 500; 1000 [USP'U]/G; [USP'U]/G
OINTMENT TOPICAL ONCE
Status: CANCELLED | OUTPATIENT
Start: 2021-05-04 | End: 2021-05-04

## 2021-05-04 RX ORDER — BACITRACIN, NEOMYCIN, POLYMYXIN B 400; 3.5; 5 [USP'U]/G; MG/G; [USP'U]/G
OINTMENT TOPICAL ONCE
Status: CANCELLED | OUTPATIENT
Start: 2021-05-04 | End: 2021-05-04

## 2021-05-04 RX ORDER — LIDOCAINE HYDROCHLORIDE 40 MG/ML
SOLUTION TOPICAL ONCE
Status: DISCONTINUED | OUTPATIENT
Start: 2021-05-04 | End: 2021-05-05 | Stop reason: HOSPADM

## 2021-05-04 RX ORDER — CLOBETASOL PROPIONATE 0.5 MG/G
OINTMENT TOPICAL ONCE
Status: CANCELLED | OUTPATIENT
Start: 2021-05-04 | End: 2021-05-04

## 2021-05-04 RX ORDER — GINSENG 100 MG
CAPSULE ORAL ONCE
Status: CANCELLED | OUTPATIENT
Start: 2021-05-04 | End: 2021-05-04

## 2021-05-04 RX ORDER — GENTAMICIN SULFATE 1 MG/G
OINTMENT TOPICAL ONCE
Status: CANCELLED | OUTPATIENT
Start: 2021-05-04 | End: 2021-05-04

## 2021-05-04 RX ORDER — LIDOCAINE 50 MG/G
OINTMENT TOPICAL ONCE
Status: CANCELLED | OUTPATIENT
Start: 2021-05-04 | End: 2021-05-04

## 2021-05-04 RX ORDER — LIDOCAINE HYDROCHLORIDE 40 MG/ML
SOLUTION TOPICAL ONCE
Status: CANCELLED | OUTPATIENT
Start: 2021-05-04 | End: 2021-05-04

## 2021-05-04 RX ORDER — LIDOCAINE HYDROCHLORIDE 20 MG/ML
JELLY TOPICAL ONCE
Status: CANCELLED | OUTPATIENT
Start: 2021-05-04 | End: 2021-05-04

## 2021-05-04 RX ORDER — LIDOCAINE 40 MG/G
CREAM TOPICAL ONCE
Status: CANCELLED | OUTPATIENT
Start: 2021-05-04 | End: 2021-05-04

## 2021-05-04 NOTE — PROGRESS NOTES
Wound Care Center Progress Note With Procedure    Marilyn Humphries  AGE: 41 y.o. GENDER: female  : 1980  EPISODE DATE:  2021     Subjective:     Chief Complaint   Patient presents with    Wound Check     RLE         HISTORY of PRESENT ILLNESS     Marilyn Rahman is a 41 y. o. female who presents to the [facility] for a visit for evaluation and treatment of Acute diabetic  ulcer(s) of  R heel and right 5th toe.  The condition is of mild severity. The ulcer has been present for 2.5 weeks.  The underlying cause is thought to be uncontrolled diabeties.  The patients care to date has included a visit to the ED, where she was given doxy and Lotrimin. The doxy appeared ineffective to the patient, so her PCP placed her on Bactrim. She has also been cleaning wound with dial soap daily. The patient has significant underlying medical conditions as below. Patient is a daily smoker, and does not take an blood thinner. She is a diabetic, who recently started taking insulin. She checkgs her BG TID and states she rarely measures over 120. She has improved her A1C from 12 to 8.   Patient told us today that she has been soaking her wound in dial soap.  This is from the hospital, and not part of our orders   Wound Pain Timing/Severity: none  Quality of pain: N/A  Severity of pain:  0 / 10   Modifying Factors: diabetes and poor glucose control  Associated Signs/Symptoms: none        PAST MEDICAL HISTORY        Diagnosis Date    Allergic rhinitis     allergy shots- Dr. Dennis Giron Allergic rhinitis     Dr. Danica kwan and flonase    Anxiety 10/24/2012    Atopic dermatitis     Dr. Danica kwan    Back pain 10/24/2012    Bilateral sciatica 2020    Depression     Diabetes mellitus ([address]     Diabetes mellitus type II, uncontrolled ([address]     Diabetic eye exam: 2011, Dr. Bre Ham , needs record [V72.0T][    GERD (gastroesophageal reflux disease)     Headache(784.0)     Hyperlipidemia LDL goal < 100 2012    Hypertension     [facility]     No retinopathy on exam     Dr Bruno Fontanez 13 Diabetes with no significatn diabetic retinopathy found in both eyes    Non compliance w medication regimen 2014    Sprain of neck 10/4/2011       PAST SURGICAL HISTORY    Past Surgical History:   Procedure Laterality Date     SECTION      HYSTERECTOMY      OTHER SURGICAL HISTORY  2017    I&D of right perineal abcess        FAMILY HISTORY    Family History   Problem Relation Age of Onset    Heart Disease Mother     Diabetes Mother     Asthma Mother     High Cholesterol Mother     Depression Mother     Heart Disease Father     High Cholesterol Father        SOCIAL HISTORY    Social History     Tobacco Use    Smoking status: Current Every Day Smoker     Packs/day: 0.25     Types: Cigarettes     Last attempt to quit: 2017     Years since quitting: 3.8    Smokeless tobacco: Never Used    Tobacco comment: trying to slow down    Substance Use Topics    Alcohol use: Yes     Comment: rare    Drug use: No       ALLERGIES    Allergies   Allergen Reactions    Penicillins Rash       MEDICATIONS    Current Outpatient Medications on File Prior to Encounter   Medication Sig Dispense Refill    lisinopril (PRINIVIL;ZESTRIL) 2.5 MG tablet Take 1 tablet by mouth every morning 90 tablet 3    atorvastatin (LIPITOR) 40 MG tablet Take 1 tablet by mouth every morning 90 tablet 3    Dulaglutide 1.5 MG/0.5ML SOPN Inject 1.5 mg into the skin once a week Dose increase as of 20 4 pen 5    glipiZIDE (GLUCOTROL XL) 10 MG extended release tablet Take 1 tablet by mouth every morning 90 tablet 3    insulin glargine (LANTUS SOLOSTAR) 100 UNIT/ML injection pen Inject 15 Units into the skin nightly 10 pen 5    Lancets MISC To test up to 4 times per day- nighttime and with each meal 300 each 5    blood glucose test strips (EXACTECH TEST) strip 1 each by In Vitro route daily Embrace test strips to test BS 4x daily 300 each 6    Insulin Pen Needle 32G X 5 MM MISC To use with Lantus pen and Novolog pen 30 each 11    aspirin EC 81 MG EC tablet Take 1 tablet by mouth daily 90 tablet 0     No current facility-administered medications on file prior to encounter. REVIEW OF SYSTEMS    Pertinent items are noted in HPI. Constitutional: Negative for systemic symptoms including fever, chills and malaise. Objective:      /89   Pulse 95   Temp 98.1 °F (36.7 °C) (Temporal)   Resp 18     PHYSICAL EXAM      General: The patient is in no acute distress. Mental status:  Patient is appropriate, is  oriented to place and plan of care. Dermatologic exam: Visual inspection of the periwound reveals the skin to be moist and clammy  Wound exam: see wound description below in procedure note      Assessment:     Problem List Items Addressed This Visit     WD-Diabetic ulcer of right heel with fat layer exposed ([address] - Primary    Relevant Medications    lidocaine (XYLOCAINE) 4 % external solution    Other Relevant Orders    Initiate Outpatient Wound Care Protocol    WD-Diabetic ulcer of right fifth toe (HCC)    Relevant Medications    lidocaine (XYLOCAINE) 4 % external solution    Other Relevant Orders    Initiate Outpatient Wound Care Protocol        Procedure Note    Indications:  Based on my examination of this patient's wound(s) today, sharp excision into necrotic subcutaneous tissue is required to promote healing and evaluate the extent of previous healing. Performed by: Aura Encarnacion APRN - CNP    Consent obtained: Yes    Time out taken:  Yes    Pain Control: Anesthetic  Anesthetic: 4% Lidocaine Liquid Topical     Debridement:Excisional Debridement    Using curette the wound(s) was/were sharply debrided down through and including the removal of subcutaneous tissue.         Devitalized Tissue Debrided:  fibrin, biofilm and slough    Pre Debridement Measurements:  Are located in the Wound Documentation Flow Sheet    All active wounds listed below with today's date are evaluated  Wound(s)    debrided this date include # : 1     Post  Debridement Measurements:  Wound 04/13/21 Foot #1 4th and 5th toe space (Active)   Wound Image   04/13/21 1503   Dressing Status New dressing applied 04/27/21 1544   Wound Cleansed Cleansed with saline 05/04/21 1512   Offloading for Diabetic Foot Ulcers No offloading required 04/27/21 1517   Wound Length (cm) 1 cm 05/04/21 1512   Wound Width (cm) 0.4 cm 05/04/21 1512   Wound Depth (cm) 0.1 cm 05/04/21 1512   Wound Surface Area (cm^2) 0.4 cm^2 05/04/21 1512   Change in Wound Size % (l*w) 66.67 05/04/21 1512   Wound Volume (cm^3) 0.04 cm^3 05/04/21 1512   Wound Healing % 83 05/04/21 1512   Post-Procedure Length (cm) 1 cm 05/04/21 1531   Post-Procedure Width (cm) 0.4 cm 05/04/21 1531   Post-Procedure Depth (cm) 0.1 cm 05/04/21 1531   Post-Procedure Surface Area (cm^2) 0.4 cm^2 05/04/21 1531   Post-Procedure Volume (cm^3) 0.04 cm^3 05/04/21 1531   Distance Tunneling (cm) 0 cm 05/04/21 1512   Tunneling Position ___ O'Clock 0 05/04/21 1512   Undermining Starts ___ O'Clock 0 05/04/21 1512   Undermining Ends___ O'Clock 0 05/04/21 1512   Undermining Maxium Distance (cm) 0 05/04/21 1512   Wound Assessment Granulation tissue;Slough 05/04/21 1512   Drainage Amount Moderate 05/04/21 1512   Drainage Description Serosanguinous 05/04/21 1512   Odor None 05/04/21 1512   Yamilex-wound Assessment Hyperkeratosis (callous); Maceration 05/04/21 1512   Margins Unattached edges 05/04/21 1512   Wound Thickness Description not for Pressure Injury Full thickness 05/04/21 1512   Number of days: 21       Percent of Wound(s) Debrided: approximately 100%    Total  Area  Debrided:  0.4 sq cm     Bleeding:  None    Hemostasis Achieved:  not needed    Procedural Pain:  0  / 10     Post Procedural Pain:  0 / 10     Response to treatment:  Well tolerated by patient.      Status of wound progress and description from last visit:   Slightly improved. We educated patient to follow our orders only and that dial soaks were not good for healing. Continue to monitor       Plan:       Discharge Instructions       PHYSICIAN ORDERS AND DISCHARGE INSTRUCTIONS     NOTE: Upon discharge from the [address], you will receive a patient experience survey. We would be grateful if you would take the time to fill this survey out.     Wound care order history:                 ROBIN's   Right 1.17      Left 1.2   Date 4/13/21              Vascular studies:                 Cultures:  4/13/21              Antibiotics:                  HbA1c:   8 1/13/21              Compression/Lymph Pumps:              Grafts:                  Continuing wound care orders and information:              Residence:               Continue home health care with:               Your wound-care supplies will be provided by: .                            DYBTP cleansing:                           GF not scrub or use excessive force.                          Wash hands with soap and water before and after dressing changes.                           Prior to applying a clean dressing, cleanse wound with normal saline,                          wound cleanser, or mild soap and water.                           Ask your physician or nurse before getting the wound(s) wet in the shower.              Daily Wound management:                          Keep weight off wounds and reposition every 2 hours.                          Avoid standing for long periods of time.                          Apply wraps/stockings in AM and remove at bedtime.                          Elevate legs to the level of the heart or above for 30 minutes 4-5 times a day and/or when sitting.                                               When taking antibiotics take entire prescription as ordered by MD do not stop taking until medicine is all gone.                                                                  Orders for this week (4/27/21): Right 4th toe - Wash with mild soap and water. Pat dry with 4x4   Apply saline dampened ludivina to wound bed  Cover with calcium alginate  Wrap with conform and secure with tape  Tubi F  Change Daily      Follow up with Jordyn Mars CNP in 1 week in the wound care center  Call [phone] for any questions or concerns.   Date__________   Time____________           Treatment Note      Written Patient Dismissal Instructions Given            Electronically signed by ALISTAIR Perkins CNP on 5/4/2021 at 3:46 PM

## 2021-05-11 ENCOUNTER — HOSPITAL ENCOUNTER (OUTPATIENT)
Dept: WOUND CARE | Age: 41
Discharge: HOME | End: 2021-05-11
Payer: MEDICARE

## 2021-05-11 VITALS
DIASTOLIC BLOOD PRESSURE: 93 MMHG | HEART RATE: 85 BPM | TEMPERATURE: 98.8 F | SYSTOLIC BLOOD PRESSURE: 131 MMHG | RESPIRATION RATE: 18 BRPM

## 2021-05-11 DIAGNOSIS — E11.621 DIABETIC ULCER OF RIGHT FIFTH TOE (HCC): ICD-10-CM

## 2021-05-11 DIAGNOSIS — L97.519 DIABETIC ULCER OF RIGHT FIFTH TOE (HCC): ICD-10-CM

## 2021-05-11 DIAGNOSIS — E11.621 DIABETIC ULCER OF RIGHT HEEL ASSOCIATED WITH TYPE 2 DIABETES MELLITUS, WITH FAT LAYER EXPOSED (HCC): Primary | ICD-10-CM

## 2021-05-11 DIAGNOSIS — L97.412 DIABETIC ULCER OF RIGHT HEEL ASSOCIATED WITH TYPE 2 DIABETES MELLITUS, WITH FAT LAYER EXPOSED (HCC): Primary | ICD-10-CM

## 2021-05-11 PROCEDURE — 11042 DBRDMT SUBQ TIS 1ST 20SQCM/<: CPT | Performed by: NURSE PRACTITIONER

## 2021-05-11 PROCEDURE — 11042 DBRDMT SUBQ TIS 1ST 20SQCM/<: CPT

## 2021-05-11 RX ORDER — GINSENG 100 MG
CAPSULE ORAL ONCE
Status: CANCELLED | OUTPATIENT
Start: 2021-05-11 | End: 2021-05-11

## 2021-05-11 RX ORDER — LIDOCAINE HYDROCHLORIDE 40 MG/ML
SOLUTION TOPICAL ONCE
Status: DISCONTINUED | OUTPATIENT
Start: 2021-05-11 | End: 2021-05-12 | Stop reason: HOSPADM

## 2021-05-11 RX ORDER — GENTAMICIN SULFATE 1 MG/G
OINTMENT TOPICAL ONCE
Status: CANCELLED | OUTPATIENT
Start: 2021-05-11 | End: 2021-05-11

## 2021-05-11 RX ORDER — LIDOCAINE 40 MG/G
CREAM TOPICAL ONCE
Status: CANCELLED | OUTPATIENT
Start: 2021-05-11 | End: 2021-05-11

## 2021-05-11 RX ORDER — BETAMETHASONE DIPROPIONATE 0.05 %
OINTMENT (GRAM) TOPICAL ONCE
Status: CANCELLED | OUTPATIENT
Start: 2021-05-11 | End: 2021-05-11

## 2021-05-11 RX ORDER — BACITRACIN, NEOMYCIN, POLYMYXIN B 400; 3.5; 5 [USP'U]/G; MG/G; [USP'U]/G
OINTMENT TOPICAL ONCE
Status: CANCELLED | OUTPATIENT
Start: 2021-05-11 | End: 2021-05-11

## 2021-05-11 RX ORDER — LIDOCAINE HYDROCHLORIDE 20 MG/ML
JELLY TOPICAL ONCE
Status: CANCELLED | OUTPATIENT
Start: 2021-05-11 | End: 2021-05-11

## 2021-05-11 RX ORDER — LIDOCAINE 50 MG/G
OINTMENT TOPICAL ONCE
Status: CANCELLED | OUTPATIENT
Start: 2021-05-11 | End: 2021-05-11

## 2021-05-11 RX ORDER — BACITRACIN ZINC AND POLYMYXIN B SULFATE 500; 1000 [USP'U]/G; [USP'U]/G
OINTMENT TOPICAL ONCE
Status: CANCELLED | OUTPATIENT
Start: 2021-05-11 | End: 2021-05-11

## 2021-05-11 RX ORDER — LIDOCAINE HYDROCHLORIDE 40 MG/ML
SOLUTION TOPICAL ONCE
Status: CANCELLED | OUTPATIENT
Start: 2021-05-11 | End: 2021-05-11

## 2021-05-11 RX ORDER — CLOBETASOL PROPIONATE 0.5 MG/G
OINTMENT TOPICAL ONCE
Status: CANCELLED | OUTPATIENT
Start: 2021-05-11 | End: 2021-05-11

## 2021-05-11 NOTE — PROGRESS NOTES
Wound Care Center Progress Note With Procedure    Marilyn Aguilar  AGE: 41 y.o. GENDER: female  : 1980  EPISODE DATE:  2021     Subjective:     Chief Complaint   Patient presents with    Wound Check     Right Foot          HISTORY of PRESENT ILLNESS     Marilyn Rahman is a 41 y. o. female who presents to the Wound Clinic for a visit for evaluation and treatment of Acute diabetic  ulcer(s) of  R heel and right 5th toe.  The condition is of mild severity. The ulcer has been present for 2.5 weeks.  The underlying cause is thought to be uncontrolled diabeties.  The patients care to date has included a visit to the ED, where she was given doxy and Lotrimin. The doxy appeared ineffective to the patient, so her PCP placed her on Bactrim. She has also been cleaning wound with dial soap daily. The patient has significant underlying medical conditions as below. Patient is not making steady progress. She states that she follows our orders and has quit smoking.    Wound Pain Timing/Severity: none  Quality of pain: N/A  Severity of pain:  0 / 10   Modifying Factors: diabetes and poor glucose control  Associated Signs/Symptoms: none        PAST MEDICAL HISTORY        Diagnosis Date    Allergic rhinitis     allergy shots- Dr. Lola Allen Allergic rhinitis     Dr. Richard kwan and flonase    Anxiety 10/24/2012    Atopic dermatitis     Dr. Richard kwan    Back pain 10/24/2012    Bilateral sciatica 2020    Depression     Diabetes mellitus ([address]     Diabetes mellitus type II, uncontrolled ([address]     Diabetic eye exam: 2011, Dr. Ulysses Koroma , needs record [V72.0T][    GERD (gastroesophageal reflux disease)     Headache(784.0)     Hyperlipidemia LDL goal < 100 2012    Hypertension     Morbid obesity ([address]     No retinopathy on exam     Dr Ulysses Koroma 13 Diabetes with no significatn diabetic retinopathy found in both eyes    Non compliance w medication regimen 2014    Sprain of neck 10/4/2011       PAST SURGICAL HISTORY    Past Surgical History:   Procedure Laterality Date     SECTION      HYSTERECTOMY      OTHER SURGICAL HISTORY  2017    I&D of right perineal abcess        FAMILY HISTORY    Family History   Problem Relation Age of Onset    Heart Disease Mother     Diabetes Mother     Asthma Mother     High Cholesterol Mother     Depression Mother     Heart Disease Father     High Cholesterol Father        SOCIAL HISTORY    Social History     Tobacco Use    Smoking status: Current Every Day Smoker     Packs/day: 0.25     Types: Cigarettes     Last attempt to quit: 2017     Years since quitting: 3.8    Smokeless tobacco: Never Used    Tobacco comment: trying to slow down    Substance Use Topics    Alcohol use: Yes     Comment: rare    Drug use: No       ALLERGIES    Allergies   Allergen Reactions    Penicillins Rash       MEDICATIONS    Current Outpatient Medications on File Prior to Encounter   Medication Sig Dispense Refill    lisinopril (PRINIVIL;ZESTRIL) 2.5 MG tablet Take 1 tablet by mouth every morning 90 tablet 3    atorvastatin (LIPITOR) 40 MG tablet Take 1 tablet by mouth every morning 90 tablet 3    Dulaglutide 1.5 MG/0.5ML SOPN Inject 1.5 mg into the skin once a week Dose increase as of 20 4 pen 5    glipiZIDE (GLUCOTROL XL) 10 MG extended release tablet Take 1 tablet by mouth every morning 90 tablet 3    insulin glargine (LANTUS SOLOSTAR) 100 UNIT/ML injection pen Inject 15 Units into the skin nightly 10 pen 5    Lancets MISC To test up to 4 times per day- nighttime and with each meal 300 each 5    blood glucose test strips (EXACTECH TEST) strip 1 each by In Vitro route daily Embrace test strips to test BS 4x daily 300 each 6    Insulin Pen Needle 32G X 5 MM MISC To use with Lantus pen and Novolog pen 30 each 11    aspirin EC 81 MG EC tablet Take 1 tablet by mouth daily 90 tablet 0     No current facility-administered medications on file applied;Clean;Dry 05/11/21 1532   Wound Cleansed Cleansed with saline 05/11/21 1502   Offloading for Diabetic Foot Ulcers No offloading required 05/11/21 1502   Wound Length (cm) 1.2 cm 05/11/21 1502   Wound Width (cm) 2 cm 05/11/21 1502   Wound Depth (cm) 0.1 cm 05/11/21 1502   Wound Surface Area (cm^2) 2.4 cm^2 05/11/21 1502   Change in Wound Size % (l*w) -100 05/11/21 1502   Wound Volume (cm^3) 0.24 cm^3 05/11/21 1502   Wound Healing % 0 05/11/21 1502   Post-Procedure Length (cm) 1.2 cm 05/11/21 1526   Post-Procedure Width (cm) 2 cm 05/11/21 1526   Post-Procedure Depth (cm) 0.3 cm 05/11/21 1526   Post-Procedure Surface Area (cm^2) 2.4 cm^2 05/11/21 1526   Post-Procedure Volume (cm^3) 0.72 cm^3 05/11/21 1526   Distance Tunneling (cm) 0 cm 05/11/21 1502   Tunneling Position ___ O'Clock 0 05/11/21 1502   Undermining Starts ___ O'Clock 0 05/11/21 1502   Undermining Ends___ O'Clock 0 05/11/21 1502   Undermining Maxium Distance (cm) 0 05/11/21 1502   Wound Assessment Granulation tissue;Pink/red;Slough 05/11/21 1502   Drainage Amount Small 05/11/21 1502   Drainage Description Serosanguinous; Serous 05/11/21 1502   Odor None 05/11/21 1502   Yamilex-wound Assessment Hyperkeratosis (callous) 05/11/21 1502   Margins Unattached edges 05/11/21 1502   Wound Thickness Description not for Pressure Injury Full thickness 05/11/21 1502   Number of days: 28       Percent of Wound(s) Debrided: approximately 100%    Total  Area  Debrided:  2.4 sq cm     Bleeding:  Minimal    Hemostasis Achieved:  by pressure    Procedural Pain:  0  / 10     Post Procedural Pain:  0 / 10     Response to treatment:  Well tolerated by patient. Status of wound progress and description from last visit:   Larger in size today. We are concerned about patient compliance with dressing changes. There is a fair amount of maceration, so we will change around her collagen to a dry puracol and add anasept, as there is a concern for bacterial burden.        Plan: Discharge Instructions             PHYSICIAN ORDERS AND DISCHARGE INSTRUCTIONS     NOTE: Upon discharge from the [address], you will receive a patient experience survey. We would be grateful if you would take the time to fill this survey out.     Wound care order history:                 ROBIN's   Right 1.17      Left 1.2   Date 4/13/21              Vascular studies:                 Cultures:  4/13/21              Antibiotics:                  HbA1c:   8 1/13/21              Compression/Lymph Pumps:              Grafts:                  Continuing wound care orders and information:              Residence:               Continue home health care with:               Your wound-care supplies will be provided by: .                            [facility] cleansing:                           IV not scrub or use excessive force.                          Wash hands with soap and water before and after dressing changes.                         Prior to applying a clean dressing, cleanse wound with normal saline,                          wound cleanser, or mild soap and water.                           Ask your physician or nurse before getting the wound(s) wet in the shower.              Daily Wound management:                          Keep weight off wounds and reposition every 2 hours.                          SEPLR standing for long periods of time.                          Apply wraps/stockings in AM and remove at bedtime.                          Elevate legs to the level of the heart or above for 30 minutes 4-5 times a day and/or when sitting.                                               When taking antibiotics take entire prescription as ordered by MD do not stop taking until medicine is all gone.                                                                  Orders for this week (5/11/21): Right 4th toe - Wash with mild soap and water.  Pat dry with 4x4   Apply anasept to wound bed  Cover with puracol and calcium alginate  Wrap with  2 in conform and secure with tape  Tubi F  Change Daily      Follow up with Nataliya Miles CNP in 1 week in the wound care center  Call [phone] for any questions or concerns.   Date__________   Time____________          Treatment Note Wound 04/13/21 Foot #1 4th and 5th toe space-Dressing/Treatment: (anasept,puracol,ca alg,conform, tubi F,netting)    Written Patient Dismissal Instructions Given            Electronically signed by ALISTAIR Childs CNP on 5/11/2021 at 3:41 PM

## 2021-05-17 ENCOUNTER — HOSPITAL ENCOUNTER (OUTPATIENT)
Age: 41
Discharge: HOME | End: 2021-05-17
Payer: MEDICARE

## 2021-05-17 ENCOUNTER — HOSPITAL ENCOUNTER (OUTPATIENT)
Dept: GENERAL RADIOLOGY | Age: 41
Discharge: HOME | End: 2021-05-17
Payer: MEDICARE

## 2021-05-17 ENCOUNTER — HOSPITAL ENCOUNTER (OUTPATIENT)
Dept: ULTRASOUND IMAGING | Age: 41
Discharge: HOME | End: 2021-05-17
Payer: MEDICARE

## 2021-05-17 ENCOUNTER — HOSPITAL ENCOUNTER (OUTPATIENT)
Dept: WOUND CARE | Age: 41
Discharge: HOME | End: 2021-05-17
Payer: MEDICARE

## 2021-05-17 VITALS
DIASTOLIC BLOOD PRESSURE: 83 MMHG | RESPIRATION RATE: 18 BRPM | HEART RATE: 91 BPM | SYSTOLIC BLOOD PRESSURE: 143 MMHG | TEMPERATURE: 97.8 F

## 2021-05-17 DIAGNOSIS — I73.9 PVD (PERIPHERAL VASCULAR DISEASE) (HCC): ICD-10-CM

## 2021-05-17 DIAGNOSIS — L97.519 DIABETIC ULCER OF RIGHT FIFTH TOE (HCC): ICD-10-CM

## 2021-05-17 DIAGNOSIS — E11.621 DIABETIC ULCER OF RIGHT HEEL ASSOCIATED WITH TYPE 2 DIABETES MELLITUS, WITH FAT LAYER EXPOSED (HCC): Primary | ICD-10-CM

## 2021-05-17 DIAGNOSIS — E11.621 DIABETIC ULCER OF RIGHT FIFTH TOE (HCC): ICD-10-CM

## 2021-05-17 DIAGNOSIS — L97.412 DIABETIC ULCER OF RIGHT HEEL ASSOCIATED WITH TYPE 2 DIABETES MELLITUS, WITH FAT LAYER EXPOSED (HCC): Primary | ICD-10-CM

## 2021-05-17 PROCEDURE — 87077 CULTURE AEROBIC IDENTIFY: CPT

## 2021-05-17 PROCEDURE — 87070 CULTURE OTHR SPECIMN AEROBIC: CPT

## 2021-05-17 PROCEDURE — 11042 DBRDMT SUBQ TIS 1ST 20SQCM/<: CPT | Performed by: NURSE PRACTITIONER

## 2021-05-17 PROCEDURE — 87186 SC STD MICRODIL/AGAR DIL: CPT

## 2021-05-17 PROCEDURE — 11042 DBRDMT SUBQ TIS 1ST 20SQCM/<: CPT

## 2021-05-17 PROCEDURE — 93925 LOWER EXTREMITY STUDY: CPT

## 2021-05-17 PROCEDURE — 87075 CULTR BACTERIA EXCEPT BLOOD: CPT

## 2021-05-17 PROCEDURE — 73630 X-RAY EXAM OF FOOT: CPT

## 2021-05-17 RX ORDER — LIDOCAINE HYDROCHLORIDE 40 MG/ML
SOLUTION TOPICAL ONCE
Status: CANCELLED | OUTPATIENT
Start: 2021-05-17 | End: 2021-05-17

## 2021-05-17 RX ORDER — LIDOCAINE HYDROCHLORIDE 40 MG/ML
SOLUTION TOPICAL ONCE
Status: DISCONTINUED | OUTPATIENT
Start: 2021-05-17 | End: 2021-05-18 | Stop reason: HOSPADM

## 2021-05-17 RX ORDER — CIPROFLOXACIN 500 MG/1
500 TABLET, FILM COATED ORAL 2 TIMES DAILY
Qty: 60 TABLET | Refills: 0 | Status: ON HOLD | OUTPATIENT
Start: 2021-05-17 | End: 2021-06-18 | Stop reason: HOSPADM

## 2021-05-17 RX ORDER — LIDOCAINE 40 MG/G
CREAM TOPICAL ONCE
Status: CANCELLED | OUTPATIENT
Start: 2021-05-17 | End: 2021-05-17

## 2021-05-17 RX ORDER — LIDOCAINE HYDROCHLORIDE 20 MG/ML
JELLY TOPICAL ONCE
Status: CANCELLED | OUTPATIENT
Start: 2021-05-17 | End: 2021-05-17

## 2021-05-17 RX ORDER — GINSENG 100 MG
CAPSULE ORAL ONCE
Status: CANCELLED | OUTPATIENT
Start: 2021-05-17 | End: 2021-05-17

## 2021-05-17 RX ORDER — BACITRACIN ZINC AND POLYMYXIN B SULFATE 500; 1000 [USP'U]/G; [USP'U]/G
OINTMENT TOPICAL ONCE
Status: CANCELLED | OUTPATIENT
Start: 2021-05-17 | End: 2021-05-17

## 2021-05-17 RX ORDER — CLOBETASOL PROPIONATE 0.5 MG/G
OINTMENT TOPICAL ONCE
Status: CANCELLED | OUTPATIENT
Start: 2021-05-17 | End: 2021-05-17

## 2021-05-17 RX ORDER — LIDOCAINE 50 MG/G
OINTMENT TOPICAL ONCE
Status: CANCELLED | OUTPATIENT
Start: 2021-05-17 | End: 2021-05-17

## 2021-05-17 RX ORDER — BACITRACIN, NEOMYCIN, POLYMYXIN B 400; 3.5; 5 [USP'U]/G; MG/G; [USP'U]/G
OINTMENT TOPICAL ONCE
Status: CANCELLED | OUTPATIENT
Start: 2021-05-17 | End: 2021-05-17

## 2021-05-17 RX ORDER — GREEN TEA/HOODIA GORDONII 315-12.5MG
1 CAPSULE ORAL 2 TIMES DAILY
Qty: 60 TABLET | Refills: 0 | Status: ON HOLD | OUTPATIENT
Start: 2021-05-17 | End: 2021-06-18

## 2021-05-17 RX ORDER — SULFAMETHOXAZOLE AND TRIMETHOPRIM 800; 160 MG/1; MG/1
1 TABLET ORAL 2 TIMES DAILY
Qty: 60 TABLET | Refills: 0 | Status: ON HOLD | OUTPATIENT
Start: 2021-05-17 | End: 2021-06-18 | Stop reason: HOSPADM

## 2021-05-17 RX ORDER — BETAMETHASONE DIPROPIONATE 0.05 %
OINTMENT (GRAM) TOPICAL ONCE
Status: CANCELLED | OUTPATIENT
Start: 2021-05-17 | End: 2021-05-17

## 2021-05-17 RX ORDER — GENTAMICIN SULFATE 1 MG/G
OINTMENT TOPICAL ONCE
Status: CANCELLED | OUTPATIENT
Start: 2021-05-17 | End: 2021-05-17

## 2021-05-17 ASSESSMENT — PAIN DESCRIPTION - LOCATION: LOCATION: FOOT

## 2021-05-17 ASSESSMENT — PAIN SCALES - GENERAL: PAINLEVEL_OUTOF10: 10

## 2021-05-17 ASSESSMENT — PAIN DESCRIPTION - PROGRESSION: CLINICAL_PROGRESSION: GRADUALLY WORSENING

## 2021-05-17 ASSESSMENT — PAIN DESCRIPTION - ORIENTATION: ORIENTATION: RIGHT

## 2021-05-17 ASSESSMENT — PAIN DESCRIPTION - DESCRIPTORS: DESCRIPTORS: THROBBING

## 2021-05-17 ASSESSMENT — PAIN DESCRIPTION - FREQUENCY: FREQUENCY: CONTINUOUS

## 2021-05-17 ASSESSMENT — PAIN DESCRIPTION - PAIN TYPE: TYPE: ACUTE PAIN

## 2021-05-17 NOTE — PROGRESS NOTES
Wound Care Center Progress Note With Procedure    Marilyn Doty Notice  AGE: 41 y.o. GENDER: female  : 1980  EPISODE DATE:  2021     Subjective:     Chief Complaint   Patient presents with    Wound Check     Rt foot         HISTORY of PRESENT ILLNESS   Marilyn Rahman is a 41 y. o. female who presents to the Wound Clinic for a visit for evaluation and treatment of Acute diabetic ulcer(s) of the right foot, the web between the 4th and 5th toe. The condition is of moderate severity. The ulcer had been present for 2.5 weeks at initial wound clinic visit. Raudel Nunez underlying cause is thought to be uncontrolled [address] patients care to date has included a visit to the ED, where she was given doxy and Lotrimin. The doxy appeared ineffective to the patient, so her PCP placed her on Bactrim. She has also been cleaning wound with dial soap daily. The patient has significant underlying medical conditions as below. She is a diabetic, last hemoglobin A1c was 8.9 on 21. She states that she follows our orders and has quit smoking.    Wound Pain Timing/Severity: Aching, tender  Quality of pain: waxing and waning  Severity of pain:  4 / 10   Modifying Factors: diabetes, poor glucose control, obesity and pressure  Associated Signs/Symptoms: drainage and edema        PAST MEDICAL HISTORY        Diagnosis Date    Allergic rhinitis     allergy shots- Dr. Mariosl Hernández Allergic rhinitis     Dr. Cheryl kwan and flonase    Anxiety 10/24/2012    Atopic dermatitis     Dr. Cheryl kwan    Back pain 10/24/2012    Bilateral sciatica 2020    Depression     Diabetes mellitus ([address]     Diabetes mellitus type II, uncontrolled ([address]     Diabetic eye exam: 2011, Dr. Kaylah Kelley , needs record [V72.0T][    GERD (gastroesophageal reflux disease)     Headache(784.0)     Hyperlipidemia LDL goal < 100 2012    Hypertension     Morbid obesity ([address]     No retinopathy on exam     Dr Kaylah Kelley 13 Diabetes with Lantus pen and Novolog pen 30 each 11    aspirin EC 81 MG EC tablet Take 1 tablet by mouth daily 90 tablet 0     No current facility-administered medications on file prior to encounter. REVIEW OF SYSTEMS    Pertinent items are noted in HPI. Constitutional: Negative for systemic symptoms including fever, chills and malaise. Objective:      BP (!) 143/83   Pulse 91   Temp 97.8 °F (36.6 °C) (Temporal)   Resp 18     PHYSICAL EXAM    General: The patient is in no acute distress. Mental status:  Patient is appropriate, is  oriented to place and plan of care. Dermatologic exam: Visual inspection of the periwound reveals the skin to be edematous  Wound exam: see wound description below in procedure note      Assessment:     Problem List Items Addressed This Visit     WD-Diabetic ulcer of right heel with fat layer exposed ([address] - Primary    Relevant Medications    lidocaine (XYLOCAINE) 4 % external solution    Other Relevant Orders    Initiate Outpatient Wound Care Protocol    WD-Diabetic ulcer of right fifth toe (HCC)    Relevant Medications    lidocaine (XYLOCAINE) 4 % external solution    Other Relevant Orders    Initiate Outpatient Wound Care Protocol    Culture, Wound    VL LOWER EXTREMITY ARTERIES BILATERAL    XR FOOT RIGHT (MIN 3 VIEWS)      Other Visit Diagnoses     PVD (peripheral vascular disease) ([address]        Relevant Orders    VL LOWER EXTREMITY ARTERIES BILATERAL        Procedure Note    Indications:  Based on my examination of this patient's wound(s) today, sharp excision into necrotic subcutaneous tissue is required to promote healing and evaluate the extent of previous healing.     Performed by: Aviva Sandhoff, APRN - CNP    Consent obtained: Yes    Time out taken:  Yes    Pain Control: Anesthetic  Anesthetic: 5% Lidocaine Ointment Topical     Debridement:Excisional Debridement    Using curette, scissors and forceps the wound(s) was/were sharply debrided down through and including the pressure    Procedural Pain:  0  / 10     Post Procedural Pain:  0 / 10     Response to treatment:  Well tolerated by patient. Status of wound progress and description from last visit: The patient called today and wanted in earlier because her th toe is discolored, she is having pain and her foot is red and swollen. Her 5th toe has a purple bruised look, her foot is swollen and red. She denies injury to the foot but admits she has been on her feet more than usual. The wound was cultured today, she will get an Xray of the right foot today and is scheduled for a stat VL arteries today at 3 pm. Based on her last culture will start Cipro and Bactrim for 30 days. The patient requests something for pain, will prescribe patient opioid pain medication at this time. Patient understands all side effects and contraindications of opioids. No indication of abuse or seeking behavior. OARRS report checked at this time. We will continue to monitor. The patient will come in for a nurse visit tomorrow for wound care. Plan:       Discharge Instructions       PHYSICIAN ORDERS AND DISCHARGE INSTRUCTIONS     NOTE: Upon discharge from the [address], you will receive a patient experience survey. We would be grateful if you would take the time to fill this survey out.     Wound care order history:                 ROBIN's   Right 1.17      Left 1.2   Date 4/13/21              Vascular studies:                 Cultures:  4/13/21              Antibiotics:                  HbA1c:   8 1/13/21              Compression/Lymph Pumps:              Grafts:                  Continuing wound care orders and information:              Residence:               Continue home health care with:               Your wound-care supplies will be provided by:  .                            RDQPV cleansing:                           BT not scrub or use excessive force.                          Wash hands with soap and water before and after dressing

## 2021-05-18 ENCOUNTER — HOSPITAL ENCOUNTER (OUTPATIENT)
Dept: WOUND CARE | Age: 41
Discharge: HOME | End: 2021-05-18
Payer: MEDICARE

## 2021-05-18 VITALS — RESPIRATION RATE: 16 BRPM | TEMPERATURE: 97.2 F

## 2021-05-18 PROCEDURE — 29581 APPL MULTLAYER CMPRN SYS LEG: CPT

## 2021-05-18 ASSESSMENT — PAIN DESCRIPTION - FREQUENCY: FREQUENCY: CONTINUOUS

## 2021-05-18 ASSESSMENT — PAIN DESCRIPTION - PAIN TYPE: TYPE: ACUTE PAIN

## 2021-05-18 ASSESSMENT — PAIN DESCRIPTION - DESCRIPTORS: DESCRIPTORS: ACHING;THROBBING;TENDER

## 2021-05-18 ASSESSMENT — PAIN DESCRIPTION - ORIENTATION: ORIENTATION: RIGHT

## 2021-05-18 ASSESSMENT — PAIN DESCRIPTION - ONSET: ONSET: ON-GOING

## 2021-05-18 ASSESSMENT — PAIN SCALES - GENERAL: PAINLEVEL_OUTOF10: 10

## 2021-05-18 NOTE — PLAN OF CARE
Patient stated that she picked up her antibiotics, but Ivan Oneill CNP said she would give her some pain meds as well and these were not called in. Checked OARRS and read Edith's note and confirmed this was true. Wrote patient for 7 days of tramadol.  Educated patient on this medication and risks and adverse effects

## 2021-05-18 NOTE — PROGRESS NOTES
Multilayer Compression Wrap   (Not Unna) Below the Knee    NAME:  Marilyn Del Rio  YOB: 1980  MEDICAL RECORD NUMBER:  [id number]  DATE:  5/18/2021    Multilayer compression wrap: Removed old Multilayer wrap if indicated and wash leg with mild soap/water. Applied moisturizing agent to dry skin as needed. Applied primary and secondary dressing as ordered. Applied multilayered dressing below the knee to right lower leg. Instructed patient/caregiver not to remove dressing and to keep it clean and dry. Instructed patient/caregiver on complications to report to provider, such as pain, numbness in toes, heavy drainage, and slippage of dressing. Instructed patient on purpose of compression dressing and on activity and exercise recommendations.       Electronically signed by Byron Cannon LPN on 0/48/7491 at 7:12 PM

## 2021-05-21 ENCOUNTER — HOSPITAL ENCOUNTER (OUTPATIENT)
Dept: WOUND CARE | Age: 41
Discharge: HOME | End: 2021-05-21
Payer: MEDICARE

## 2021-05-21 VITALS
SYSTOLIC BLOOD PRESSURE: 135 MMHG | HEART RATE: 86 BPM | TEMPERATURE: 97.4 F | RESPIRATION RATE: 16 BRPM | DIASTOLIC BLOOD PRESSURE: 93 MMHG

## 2021-05-21 DIAGNOSIS — L97.519 DIABETIC ULCER OF RIGHT FIFTH TOE (HCC): ICD-10-CM

## 2021-05-21 DIAGNOSIS — E11.621 DIABETIC ULCER OF RIGHT HEEL ASSOCIATED WITH TYPE 2 DIABETES MELLITUS, WITH FAT LAYER EXPOSED (HCC): Primary | ICD-10-CM

## 2021-05-21 DIAGNOSIS — E11.621 DIABETIC ULCER OF RIGHT FIFTH TOE (HCC): ICD-10-CM

## 2021-05-21 DIAGNOSIS — L97.412 DIABETIC ULCER OF RIGHT HEEL ASSOCIATED WITH TYPE 2 DIABETES MELLITUS, WITH FAT LAYER EXPOSED (HCC): Primary | ICD-10-CM

## 2021-05-21 PROCEDURE — 11042 DBRDMT SUBQ TIS 1ST 20SQCM/<: CPT

## 2021-05-21 PROCEDURE — 11042 DBRDMT SUBQ TIS 1ST 20SQCM/<: CPT | Performed by: NURSE PRACTITIONER

## 2021-05-21 RX ORDER — BACITRACIN ZINC AND POLYMYXIN B SULFATE 500; 1000 [USP'U]/G; [USP'U]/G
OINTMENT TOPICAL ONCE
Status: CANCELLED | OUTPATIENT
Start: 2021-05-21 | End: 2021-05-21

## 2021-05-21 RX ORDER — LIDOCAINE 40 MG/G
CREAM TOPICAL ONCE
Status: CANCELLED | OUTPATIENT
Start: 2021-05-21 | End: 2021-05-21

## 2021-05-21 RX ORDER — LIDOCAINE HYDROCHLORIDE 20 MG/ML
JELLY TOPICAL ONCE
Status: CANCELLED | OUTPATIENT
Start: 2021-05-21 | End: 2021-05-21

## 2021-05-21 RX ORDER — LIDOCAINE HYDROCHLORIDE 40 MG/ML
SOLUTION TOPICAL ONCE
Status: CANCELLED | OUTPATIENT
Start: 2021-05-21 | End: 2021-05-21

## 2021-05-21 RX ORDER — CLOBETASOL PROPIONATE 0.5 MG/G
OINTMENT TOPICAL ONCE
Status: CANCELLED | OUTPATIENT
Start: 2021-05-21 | End: 2021-05-21

## 2021-05-21 RX ORDER — GINSENG 100 MG
CAPSULE ORAL ONCE
Status: CANCELLED | OUTPATIENT
Start: 2021-05-21 | End: 2021-05-21

## 2021-05-21 RX ORDER — BACITRACIN, NEOMYCIN, POLYMYXIN B 400; 3.5; 5 [USP'U]/G; MG/G; [USP'U]/G
OINTMENT TOPICAL ONCE
Status: CANCELLED | OUTPATIENT
Start: 2021-05-21 | End: 2021-05-21

## 2021-05-21 RX ORDER — LIDOCAINE HYDROCHLORIDE 40 MG/ML
SOLUTION TOPICAL ONCE
Status: DISCONTINUED | OUTPATIENT
Start: 2021-05-21 | End: 2021-05-22 | Stop reason: HOSPADM

## 2021-05-21 RX ORDER — LIDOCAINE 50 MG/G
OINTMENT TOPICAL ONCE
Status: CANCELLED | OUTPATIENT
Start: 2021-05-21 | End: 2021-05-21

## 2021-05-21 RX ORDER — GENTAMICIN SULFATE 1 MG/G
OINTMENT TOPICAL ONCE
Status: CANCELLED | OUTPATIENT
Start: 2021-05-21 | End: 2021-05-21

## 2021-05-21 RX ORDER — BETAMETHASONE DIPROPIONATE 0.05 %
OINTMENT (GRAM) TOPICAL ONCE
Status: CANCELLED | OUTPATIENT
Start: 2021-05-21 | End: 2021-05-21

## 2021-05-21 NOTE — PROGRESS NOTES
Wound Care Center Progress Note With Procedure    Marilyn Small  AGE: 41 y.o. GENDER: female  : 1980  EPISODE DATE:  2021     Subjective:     Chief Complaint   Patient presents with    Wound Check     RLE          HISTORY of PRESENT ILLNESS     Marilyn Rahman is a 41 y. o. female who presents to the Wound Clinic for a visit for evaluation and treatment of Acute diabetic ulcer(s) of the right foot, the web between the 4th and 5th toe. The condition is of moderate severity. The ulcer had been present for 2.5 weeks at initial wound clinic visit. Eliu Morsea underlying cause is thought to be uncontrolled [address] patients care to date has included a visit to the ED, where she was given doxycycline and Lotrimin. The doxy appeared ineffective to the patient, so her PCP placed her on Bactrim. She has also been cleaning wound with dial soap daily. The patient has significant underlying medical conditions as below.     She is a diabetic, last hemoglobin A1c was 8.9 on 21. She states that she follows our orders and has quit smoking. She is not on a blood thinner.   Wound Pain Timing/Severity: Aching, tender  Quality of pain: waxing and waning  Severity of pain:  3 / 10   Modifying Factors: diabetes, poor glucose control, obesity and pressure  Associated Signs/Symptoms: drainage and edema        PAST MEDICAL HISTORY        Diagnosis Date    Allergic rhinitis     allergy shots- Dr. Andrea Collier Allergic rhinitis     Dr. Roberto Carlos kwan and flonase    Anxiety 10/24/2012    Atopic dermatitis     Dr. Roberto Carlos kwan    Back pain 10/24/2012    Bilateral sciatica 2020    Depression     Diabetes mellitus ([address]     Diabetes mellitus type II, uncontrolled ([address]     Diabetic eye exam: 2011, Dr. Dewey Landau , needs record [V72.0T][    GERD (gastroesophageal reflux disease)     Headache(784.0)     Hyperlipidemia LDL goal < 100 2012    Hypertension     Morbid obesity ([address]     No retinopathy on exam     Dr Twin Maguire 13 Diabetes with no significatn diabetic retinopathy found in both eyes    Non compliance w medication regimen 2014    Sprain of neck 10/4/2011       PAST SURGICAL HISTORY    Past Surgical History:   Procedure Laterality Date     SECTION      HYSTERECTOMY      OTHER SURGICAL HISTORY  2017    I&D of right perineal abcess        FAMILY HISTORY    Family History   Problem Relation Age of Onset    Heart Disease Mother     Diabetes Mother     Asthma Mother     High Cholesterol Mother     Depression Mother     Heart Disease Father     High Cholesterol Father        SOCIAL HISTORY    Social History     Tobacco Use    Smoking status: Current Every Day Smoker     Packs/day: 0.25     Types: Cigarettes     Last attempt to quit: 2017     Years since quitting: 3.8    Smokeless tobacco: Never Used    Tobacco comment: trying to slow down    Vaping Use    Vaping Use: Never used   Substance Use Topics    Alcohol use: Yes     Comment: rare    Drug use: No       ALLERGIES    Allergies   Allergen Reactions    Penicillins Rash       MEDICATIONS    Current Outpatient Medications on File Prior to Encounter   Medication Sig Dispense Refill    ciprofloxacin (CIPRO) 500 MG tablet Take 1 tablet by mouth 2 times daily 60 tablet 0    sulfamethoxazole-trimethoprim (BACTRIM DS;SEPTRA DS) 800-160 MG per tablet Take 1 tablet by mouth 2 times daily 60 tablet 0    Probiotic Acidophilus (FLORANEX) TABS Take 1 tablet by mouth 2 times daily 60 tablet 0    lisinopril (PRINIVIL;ZESTRIL) 2.5 MG tablet Take 1 tablet by mouth every morning 90 tablet 3    atorvastatin (LIPITOR) 40 MG tablet Take 1 tablet by mouth every morning 90 tablet 3    Dulaglutide 1.5 MG/0.5ML SOPN Inject 1.5 mg into the skin once a week Dose increase as of 20 4 pen 5    glipiZIDE (GLUCOTROL XL) 10 MG extended release tablet Take 1 tablet by mouth every morning 90 tablet 3    insulin glargine (LANTUS SOLOSTAR) 100 UNIT/ML injection pen Inject 15 Units into the skin nightly 10 pen 5    Lancets MISC To test up to 4 times per day- nighttime and with each meal 300 each 5    blood glucose test strips (EXACTECH TEST) strip 1 each by In Vitro route daily Embrace test strips to test BS 4x daily 300 each 6    Insulin Pen Needle 32G X 5 MM MISC To use with Lantus pen and Novolog pen 30 each 11    aspirin EC 81 MG EC tablet Take 1 tablet by mouth daily 90 tablet 0     No current facility-administered medications on file prior to encounter. REVIEW OF SYSTEMS    Pertinent items are noted in HPI. Constitutional: Negative for systemic symptoms including fever, chills and malaise. Objective:      BP (!) 135/93   Pulse 86   Temp 97.4 °F (36.3 °C) (Temporal)   Resp 16     PHYSICAL EXAM    General: The patient is in no acute distress. Mental status:  Patient is appropriate, is  oriented to place and plan of care. Dermatologic exam: Visual inspection of the periwound reveals the skin to be edematous  Wound exam: see wound description below in procedure note      Assessment:     Problem List Items Addressed This Visit     WD-Diabetic ulcer of right heel with fat layer exposed ([address] - Primary    Relevant Medications    lidocaine (XYLOCAINE) 4 % external solution    Other Relevant Orders    Initiate Outpatient Wound Care Protocol    WD-Diabetic ulcer of right fifth toe (HCC)    Relevant Medications    lidocaine (XYLOCAINE) 4 % external solution    Other Relevant Orders    Initiate Outpatient Wound Care Protocol        Procedure Note    Indications:  Based on my examination of this patient's wound(s) today, sharp excision into necrotic subcutaneous tissue is required to promote healing and evaluate the extent of previous healing.     Performed by: ALISTAIR Ortega - CNP    Consent obtained: Yes    Time out taken:  Yes    Pain Control: Anesthetic  Anesthetic: 5% Lidocaine Ointment Topical     Debridement:Excisional 100%    Total  Area  Debrided:  6 sq cm     Bleeding:  Minimal    Hemostasis Achieved:  by pressure    Procedural Pain:  0  / 10     Post Procedural Pain:  0 / 10     Response to treatment:  Well tolerated by patient. Status of wound progress and description from last visit: The wound is larger today post debridement earlier in the week. The swelling and  erythema have improved and the purple bruised discoloration is gone since last visit. Her wound was cultured and demonstrates moderate growth of staph and rare growth enterococcus and morganella, she was start on Cipro and Bactrim for 30 days. Xray right foot negative for osteomyelitis and VL arteries with mild PVD in the runoff vessels bilaterally. Narrative   EXAMINATION:   THREE XRAY VIEWS OF THE RIGHT FOOT       5/17/2021 3:35 pm       COMPARISON:   None.       HISTORY:   ORDERING SYSTEM PROVIDED HISTORY: Diabetic ulcer of right fifth toe ([address]   TECHNOLOGIST PROVIDED HISTORY:   Reason for exam:->rule out osteomyelitis   Acuity: Acute   Type of Exam: Initial   Additional signs and symptoms: rule out osteomyelitis, Diabetic ulcer of   right fifth toe (HCC       FINDINGS:   There is no evidence of acute fracture.  There is normal alignment of the   tarsometatarsal joints.  No acute joint abnormality.  No focal osseous   lesion. No focal soft tissue abnormality.           Impression   No acute osseous abnormality.      Narrative   EXAMINATION:   ARTERIAL DUPLEX ULTRASOUND OF THE BILATERAL LOWER EXTREMITIES  5/17/2021 2:45   pm       TECHNIQUE:   Duplex ultrasound and Doppler images were obtained of the bilateral lower   extremities       COMPARISON:   None.       HISTORY:   ORDERING SYSTEM PROVIDED HISTORY: Diabetic ulcer of right fifth toe [facility])   TECHNOLOGIST PROVIDED HISTORY:   Reason for exam:->discolored 5th toe right foot   Reason for Exam: ulcer rt foot       FINDINGS:   The arterial structures of bilateral lower extremities are patent.       No shower.              Daily Wound management:                          Keep weight off wounds and reposition every 2 hours.                          SWQPZ standing for long periods of time.                          GISZV wraps/stockings in AM and remove at bedtime.                          Elevate legs to the level of the heart or above for 30 minutes 4-5 times a day and/or when sitting.                                               When taking antibiotics take entire prescription as ordered by MD do not stop taking until medicine is all gone.                                                                  Orders for this week (5/21/21): Right 4th toe - Wash with mild soap and water. Pat dry with 4x4   Desitin to periwound  Spray wound with anasept cleanser  Apply Stimulen powder to wound bed  Cover with calcium algiante, and drawtex  Fold Sofsorb over top of toes  Wrap toes with conform  Wrap with  Coban 2 lite and secure with tape  Leave in place till Monday     Nurse Visit Monday  Follow up with Martin Trinh CNP on Friday in the wound care center  Call [phone] for any questions or concerns.   Date__________   Time____________           Treatment Note      Written Patient Dismissal Instructions Given            Electronically signed by ALISTAIR Bray CNP on 5/21/2021 at 11:45 AM

## 2021-05-21 NOTE — PROGRESS NOTES
Multilayer Compression Wrap   (Not Unna) Below the Knee    NAME:  Marilyn Sue  YOB: 1980  MEDICAL RECORD NUMBER:  [id number]  DATE:  5/21/2021    Multilayer compression wrap: Removed old Multilayer wrap if indicated and wash leg with mild soap/water. Applied moisturizing agent to dry skin as needed. Applied primary and secondary dressing as ordered. Applied multilayered dressing below the knee to right lower leg. Instructed patient/caregiver not to remove dressing and to keep it clean and dry. Instructed patient/caregiver on complications to report to provider, such as pain, numbness in toes, heavy drainage, and slippage of dressing. Instructed patient on purpose of compression dressing and on activity and exercise recommendations.       Electronically signed by Lissy Garcia RN on 5/21/2021 at 12:02 PM

## 2021-05-22 LAB
CULTURE: ABNORMAL
Lab: ABNORMAL
SPECIMEN: ABNORMAL

## 2021-05-24 ENCOUNTER — HOSPITAL ENCOUNTER (OUTPATIENT)
Dept: WOUND CARE | Age: 41
Discharge: HOME | End: 2021-05-24
Payer: MEDICARE

## 2021-05-24 VITALS — RESPIRATION RATE: 18 BRPM

## 2021-05-24 PROCEDURE — 99213 OFFICE O/P EST LOW 20 MIN: CPT

## 2021-05-24 NOTE — PLAN OF CARE
Problem: Wound:  Goal: Will show signs of wound healing; wound closure and no evidence of infection  Description: Will show signs of wound healing; wound closure and no evidence of infection  5/24/2021 1058 by Codi Mott LPN  Outcome: Ongoing  5/24/2021 1042 by Susie Salazar RN  Outcome: Ongoing

## 2021-05-28 ENCOUNTER — HOSPITAL ENCOUNTER (OUTPATIENT)
Dept: WOUND CARE | Age: 41
Discharge: HOME | End: 2021-05-28
Payer: MEDICARE

## 2021-05-28 VITALS
TEMPERATURE: 96.8 F | HEART RATE: 65 BPM | SYSTOLIC BLOOD PRESSURE: 111 MMHG | RESPIRATION RATE: 16 BRPM | DIASTOLIC BLOOD PRESSURE: 74 MMHG

## 2021-05-28 DIAGNOSIS — E11.621 DIABETIC ULCER OF RIGHT HEEL ASSOCIATED WITH TYPE 2 DIABETES MELLITUS, WITH FAT LAYER EXPOSED (HCC): Primary | ICD-10-CM

## 2021-05-28 DIAGNOSIS — L97.519 DIABETIC ULCER OF RIGHT FIFTH TOE (HCC): ICD-10-CM

## 2021-05-28 DIAGNOSIS — L97.412 DIABETIC ULCER OF RIGHT HEEL ASSOCIATED WITH TYPE 2 DIABETES MELLITUS, WITH FAT LAYER EXPOSED (HCC): Primary | ICD-10-CM

## 2021-05-28 DIAGNOSIS — E11.621 DIABETIC ULCER OF RIGHT FIFTH TOE (HCC): ICD-10-CM

## 2021-05-28 PROCEDURE — 11042 DBRDMT SUBQ TIS 1ST 20SQCM/<: CPT | Performed by: NURSE PRACTITIONER

## 2021-05-28 PROCEDURE — 11042 DBRDMT SUBQ TIS 1ST 20SQCM/<: CPT

## 2021-05-28 RX ORDER — LIDOCAINE 50 MG/G
OINTMENT TOPICAL ONCE
Status: DISCONTINUED | OUTPATIENT
Start: 2021-05-28 | End: 2021-05-29 | Stop reason: HOSPADM

## 2021-05-28 RX ORDER — LIDOCAINE HYDROCHLORIDE 20 MG/ML
JELLY TOPICAL ONCE
Status: CANCELLED | OUTPATIENT
Start: 2021-05-28 | End: 2021-05-28

## 2021-05-28 RX ORDER — GENTAMICIN SULFATE 1 MG/G
OINTMENT TOPICAL ONCE
Status: CANCELLED | OUTPATIENT
Start: 2021-05-28 | End: 2021-05-28

## 2021-05-28 RX ORDER — LIDOCAINE 40 MG/G
CREAM TOPICAL ONCE
Status: CANCELLED | OUTPATIENT
Start: 2021-05-28 | End: 2021-05-28

## 2021-05-28 RX ORDER — LIDOCAINE HYDROCHLORIDE 40 MG/ML
SOLUTION TOPICAL ONCE
Status: CANCELLED | OUTPATIENT
Start: 2021-05-28 | End: 2021-05-28

## 2021-05-28 RX ORDER — CLOBETASOL PROPIONATE 0.5 MG/G
OINTMENT TOPICAL ONCE
Status: CANCELLED | OUTPATIENT
Start: 2021-05-28 | End: 2021-05-28

## 2021-05-28 RX ORDER — GINSENG 100 MG
CAPSULE ORAL ONCE
Status: CANCELLED | OUTPATIENT
Start: 2021-05-28 | End: 2021-05-28

## 2021-05-28 RX ORDER — BACITRACIN, NEOMYCIN, POLYMYXIN B 400; 3.5; 5 [USP'U]/G; MG/G; [USP'U]/G
OINTMENT TOPICAL ONCE
Status: CANCELLED | OUTPATIENT
Start: 2021-05-28 | End: 2021-05-28

## 2021-05-28 RX ORDER — LIDOCAINE 50 MG/G
OINTMENT TOPICAL ONCE
Status: CANCELLED | OUTPATIENT
Start: 2021-05-28 | End: 2021-05-28

## 2021-05-28 RX ORDER — BETAMETHASONE DIPROPIONATE 0.05 %
OINTMENT (GRAM) TOPICAL ONCE
Status: CANCELLED | OUTPATIENT
Start: 2021-05-28 | End: 2021-05-28

## 2021-05-28 RX ORDER — BACITRACIN ZINC AND POLYMYXIN B SULFATE 500; 1000 [USP'U]/G; [USP'U]/G
OINTMENT TOPICAL ONCE
Status: CANCELLED | OUTPATIENT
Start: 2021-05-28 | End: 2021-05-28

## 2021-05-28 NOTE — PROGRESS NOTES
Wound Care Center Progress Note With Procedure    Marilyn Loredo  AGE: 41 y.o. GENDER: female  : 1980  EPISODE DATE:  2021     Subjective:     Chief Complaint   Patient presents with    Wound Check     right foot         HISTORY of PRESENT ILLNESS     Marilyn Rahman is a 41 y. o. female who presents to the Wound Clinic for a visit for evaluation and treatment of Acute diabetic ulcer of the right foot, the web between the 4th and 5th toe and the medial 5th toe. The condition is of moderate severity. The ulcer had been present for 2.5 weeks at initial wound clinic visit. Twanna Tereza underlying cause is thought to be uncontrolled [address] patients care to date has included a visit to the ED, where she was given doxycycline and Lotrimin. The doxy appeared ineffective to the patient, so her PCP placed her on Bactrim. She has also been cleaning wound with dial soap daily. The patient has significant underlying medical conditions as below.      She is a diabetic, last hemoglobin A1c was 8.9 on 21. She states that she follows our orders and has quit smoking. She is not on a blood thinner.   Wound Pain Timing/Severity: Aching, tender  Quality of pain: waxing and waning  Severity of pain:  2 / 10   Modifying Factors: diabetes, poor glucose control, obesity and pressure  Associated Signs/Symptoms: drainage and edema        PAST MEDICAL HISTORY        Diagnosis Date    Allergic rhinitis     allergy shots- Dr. Ping Allen Allergic rhinitis     Dr. Rich Smith- jhonnyitin and flonase    Anxiety 10/24/2012    Atopic dermatitis     Dr. Rich Smith- claritin    Back pain 10/24/2012    Bilateral sciatica 2020    Depression     Diabetes mellitus ([address]     Diabetes mellitus type II, uncontrolled ([address]     Diabetic eye exam: 2011, Dr. Elena Steel , needs record [V72.0T][    GERD (gastroesophageal reflux disease)     Headache(784.0)     Hyperlipidemia LDL goal < 100 2012    Hypertension     Morbid obesity ([facility]     No retinopathy on exam     Dr Samanta Peace 13 Diabetes with no significatn diabetic retinopathy found in both eyes    Non compliance w medication regimen 2014    Sprain of neck 10/4/2011       PAST SURGICAL HISTORY    Past Surgical History:   Procedure Laterality Date     SECTION      HYSTERECTOMY      OTHER SURGICAL HISTORY  2017    I&D of right perineal abcess        FAMILY HISTORY    Family History   Problem Relation Age of Onset    Heart Disease Mother     Diabetes Mother     Asthma Mother     High Cholesterol Mother     Depression Mother     Heart Disease Father     High Cholesterol Father        SOCIAL HISTORY    Social History     Tobacco Use    Smoking status: Current Every Day Smoker     Packs/day: 0.25     Types: Cigarettes     Last attempt to quit: 2017     Years since quitting: 3.9    Smokeless tobacco: Never Used    Tobacco comment: trying to slow down    Vaping Use    Vaping Use: Never used   Substance Use Topics    Alcohol use: Yes     Comment: rare    Drug use: No       ALLERGIES    Allergies   Allergen Reactions    Penicillins Rash       MEDICATIONS    Current Outpatient Medications on File Prior to Encounter   Medication Sig Dispense Refill    ciprofloxacin (CIPRO) 500 MG tablet Take 1 tablet by mouth 2 times daily 60 tablet 0    sulfamethoxazole-trimethoprim (BACTRIM DS;SEPTRA DS) 800-160 MG per tablet Take 1 tablet by mouth 2 times daily 60 tablet 0    Probiotic Acidophilus (FLORANEX) TABS Take 1 tablet by mouth 2 times daily 60 tablet 0    lisinopril (PRINIVIL;ZESTRIL) 2.5 MG tablet Take 1 tablet by mouth every morning 90 tablet 3    atorvastatin (LIPITOR) 40 MG tablet Take 1 tablet by mouth every morning 90 tablet 3    Dulaglutide 1.5 MG/0.5ML SOPN Inject 1.5 mg into the skin once a week Dose increase as of 20 4 pen 5    glipiZIDE (GLUCOTROL XL) 10 MG extended release tablet Take 1 tablet by mouth every morning 90 tablet 3    insulin glargine (LANTUS SOLOSTAR) 100 UNIT/ML injection pen Inject 15 Units into the skin nightly 10 pen 5    Lancets MISC To test up to 4 times per day- nighttime and with each meal 300 each 5    blood glucose test strips (EXACTECH TEST) strip 1 each by In Vitro route daily Embrace test strips to test BS 4x daily 300 each 6    Insulin Pen Needle 32G X 5 MM MISC To use with Lantus pen and Novolog pen 30 each 11    aspirin EC 81 MG EC tablet Take 1 tablet by mouth daily 90 tablet 0     No current facility-administered medications on file prior to encounter. REVIEW OF SYSTEMS    Pertinent items are noted in HPI. Constitutional: Negative for systemic symptoms including fever, chills and malaise. Objective:      /74   Pulse 65   Temp 96.8 °F (36 °C) (Temporal)   Resp 16     PHYSICAL EXAM    General: The patient is in no acute distress. Mental status:  Patient is appropriate, is  oriented to place and plan of care. Dermatologic exam: Visual inspection of the periwound reveals the skin to be edematous  Wound exam: see wound description below in procedure note      Assessment:     Problem List Items Addressed This Visit     WD-Diabetic ulcer of right heel with fat layer exposed ([address] - Primary    Relevant Medications    lidocaine (XYLOCAINE) 5 % ointment    Other Relevant Orders    Initiate Outpatient Wound Care Protocol    WD-Diabetic ulcer of right fifth toe (HCC)    Relevant Medications    lidocaine (XYLOCAINE) 5 % ointment    Other Relevant Orders    Initiate Outpatient Wound Care Protocol        Procedure Note    Indications:  Based on my examination of this patient's wound(s) today, sharp excision into necrotic subcutaneous tissue is required to promote healing and evaluate the extent of previous healing.     Performed by: ALISTAIR Crystal - CNP    Consent obtained: Yes    Time out taken:  Yes    Pain Control: Anesthetic  Anesthetic: 5% Lidocaine Ointment Topical     Debridement:Excisional 100%    Total  Area  Debrided:  1.44 sq cm     Bleeding:  Minimal    Hemostasis Achieved:  by pressure    Procedural Pain:  0  / 10     Post Procedural Pain:  0 / 10     Response to treatment:  Well tolerated by patient. Status of wound progress and description from last visit: The wound has improved in size. The swelling and erythema have improved and the purple bruised discoloration remains resolved. She has has a tunneled area at the base of the 4th and 5th toe web and the medial 5th toe had some eschar. She has been leaving her foot open to air multiple times per day and changing the dressing. Her wound was cultured 5/17/21 and demonstrated moderate growth of staph and rare growth enterococcus and morganella, she was start on Cipro and Bactrim for 30 days. Xray right foot negative for osteomyelitis and VL arteries with mild PVD in the runoff vessels bilaterally.     Narrative   EXAMINATION:   THREE XRAY VIEWS OF THE RIGHT FOOT       5/17/2021 3:35 pm       COMPARISON:   None.       HISTORY:   ORDERING SYSTEM PROVIDED HISTORY: Diabetic ulcer of right fifth toe ([address]   TECHNOLOGIST PROVIDED HISTORY:   Reason for exam:->rule out osteomyelitis   Acuity: Acute   Type of Exam: Initial   Additional signs and symptoms: rule out osteomyelitis, Diabetic ulcer of   right fifth toe (HCC       FINDINGS:   There is no evidence of acute fracture.  There is normal alignment of the   tarsometatarsal joints.  No acute joint abnormality.  No focal osseous   lesion.  No focal soft tissue abnormality.           Impression   No acute osseous abnormality.      Narrative   EXAMINATION:   ARTERIAL DUPLEX ULTRASOUND OF THE BILATERAL LOWER EXTREMITIES  5/17/2021 2:45   pm       TECHNIQUE:   Duplex ultrasound and Doppler images were obtained of the bilateral lower   extremities       COMPARISON:   None.       HISTORY:   ORDERING SYSTEM PROVIDED HISTORY: Diabetic ulcer of right fifth toe [facility])   TECHNOLOGIST PROVIDED HISTORY:   Reason for exam:->discolored 5th toe right foot   Reason for Exam: ulcer rt foot       FINDINGS:   The arterial structures of bilateral lower extremities are patent.       No significantly abnormal flow velocities are identified.       There are triphasic waveforms throughout the femoral and popliteal arteries   bilaterally.       There are monophasic waveforms in the posterior tibial arteries bilaterally.       There are triphasic waveforms in the right anterior tibial artery.  There is   monophasic waveform in the left anterior tibial artery.       There are monophasic waveforms in the right peroneal artery.  There is   triphasic waveform in the left peroneal artery.           Impression   Findings suggestive of at least mild peripheral vascular disease in the   runoff vessels bilaterally.  Further evaluation with CT angiogram or   conventional angiogram may be helpful for better characterization.               Plan:       Discharge Instructions             PHYSICIAN ORDERS AND DISCHARGE INSTRUCTIONS     NOTE: Upon discharge from the [address], you will receive a patient experience survey. We would be grateful if you would take the time to fill this survey out.     Wound care order history:                 ROBIN's   Right 1.17      Left 1.2   Date 4/13/21              Vascular studies:                 Cultures:  4/13/21              Antibiotics:                  HbA1c:   8 1/13/21              Compression/Lymph Pumps:              Grafts:                  Continuing wound care orders and information:              Residence:               Continue home health care with:               Your wound-care supplies will be provided by: .                            NATHALIA cleansing:                           ET not scrub or use excessive force.                          Wash hands with soap and water before and after dressing changes.                           Prior to applying a clean dressing, cleanse wound with normal saline,                          wound cleanser, or mild soap and water.                           Ask your physician or nurse before getting the wound(s) wet in the shower.              Daily Wound management:                          SJTS weight off wounds and reposition every 2 hours.                          BHCPA standing for long periods of time.                          BIDYD wraps/stockings in AM and remove at bedtime.                          Elevate legs to the level of the heart or above for 30 minutes 4-5 times a day and/or when sitting.                                               When taking antibiotics take entire prescription as ordered by MD do not stop taking until medicine is all gone.                                                                  Orders for this week (5/28/21): Right 4th toe - Wash with mild soap and water. Pat dry with 4x4   Desitin to periwound  Spray wound with anasept cleanser  Apply Stimulen powder to wound bed  Cover with calcium algiante, and drawtex  Fold Sofsorb over top of toes  Wrap toes with conform secure with tape  Change Daily      Follow up with Simuel Hodgkins, CNP on Monday in the wound care center  Call [phone] for any questions or concerns.   Date__________   Time____________          Treatment Note      Written Patient Dismissal Instructions Given            Electronically signed by ALISTAIR Zaidi CNP on 5/28/2021 at 11:19 AM

## 2021-06-02 ENCOUNTER — HOSPITAL ENCOUNTER (OUTPATIENT)
Dept: WOUND CARE | Age: 41
Discharge: HOME | End: 2021-06-02
Payer: MEDICARE

## 2021-06-02 VITALS
RESPIRATION RATE: 16 BRPM | HEART RATE: 87 BPM | DIASTOLIC BLOOD PRESSURE: 76 MMHG | TEMPERATURE: 97.7 F | SYSTOLIC BLOOD PRESSURE: 120 MMHG

## 2021-06-02 DIAGNOSIS — L97.412 DIABETIC ULCER OF RIGHT HEEL ASSOCIATED WITH TYPE 2 DIABETES MELLITUS, WITH FAT LAYER EXPOSED (HCC): Primary | ICD-10-CM

## 2021-06-02 DIAGNOSIS — E11.621 DIABETIC ULCER OF RIGHT HEEL ASSOCIATED WITH TYPE 2 DIABETES MELLITUS, WITH FAT LAYER EXPOSED (HCC): Primary | ICD-10-CM

## 2021-06-02 DIAGNOSIS — L97.519 DIABETIC ULCER OF RIGHT FIFTH TOE (HCC): ICD-10-CM

## 2021-06-02 DIAGNOSIS — E11.621 DIABETIC ULCER OF RIGHT FIFTH TOE (HCC): ICD-10-CM

## 2021-06-02 PROCEDURE — 11042 DBRDMT SUBQ TIS 1ST 20SQCM/<: CPT

## 2021-06-02 PROCEDURE — 11042 DBRDMT SUBQ TIS 1ST 20SQCM/<: CPT | Performed by: NURSE PRACTITIONER

## 2021-06-02 RX ORDER — BETAMETHASONE DIPROPIONATE 0.05 %
OINTMENT (GRAM) TOPICAL ONCE
Status: CANCELLED | OUTPATIENT
Start: 2021-06-02 | End: 2021-06-02

## 2021-06-02 RX ORDER — BACITRACIN, NEOMYCIN, POLYMYXIN B 400; 3.5; 5 [USP'U]/G; MG/G; [USP'U]/G
OINTMENT TOPICAL ONCE
Status: CANCELLED | OUTPATIENT
Start: 2021-06-02 | End: 2021-06-02

## 2021-06-02 RX ORDER — LIDOCAINE 50 MG/G
OINTMENT TOPICAL ONCE
Status: CANCELLED | OUTPATIENT
Start: 2021-06-02 | End: 2021-06-02

## 2021-06-02 RX ORDER — LIDOCAINE HYDROCHLORIDE 20 MG/ML
JELLY TOPICAL ONCE
Status: CANCELLED | OUTPATIENT
Start: 2021-06-02 | End: 2021-06-02

## 2021-06-02 RX ORDER — LIDOCAINE HYDROCHLORIDE 40 MG/ML
SOLUTION TOPICAL ONCE
Status: CANCELLED | OUTPATIENT
Start: 2021-06-02 | End: 2021-06-02

## 2021-06-02 RX ORDER — LIDOCAINE 50 MG/G
OINTMENT TOPICAL ONCE
Status: DISCONTINUED | OUTPATIENT
Start: 2021-06-02 | End: 2021-06-03 | Stop reason: HOSPADM

## 2021-06-02 RX ORDER — BACITRACIN ZINC AND POLYMYXIN B SULFATE 500; 1000 [USP'U]/G; [USP'U]/G
OINTMENT TOPICAL ONCE
Status: CANCELLED | OUTPATIENT
Start: 2021-06-02 | End: 2021-06-02

## 2021-06-02 RX ORDER — CLOBETASOL PROPIONATE 0.5 MG/G
OINTMENT TOPICAL ONCE
Status: CANCELLED | OUTPATIENT
Start: 2021-06-02 | End: 2021-06-02

## 2021-06-02 RX ORDER — GENTAMICIN SULFATE 1 MG/G
OINTMENT TOPICAL ONCE
Status: CANCELLED | OUTPATIENT
Start: 2021-06-02 | End: 2021-06-02

## 2021-06-02 RX ORDER — GINSENG 100 MG
CAPSULE ORAL ONCE
Status: CANCELLED | OUTPATIENT
Start: 2021-06-02 | End: 2021-06-02

## 2021-06-02 RX ORDER — LIDOCAINE 40 MG/G
CREAM TOPICAL ONCE
Status: CANCELLED | OUTPATIENT
Start: 2021-06-02 | End: 2021-06-02

## 2021-06-02 ASSESSMENT — PAIN DESCRIPTION - FREQUENCY: FREQUENCY: INTERMITTENT

## 2021-06-02 ASSESSMENT — PAIN - FUNCTIONAL ASSESSMENT: PAIN_FUNCTIONAL_ASSESSMENT: PREVENTS OR INTERFERES SOME ACTIVE ACTIVITIES AND ADLS

## 2021-06-02 ASSESSMENT — PAIN SCALES - GENERAL: PAINLEVEL_OUTOF10: 5

## 2021-06-02 ASSESSMENT — PAIN DESCRIPTION - ORIENTATION: ORIENTATION: RIGHT

## 2021-06-02 ASSESSMENT — PAIN DESCRIPTION - PROGRESSION: CLINICAL_PROGRESSION: NOT CHANGED

## 2021-06-02 ASSESSMENT — PAIN DESCRIPTION - DESCRIPTORS: DESCRIPTORS: BURNING

## 2021-06-02 ASSESSMENT — PAIN DESCRIPTION - PAIN TYPE: TYPE: ACUTE PAIN

## 2021-06-02 ASSESSMENT — PAIN DESCRIPTION - ONSET: ONSET: ON-GOING

## 2021-06-02 ASSESSMENT — PAIN DESCRIPTION - LOCATION: LOCATION: FOOT

## 2021-06-02 NOTE — PROGRESS NOTES
Wound Care Center Progress Note With Procedure    Marilyn Cervantes  AGE: 41 y.o. GENDER: female  : 1980  EPISODE DATE:  2021     Subjective:     Chief Complaint   Patient presents with    Wound Check     right foot         HISTORY of PRESENT ILLNESS     Marilyn Rahman is a 41 y. o. female who presents to the Wound Clinic for a visit for evaluation and treatment of Acute diabetic ulcer of the right foot, the web between the 4th and 5th toe and the medial 5th toe. The condition is of moderate severity. The ulcer had been present for 2.5 weeks at initial wound clinic visit. Luciano Monroe underlying cause is thought to be uncontrolled [address] patients care to date has included a visit to the ED, where she was given doxycycline and Lotrimin. The doxy appeared ineffective to the patient, so her PCP placed her on Bactrim. She has also been cleaning wound with dial soap daily. The patient has significant underlying medical conditions as below.      She is a diabetic, last hemoglobin A1c was 8.9 on 21. She states that she follows our orders and has quit smoking. She is not on a blood thinner.   Wound Pain Timing/Severity: Aching, tender  Quality of pain: waxing and waning  Severity of pain:  2 / 10   Modifying Factors: diabetes, poor glucose control, obesity and pressure  Associated Signs/Symptoms: drainage and edema        PAST MEDICAL HISTORY        Diagnosis Date    Allergic rhinitis     allergy shots- Dr. Jamaal Emanuel Allergic rhinitis     Dr. Kayla Hill- claritin and flonase    Anxiety 10/24/2012    Atopic dermatitis     Dr. Kayla Hill- claritin    Back pain 10/24/2012    Bilateral sciatica 2020    Depression     Diabetes mellitus ([address]     Diabetes mellitus type II, uncontrolled ([address]     Diabetic eye exam: 2011, Dr. Hari Palmer , needs record [V72.0T][    GERD (gastroesophageal reflux disease)     Headache(784.0)     Hyperlipidemia LDL goal < 100 2012    Hypertension     Morbid obesity glargine (LANTUS SOLOSTAR) 100 UNIT/ML injection pen Inject 15 Units into the skin nightly 10 pen 5    Lancets MISC To test up to 4 times per day- nighttime and with each meal 300 each 5    blood glucose test strips (EXACTECH TEST) strip 1 each by In Vitro route daily Embrace test strips to test BS 4x daily 300 each 6    Insulin Pen Needle 32G X 5 MM MISC To use with Lantus pen and Novolog pen 30 each 11    aspirin EC 81 MG EC tablet Take 1 tablet by mouth daily 90 tablet 0     No current facility-administered medications on file prior to encounter. REVIEW OF SYSTEMS    Pertinent items are noted in HPI. Constitutional: Negative for systemic symptoms including fever, chills and malaise. Objective:      /76   Pulse 87   Temp 97.7 °F (36.5 °C) (Temporal)   Resp 16     PHYSICAL EXAM      General: The patient is in no acute distress. Mental status:  Patient is appropriate, is  oriented to place and plan of care. Dermatologic exam: Visual inspection of the periwound reveals the skin to be clammy, coarse and edematous  Wound exam: see wound description below in procedure note      Assessment:     Problem List Items Addressed This Visit     WD-Diabetic ulcer of right heel with fat layer exposed ([address] - Primary    Relevant Medications    lidocaine (XYLOCAINE) 5 % ointment    Other Relevant Orders    Initiate Outpatient Wound Care Protocol    WD-Diabetic ulcer of right fifth toe (HCC)    Relevant Medications    lidocaine (XYLOCAINE) 5 % ointment    Other Relevant Orders    Initiate Outpatient Wound Care Protocol        Procedure Note    Indications:  Based on my examination of this patient's wound(s) today, sharp excision into necrotic subcutaneous tissue is required to promote healing and evaluate the extent of previous healing.     Performed by: ALISTAIR Currie - CNP    Consent obtained: Yes    Time out taken:  Yes    Pain Control: Anesthetic  Anesthetic: 5% Lidocaine Ointment Topical Debridement:Excisional Debridement    Using curette the wound(s) was/were sharply debrided down through and including the removal of subcutaneous tissue. Devitalized Tissue Debrided:  fibrin, biofilm, slough, exudate and callus    Pre Debridement Measurements:  Are located in the Wound Documentation Flow Sheet    All active wounds listed below with today's date are evaluated  Wound(s)    debrided this date include # : 1     Post  Debridement Measurements:  Wound 04/13/21 Foot #1 4th and 5th Toe Web Space (Active)   Wound Image   05/21/21 1053   Dressing Status Clean;Dry; Intact; New dressing applied 05/28/21 1121   Wound Cleansed Wound cleanser 05/28/21 1027   Dressing/Treatment Moisture barrier;Collagen;Alginate 05/28/21 1121   Offloading for Diabetic Foot Ulcers No offloading required 05/21/21 1053   Wound Length (cm) 2 cm 06/02/21 1045   Wound Width (cm) 4 cm 06/02/21 1045   Wound Depth (cm) 0.2 cm 06/02/21 1045   Wound Surface Area (cm^2) 8 cm^2 06/02/21 1045   Change in Wound Size % (l*w) -566.67 06/02/21 1045   Wound Volume (cm^3) 1.6 cm^3 06/02/21 1045   Wound Healing % -567 06/02/21 1045   Post-Procedure Length (cm) 2 cm 06/02/21 1110   Post-Procedure Width (cm) 4 cm 06/02/21 1110   Post-Procedure Depth (cm) 0.2 cm 06/02/21 1110   Post-Procedure Surface Area (cm^2) 8 cm^2 06/02/21 1110   Post-Procedure Volume (cm^3) 1.6 cm^3 06/02/21 1110   Distance Tunneling (cm) 0 cm 06/02/21 1045   Tunneling Position ___ O'Clock 0 06/02/21 1045   Undermining Starts ___ O'Clock 0 06/02/21 1045   Undermining Ends___ O'Clock 0 06/02/21 1045   Undermining Maxium Distance (cm) 0 06/02/21 1045   Wound Assessment Pink/red;Slough 06/02/21 1045   Drainage Amount Moderate 06/02/21 1045   Drainage Description Serosanguinous 06/02/21 1045   Odor None 06/02/21 1045   Yamilex-wound Assessment Maceration; Hyperpigmented;Fragile 06/02/21 1045   Margins Defined edges; Unattached edges 06/02/21 1045   Wound Thickness Description not for Pressure Injury Full thickness 06/02/21 1045   Number of days: 49       Wound 06/02/21 Foot Left;Medial #2 (onset 2 days) Left Medial Foot (Active)   Wound Image   06/02/21 1045   Wound Cleansed Wound cleanser 06/02/21 1045   Offloading for Diabetic Foot Ulcers No offloading required 06/02/21 1045   Wound Length (cm) 2.8 cm 06/02/21 1045   Wound Width (cm) 2 cm 06/02/21 1045   Wound Depth (cm) 0.1 cm 06/02/21 1045   Wound Surface Area (cm^2) 5.6 cm^2 06/02/21 1045   Wound Volume (cm^3) 0.56 cm^3 06/02/21 1045   Post-Procedure Length (cm) 2 cm 06/02/21 1110   Post-Procedure Width (cm) 2 cm 06/02/21 1110   Post-Procedure Depth (cm) 0.1 cm 06/02/21 1110   Post-Procedure Surface Area (cm^2) 4 cm^2 06/02/21 1110   Post-Procedure Volume (cm^3) 0.4 cm^3 06/02/21 1110   Distance Tunneling (cm) 0 cm 06/02/21 1045   Tunneling Position ___ O'Clock 0 06/02/21 1045   Undermining Starts ___ O'Clock 0 06/02/21 1045   Undermining Ends___ O'Clock 0 06/02/21 1045   Undermining Maxium Distance (cm) 0 06/02/21 1045   Wound Assessment Fluid filled blister 06/02/21 1045   Drainage Amount None 06/02/21 1045   Odor None 06/02/21 1045   Yamilex-wound Assessment Intact 06/02/21 1045   Margins Attached edges 06/02/21 1045   Wound Thickness Description not for Pressure Injury Partial thickness 06/02/21 1045   Number of days: 0       Percent of Wound(s) Debrided: approximately 100%    Total  Area  Debrided:  5.6 sq cm     Bleeding:  Minimal    Hemostasis Achieved:  by pressure    Procedural Pain:  0  / 10     Post Procedural Pain:  0 / 10     Response to treatment:  Well tolerated by patient. Status of wound progress and description from last visit:   Worsened. We are having issue with wound. There are questions about how well she is taking care of wound at home. There is a necrotic cap of eschar on the medial left 5th toe. Tunneling of small pinpoint lesion at base is improved, and cannot palpate bone.    We will add orders for this week and try to stabilize wound. Scans were negative for osteo. Spoke with miko DUMONT, who would like patient to remain on 30 day course of antibiotics based on culture. We may need to refer to a surgeon if we cannot make progress         Plan:       Discharge Instructions       [address]     NOTE: Upon discharge from the [address], you will receive a patient experience survey. We would be grateful if you would take the time to fill this survey out.     Wound care order history:                 ROBIN's   Right 1.17      Left 1.2   Date 4/13/21              Vascular studies:                 Cultures:  4/13/21              Antibiotics:                  HbA1c:   8 1/13/21              Compression/Lymph Pumps:              Grafts:                  Continuing wound care orders and information:              Residence:               Continue home health care with:               Your wound-care supplies will be provided by: .                            KEILY cleansing:                           WC not scrub or use excessive force.                          Wash hands with soap and water before and after dressing changes.                           Prior to applying a clean dressing, cleanse wound with normal saline,                          wound cleanser, or mild soap and water.                           Ask your physician or nurse before getting the wound(s) wet in the shower.              Daily Wound management:                          Keep weight off wounds and reposition every 2 hours.                          Avoid standing for long periods of time.                          Apply wraps/stockings in AM and remove at bedtime.                          Elevate legs to the level of the heart or above for 30 minutes 4-5 times a day and/or when sitting.                                               When taking antibiotics take entire prescription as ordered by MD do not stop taking until

## 2021-06-07 ENCOUNTER — HOSPITAL ENCOUNTER (OUTPATIENT)
Dept: WOUND CARE | Age: 41
Discharge: HOME | End: 2021-06-07
Payer: MEDICARE

## 2021-06-07 VITALS
DIASTOLIC BLOOD PRESSURE: 83 MMHG | TEMPERATURE: 97.8 F | RESPIRATION RATE: 15 BRPM | SYSTOLIC BLOOD PRESSURE: 120 MMHG | HEART RATE: 83 BPM

## 2021-06-07 DIAGNOSIS — E11.621 DIABETIC ULCER OF RIGHT HEEL ASSOCIATED WITH TYPE 2 DIABETES MELLITUS, WITH FAT LAYER EXPOSED (HCC): Primary | ICD-10-CM

## 2021-06-07 DIAGNOSIS — L97.412 DIABETIC ULCER OF RIGHT HEEL ASSOCIATED WITH TYPE 2 DIABETES MELLITUS, WITH FAT LAYER EXPOSED (HCC): Primary | ICD-10-CM

## 2021-06-07 DIAGNOSIS — E11.621 DIABETIC ULCER OF RIGHT FIFTH TOE (HCC): ICD-10-CM

## 2021-06-07 DIAGNOSIS — L97.519 DIABETIC ULCER OF RIGHT FIFTH TOE (HCC): ICD-10-CM

## 2021-06-07 PROCEDURE — 11042 DBRDMT SUBQ TIS 1ST 20SQCM/<: CPT | Performed by: NURSE PRACTITIONER

## 2021-06-07 PROCEDURE — 11042 DBRDMT SUBQ TIS 1ST 20SQCM/<: CPT

## 2021-06-07 RX ORDER — BACITRACIN ZINC AND POLYMYXIN B SULFATE 500; 1000 [USP'U]/G; [USP'U]/G
OINTMENT TOPICAL ONCE
Status: CANCELLED | OUTPATIENT
Start: 2021-06-07 | End: 2021-06-07

## 2021-06-07 RX ORDER — GINSENG 100 MG
CAPSULE ORAL ONCE
Status: CANCELLED | OUTPATIENT
Start: 2021-06-07 | End: 2021-06-07

## 2021-06-07 RX ORDER — LIDOCAINE 40 MG/G
CREAM TOPICAL ONCE
Status: CANCELLED | OUTPATIENT
Start: 2021-06-07 | End: 2021-06-07

## 2021-06-07 RX ORDER — LIDOCAINE HYDROCHLORIDE 40 MG/ML
SOLUTION TOPICAL ONCE
Status: CANCELLED | OUTPATIENT
Start: 2021-06-07 | End: 2021-06-07

## 2021-06-07 RX ORDER — GENTAMICIN SULFATE 1 MG/G
OINTMENT TOPICAL ONCE
Status: CANCELLED | OUTPATIENT
Start: 2021-06-07 | End: 2021-06-07

## 2021-06-07 RX ORDER — BACITRACIN, NEOMYCIN, POLYMYXIN B 400; 3.5; 5 [USP'U]/G; MG/G; [USP'U]/G
OINTMENT TOPICAL ONCE
Status: CANCELLED | OUTPATIENT
Start: 2021-06-07 | End: 2021-06-07

## 2021-06-07 RX ORDER — LIDOCAINE HYDROCHLORIDE 40 MG/ML
SOLUTION TOPICAL ONCE
Status: DISCONTINUED | OUTPATIENT
Start: 2021-06-07 | End: 2021-06-08 | Stop reason: HOSPADM

## 2021-06-07 RX ORDER — LIDOCAINE HYDROCHLORIDE 20 MG/ML
JELLY TOPICAL ONCE
Status: CANCELLED | OUTPATIENT
Start: 2021-06-07 | End: 2021-06-07

## 2021-06-07 RX ORDER — BETAMETHASONE DIPROPIONATE 0.05 %
OINTMENT (GRAM) TOPICAL ONCE
Status: CANCELLED | OUTPATIENT
Start: 2021-06-07 | End: 2021-06-07

## 2021-06-07 RX ORDER — LIDOCAINE 50 MG/G
OINTMENT TOPICAL ONCE
Status: CANCELLED | OUTPATIENT
Start: 2021-06-07 | End: 2021-06-07

## 2021-06-07 RX ORDER — CLOBETASOL PROPIONATE 0.5 MG/G
OINTMENT TOPICAL ONCE
Status: CANCELLED | OUTPATIENT
Start: 2021-06-07 | End: 2021-06-07

## 2021-06-07 NOTE — PROGRESS NOTES
Wound Care Center Progress Note With Procedure    Marilyn Loredo  AGE: 41 y.o. GENDER: female  : 1980  EPISODE DATE:  2021     Subjective:     Chief Complaint   Patient presents with    Wound Check     right foot         HISTORY of PRESENT ILLNESS     Marilyn Rahman is a 41 y. o. female who presents to the Wound Clinic for a visit for evaluation and treatment of Acute diabetic ulcer of the right foot, the web between the 4th and 5th toe and the medial 5th toe. The condition is of moderate severity. The ulcer had been present for 2.5 weeks at initial wound clinic visit. Glena Myrtle underlying cause is thought to be uncontrolled [address] patients care to date has included a visit to the ED, where she was given doxycycline and Lotrimin. The doxy appeared ineffective to the patient, so her PCP placed her on Bactrim. The patient has significant underlying medical conditions as below.   Patient had an arterial scan which showed evidence of mild stenosis. She also had an XR that did not find osteo. Culture was completed and patient is on month long bactrim and cipro course      She is a diabetic, last hemoglobin A1c was 8.9 on 21. She states that she follows our orders and has quit smoking. She is not on a blood thinner.   Wound Pain Timing/Severity: Aching, tender  Quality of pain: waxing and waning  Severity of pain:  2  10   Modifying Factors: diabetes, poor glucose control, obesity and pressure  Associated Signs/Symptoms: drainage and edema        PAST MEDICAL HISTORY        Diagnosis Date    Allergic rhinitis     allergy shots- Dr. Ping Allen Allergic rhinitis     Dr. Rich Smith- aniya and flonase    Anxiety 10/24/2012    Atopic dermatitis     Dr. Rich Smith- claritin    Back pain 10/24/2012    Bilateral sciatica 2020    Depression     Diabetes mellitus ([address]     Diabetes mellitus type II, uncontrolled ([address]     Diabetic eye exam: 2011, Dr. Elena Steel , needs record [V72.0T][    GERD (gastroesophageal reflux disease)     Headache(784.0)     Hyperlipidemia LDL goal < 100 2012    Hypertension     [facility]bid [facility]     No retinopathy on exam     Dr Rachael Griggs 13 Diabetes with no significatn diabetic retinopathy found in both eyes    Non compliance w medication regimen 2014    Sprain of neck 10/4/2011       PAST SURGICAL HISTORY    Past Surgical History:   Procedure Laterality Date     SECTION      HYSTERECTOMY      OTHER SURGICAL HISTORY  2017    I&D of right perineal abcess        FAMILY HISTORY    Family History   Problem Relation Age of Onset    Heart Disease Mother     Diabetes Mother     Asthma Mother     High Cholesterol Mother     Depression Mother     Heart Disease Father     High Cholesterol Father        SOCIAL HISTORY    Social History     Tobacco Use    Smoking status: Current Every Day Smoker     Packs/day: 0.25     Types: Cigarettes     Last attempt to quit: 2017     Years since quitting: 3.9    Smokeless tobacco: Never Used    Tobacco comment: trying to slow down    Vaping Use    Vaping Use: Never used   Substance Use Topics    Alcohol use: Yes     Comment: rare    Drug use: No       ALLERGIES    Allergies   Allergen Reactions    Penicillins Rash       MEDICATIONS    Current Outpatient Medications on File Prior to Encounter   Medication Sig Dispense Refill    ciprofloxacin (CIPRO) 500 MG tablet Take 1 tablet by mouth 2 times daily 60 tablet 0    sulfamethoxazole-trimethoprim (BACTRIM DS;SEPTRA DS) 800-160 MG per tablet Take 1 tablet by mouth 2 times daily 60 tablet 0    Probiotic Acidophilus (FLORANEX) TABS Take 1 tablet by mouth 2 times daily 60 tablet 0    lisinopril (PRINIVIL;ZESTRIL) 2.5 MG tablet Take 1 tablet by mouth every morning 90 tablet 3    atorvastatin (LIPITOR) 40 MG tablet Take 1 tablet by mouth every morning 90 tablet 3    Dulaglutide 1.5 MG/0.5ML SOPN Inject 1.5 mg into the skin once a week Dose increase as evaluate the extent of previous healing. Performed by: ALISTAIR Centeno CNP    Consent obtained: Yes    Time out taken:  Yes    Pain Control: Anesthetic  Anesthetic: 4% Lidocaine Liquid Topical     Debridement:Excisional Debridement    Using curette and forceps the wound(s) was/were sharply debrided down through and including the removal of subcutaneous tissue. Devitalized Tissue Debrided:  fibrin, biofilm, slough, exudate and callus    Pre Debridement Measurements:  Are located in the Wound Documentation Flow Sheet    All active wounds listed below with today's date are evaluated  Wound(s)    debrided this date include # : 1     Post  Debridement Measurements:  Wound 04/13/21 Foot #1 4th and 5th Toe Web Space (Active)   Wound Image   05/21/21 1053   Dressing Status Clean;Dry; Intact; New dressing applied 05/28/21 1121   Wound Cleansed Wound cleanser 05/28/21 1027   Dressing/Treatment Moisture barrier;Collagen;Alginate 05/28/21 1121   Offloading for Diabetic Foot Ulcers No offloading required 05/21/21 1053   Wound Length (cm) 3.2 cm 06/07/21 0810   Wound Width (cm) 4 cm 06/07/21 0810   Wound Depth (cm) 0.5 cm 06/07/21 0810   Wound Surface Area (cm^2) 12.8 cm^2 06/07/21 0810   Change in Wound Size % (l*w) -966.67 06/07/21 0810   Wound Volume (cm^3) 6.4 cm^3 06/07/21 0810   Wound Healing % -2567 06/07/21 0810   Post-Procedure Length (cm) 3.2 cm 06/07/21 0814   Post-Procedure Width (cm) 4 cm 06/07/21 0814   Post-Procedure Depth (cm) 0.5 cm 06/07/21 0814   Post-Procedure Surface Area (cm^2) 12.8 cm^2 06/07/21 0814   Post-Procedure Volume (cm^3) 6.4 cm^3 06/07/21 0814   Distance Tunneling (cm) 0 cm 06/07/21 0810   Tunneling Position ___ O'Clock 0 06/07/21 0810   Undermining Starts ___ O'Clock 0 06/07/21 0810   Undermining Ends___ O'Clock 0 06/07/21 0810   Undermining Maxium Distance (cm) 0 06/07/21 0810   Wound Assessment [facility] 06/07/21 0810   Drainage Amount Moderate 06/07/21 0810   Drainage Description Serosanguinous 06/07/21 0810   Odor None 06/07/21 0810   Yamilex-wound Assessment Maceration 06/07/21 0810   Margins Defined edges 06/07/21 0810   Wound Thickness Description not for Pressure Injury Full thickness 06/07/21 0810   Number of days: 54       Wound 06/02/21 Foot Left;Medial #2 (onset 2 days) Left Medial Foot (Active)   Wound Image   06/02/21 1045   Wound Cleansed Cleansed with saline 06/07/21 0810   Offloading for Diabetic Foot Ulcers No offloading required 06/02/21 1045   Wound Length (cm) 2.4 cm 06/07/21 0810   Wound Width (cm) 0.9 cm 06/07/21 0810   Wound Depth (cm) 0.1 cm 06/07/21 0810   Wound Surface Area (cm^2) 2.16 cm^2 06/07/21 0810   Change in Wound Size % (l*w) 61.43 06/07/21 0810   Wound Volume (cm^3) 0.22 cm^3 06/07/21 0810   Wound Healing % 61 06/07/21 0810   Post-Procedure Length (cm) 2 cm 06/02/21 1110   Post-Procedure Width (cm) 2 cm 06/02/21 1110   Post-Procedure Depth (cm) 0.1 cm 06/02/21 1110   Post-Procedure Surface Area (cm^2) 4 cm^2 06/02/21 1110   Post-Procedure Volume (cm^3) 0.4 cm^3 06/02/21 1110   Distance Tunneling (cm) 0 cm 06/07/21 0810   Tunneling Position ___ O'Clock 0 06/07/21 0810   Undermining Starts ___ O'Clock 0 06/07/21 0810   Undermining Ends___ O'Clock 0 06/07/21 0810   Undermining Maxium Distance (cm) 0 06/07/21 0810   Wound Assessment Granulation tissue 06/07/21 0810   Drainage Amount None 06/07/21 0810   Odor None 06/07/21 0810   Yamilex-wound Assessment Intact 06/07/21 0810   Margins Defined edges 06/07/21 0810   Wound Thickness Description not for Pressure Injury Full thickness 06/07/21 0810   Number of days: 4       Percent of Wound(s) Debrided: approximately 100%    Total  Area  Debrided:  2.16 sq cm     Bleeding:  Minimal    Hemostasis Achieved:  by pressure    Procedural Pain:  0  / 10     Post Procedural Pain:  0 / 10     Response to treatment:  Well tolerated by patient. Status of wound progress and description from last visit:   Slightly improved.  Pinpoint lesion on base of 5th toe appears to be filling in and depth is improved. Still issues with moisture, but fibrous tissue has mostly sloughed off and there appears to be good healing tissue present. We would like to give patient another week, and if there is no improvement I would like patient to see one of our surgeons for an evaluation. Plan:       Discharge Instructions       PHYSICIAN ORDERS AND DISCHARGE INSTRUCTIONS     NOTE: Upon discharge from the [address], you will receive a patient experience survey. We would be grateful if you would take the time to fill this survey out.     Wound care order history:                 ROBIN's   Right 1.17      Left 1.2   Date 4/13/21              Vascular studies:                 Cultures:  4/13/21              Antibiotics:                  HbA1c:   8 1/13/21              Compression/Lymph Pumps:              Grafts:                  Continuing wound care orders and information:              Residence:               Continue home health care with:               Your wound-care supplies will be provided by: .                            [facility] cleansing:                           JY not scrub or use excessive force.                          Wash hands with soap and water before and after dressing changes.                         Prior to applying a clean dressing, cleanse wound with normal saline,                          wound cleanser, or mild soap and water.                           Ask your physician or nurse before getting the wound(s) wet in the shower.              Daily Wound management:                          Keep weight off wounds and reposition every 2 hours.                          Avoid standing for long periods of time.                          Apply wraps/stockings in AM and remove at bedtime.                          Elevate legs to the level of the heart or above for 30 minutes 4-5 times a day and/or when sitting.                                               RRFY taking antibiotics take entire prescription as ordered by MD do not stop taking until medicine is all gone.                                                                  Orders for this week (6/7/21): Right 4th toe - Wash with mild soap and water. Pat dry with 4x4   Desitin to periwound  Spray wound with anasept cleanser  Apply Stimulen powder to wound bed  Cover with drawtex  Fold Sofsorb over top of toes  Wrap toes with conform secure with tape  Change Daily         Left Medial Foot- Wash with mild soap and water. Pat dry with 4x4  Apply calcium alginate  Cover with Border gauze  Change Daily      Follow up with Yael Pimentel CNP on In 1 week in the wound care center  Call [phone] for any questions or concerns.   Date__________   Time____________        Treatment Note      Written Patient Dismissal Instructions Given            Electronically signed by ALISTAIR Campbell CNP on 6/7/2021 at 8:22 AM

## 2021-06-14 ENCOUNTER — HOSPITAL ENCOUNTER (OUTPATIENT)
Dept: WOUND CARE | Age: 41
Discharge: HOME | End: 2021-06-14
Payer: MEDICARE

## 2021-06-14 ENCOUNTER — HOSPITAL ENCOUNTER (OUTPATIENT)
Age: 41
Discharge: HOME | End: 2021-06-14
Payer: MEDICARE

## 2021-06-14 ENCOUNTER — HOSPITAL ENCOUNTER (OUTPATIENT)
Dept: GENERAL RADIOLOGY | Age: 41
Discharge: HOME | End: 2021-06-14
Payer: MEDICARE

## 2021-06-14 VITALS — RESPIRATION RATE: 16 BRPM | TEMPERATURE: 97.7 F

## 2021-06-14 DIAGNOSIS — L97.412 DIABETIC ULCER OF RIGHT HEEL ASSOCIATED WITH TYPE 2 DIABETES MELLITUS, WITH FAT LAYER EXPOSED (HCC): ICD-10-CM

## 2021-06-14 DIAGNOSIS — S91.301S OPEN WOUND OF FOOT, RIGHT, SEQUELA: ICD-10-CM

## 2021-06-14 DIAGNOSIS — E11.621 DIABETIC ULCER OF RIGHT FIFTH TOE (HCC): Primary | ICD-10-CM

## 2021-06-14 DIAGNOSIS — L97.519 DIABETIC ULCER OF RIGHT FIFTH TOE (HCC): Primary | ICD-10-CM

## 2021-06-14 DIAGNOSIS — L97.519 DIABETIC ULCER OF RIGHT FIFTH TOE (HCC): ICD-10-CM

## 2021-06-14 DIAGNOSIS — E11.621 DIABETIC ULCER OF RIGHT FIFTH TOE (HCC): ICD-10-CM

## 2021-06-14 DIAGNOSIS — E11.621 DIABETIC ULCER OF RIGHT HEEL ASSOCIATED WITH TYPE 2 DIABETES MELLITUS, WITH FAT LAYER EXPOSED (HCC): ICD-10-CM

## 2021-06-14 PROCEDURE — 87070 CULTURE OTHR SPECIMN AEROBIC: CPT

## 2021-06-14 PROCEDURE — 73630 X-RAY EXAM OF FOOT: CPT

## 2021-06-14 PROCEDURE — 99213 OFFICE O/P EST LOW 20 MIN: CPT | Performed by: NURSE PRACTITIONER

## 2021-06-14 PROCEDURE — 87075 CULTR BACTERIA EXCEPT BLOOD: CPT

## 2021-06-14 PROCEDURE — 99213 OFFICE O/P EST LOW 20 MIN: CPT

## 2021-06-14 RX ORDER — BETAMETHASONE DIPROPIONATE 0.05 %
OINTMENT (GRAM) TOPICAL ONCE
Status: CANCELLED | OUTPATIENT
Start: 2021-06-14 | End: 2021-06-14

## 2021-06-14 RX ORDER — BACITRACIN ZINC AND POLYMYXIN B SULFATE 500; 1000 [USP'U]/G; [USP'U]/G
OINTMENT TOPICAL ONCE
Status: CANCELLED | OUTPATIENT
Start: 2021-06-14 | End: 2021-06-14

## 2021-06-14 RX ORDER — LIDOCAINE 40 MG/G
CREAM TOPICAL ONCE
Status: CANCELLED | OUTPATIENT
Start: 2021-06-14 | End: 2021-06-14

## 2021-06-14 RX ORDER — LIDOCAINE 50 MG/G
OINTMENT TOPICAL ONCE
Status: CANCELLED | OUTPATIENT
Start: 2021-06-14 | End: 2021-06-14

## 2021-06-14 RX ORDER — CLOBETASOL PROPIONATE 0.5 MG/G
OINTMENT TOPICAL ONCE
Status: CANCELLED | OUTPATIENT
Start: 2021-06-14 | End: 2021-06-14

## 2021-06-14 RX ORDER — LIDOCAINE HYDROCHLORIDE 20 MG/ML
JELLY TOPICAL ONCE
Status: CANCELLED | OUTPATIENT
Start: 2021-06-14 | End: 2021-06-14

## 2021-06-14 RX ORDER — LIDOCAINE HYDROCHLORIDE 40 MG/ML
SOLUTION TOPICAL ONCE
Status: DISCONTINUED | OUTPATIENT
Start: 2021-06-14 | End: 2021-06-15 | Stop reason: HOSPADM

## 2021-06-14 RX ORDER — GENTAMICIN SULFATE 1 MG/G
OINTMENT TOPICAL ONCE
Status: CANCELLED | OUTPATIENT
Start: 2021-06-14 | End: 2021-06-14

## 2021-06-14 RX ORDER — LIDOCAINE HYDROCHLORIDE 40 MG/ML
SOLUTION TOPICAL ONCE
Status: CANCELLED | OUTPATIENT
Start: 2021-06-14 | End: 2021-06-14

## 2021-06-14 RX ORDER — GINSENG 100 MG
CAPSULE ORAL ONCE
Status: CANCELLED | OUTPATIENT
Start: 2021-06-14 | End: 2021-06-14

## 2021-06-14 RX ORDER — BACITRACIN, NEOMYCIN, POLYMYXIN B 400; 3.5; 5 [USP'U]/G; MG/G; [USP'U]/G
OINTMENT TOPICAL ONCE
Status: CANCELLED | OUTPATIENT
Start: 2021-06-14 | End: 2021-06-14

## 2021-06-14 NOTE — PROGRESS NOTES
Wound Care Center Progress Note       Marilyn Sue  AGE: 41 y.o. GENDER: female  : 1980  TODAY'S DATE:  2021        Subjective:     Chief Complaint   Patient presents with    Wound Check     Right Pinky toe         HISTORY of PRESENT ILLNESS    Marilyn Rahman is a 41 y. o. female who presents to the Wound Clinic for a visit for evaluation and treatment of Acute diabetic ulcer of the right foot, the web between the 4th and 5th toe and the medial 5th toe. The condition is of moderate severity. The ulcer had been present for 2.5 weeks at initial wound clinic visit. David Gongora underlying cause is thought to be uncontrolled [address] patients care to date has included a visit to the ED, where she was given doxycycline and Lotrimin. The doxy appeared ineffective to the patient, so her PCP placed her on Bactrim. The patient has significant underlying medical conditions as below.   Patient had an arterial scan which showed evidence of mild stenosis. She also had an XR that did not find osteo. Culture was completed and patient is on month long bactrim and cipro course. She is looking much worse today. There appears to still be bacterial infection, even with patient taking 30 days of cipro and bactrim. We will re-culture and send her for another XR. She is able to come tomorrow and see Dr. Candi Gomez. She may need surgical amputation or debridement.      She is a diabetic, last hemoglobin A1c was 8.9 on 21. She states that she follows our orders and has quit smoking. She is not on a blood thinner.   Wound Pain Timing/Severity: Aching, tender  Quality of pain: waxing and waning  Severity of pain:  2 / 10   Modifying Factors: diabetes, poor glucose control, obesity and pressure  Associated Signs/Symptoms: drainage and edema        PAST MEDICAL HISTORY        Diagnosis Date    Allergic rhinitis     allergy shots- Dr. Krzysztof Kingston Allergic rhinitis     Dr. Karen Pillai- claritin and flonase    Anxiety 10/24/2012    Atopic dermatitis     Dr. Theron kwan    Back pain 10/24/2012    Bilateral sciatica 2020    Depression     Diabetes mellitus [facility]     Diabetes mellitus type II, uncontrolled ([facility]     Diabetic eye exam: 2011, Dr. Missy Ramirez , needs record [V72.0T][    GERD (gastroesophageal reflux disease)     Headache(784.0)     Hyperlipidemia LDL goal < 100 2012    Hypertension     Morbid obesity ([facility] [facility]     No retinopathy on exam     Dr Missy Ramirez 13 Diabetes with no significatn diabetic retinopathy found in both eyes    Non compliance w medication regimen 2014    Sprain of neck 10/4/2011       PAST SURGICAL HISTORY    Past Surgical History:   Procedure Laterality Date     SECTION      HYSTERECTOMY      OTHER SURGICAL HISTORY  2017    I&D of right perineal abcess        FAMILY HISTORY    Family History   Problem Relation Age of Onset    Heart Disease Mother     Diabetes Mother     Asthma Mother     High Cholesterol Mother     Depression Mother     Heart Disease Father     High Cholesterol Father        SOCIAL HISTORY    Social History     Tobacco Use    Smoking status: Current Every Day Smoker     Packs/day: 0.25     Types: Cigarettes     Last attempt to quit: 2017     Years since quitting: 3.9    Smokeless tobacco: Never Used    Tobacco comment: trying to slow down    Vaping Use    Vaping Use: Never used   Substance Use Topics    Alcohol use: Yes     Comment: rare    Drug use: No       ALLERGIES    Allergies   Allergen Reactions    Penicillins Rash       MEDICATIONS    Current Outpatient Medications on File Prior to Encounter   Medication Sig Dispense Refill    ciprofloxacin (CIPRO) 500 MG tablet Take 1 tablet by mouth 2 times daily 60 tablet 0    sulfamethoxazole-trimethoprim (BACTRIM DS;SEPTRA DS) 800-160 MG per tablet Take 1 tablet by mouth 2 times daily 60 tablet 0    Probiotic Acidophilus (FLORANEX) TABS Take 1 tablet by mouth 2 times daily 60 tablet 0    lisinopril (PRINIVIL;ZESTRIL) 2.5 MG tablet Take 1 tablet by mouth every morning 90 tablet 3    atorvastatin (LIPITOR) 40 MG tablet Take 1 tablet by mouth every morning 90 tablet 3    Dulaglutide 1.5 MG/0.5ML SOPN Inject 1.5 mg into the skin once a week Dose increase as of 1/9/20 4 pen 5    glipiZIDE (GLUCOTROL XL) 10 MG extended release tablet Take 1 tablet by mouth every morning 90 tablet 3    insulin glargine (LANTUS SOLOSTAR) 100 UNIT/ML injection pen Inject 15 Units into the skin nightly 10 pen 5    Lancets MISC To test up to 4 times per day- nighttime and with each meal 300 each 5    blood glucose test strips (EXACTECH TEST) strip 1 each by In Vitro route daily Embrace test strips to test BS 4x daily 300 each 6    Insulin Pen Needle 32G X 5 MM MISC To use with Lantus pen and Novolog pen 30 each 11    aspirin EC 81 MG EC tablet Take 1 tablet by mouth daily 90 tablet 0     No current facility-administered medications on file prior to encounter. REVIEW OF SYSTEMS    Pertinent items are noted in HPI. Constitutional: Negative for systemic symptoms including fever, chills and malaise. Objective:      Temp 97.7 °F (36.5 °C) (Temporal)   Resp 16     PHYSICAL EXAM      General: The patient is in no acute distress. Mental status:  Patient is appropriate, is  oriented to place and plan of care. Dermatologic exam: Visual inspection of the periwound reveals the skin to be moist, clammy and edematous.   Wound exam:  see wound description below     All active wounds listed below with today's date are evaluated      Wound 04/13/21 Foot #1 4th and 5th Toe Web Space (Active)   Wound Image   06/14/21 0804   Dressing Status New dressing applied 06/07/21 0917   Wound Cleansed Wound cleanser 06/14/21 0804   Dressing/Treatment Moisture barrier;Collagen;Alginate 05/28/21 1121   Offloading for Diabetic Foot Ulcers No offloading required 06/14/21 0804   Wound Length (cm) 3.2 cm 06/14/21 0804   Wound Width Volume (cm^3) 0.4 cm^3 06/02/21 1110   Distance Tunneling (cm) 0 cm 06/14/21 0804   Tunneling Position ___ O'Clock 0 06/14/21 0804   Undermining Starts ___ O'Clock 0 06/14/21 0804   Undermining Ends___ O'Clock 0 06/14/21 0804   Undermining Maxium Distance (cm) 0 06/14/21 0804   Wound Assessment Granulation tissue 06/14/21 0804   Drainage Amount None 06/14/21 0804   Odor None 06/14/21 0804   Yamilex-wound Assessment Intact 06/14/21 0804   Margins Defined edges 06/14/21 0804   Wound Thickness Description not for Pressure Injury Full thickness 06/14/21 0804   Number of days: 11       Assessment:       Problem List Items Addressed This Visit     WD-Diabetic ulcer of right heel with fat layer exposed ([facility] [address]    Relevant Medications    lidocaine (XYLOCAINE) 4 % external solution (Start on 6/14/2021  8:30 AM)    Other Relevant Orders    Initiate Outpatient Wound Care Protocol    Culture, Wound    WD-Diabetic ulcer of right fifth toe ([address] - Primary    Relevant Medications    lidocaine (XYLOCAINE) 4 % external solution (Start on 6/14/2021  8:30 AM)    Other Relevant Orders    Initiate Outpatient Wound Care Protocol    XR FOOT RIGHT (2 VIEWS)    Open wound of foot, right, sequela    Relevant Orders    Culture, Wound    XR FOOT RIGHT (2 VIEWS)          Status of wound progress and description from last visit:   Worsened today. Wound is wet, macerated, and painful. There appears to still be bacterial infection present, and we will review culture when resulted. We will also watch out for XR results. We will have patient see Dr. Audelia Elkins on Thursday so he can evaluate her for possible interventions. Plan:     Discharge Instructions            PHYSICIAN ORDERS AND DISCHARGE INSTRUCTIONS     NOTE: Upon discharge from the [address], you will receive a patient experience survey.  We would be grateful if you would take the time to fill this survey out.     Wound care order history:                 ROBIN's   Right 1.17      Left [address]     Please complete X ray ordered  Follow up with Cezar Liao CNP on In 1 week in the wound care center  Call [phone] for any questions or concerns.   Date__________   Time____________          Treatment Note      Written Patient Dismissal Instructions Given            Electronically signed by ALISTAIR Rodriguez CNP on 6/14/2021 at 8:25 AM

## 2021-06-15 ENCOUNTER — APPOINTMENT (OUTPATIENT)
Dept: ULTRASOUND IMAGING | Age: 41
DRG: 854 | End: 2021-06-15
Payer: MEDICARE

## 2021-06-15 ENCOUNTER — TELEPHONE (OUTPATIENT)
Dept: WOUND CARE | Age: 41
End: 2021-06-15

## 2021-06-15 ENCOUNTER — HOSPITAL ENCOUNTER (INPATIENT)
Age: 41
LOS: 3 days | Discharge: HOME HEALTH | DRG: 854 | End: 2021-06-18
Attending: INTERNAL MEDICINE | Admitting: INTERNAL MEDICINE
Payer: MEDICARE

## 2021-06-15 DIAGNOSIS — L03.119 CELLULITIS OF FOOT: ICD-10-CM

## 2021-06-15 DIAGNOSIS — M86.171 OTHER ACUTE OSTEOMYELITIS OF RIGHT FOOT (HCC): Primary | ICD-10-CM

## 2021-06-15 DIAGNOSIS — E11.69 TYPE 2 DIABETES MELLITUS WITH OTHER SPECIFIED COMPLICATION, UNSPECIFIED WHETHER LONG TERM INSULIN USE (HCC): ICD-10-CM

## 2021-06-15 PROBLEM — M86.9 OSTEOMYELITIS (HCC): Status: ACTIVE | Noted: 2021-06-15

## 2021-06-15 LAB
ALBUMIN SERPL-MCNC: 3.8 GM/DL (ref 3.4–5)
ALP BLD-CCNC: 95 IU/L (ref 40–129)
ALT SERPL-CCNC: 9 U/L (ref 10–40)
ANION GAP SERPL CALCULATED.3IONS-SCNC: 13 MMOL/L (ref 4–16)
AST SERPL-CCNC: 12 IU/L (ref 15–37)
BASOPHILS ABSOLUTE: 0 K/CU MM
BASOPHILS RELATIVE PERCENT: 0.3 % (ref 0–1)
BILIRUB SERPL-MCNC: 0.4 MG/DL (ref 0–1)
BUN BLDV-MCNC: 9 MG/DL (ref 6–23)
CALCIUM SERPL-MCNC: 8.9 MG/DL (ref 8.3–10.6)
CHLORIDE BLD-SCNC: 97 MMOL/L (ref 99–110)
CO2: 23 MMOL/L (ref 21–32)
CREAT SERPL-MCNC: 0.8 MG/DL (ref 0.6–1.1)
DIFFERENTIAL TYPE: ABNORMAL
EOSINOPHILS ABSOLUTE: 0.2 K/CU MM
EOSINOPHILS RELATIVE PERCENT: 1.4 % (ref 0–3)
GFR AFRICAN AMERICAN: >60 ML/MIN/1.73M2
GFR NON-AFRICAN AMERICAN: >60 ML/MIN/1.73M2
GLUCOSE BLD-MCNC: 196 MG/DL (ref 70–99)
HCT VFR BLD CALC: 38.8 % (ref 37–47)
HEMOGLOBIN: 13.6 GM/DL (ref 12.5–16)
IMMATURE NEUTROPHIL %: 0.4 % (ref 0–0.43)
LACTATE: 1.5 MMOL/L (ref 0.4–2)
LYMPHOCYTES ABSOLUTE: 2.1 K/CU MM
LYMPHOCYTES RELATIVE PERCENT: 15.6 % (ref 24–44)
MCH RBC QN AUTO: 32.6 PG (ref 27–31)
MCHC RBC AUTO-ENTMCNC: 35.1 % (ref 32–36)
MCV RBC AUTO: 93 FL (ref 78–100)
MONOCYTES ABSOLUTE: 1 K/CU MM
MONOCYTES RELATIVE PERCENT: 7.4 % (ref 0–4)
NUCLEATED RBC %: 0 %
PDW BLD-RTO: 11.9 % (ref 11.7–14.9)
PLATELET # BLD: 286 K/CU MM (ref 140–440)
PMV BLD AUTO: 9.1 FL (ref 7.5–11.1)
POTASSIUM SERPL-SCNC: 3.8 MMOL/L (ref 3.5–5.1)
RBC # BLD: 4.17 M/CU MM (ref 4.2–5.4)
SEGMENTED NEUTROPHILS ABSOLUTE COUNT: 10.3 K/CU MM
SEGMENTED NEUTROPHILS RELATIVE PERCENT: 74.9 % (ref 36–66)
SODIUM BLD-SCNC: 133 MMOL/L (ref 135–145)
TOTAL IMMATURE NEUTOROPHIL: 0.06 K/CU MM
TOTAL NUCLEATED RBC: 0 K/CU MM
TOTAL PROTEIN: 7.5 GM/DL (ref 6.4–8.2)
WBC # BLD: 13.7 K/CU MM (ref 4–10.5)

## 2021-06-15 PROCEDURE — 1200000000 HC SEMI PRIVATE

## 2021-06-15 PROCEDURE — 96366 THER/PROPH/DIAG IV INF ADDON: CPT

## 2021-06-15 PROCEDURE — 93971 EXTREMITY STUDY: CPT

## 2021-06-15 PROCEDURE — 36415 COLL VENOUS BLD VENIPUNCTURE: CPT

## 2021-06-15 PROCEDURE — 83605 ASSAY OF LACTIC ACID: CPT

## 2021-06-15 PROCEDURE — 6360000002 HC RX W HCPCS: Performed by: PHYSICIAN ASSISTANT

## 2021-06-15 PROCEDURE — 2580000003 HC RX 258: Performed by: PHYSICIAN ASSISTANT

## 2021-06-15 PROCEDURE — 85025 COMPLETE CBC W/AUTO DIFF WBC: CPT

## 2021-06-15 PROCEDURE — 80053 COMPREHEN METABOLIC PANEL: CPT

## 2021-06-15 PROCEDURE — 99284 EMERGENCY DEPT VISIT MOD MDM: CPT

## 2021-06-15 PROCEDURE — 6370000000 HC RX 637 (ALT 250 FOR IP): Performed by: PHYSICIAN ASSISTANT

## 2021-06-15 PROCEDURE — 96365 THER/PROPH/DIAG IV INF INIT: CPT

## 2021-06-15 RX ORDER — HYDROCODONE BITARTRATE AND ACETAMINOPHEN 5; 325 MG/1; MG/1
1 TABLET ORAL ONCE
Status: COMPLETED | OUTPATIENT
Start: 2021-06-15 | End: 2021-06-15

## 2021-06-15 RX ADMIN — CEFEPIME 2000 MG: 2 INJECTION, POWDER, FOR SOLUTION INTRAVENOUS at 20:33

## 2021-06-15 RX ADMIN — HYDROCODONE BITARTRATE AND ACETAMINOPHEN 1 TABLET: 5; 325 TABLET ORAL at 19:47

## 2021-06-15 ASSESSMENT — PAIN SCALES - GENERAL
PAINLEVEL_OUTOF10: 10

## 2021-06-15 ASSESSMENT — PAIN DESCRIPTION - DESCRIPTORS: DESCRIPTORS: ACHING

## 2021-06-15 ASSESSMENT — PAIN DESCRIPTION - PAIN TYPE
TYPE: CHRONIC PAIN
TYPE: ACUTE PAIN

## 2021-06-15 ASSESSMENT — PAIN DESCRIPTION - FREQUENCY: FREQUENCY: CONTINUOUS

## 2021-06-15 ASSESSMENT — PAIN DESCRIPTION - ORIENTATION
ORIENTATION: RIGHT
ORIENTATION: RIGHT

## 2021-06-15 ASSESSMENT — PAIN DESCRIPTION - LOCATION
LOCATION: FOOT
LOCATION: TOE (COMMENT WHICH ONE)

## 2021-06-15 NOTE — TELEPHONE ENCOUNTER
Called patient and spoke via phone about her XR results, which were (+) for osteo in the right 5th toe. Patient denies any symptoms beyond pain in the foot. Advised patient to notify us of any symptoms of sepsis, and educated patient on these. She is to call our office first, then we will likely advise she go to the ER and we will call ahead for report if needed. She is to see Dr. Devyn Gregorio on Thursday, and we stressed it is very important to make this appointment. Patient is on Bactrim and Cipro x30 days. We re-cultured wound yesterday and do not have results. We will follow up when this comes back and make appropriate changes to meds.

## 2021-06-15 NOTE — ED TRIAGE NOTES
Patient states she injured her right heel in April and has been receiving on going medical treatment for the infection in the right foot. She is currently seeing the wound clinic in [facility], she is seeing a surgeon, culture and xray taken yesterday.

## 2021-06-16 ENCOUNTER — ANESTHESIA EVENT (OUTPATIENT)
Dept: OPERATING ROOM | Age: 41
DRG: 854 | End: 2021-06-16
Payer: MEDICARE

## 2021-06-16 ENCOUNTER — ANESTHESIA (OUTPATIENT)
Dept: OPERATING ROOM | Age: 41
DRG: 854 | End: 2021-06-16
Payer: MEDICARE

## 2021-06-16 VITALS — OXYGEN SATURATION: 100 % | SYSTOLIC BLOOD PRESSURE: 147 MMHG | DIASTOLIC BLOOD PRESSURE: 91 MMHG

## 2021-06-16 LAB
ANION GAP SERPL CALCULATED.3IONS-SCNC: 12 MMOL/L (ref 4–16)
BASOPHILS ABSOLUTE: 0.1 K/CU MM
BASOPHILS RELATIVE PERCENT: 0.4 % (ref 0–1)
BUN BLDV-MCNC: 7 MG/DL (ref 6–23)
CALCIUM SERPL-MCNC: 8.4 MG/DL (ref 8.3–10.6)
CHLORIDE BLD-SCNC: 99 MMOL/L (ref 99–110)
CO2: 22 MMOL/L (ref 21–32)
CREAT SERPL-MCNC: 0.6 MG/DL (ref 0.6–1.1)
DIFFERENTIAL TYPE: ABNORMAL
EOSINOPHILS ABSOLUTE: 0.2 K/CU MM
EOSINOPHILS RELATIVE PERCENT: 1.6 % (ref 0–3)
ERYTHROCYTE SEDIMENTATION RATE: 95 MM/HR (ref 0–20)
GFR AFRICAN AMERICAN: >60 ML/MIN/1.73M2
GFR NON-AFRICAN AMERICAN: >60 ML/MIN/1.73M2
GLUCOSE BLD-MCNC: 211 MG/DL (ref 70–99)
GLUCOSE BLD-MCNC: 221 MG/DL (ref 70–99)
GLUCOSE BLD-MCNC: 228 MG/DL (ref 70–99)
GLUCOSE BLD-MCNC: 235 MG/DL (ref 70–99)
GLUCOSE BLD-MCNC: 236 MG/DL (ref 70–99)
GLUCOSE BLD-MCNC: 260 MG/DL (ref 70–99)
HCT VFR BLD CALC: 34.9 % (ref 37–47)
HEMOGLOBIN: 12.3 GM/DL (ref 12.5–16)
HIGH SENSITIVE C-REACTIVE PROTEIN: 170.8 MG/L
IMMATURE NEUTROPHIL %: 0.6 % (ref 0–0.43)
LYMPHOCYTES ABSOLUTE: 1.9 K/CU MM
LYMPHOCYTES RELATIVE PERCENT: 15.3 % (ref 24–44)
MCH RBC QN AUTO: 32.3 PG (ref 27–31)
MCHC RBC AUTO-ENTMCNC: 35.2 % (ref 32–36)
MCV RBC AUTO: 91.6 FL (ref 78–100)
MONOCYTES ABSOLUTE: 0.8 K/CU MM
MONOCYTES RELATIVE PERCENT: 6.1 % (ref 0–4)
NUCLEATED RBC %: 0 %
PDW BLD-RTO: 11.7 % (ref 11.7–14.9)
PLATELET # BLD: 256 K/CU MM (ref 140–440)
PMV BLD AUTO: 9.5 FL (ref 7.5–11.1)
POTASSIUM SERPL-SCNC: 3.9 MMOL/L (ref 3.5–5.1)
RBC # BLD: 3.81 M/CU MM (ref 4.2–5.4)
SARS-COV-2, NAAT: NOT DETECTED
SEGMENTED NEUTROPHILS ABSOLUTE COUNT: 9.4 K/CU MM
SEGMENTED NEUTROPHILS RELATIVE PERCENT: 76 % (ref 36–66)
SODIUM BLD-SCNC: 133 MMOL/L (ref 135–145)
SOURCE: NORMAL
TOTAL IMMATURE NEUTOROPHIL: 0.07 K/CU MM
TOTAL NUCLEATED RBC: 0 K/CU MM
WBC # BLD: 12.4 K/CU MM (ref 4–10.5)

## 2021-06-16 PROCEDURE — 85652 RBC SED RATE AUTOMATED: CPT

## 2021-06-16 PROCEDURE — 87070 CULTURE OTHR SPECIMN AEROBIC: CPT

## 2021-06-16 PROCEDURE — 2580000003 HC RX 258: Performed by: INTERNAL MEDICINE

## 2021-06-16 PROCEDURE — 3600000002 HC SURGERY LEVEL 2 BASE: Performed by: SURGERY

## 2021-06-16 PROCEDURE — 2580000003 HC RX 258: Performed by: SURGERY

## 2021-06-16 PROCEDURE — 87077 CULTURE AEROBIC IDENTIFY: CPT

## 2021-06-16 PROCEDURE — 88305 TISSUE EXAM BY PATHOLOGIST: CPT | Performed by: PATHOLOGY

## 2021-06-16 PROCEDURE — 87075 CULTR BACTERIA EXCEPT BLOOD: CPT

## 2021-06-16 PROCEDURE — 6360000002 HC RX W HCPCS: Performed by: SURGERY

## 2021-06-16 PROCEDURE — 2500000003 HC RX 250 WO HCPCS: Performed by: NURSE ANESTHETIST, CERTIFIED REGISTERED

## 2021-06-16 PROCEDURE — 6360000002 HC RX W HCPCS: Performed by: INTERNAL MEDICINE

## 2021-06-16 PROCEDURE — 2580000003 HC RX 258: Performed by: NURSE ANESTHETIST, CERTIFIED REGISTERED

## 2021-06-16 PROCEDURE — 2500000003 HC RX 250 WO HCPCS: Performed by: SURGERY

## 2021-06-16 PROCEDURE — 87635 SARS-COV-2 COVID-19 AMP PRB: CPT

## 2021-06-16 PROCEDURE — 6370000000 HC RX 637 (ALT 250 FOR IP): Performed by: SURGERY

## 2021-06-16 PROCEDURE — 86141 C-REACTIVE PROTEIN HS: CPT

## 2021-06-16 PROCEDURE — 3600000012 HC SURGERY LEVEL 2 ADDTL 15MIN: Performed by: SURGERY

## 2021-06-16 PROCEDURE — 3700000001 HC ADD 15 MINUTES (ANESTHESIA): Performed by: SURGERY

## 2021-06-16 PROCEDURE — 80048 BASIC METABOLIC PNL TOTAL CA: CPT

## 2021-06-16 PROCEDURE — 3700000000 HC ANESTHESIA ATTENDED CARE: Performed by: SURGERY

## 2021-06-16 PROCEDURE — 94761 N-INVAS EAR/PLS OXIMETRY MLT: CPT

## 2021-06-16 PROCEDURE — 1200000000 HC SEMI PRIVATE

## 2021-06-16 PROCEDURE — 0Y6X0Z0 DETACHMENT AT RIGHT 5TH TOE, COMPLETE, OPEN APPROACH: ICD-10-PCS | Performed by: SURGERY

## 2021-06-16 PROCEDURE — 6370000000 HC RX 637 (ALT 250 FOR IP): Performed by: INTERNAL MEDICINE

## 2021-06-16 PROCEDURE — 28810 AMPUTATION TOE & METATARSAL: CPT | Performed by: SURGERY

## 2021-06-16 PROCEDURE — 87205 SMEAR GRAM STAIN: CPT

## 2021-06-16 PROCEDURE — 6360000002 HC RX W HCPCS: Performed by: NURSE ANESTHETIST, CERTIFIED REGISTERED

## 2021-06-16 PROCEDURE — 36415 COLL VENOUS BLD VENIPUNCTURE: CPT

## 2021-06-16 PROCEDURE — 2709999900 HC NON-CHARGEABLE SUPPLY: Performed by: SURGERY

## 2021-06-16 PROCEDURE — 88311 DECALCIFY TISSUE: CPT | Performed by: PATHOLOGY

## 2021-06-16 PROCEDURE — 82962 GLUCOSE BLOOD TEST: CPT

## 2021-06-16 PROCEDURE — 85025 COMPLETE CBC W/AUTO DIFF WBC: CPT

## 2021-06-16 PROCEDURE — 87186 SC STD MICRODIL/AGAR DIL: CPT

## 2021-06-16 PROCEDURE — 99221 1ST HOSP IP/OBS SF/LOW 40: CPT | Performed by: SURGERY

## 2021-06-16 RX ORDER — OXYCODONE HYDROCHLORIDE 10 MG/1
10 TABLET ORAL EVERY 4 HOURS PRN
Status: DISCONTINUED | OUTPATIENT
Start: 2021-06-16 | End: 2021-06-18 | Stop reason: HOSPADM

## 2021-06-16 RX ORDER — ATORVASTATIN CALCIUM 40 MG/1
40 TABLET, FILM COATED ORAL EVERY MORNING
Status: DISCONTINUED | OUTPATIENT
Start: 2021-06-16 | End: 2021-06-18 | Stop reason: HOSPADM

## 2021-06-16 RX ORDER — ONDANSETRON 2 MG/ML
4 INJECTION INTRAMUSCULAR; INTRAVENOUS EVERY 6 HOURS PRN
Status: DISCONTINUED | OUTPATIENT
Start: 2021-06-16 | End: 2021-06-18 | Stop reason: HOSPADM

## 2021-06-16 RX ORDER — PROPOFOL 10 MG/ML
INJECTION, EMULSION INTRAVENOUS PRN
Status: DISCONTINUED | OUTPATIENT
Start: 2021-06-16 | End: 2021-06-16 | Stop reason: SDUPTHER

## 2021-06-16 RX ORDER — LIDOCAINE HYDROCHLORIDE 20 MG/ML
INJECTION, SOLUTION INTRAVENOUS PRN
Status: DISCONTINUED | OUTPATIENT
Start: 2021-06-16 | End: 2021-06-16 | Stop reason: SDUPTHER

## 2021-06-16 RX ORDER — KETAMINE HCL 50MG/ML(1)
SYRINGE (ML) INTRAVENOUS PRN
Status: DISCONTINUED | OUTPATIENT
Start: 2021-06-16 | End: 2021-06-16 | Stop reason: SDUPTHER

## 2021-06-16 RX ORDER — ACETAMINOPHEN 325 MG/1
650 TABLET ORAL EVERY 6 HOURS PRN
Status: DISCONTINUED | OUTPATIENT
Start: 2021-06-16 | End: 2021-06-18 | Stop reason: HOSPADM

## 2021-06-16 RX ORDER — INSULIN GLARGINE 100 [IU]/ML
10 INJECTION, SOLUTION SUBCUTANEOUS NIGHTLY
Status: DISCONTINUED | OUTPATIENT
Start: 2021-06-16 | End: 2021-06-18 | Stop reason: HOSPADM

## 2021-06-16 RX ORDER — ONDANSETRON 4 MG/1
4 TABLET, ORALLY DISINTEGRATING ORAL EVERY 8 HOURS PRN
Status: DISCONTINUED | OUTPATIENT
Start: 2021-06-16 | End: 2021-06-18 | Stop reason: HOSPADM

## 2021-06-16 RX ORDER — NICOTINE POLACRILEX 4 MG
15 LOZENGE BUCCAL PRN
Status: DISCONTINUED | OUTPATIENT
Start: 2021-06-16 | End: 2021-06-18 | Stop reason: HOSPADM

## 2021-06-16 RX ORDER — POLYETHYLENE GLYCOL 3350 17 G/17G
17 POWDER, FOR SOLUTION ORAL DAILY PRN
Status: DISCONTINUED | OUTPATIENT
Start: 2021-06-16 | End: 2021-06-18 | Stop reason: HOSPADM

## 2021-06-16 RX ORDER — SODIUM CHLORIDE 9 MG/ML
25 INJECTION, SOLUTION INTRAVENOUS PRN
Status: DISCONTINUED | OUTPATIENT
Start: 2021-06-16 | End: 2021-06-18 | Stop reason: HOSPADM

## 2021-06-16 RX ORDER — ACETAMINOPHEN 650 MG/1
650 SUPPOSITORY RECTAL EVERY 6 HOURS PRN
Status: DISCONTINUED | OUTPATIENT
Start: 2021-06-16 | End: 2021-06-18 | Stop reason: HOSPADM

## 2021-06-16 RX ORDER — DEXTROSE MONOHYDRATE 50 MG/ML
100 INJECTION, SOLUTION INTRAVENOUS PRN
Status: DISCONTINUED | OUTPATIENT
Start: 2021-06-16 | End: 2021-06-18 | Stop reason: HOSPADM

## 2021-06-16 RX ORDER — LISINOPRIL 2.5 MG/1
2.5 TABLET ORAL EVERY MORNING
Status: DISCONTINUED | OUTPATIENT
Start: 2021-06-16 | End: 2021-06-18 | Stop reason: HOSPADM

## 2021-06-16 RX ORDER — BUPIVACAINE HYDROCHLORIDE 5 MG/ML
INJECTION, SOLUTION EPIDURAL; INTRACAUDAL
Status: COMPLETED | OUTPATIENT
Start: 2021-06-16 | End: 2021-06-16

## 2021-06-16 RX ORDER — SODIUM CHLORIDE 0.9 % (FLUSH) 0.9 %
5-40 SYRINGE (ML) INJECTION EVERY 12 HOURS SCHEDULED
Status: DISCONTINUED | OUTPATIENT
Start: 2021-06-16 | End: 2021-06-18 | Stop reason: HOSPADM

## 2021-06-16 RX ORDER — ACETAMINOPHEN 325 MG/1
650 TABLET ORAL
Status: DISCONTINUED | OUTPATIENT
Start: 2021-06-16 | End: 2021-06-18 | Stop reason: HOSPADM

## 2021-06-16 RX ORDER — DEXTROSE MONOHYDRATE 25 G/50ML
12.5 INJECTION, SOLUTION INTRAVENOUS PRN
Status: DISCONTINUED | OUTPATIENT
Start: 2021-06-16 | End: 2021-06-18 | Stop reason: HOSPADM

## 2021-06-16 RX ORDER — ASPIRIN 81 MG/1
81 TABLET ORAL DAILY
Status: DISCONTINUED | OUTPATIENT
Start: 2021-06-16 | End: 2021-06-18 | Stop reason: HOSPADM

## 2021-06-16 RX ORDER — SODIUM CHLORIDE, SODIUM LACTATE, POTASSIUM CHLORIDE, CALCIUM CHLORIDE 600; 310; 30; 20 MG/100ML; MG/100ML; MG/100ML; MG/100ML
INJECTION, SOLUTION INTRAVENOUS CONTINUOUS PRN
Status: DISCONTINUED | OUTPATIENT
Start: 2021-06-16 | End: 2021-06-16 | Stop reason: SDUPTHER

## 2021-06-16 RX ORDER — FENTANYL CITRATE 50 UG/ML
INJECTION, SOLUTION INTRAMUSCULAR; INTRAVENOUS PRN
Status: DISCONTINUED | OUTPATIENT
Start: 2021-06-16 | End: 2021-06-16 | Stop reason: SDUPTHER

## 2021-06-16 RX ORDER — OXYCODONE HYDROCHLORIDE 5 MG/1
5 TABLET ORAL EVERY 4 HOURS PRN
Status: DISCONTINUED | OUTPATIENT
Start: 2021-06-16 | End: 2021-06-18 | Stop reason: HOSPADM

## 2021-06-16 RX ORDER — SODIUM CHLORIDE 0.9 % (FLUSH) 0.9 %
5-40 SYRINGE (ML) INJECTION PRN
Status: DISCONTINUED | OUTPATIENT
Start: 2021-06-16 | End: 2021-06-18 | Stop reason: HOSPADM

## 2021-06-16 RX ADMIN — INSULIN GLARGINE 10 UNITS: 100 INJECTION, SOLUTION SUBCUTANEOUS at 21:24

## 2021-06-16 RX ADMIN — INSULIN LISPRO 4 UNITS: 100 INJECTION, SOLUTION INTRAVENOUS; SUBCUTANEOUS at 11:24

## 2021-06-16 RX ADMIN — INSULIN LISPRO 3 UNITS: 100 INJECTION, SOLUTION INTRAVENOUS; SUBCUTANEOUS at 01:31

## 2021-06-16 RX ADMIN — ONDANSETRON 4 MG: 2 INJECTION INTRAMUSCULAR; INTRAVENOUS at 04:39

## 2021-06-16 RX ADMIN — Medication 10 MG: at 09:16

## 2021-06-16 RX ADMIN — CEFEPIME HYDROCHLORIDE 1000 MG: 1 INJECTION, POWDER, FOR SOLUTION INTRAMUSCULAR; INTRAVENOUS at 21:24

## 2021-06-16 RX ADMIN — FENTANYL CITRATE 50 MCG: 50 INJECTION, SOLUTION INTRAMUSCULAR; INTRAVENOUS at 08:54

## 2021-06-16 RX ADMIN — ACETAMINOPHEN 650 MG: 325 TABLET ORAL at 21:23

## 2021-06-16 RX ADMIN — ONDANSETRON 4 MG: 2 INJECTION INTRAMUSCULAR; INTRAVENOUS at 11:17

## 2021-06-16 RX ADMIN — INSULIN LISPRO 4 UNITS: 100 INJECTION, SOLUTION INTRAVENOUS; SUBCUTANEOUS at 18:19

## 2021-06-16 RX ADMIN — CEFEPIME HYDROCHLORIDE 1000 MG: 1 INJECTION, POWDER, FOR SOLUTION INTRAMUSCULAR; INTRAVENOUS at 11:17

## 2021-06-16 RX ADMIN — CEFAZOLIN 2000 MG: 10 INJECTION, POWDER, FOR SOLUTION INTRAVENOUS at 08:58

## 2021-06-16 RX ADMIN — Medication 5 MG: at 09:06

## 2021-06-16 RX ADMIN — ONDANSETRON 4 MG: 2 INJECTION INTRAMUSCULAR; INTRAVENOUS at 18:22

## 2021-06-16 RX ADMIN — VANCOMYCIN HYDROCHLORIDE 2000 MG: 1 INJECTION, POWDER, LYOPHILIZED, FOR SOLUTION INTRAVENOUS at 05:39

## 2021-06-16 RX ADMIN — SODIUM CHLORIDE, PRESERVATIVE FREE 10 ML: 5 INJECTION INTRAVENOUS at 21:25

## 2021-06-16 RX ADMIN — Medication 10 MG: at 09:29

## 2021-06-16 RX ADMIN — VANCOMYCIN HYDROCHLORIDE 1250 MG: 5 INJECTION, POWDER, LYOPHILIZED, FOR SOLUTION INTRAVENOUS at 21:24

## 2021-06-16 RX ADMIN — PROPOFOL 170 MG: 10 INJECTION, EMULSION INTRAVENOUS at 08:54

## 2021-06-16 RX ADMIN — INSULIN LISPRO 2 UNITS: 100 INJECTION, SOLUTION INTRAVENOUS; SUBCUTANEOUS at 21:24

## 2021-06-16 RX ADMIN — FENTANYL CITRATE 25 MCG: 50 INJECTION, SOLUTION INTRAMUSCULAR; INTRAVENOUS at 09:34

## 2021-06-16 RX ADMIN — SODIUM CHLORIDE, POTASSIUM CHLORIDE, SODIUM LACTATE AND CALCIUM CHLORIDE: 600; 310; 30; 20 INJECTION, SOLUTION INTRAVENOUS at 08:50

## 2021-06-16 RX ADMIN — OXYCODONE HYDROCHLORIDE 10 MG: 10 TABLET ORAL at 15:39

## 2021-06-16 RX ADMIN — SODIUM CHLORIDE 25 ML: 9 INJECTION, SOLUTION INTRAVENOUS at 04:32

## 2021-06-16 RX ADMIN — LIDOCAINE HYDROCHLORIDE 100 MG: 20 INJECTION, SOLUTION INTRAVENOUS at 08:54

## 2021-06-16 RX ADMIN — CEFEPIME HYDROCHLORIDE 1000 MG: 1 INJECTION, POWDER, FOR SOLUTION INTRAMUSCULAR; INTRAVENOUS at 04:32

## 2021-06-16 RX ADMIN — ACETAMINOPHEN 650 MG: 325 TABLET ORAL at 04:40

## 2021-06-16 RX ADMIN — INSULIN GLARGINE 10 UNITS: 100 INJECTION, SOLUTION SUBCUTANEOUS at 01:30

## 2021-06-16 RX ADMIN — Medication 25 MG: at 08:54

## 2021-06-16 RX ADMIN — FENTANYL CITRATE 25 MCG: 50 INJECTION, SOLUTION INTRAMUSCULAR; INTRAVENOUS at 09:18

## 2021-06-16 ASSESSMENT — PULMONARY FUNCTION TESTS
PIF_VALUE: 0
PIF_VALUE: 1
PIF_VALUE: 0
PIF_VALUE: 1
PIF_VALUE: 0
PIF_VALUE: 1
PIF_VALUE: 0
PIF_VALUE: 1
PIF_VALUE: 0
PIF_VALUE: 0

## 2021-06-16 ASSESSMENT — PAIN SCALES - GENERAL
PAINLEVEL_OUTOF10: 0
PAINLEVEL_OUTOF10: 7
PAINLEVEL_OUTOF10: 3
PAINLEVEL_OUTOF10: 0

## 2021-06-16 ASSESSMENT — PAIN DESCRIPTION - DESCRIPTORS: DESCRIPTORS: ACHING

## 2021-06-16 ASSESSMENT — PAIN DESCRIPTION - PROGRESSION: CLINICAL_PROGRESSION: NOT CHANGED

## 2021-06-16 ASSESSMENT — PAIN DESCRIPTION - PAIN TYPE: TYPE: ACUTE PAIN

## 2021-06-16 ASSESSMENT — PAIN DESCRIPTION - LOCATION: LOCATION: FOOT

## 2021-06-16 ASSESSMENT — PAIN DESCRIPTION - ORIENTATION: ORIENTATION: RIGHT

## 2021-06-16 ASSESSMENT — LIFESTYLE VARIABLES: SMOKING_STATUS: 1

## 2021-06-16 ASSESSMENT — PAIN - FUNCTIONAL ASSESSMENT: PAIN_FUNCTIONAL_ASSESSMENT: PREVENTS OR INTERFERES SOME ACTIVE ACTIVITIES AND ADLS

## 2021-06-16 ASSESSMENT — PAIN DESCRIPTION - ONSET: ONSET: ON-GOING

## 2021-06-16 ASSESSMENT — PAIN DESCRIPTION - FREQUENCY: FREQUENCY: CONTINUOUS

## 2021-06-16 NOTE — PROGRESS NOTES
Hospitalist Progress Note      Name:  Keke Aguilar /Age/Sex: 1980  (41 y.o. female)   MRN & CSN:  [id number] & [id number] Admission Date/Time: 6/15/2021  6:54 PM   [facility] PCP: Roberto Carlos Rodriguez MD         Hospital Day: 2    Assessment and Plan:   Keke Aguilar is a 41 y.o.  female  who presents with <principal problem not specified>     #. Osteomyelitis of the right fifth phalanx:  -Would obtain ID consult, however service is currently unavailable at this time. Will await results of surgical/tissue cultures   -To OR 2021  -Patient was on Bactrim, ciprofloxacin for a month.  -Wound cultures-  2021-Klebsiella pneumoniae, Citrobacter sensitive to cephalosporins  2021-Staph aureus-MSSA-clindamycin resistant, Enterococcus faecalis, Morganella morganii  -Patient received cefepime in ER  -ESR, CRP ordered   -Continue vancomycin, cefepime.  -N.p.o. after midnight for debridement/amputation.  -Dr. Liz Shearer consulted from ER.     #.  Sepsis secondary to osteomyelitis/cellulitis  -Tachycardia, leukocytosis, infected toe.  -Lactic acid 1.5  -Continue vancomycin, cefepime  -Culture sent from wound clinic-2021-pending     #.  Mild peripheral vascular disease:  -Arterial Doppler done-2021.     #. Uncontrolled diabetes  -IoQ8t-7.9-2021  -Patient is on glargine 15 units nightly, Trulicity q. weekly, has not been taking glipizide (patient reports that she was advised not to take oral medication when she is on antibiotics). -Continue 10 units of Lantus nightly, insulin sliding scale with hypoglycemia protocol.  -Patient is on lisinopril 2.5 mg for renal protection.     #. Hyperlipidemia: Continue atorvastatin     DVT Prophylaxis: Hold chemical prophylaxis for anticipated procedure in a.m. GI Prophylaxis: Not indicated  Code Status: FULL.       Case d/w ED physician    Diet ADULT DIET; Regular; 4 carb choices (60 gm/meal)  Adult Oral Nutrition Supplement;  Wound nerves appear grossly intact, normal speech, no lateralizing weakness. PSYCH Awake, alert, oriented x 4. Affect appropriate.     Medications:   Medications:    sodium chloride flush  5-40 mL Intravenous 2 times per day    insulin lispro  0-12 Units Subcutaneous TID WC    insulin lispro  0-6 Units Subcutaneous Nightly    aspirin EC  81 mg Oral Daily    atorvastatin  40 mg Oral QAM    insulin glargine  10 Units Subcutaneous Nightly    lisinopril  2.5 mg Oral QAM    cefepime  1,000 mg Intravenous Q8H    vancomycin  1,250 mg Intravenous Q12H    acetaminophen  650 mg Oral Q4H While awake      Infusions:    sodium chloride Stopped (06/16/21 0525)    dextrose       PRN Meds: sodium chloride flush, 5-40 mL, PRN  sodium chloride, 25 mL, PRN  ondansetron, 4 mg, Q8H PRN   Or  ondansetron, 4 mg, Q6H PRN  polyethylene glycol, 17 g, Daily PRN  acetaminophen, 650 mg, Q6H PRN   Or  acetaminophen, 650 mg, Q6H PRN  glucose, 15 g, PRN  dextrose, 12.5 g, PRN  glucagon (rDNA), 1 mg, PRN  dextrose, 100 mL/hr, PRN  oxyCODONE, 5 mg, Q4H PRN  oxyCODONE, 10 mg, Q4H PRN          Electronically signed by Jasmine Bingham MD on 6/16/2021 at 7:14 PM

## 2021-06-16 NOTE — OP NOTE
Operative Note      Patient: Keith Salomon  YOB: 1980  MRN: [id number]    Date of Procedure: 6/16/2021    Pre-Op Diagnosis: INFECTED RIGHT 5TH TOE    Post-Op Diagnosis: Same       Procedure(s):  1. RIGHT 5TH TOE AMPUTATION  2. NEGATIVE PRESSURE WOUND VAC APPLICATION    Surgeon(s):  Micky Connors MD    Assistant:   * No surgical staff found *    Anesthesia: Monitor Anesthesia Care    Estimated Blood Loss (mL): 94QF    Complications: None    Specimens:   ID Type Source Tests Collected by Time Destination   1 : RIGHT FIFTH GREAT TOE BONE Bone Bone CULTURE, SURGICAL Micky Connors MD 6/16/2021 0815    2 : DEEP TISSUE RIGHT FIFTH TOE Tissue Tissue CULTURE, TISSUE Micky Connors MD 6/16/2021 1223    A : RIGHT FIFTH TOE  Tissue Tissue SURGICAL PATHOLOGY Micky Connors MD 6/16/2021 0845        Implants:  * No implants in log *      Drains: * No LDAs found *    Findings: sponge necrotic bone at the phalanges of the fifth toe with infection and necrotic tissue extending onto the distal lateral foot    Detailed Description of Procedure:   Procedure Details: The patient was seen again in the Holding Room. The risks, benefits, complications, treatment options, and expected outcomes were discussed with the patient. The possibilities of, bleeding, recurrent infection, the need for additional procedures, and development of a complication requiring transfusion or further operation were discussed with the patient and/or family. There was concurrence with the proposed plan, and informed consent was obtained. The site of surgery was properly noted/marked. The patient was taken to the Operating Room, identified as Keith Salomon, and the procedure verified. The patient was placed supine. A Time Out was held and the above information confirmed. After the induction of anesthesia, the right foot was prepped and draped in the usual sterile fashion.  Then, 1% lidocaine was infiltrated around the base of the 5th toe and a fish-mouth type of   incision was made at the base of the toe circumferentially. The incision was deepened through subcutaneous tissue to bone with scalpel with good bleeding noted. The bone was isolated and circumferentially dissected using the scalpel. The toe was initially amputated through the joint at the metatarsal phalangeal joint. Infection was found to track up the lateral aspect of the foot with purulence and necrotic tissue present. Debridement was performed excising ~7x3cm area of skin, subcutaneous tissue and some tendon over the area. The distal metatarsal bone was exposed after debridement and a bone cutter was used to finish the amputation at the distal 1/3 of the 5th metatarsal.  Several vicryl sutures were placed for hemostasis. The wound was hemostatic using minimal Bovie electrocautery. A piece of the specimen was sent for tissue cutlure and the specimen was sent to Pathology. The wound was thoroughly irrigated and dried. Using 3-0 Vicryl, the fascial plane was closed covering the bony stump. Wound VAC was applied using a single piece of black sponge in the wound and another piece to track the pad up onto the top of the foot. The patient was subsequently transferred to the recovery room in stable condition. Instrument and lap counts were correct at the end of the case.      Electronically signed by Oxana Godinez MD on 6/16/2021 at 10:05 AM        Electronically signed by Oxana Godinez MD on 6/16/2021 at 10:03 AM

## 2021-06-16 NOTE — CONSULTS
Department of General Surgery   Surgical Service Dr. Leonel Carroll   Consult Note    Date of Consult: 21    Reason for Consult:  Right 5th toe wound  Requesting Physician:   Ivan Heart PA-C    CHIEF COMPLAINT:  Right 5th toe wound    History Obtained From:  patient    HISTORY OF PRESENT ILLNESS:    The patient is a 41 y.o. female who presented with right great toe infection. She has been followed in wound care clinic. She reports worsening of her wound with erythema and pain extending onto her foot. She had an Xray done showing osteomyelitis of the phalanges. She reports recently quitting smoking last month. She denies checking her blood glucose at home.   Her last A1c was 8.9 in April      Past Medical History:    Past Medical History:   Diagnosis Date    Allergic rhinitis     allergy shots- Dr. Jim Ellis Allergic rhinitis     Dr. Mariah kwan and flonase    Anxiety 10/24/2012    Atopic dermatitis     Dr. Mariah kwan    Back pain 10/24/2012    Bilateral sciatica 2020    Depression     Diabetes mellitus ([address]     Diabetes mellitus type II, uncontrolled ([address]     Diabetic eye exam: 2011, Dr. Sonya Ewing , needs record [V72.0T][    GERD (gastroesophageal reflux disease)     Headache(784.0)     Hyperlipidemia LDL goal < 100 2012    Hypertension     Morbid obesity ([address]     No retinopathy on exam     Dr Sonya Ewing 13 Diabetes with no significatn diabetic retinopathy found in both eyes    Non compliance w medication regimen 2014    Sprain of neck 10/4/2011       Past Surgical History:    Past Surgical History:   Procedure Laterality Date     SECTION      HYSTERECTOMY      OTHER SURGICAL HISTORY  2017    I&D of right perineal abcess        Current Medications:   Current Facility-Administered Medications   Medication Dose Route Frequency Provider Last Rate Last Admin    sodium chloride flush 0.9 % injection 5-40 mL  5-40 mL Intravenous 2 times per day Mesfin Rosario MD        sodium chloride flush 0.9 % injection 5-40 mL  5-40 mL Intravenous PRN Mesfin Rosario MD        0.9 % sodium chloride infusion  25 mL Intravenous PRN Mesfin Rosario MD   Stopped at 06/16/21 0525    ondansetron (ZOFRAN-ODT) disintegrating tablet 4 mg  4 mg Oral Q8H PRN Mesfin Rosario MD        Or    ondansetron [facility]) injection 4 mg  4 mg Intravenous Q6H PRN Mesfin Rosario MD   4 mg at 06/16/21 0439    polyethylene glycol (GLYCOLAX) packet 17 g  17 g Oral Daily PRN Mesfin Rosario MD        acetaminophen (TYLENOL) tablet 650 mg  650 mg Oral Q6H PRN Mesfin Rosario MD   650 mg at 06/16/21 0440    Or    acetaminophen (TYLENOL) suppository 650 mg  650 mg Rectal Q6H PRN Mesfin Rosario MD        insulin lispro (HUMALOG) injection vial 0-12 Units  0-12 Units Subcutaneous TID WC Mesfin Rosario MD        insulin lispro (HUMALOG) injection vial 0-6 Units  0-6 Units Subcutaneous Nightly Mesfin Rosario MD   3 Units at 06/16/21 0131    glucose (GLUTOSE) 40 % oral gel 15 g  15 g Oral PRN Mesfin Rosario MD        dextrose 50 % IV solution  12.5 g Intravenous PRN Mesfin Rosario MD        glucagon (rDNA) injection 1 mg  1 mg Intramuscular PRN Mesfin Rosario MD        dextrose 5 % solution  100 mL/hr Intravenous PRN Mesfin Rosario MD        aspirin EC tablet 81 mg  81 mg Oral Daily Mesfin Rosario MD        atorvastatin (LIPITOR) tablet 40 mg  40 mg Oral QAM Mesfin Rosario MD        insulin glargine (LANTUS) injection vial 10 Units  10 Units Subcutaneous Nightly Mesfin Rosario MD   10 Units at 06/16/21 0130    lisinopril (PRINIVIL;ZESTRIL) tablet 2.5 mg  2.5 mg Oral QAM Mesfin Rosario MD        cefepime (MAXIPIME) 1000 mg IVPB minibag  1,000 mg Intravenous Q8H Mesfin Rosario MD   Stopped at 06/16/21 0525    vancomycin (VANCOCIN) 2,000 mg in dextrose 5 % 500 mL IVPB  2,000 mg Intravenous Once Pa Strickland  mL/hr at 06/16/21 0539 2,000 mg at 06/16/21 0539    Followed by   Katie Gottlieb vancomycin (VANCOCIN) 1,250 mg in dextrose 5 % 250 mL IVPB  1,250 mg Intravenous Q12H Pa Strickland MD           Allergies:  Penicillins    Social History:   Social History     Socioeconomic History    Marital status: Single     Spouse name: Not on file    Number of children: Not on file    Years of education: Not on file    Highest education level: Not on file   Occupational History    Not on file   Tobacco Use    Smoking status: Current Every Day Smoker     Packs/day: 0.25     Types: Cigarettes     Last attempt to quit: 07/2017     Years since quitting: 3.9    Smokeless tobacco: Never Used    Tobacco comment: trying to slow down    Vaping Use    Vaping Use: Never used   Substance and Sexual Activity    Alcohol use: Yes     Comment: rare    Drug use: No    Sexual activity: Not on file   Other Topics Concern    Not on file   Social History Narrative    Not on file     Social Determinants of Health     Financial Resource Strain:     Difficulty of Paying Living Expenses:    Food Insecurity:     Worried About Running Out of Food in the Last Year:     Kendra of Food in the Last Year:    Transportation Needs:     Lack of Transportation (Medical):      Lack of Transportation (Non-Medical):    Physical Activity:     Days of Exercise per Week:     Minutes of Exercise per Session:    Stress:     Feeling of Stress :    Social Connections:     Frequency of Communication with Friends and Family:     Frequency of Social Gatherings with Friends and Family:     Attends Anabaptism Services:     Active Member of Clubs or Organizations:     Attends Club or Organization Meetings:     Marital Status:    Intimate Partner Violence:     Fear of Current or Ex-Partner:     Emotionally Abused:     Physically Abused:     Sexually Abused:        Family History:   Family History   Problem Relation Age of Onset    Heart Disease Mother     Diabetes Mother     Asthma Mother     High Cholesterol Mother     Depression Mother     Heart Disease Father     High Cholesterol Father        REVIEW OFSYSTEMS:    Review of Systems   Constitutional: Negative for chills. Negative for fever. HENT: Negative for congestion. Negative for rhinorrhea. Respiratory: Negative for cough. Negative for shortness of breath. Negative for wheezing. Cardiovascular: Negative for chest pain. Gastrointestinal:  Negative for constipation. Negative for diarrhea. Negative for nausea and vomiting. Genitourinary: Negative for difficulty urinating. Neurological: Negative for dizziness, syncope and numbness. Hematological: Does not bruise/bleed easily. PHYSICAL EXAM:  Vitals:    06/15/21 1757 06/15/21 2345 06/16/21 0000 06/16/21 0250   BP: (!) 155/94 135/86 124/74 121/69   Pulse: 117 103 103 95   Resp: 18 19 18 18   Temp: 98.8 °F (37.1 °C)  98.5 °F (36.9 °C) 98.7 °F (37.1 °C)   TempSrc: Oral  Oral Oral   SpO2: 98% 98% 98% 98%   Weight: 219 lb (99.3 kg)  219 lb (99.3 kg)    Height: 5' 5\" (1.651 m)  5' 5\" (1.651 m)        Physical Exam  General: awake, alert, in no acute distress  HEENT: mucous membranes moist  Respiratory: normal effort, no wheezes appreciated  CV: appears well perfused, regular rate and rhythm  Abdomen: Soft, non-tender, non-distended. No guarding or rebound tenderness.   Skin: warm and dry  Extremities: right 5th toe is necrotic with erythema extending onto the distal foot, crepitus noted  Neuro: no focal deficits noted  Psych: mood normal        DATA:    Lab Results   Component Value Date    WBC 13.7 (H) 06/15/2021    HGB 13.6 06/15/2021    HCT 38.8 06/15/2021    MCV 93.0 06/15/2021     06/15/2021     Lab Results   Component Value Date     06/15/2021    K 3.8 06/15/2021    CL 97 06/15/2021    CO2 23 06/15/2021    BUN 9 06/15/2021    CREATININE 0.8 06/15/2021    GLUCOSE 196 06/15/2021    CALCIUM 8.9 06/15/2021      Xray  - osteomyelitis with bony destruction of the phalanges of the right 5th toe    IMPRESSION:    41 y.o. female with osteomyelitis of the right 5th toe    Patient Active Problem List:     Uncontrolled type 2 diabetes mellitus without complication, with long-term current use of insulin     Essential hypertension     Gastroesophageal reflux disease without esophagitis     Anxiety     Chronic neck pain     Chronic back pain     Hyperlipidemia associated with type 2 diabetes mellitus (HCC)     Allergic rhinitis     Atopic dermatitis     Class 2 severe obesity due to excess calories with serious comorbidity and body mass index (BMI) of 38.0 to 38.9 in adult [facility])     Uncontrolled type 2 diabetes mellitus with complication, with long-term current use of insulin (HCC)     Poor compliance with medication- due to financial issues     Bilateral sciatica     Smoker     WD-Diabetic ulcer of right heel with fat layer exposed ([address]     WD-Diabetic ulcer of right fifth toe ([address]     Open wound of foot, right, sequela     Osteomyelitis ([address]        PLAN:  To OR this morning for toe amputation, possible wound VAC  Non-vaccinated for covid, rapid covid ordered        Electronically signed by Cr Colindres MD on 6/16/2021 at 6:26 AM    The patient was counseled at length about the risks of sandra Covid-19 during their perioperative period and any recovery window from their procedure. The patient was made aware that sandra Covid-19  may worsen their prognosis for recovering from their procedure  and lend to a higher morbidity and/or mortality risk. All material risks, benefits, and reasonable alternatives including postponing the procedure were discussed. The patient does wish to proceed with the procedure at this time.

## 2021-06-16 NOTE — H&P
HISTORY AND PHYSICAL  (Hospitalist, Internal Medicine)  IDENTIFYING INFORMATION   PATIENT:  Radha Pereira  MRN:  [id number]  ADMIT DATE: 6/15/2021      CHIEF COMPLAINT   Worsening wound on the right fifth toe    HISTORY OF PRESENT ILLNESS   Marilyn Rayo is a 41 y.o. female with diabetes mellitus type 2, on chronic insulin, hyperlipidemia, moderate obesity, quit smoking few days ago, history of perineal necrotizing fasciitis s/p excisional debridment (2017), who had an injury to her right fifth toe in  presented to ED with complaints of worsening swelling, pain. Patient reported that she hit her toe with a grocery cart in April. Since then the wound was getting progressively worse. Patient has been to wound care clinic and getting debridement. She also has also been on antibiotics(Bactrim, ciprofloxacin) since 2021. But patient continued to notice worsening wound, swelling, discoloration and pain radiating to her thigh. Patient denied any fever, but had chills. Patient denied any other complaints no chest pain, shortness of breath, fever, chills, cough, denied any abdominal pain, denied any urinary complaints, denied any constipation or diarrhea. Vitals in ED-/94, , RR 18, temp 98.8, saturating 98% on room air. Lab work significant for sodium 133, random glucose 196, WBC 13.7. Doppler of right lower extremity-no DVT. Patient received cefepime depending on prior cultures.     PAST MEDICAL HISTORY PAST SURGICAL HISTORY   diabetes mellitus type 2, on chronic insulin, hyperlipidemia, moderate obesity, quit smoking few days ago, history of perineal necrotizing fasciitis s/p excisional debridment (2017) Hysterectomy, , debridement for perineal necrotizing fasciitis   FAMILY HISTORY SOCIAL HISTORY   Hypertension, heart disease in mother   quit smoking-2021, used to smoke 1. 5 PPD/day, denies any alcohol or illicit drug abuse   MEDICATIONS ALLERGIES    Reviewed medications with patient-was on ciprofloxacin, Bactrim, lisinopril 2.5 mg daily, atorvastatin 40 mg daily, Trulicity 1.5 mg weekly, glargine 15 units nightly, aspirin 81 mg daily. Penicillin. Tolerated cefepime.        PAST MEDICAL, SURGICAL, FAMILY, and SOCIAL HISTORY         Past Medical History:   Diagnosis Date    Allergic rhinitis     allergy shots- Dr. Micheal Fields Allergic rhinitis     Dr. Daija kwan and flonase    Anxiety 10/24/2012    Atopic dermatitis     Dr. Daija Cornfield- claritin    Back pain 10/24/2012    Bilateral sciatica 2020    Depression     [facility]     Diabetes mellitus type II, uncontrolled ([facility]     Diabetic eye exam: 2011, Dr. Lawyer Leung , needs record [V72.0T][    GERD (gastroesophageal reflux disease)     Headache(784.0)     Hyperlipidemia LDL goal < 100 2012    Hypertension     Morbid obesity ([facility]     No retinopathy on exam     Dr Lawyer Leung 13 Diabetes with no significatn diabetic retinopathy found in both eyes    Non compliance w medication regimen 2014    Sprain of neck 10/4/2011     Past Surgical History:   Procedure Laterality Date     SECTION      HYSTERECTOMY      OTHER SURGICAL HISTORY  2017    I&D of right perineal abcess      Family History   Problem Relation Age of Onset    Heart Disease Mother     Diabetes Mother     Asthma Mother     High Cholesterol Mother     Depression Mother     Heart Disease Father     High Cholesterol Father      Family Hx of HTN  Family Hx as reviewed above, otherwise non-contributory  Social History     Socioeconomic History    Marital status: Single     Spouse name: Not on file    Number of children: Not on file    Years of education: Not on file    Highest education level: Not on file   Occupational History    Not on file   Tobacco Use    Smoking status: Current Every Day Smoker Packs/day: 0.25     Types: Cigarettes     Last attempt to quit: 07/2017     Years since quitting: 3.9    Smokeless tobacco: Never Used    Tobacco comment: trying to slow down    Vaping Use    Vaping Use: Never used   Substance and Sexual Activity    Alcohol use: Yes     Comment: rare    Drug use: No    Sexual activity: Not on file   Other Topics Concern    Not on file   Social History Narrative    Not on file     Social Determinants of Health     Financial Resource Strain:     Difficulty of Paying Living Expenses:    Food Insecurity:     Worried About Running Out of Food in the Last Year:     Ran Out of Food in the Last Year:    Transportation Needs:     Lack of Transportation (Medical):  Lack of Transportation (Non-Medical):    Physical Activity:     Days of Exercise per Week:     Minutes of Exercise per Session:    Stress:     Feeling of Stress :    Social Connections:     Frequency of Communication with Friends and Family:     Frequency of Social Gatherings with Friends and Family:     Attends [employer] Services:     Active Member of Clubs or Organizations:     Attends Club or Organization Meetings:     Marital Status:    Intimate Partner Violence:     Fear of Current or Ex-Partner:     Emotionally Abused:     Physically Abused:     Sexually Abused:        MEDICATIONS   Medications Prior to Admission  Not in a hospital admission.     Current Medications  Current Facility-Administered Medications   Medication Dose Route Frequency Provider Last Rate Last Admin    cefepime (MAXIPIME) 2000 mg IVPB minibag  2,000 mg Intravenous Once [employer], PA-C         Current Outpatient Medications   Medication Sig Dispense Refill    ciprofloxacin (CIPRO) 500 MG tablet Take 1 tablet by mouth 2 times daily 60 tablet 0    sulfamethoxazole-trimethoprim (BACTRIM DS;SEPTRA DS) 800-160 MG per tablet Take 1 tablet by mouth 2 times daily 60 tablet 0    Probiotic Acidophilus (FLORANEX) TABS Take 1 tablet by mouth 2 times daily 60 tablet 0    lisinopril (PRINIVIL;ZESTRIL) 2.5 MG tablet Take 1 tablet by mouth every morning 90 tablet 3    atorvastatin (LIPITOR) 40 MG tablet Take 1 tablet by mouth every morning 90 tablet 3    Dulaglutide 1.5 MG/0.5ML SOPN Inject 1.5 mg into the skin once a week Dose increase as of 1/9/20 4 pen 5    glipiZIDE (GLUCOTROL XL) 10 MG extended release tablet Take 1 tablet by mouth every morning 90 tablet 3    insulin glargine (LANTUS SOLOSTAR) 100 UNIT/ML injection pen Inject 15 Units into the skin nightly 10 pen 5    Lancets MISC To test up to 4 times per day- nighttime and with each meal 300 each 5    blood glucose test strips (EXACTECH TEST) strip 1 each by In Vitro route daily Embrace test strips to test BS 4x daily 300 each 6    Insulin Pen Needle 32G X 5 MM MISC To use with Lantus pen and Novolog pen 30 each 11    aspirin EC 81 MG EC tablet Take 1 tablet by mouth daily 90 tablet 0         Allergies  Allergies   Allergen Reactions    Penicillins Rash       REVIEW OF SYSTEMS   10 point review of systems conducted and pertinent positives and negatives as per HPI. PHYSICAL EXAM     Wt Readings from Last 3 Encounters:   06/15/21 219 lb (99.3 kg)   04/14/21 217 lb 12.8 oz (98.8 kg)   04/08/21 217 lb 6.4 oz (98.6 kg)       Blood pressure (!) 155/94, pulse 117, temperature 98.8 °F (37.1 °C), temperature source Oral, resp. rate 18, height 5' 5\" (1.651 m), weight 219 lb (99.3 kg), SpO2 98 %, not currently breastfeeding. GEN  -Awake, alert, NAD.   EYES   -PERRL. HENT  -MM are moist.   RESP  -LS CTA equal bilat, no wheezes, rales or rhonchi. Symmetric chest movement. No respiratory distress noted. C/V  -S1/S2 auscultated, tachycardia without appreciable M/R/G. mild peripheral edema. No reproducible chest wall tenderness. GI  -Abdomen is soft, non-distended, no significant tenderness. No masses or guarding. + BS in all quadrants. Rectal exam deferred.      -No CVA tenderness. Herny catheter is not present. MS  -B/L extremities- No gross joint deformities. intact sensation symmetrical except 5th toe on the right side. rRLE below knee-warm to touch compared to LLE. Swelling, open ulcer of the left fifth toe. SKIN  -Normal coloration, warm, dry. NEURO  -Awake, alert, oriented x 3, no focal deficits. PSYC  - Appropriate affect. LABS AND IMAGING     Results for Al Galaviz (MRN [id number]) as of 6/16/2021 03:51   Ref.  Range 6/15/2021 20:07   Sodium Latest Ref Range: 135 - 145 MMOL/L 133 (L)   Potassium Latest Ref Range: 3.5 - 5.1 MMOL/L 3.8   Chloride Latest Ref Range: 99 - 110 mMol/L 97 (L)   CO2 Latest Ref Range: 21 - 32 MMOL/L 23   BUN Latest Ref Range: 6 - 23 MG/DL 9   Creatinine Latest Ref Range: 0.6 - 1.1 MG/DL 0.8   Anion Gap Latest Ref Range: 4 - 16  13   GFR Non- Latest Ref Range: >60 mL/min/1.73m2 >60   GFR African American Latest Ref Range: >60 mL/min/1.73m2 >60   Lactate, ser/plas Latest Ref Range: 0.4 - 2.0 mMOL/L 1.5   Glucose Latest Ref Range: 70 - 99 MG/ (H)   Calcium Latest Ref Range: 8.3 - 10.6 MG/DL 8.9   Total Protein Latest Ref Range: 6.4 - 8.2 GM/DL 7.5   Albumin Latest Ref Range: 3.4 - 5.0 GM/DL 3.8   Alk Phos Latest Ref Range: 40 - 129 IU/L 95   ALT Latest Ref Range: 10 - 40 U/L 9 (L)   AST Latest Ref Range: 15 - 37 IU/L 12 (L)   Bilirubin Latest Ref Range: 0.0 - 1.0 MG/DL 0.4   WBC Latest Ref Range: 4.0 - 10.5 K/CU MM 13.7 (H)   RBC Latest Ref Range: 4.2 - 5.4 M/CU MM 4.17 (L)   Hemoglobin Quant Latest Ref Range: 12.5 - 16.0 GM/DL 13.6   Hematocrit Latest Ref Range: 37 - 47 % 38.8   MCV Latest Ref Range: 78 - 100 FL 93.0   MCH Latest Ref Range: 27 - 31 PG 32.6 (H)   MCHC Latest Ref Range: 32.0 - 36.0 % 35.1   MPV Latest Ref Range: 7.5 - 11.1 FL 9.1   RDW Latest Ref Range: 11.7 - 14.9 % 11.9   Platelet Count Latest Ref Range: 140 - 440 K/CU    Lymphocyte % Latest Ref Range: 24 - 44 % 15.6 (L)   Monocytes % Latest Ref Range: 0 - 4 % 7.4 (H)   Eosinophils % Latest Ref Range: 0 - 3 % 1.4   Basophils % Latest Ref Range: 0 - 1 % 0.3   Lymphocytes Absolute Latest Units: K/CU MM 2.1   Monocytes Absolute Latest Units: K/CU MM 1.0   Eosinophils Absolute Latest Units: K/CU MM 0.2   Basophils Absolute Latest Units: K/CU MM 0.0   Differential Type Unknown AUTOMATED DIFFERENTIAL   Segs Relative Latest Ref Range: 36 - 66 % 74.9 (H)   Segs Absolute Latest Units: K/CU MM 10.3   Nucleated RBC % Latest Units: % 0.0   Immature Neutrophil % Latest Ref Range: 0 - 0.43 % 0.4   Total Immature Neutrophil Latest Units: K/CU MM 0.06   Total Nucleated RBC Latest Units: K/CU MM 0.0     Recent Imaging    VL DUP LOWER EXTREMITY VENOUS RIGHT [[id number]] Collected: 06/15/21 2133      Order Status: Completed Updated: 06/15/21 2140     Narrative:       EXAMINATION:   DUPLEX VENOUS ULTRASOUND OF THE RIGHT LOWER EXTREMITY, 6/15/2021 8:30 pm     TECHNIQUE:   Duplex ultrasound using B-mode/gray scaled imaging and Doppler spectral   analysis and color flow was obtained of the right lower extremity. COMPARISON:   None. HISTORY:   ORDERING SYSTEM PROVIDED HISTORY: unilateral edema, eval for DVT   TECHNOLOGIST PROVIDED HISTORY:   Reason for exam:->unilateral edema, eval for DVT   Reason for Exam: Rt calf swelling and pain x1 wk; s/p rt foot injury x2   months ago   Acuity: Acute   Type of Exam: Initial     FINDINGS:   The visualized veins of the right lower extremity are patent and free of   echogenic thrombus.  The veins demonstrate good compressibility with normal   color flow study and spectral analysis.      Impression:       No evidence of DVT in the right lower extremity.          Narrative   EXAMINATION:   THREE XRAY VIEWS OF THE RIGHT FOOT       6/14/2021 8:46 am       COMPARISON:   Right foot 05/17/2021       HISTORY:   ORDERING SYSTEM PROVIDED HISTORY: Diabetic ulcer of right fifth toe [facility]   TECHNOLOGIST PROVIDED HISTORY:   Additional signs and symptoms: 5th toe wound       FINDINGS:   There is demineralization, fracture, and dislocation involving the 5th   phalanges.  There is overlying increased density material over the skin   surface.  Soft tissue swelling in this region.  No involvement of other bones.           Impression   Destructive bony changes in the right 5th phalanges concerning for   osteomyelitis. Relevant labs and imaging reviewed    ASSESSMENT AND PLAN     #.  Osteomyelitis of the right fifth phalanx:  -X-ray done at the wound care clinic-destructive bony changes in the right fifth phalanx concerning for osteomyelitis. -Patient was on Bactrim, ciprofloxacin for a month.  -Wound cultures-  4/13/2021-Klebsiella pneumoniae, Citrobacter sensitive to cephalosporins  5/22/2021-Staph aureus-MSSA-clindamycin resistant, Enterococcus faecalis, Morganella morganii  -Patient received cefepime in ER  -ESR, CRP ordered.  -Continue vancomycin, cefepime.  -N.p.o. after midnight for debridement/amputation.  -Dr. Carole Balderas consulted from ER. #.  Sepsis secondary to osteomyelitis/cellulitis  -Tachycardia, leukocytosis, infected toe.  -Lactic acid 1.5  -Continue vancomycin, cefepime  -Culture sent from wound clinic-6/14/2021-pending    #.  Mild peripheral vascular disease:  -Arterial Doppler done-5/17/2021. #.  Uncontrolled diabetes  -LpP3u-1.9-4/14/2021  -Patient is on glargine 15 units nightly, Trulicity q. weekly, has not been taking glipizide (patient reports that she was advised not to take oral medication when she is on antibiotics). -Continue 10 units of Lantus nightly, insulin sliding scale with hypoglycemia protocol.  -Patient is on lisinopril 2.5 mg for renal protection. #.  Hyperlipidemia: Continue atorvastatin    DVT Prophylaxis: Hold chemical prophylaxis for anticipated procedure in a.m. GI Prophylaxis: Not indicated  Code Status: FULL.       Case d/w ED physician    Justin Hill MD  Hospitalist, Internal Medicine  6/15/2021 at 10:06 PM

## 2021-06-16 NOTE — PLAN OF CARE
Problem: Pain:  Goal: Pain level will decrease  Description: Pain level will decrease  Outcome: Ongoing  Goal: Control of acute pain  Description: Control of acute pain  Outcome: Ongoing  Goal: Control of chronic pain  Description: Control of chronic pain  Outcome: Ongoing     Problem: Skin Integrity:  Goal: Will show no infection signs and symptoms  Description: Will show no infection signs and symptoms  Outcome: Ongoing  Goal: Absence of new skin breakdown  Description: Absence of new skin breakdown  Outcome: Ongoing     Problem: Safety:  Goal: Free from accidental physical injury  Description: Free from accidental physical injury  Outcome: Ongoing  Goal: Free from intentional harm  Description: Free from intentional harm  Outcome: Ongoing     Problem: Daily Care:  Goal: Daily care needs are met  Description: Daily care needs are met  Outcome: Ongoing     Problem: Discharge Planning:  Goal: Patients continuum of care needs are met  Description: Patients continuum of care needs are met  Outcome: Ongoing

## 2021-06-16 NOTE — ED PROVIDER NOTES
Patient Identification  Yony Henry is a 41 y.o. female    Chief Complaint  Toe Injury (right 5th toe wound/pain)      HPI  (History provided by patient)  This is a 41 y.o. female with h/o T2DM, diabetic neuropathy who was brought in by self for chief complaint of toe wound. Onset was 2 months ago. States her nephew injured her right foot and she developed infection that spread to right 5th toe. Has been following with wound clinic. States over last week pain and swelling have been worse. Was told she needs to see general surgery for f/u. Currently on bactrim and cipro, states compliant with meds. No fevers. Pain is 10/10, aching, constant. REVIEW OF SYSTEMS    Constitutional:  Denies fever, chills  HENT:  Denies sore throat or ear pain   Eyes: Denies vision changes, eye pain  Cardiovascular:  Denies chest pain, syncope  Respiratory:  Denies shortness of breath, cough   GI:  Denies abdominal pain, nausea, vomiting  :  Denies dysuria, discharge  Musculoskeletal:  Denies back pain  Skin:  Denies pruritis  Neurologic:  Denies headache, focal weakness, or sensory changes     See HPI and nursing notes for additional information     I have reviewed the following nursing documentation:  Allergies: Allergies   Allergen Reactions    Penicillins Rash       Past medical history:  has a past medical history of Allergic rhinitis, Allergic rhinitis, Anxiety (10/24/2012), Atopic dermatitis, Back pain (10/24/2012), Bilateral sciatica (2020), Depression, Diabetes mellitus ([address] Diabetes mellitus type II, uncontrolled ([address] GERD (gastroesophageal reflux disease), Headache(784.0), Hyperlipidemia LDL goal < 100 (2012), Hypertension, Morbid obesity ([address] No retinopathy on exam, Non compliance w medication regimen (2014), and Sprain of neck (10/4/2011). Past surgical history:  has a past surgical history that includes Hysterectomy;   section; and other surgical history (07/29/2017). Home medications:   Prior to Admission medications    Medication Sig Start Date End Date Taking? Authorizing Provider   ciprofloxacin (CIPRO) 500 MG tablet Take 1 tablet by mouth 2 times daily 5/17/21 6/16/21  ALISTAIR Stephens CNP   sulfamethoxazole-trimethoprim (BACTRIM DS;SEPTRA DS) 800-160 MG per tablet Take 1 tablet by mouth 2 times daily 5/17/21 6/16/21  ALISTAIR Stephens CNP   Probiotic [facility]ophilus [facility] TABS Take 1 tablet by mouth 2 times daily 5/17/21 6/16/21  ALISTAIR Stephens CNP   lisinopril (PRINIVIL;ZESTRIL) 2.5 MG tablet Take 1 tablet by mouth every morning 4/14/21   ALISTAIR Adorno NP   atorvastatin (LIPITOR) 40 MG tablet Take 1 tablet by mouth every morning 4/14/21   ALISTAIR Adorno NP   Dulaglutide 1.5 MG/0.5ML SOPN Inject 1.5 mg into the skin once a week Dose increase as of 1/9/20 4/14/21   ALISTAIR Adorno NP   glipiZIDE (GLUCOTROL XL) 10 MG extended release tablet Take 1 tablet by mouth every morning 4/14/21   ALISTAIR Adorno NP   insulin glargine (LANTUS SOLOSTAR) 100 UNIT/ML injection pen Inject 15 Units into the skin nightly 4/14/21   ALISTAIR Adorno NP   Lancets MISC To test up to 4 times per day- nighttime and with each meal 4/14/21   ALISTAIR Adorno NP   blood glucose test strips (EXACTECH TEST) strip 1 each by In Vitro route daily Embrace test strips to test BS 4x daily 4/14/21   ALISTAIR Adorno NP   Insulin Pen Needle 32G X 5 MM MISC To use with Lantus pen and Novolog pen 4/14/21   ALISTAIR Adorno NP   aspirin EC 81 MG EC tablet Take 1 tablet by mouth daily 9/11/20 4/3/21  ALISTAIR Adorno NP       Social history:  reports that she has been smoking cigarettes. She has been smoking about 0.25 packs per day. She has never used smokeless tobacco. She reports current alcohol use. She reports that she does not use drugs.     Family history:    Family History   Problem Relation Age of Onset    Heart Disease Mother     Diabetes Mother     Asthma Mother     High Cholesterol Mother     Depression Mother     Heart Disease Father     High Cholesterol Father          Exam  BP (!) 155/94   Pulse 117   Temp 98.8 °F (37.1 °C) (Oral)   Resp 18   Ht 5' 5\" (1.651 m)   Wt 219 lb (99.3 kg)   SpO2 98%   BMI 36.44 kg/m²   Nursing note and vitals reviewed. Constitutional: Well developed, well nourished. No acute distress. HENT:      Head: Normocephalic and atraumatic. Ears: External ears normal.      Nose: Nose normal.     Mouth: Membrane mucosa moist and pink. No posterior oropharynx erythema or tonsillar edema  Eyes: Anicteric sclera. No discharge, PERRL  Neck: Supple. Trachea midline. Cardiovascular: RRR, no murmurs, rubs, or gallops, radial pulses 2+ bilaterally. Pulmonary/Chest: Effort normal. No respiratory distress. CTAB. No stridor. No wheezes. No rales. Abdominal: Soft. Nontender to palpation. No distension. No guarding, rebound tenderness, or evidence of ascites. : No CVA tenderness. Musculoskeletal: Moves all extremities. No gross deformity. Open wound noted on right 5th toes, toe appears abnormally mobile at interphalangeal joint, malodorous. Neurological: Alert and oriented to person, place, and time. Normal muscle tone. Skin: Warm and dry. Wound as noted above on 5th toe, there is cellulitis noted on dorsal aspect of right foot and trace nonpitting edema noted up to mid shin. Psychiatric: Normal mood and affect.  Behavior is normal.      Radiographs (if obtained):  [] The following radiograph was interpreted by myself in the absence of a radiologist:   [x] Radiologist's Report Reviewed:  VL DUP LOWER EXTREMITY VENOUS RIGHT    (Results Pending)          Labs  Results for orders placed or performed during the hospital encounter of 06/15/21   CBC Auto Differential   Result Value Ref Range    WBC 13.7 (H) 4.0 - 10.5 K/CU MM    RBC 4.17 (L) 4.2 - 5.4 M/CU MM    Hemoglobin 13.6 12.5 - 16.0 GM/DL    Hematocrit 38.8 37 - 47 %    MCV 93.0 78 - 100 FL    MCH 32.6 (H) 27 - 31 PG    MCHC 35.1 32.0 - 36.0 %    RDW 11.9 11.7 - 14.9 %    Platelets 384 465 - 965 K/CU MM    MPV 9.1 7.5 - 11.1 FL    Differential Type AUTOMATED DIFFERENTIAL     Segs Relative 74.9 (H) 36 - 66 %    Lymphocytes % 15.6 (L) 24 - 44 %    Monocytes % 7.4 (H) 0 - 4 %    Eosinophils % 1.4 0 - 3 %    Basophils % 0.3 0 - 1 %    Segs Absolute 10.3 K/CU MM    Lymphocytes Absolute 2.1 K/CU MM    Monocytes Absolute 1.0 K/CU MM    Eosinophils Absolute 0.2 K/CU MM    Basophils Absolute 0.0 K/CU MM    Nucleated RBC % 0.0 %    Total Nucleated RBC 0.0 K/CU MM    Total Immature Neutrophil 0.06 K/CU MM    Immature Neutrophil % 0.4 0 - 0.43 %   CMP   Result Value Ref Range    Sodium 133 (L) 135 - 145 MMOL/L    Potassium 3.8 3.5 - 5.1 MMOL/L    Chloride 97 (L) 99 - 110 mMol/L    CO2 23 21 - 32 MMOL/L    BUN 9 6 - 23 MG/DL    CREATININE 0.8 0.6 - 1.1 MG/DL    Glucose 196 (H) 70 - 99 MG/DL    Calcium 8.9 8.3 - 10.6 MG/DL    Albumin 3.8 3.4 - 5.0 GM/DL    Total Protein 7.5 6.4 - 8.2 GM/DL    Total Bilirubin 0.4 0.0 - 1.0 MG/DL    ALT 9 (L) 10 - 40 U/L    AST 12 (L) 15 - 37 IU/L    Alkaline Phosphatase 95 40 - 129 IU/L    GFR Non-African American >60 >60 mL/min/1.73m2    GFR African American >60 >60 mL/min/1.73m2    Anion Gap 13 4 - 16   Lactic Acid, Plasma   Result Value Ref Range    Lactate 1.5 0.4 - 2.0 mMOL/L         MDM  Patient has wound on right 5th toes, cellulitis. X-ray from yesterday shows destructive osteomyelitis. Cultures from yesterday pending, previous cultures from May revealed that patient has multiple pathogens isolated, all are sensitive to cefepime. She is given first dose of cefepime in ED, total of 2 g. She did receive ultrasound of the right lower extremity to evaluate for DVT, negative. Her laboratory work-up reveals leukocytosis, hyperglycemia.   Lactic acid is normal.  I did call and speak with general surgeon, Dr. Val Pollock, who is in agreement with admission due to failure of outpatient treatment with oral antibiotics and will plan for amputation tomorrow. Asked that patient be n.p.o. at midnight. Consult placed to hospitalist, Dr. Cliff Weller, who agreed to admit patient. In consideration of current COVID19 pandemic, with effort to minimize unnecessary provider exposure, this patient was seen at bedside by me independently. However, in compliance with current hospital EBER/ED protocol, prior to admission I did discuss this patient case with emergency department physician, Dr. Amberly Song. Of note, this Pt was NOT admitted to the ICU. Final Impression  1. Other acute osteomyelitis of right foot ([address]    2. Cellulitis of foot    3. Type 2 diabetes mellitus with other specified complication, unspecified whether long term insulin use (HCC)        Blood pressure (!) 155/94, pulse 117, temperature 98.8 °F (37.1 °C), temperature source Oral, resp. rate 18, height 5' 5\" (1.651 m), weight 219 lb (99.3 kg), SpO2 98 %, not currently breastfeeding. Disposition:  Admit to med/surg floor in stable condition. Patient was given scripts for the following medications. I counseled patient how to take these medications. New Prescriptions    No medications on file       This chart was generated using the [address] [place], [place] [address]  dictation system. I created this record but it may contain dictation errors given the limitations of this technology.        [address] [facility]  06/15/21 [id number]

## 2021-06-16 NOTE — ANESTHESIA POSTPROCEDURE EVALUATION
Department of Anesthesiology  Postprocedure Note    Patient: Ruth Posadas  MRN: [id number]  YOB: 1980  Date of evaluation: 6/16/2021  Time:  9:54 AM     Procedure Summary     Date: 06/16/21 Room / Location: [address] [place] [address]    Anesthesia Start: 0941 Anesthesia Stop: 0690    Procedure: RIGHT 5TH TOE AMPUTATION (Right Foot) Diagnosis: (INFECTED RIGHT 5TH TOE)    Surgeons: Enriqueta Goldsmith MD Responsible Provider: Haseeb Hodge DO    Anesthesia Type: MAC ASA Status: 3          Anesthesia Type: MAC    Seymour Phase I:      Seymour Phase II:      Last vitals: Reviewed and per EMR flowsheets.        Anesthesia Post Evaluation    Patient location during evaluation: floor  Patient participation: complete - patient participated  Level of consciousness: awake and alert  Pain score: 0  Airway patency: patent  Nausea & Vomiting: no nausea and no vomiting  Complications: no  Cardiovascular status: blood pressure returned to baseline  Respiratory status: acceptable  Hydration status: euvolemic

## 2021-06-16 NOTE — PROGRESS NOTES
Skin assessment completed with Tabitha RN. Skin is warm and dry. Pulses are palpable. Patient has wounds on her feet.

## 2021-06-16 NOTE — PROGRESS NOTES
Comprehensive Nutrition Assessment    Type and Reason for Visit:  Initial, Positive Nutrition Screen, Wound    Nutrition Recommendations/Plan:   · Continue current diet  · Begin wound healing oral nutrition supplement bid  · Diabetes ed once appropriate    Nutrition Assessment:  Pt admitted with toe with infection and necrotic tissue extending onto the distal lateral foot. H/O uncontrolled DM, A1C 8.9. NPO and off unit during my visit for toe amputation. Diet since advanced to Carb Control 4. Will add wound healing supplement and continue to follow as moderate nutrition risk. Malnutrition Assessment:  Malnutrition Status:  No malnutrition    Context:  Acute Illness       Estimated Daily Nutrient Needs:  Energy (kcal):  8197-8650 (B[place]-St Jeor); Weight Used for Energy Requirements:  Current     Protein (g):  68-80 (1.2-1.4 g/kg IBW); Weight Used for Protein Requirements:  Ideal        Fluid (ml/day):  0835-1436; Method Used for Fluid Requirements:  1 ml/kcal      Nutrition Related Findings:  Glucose 228, A1C 8.9      Wounds:  Diabetic Ulcer, Surgical Incision, Wound Vac       Current Nutrition Therapies:    ADULT DIET;  Regular; 4 carb choices (60 gm/meal)    Anthropometric Measures:  · Height: 5' 5\" (165.1 cm)  · Current Body Weight: 219 lb (99.3 kg) (stated)   · Admission Body Weight:  (?)    · Usual Body Weight: 211 lb 9.6 oz (96 kg) (9/11/20)     · Ideal Body Weight: 125 lbs; % Ideal Body Weight 175.2 %   · BMI: 36.4  · BMI Categories: Obese Class 2 (BMI 35.0 -39.9)       Nutrition Diagnosis:   · Predicted inadequate energy intake related to increase demand for energy/nutrients as evidenced by wounds    Nutrition Interventions:   Food and/or Nutrient Delivery:  Continue Current Diet, Start Oral Nutrition Supplement  Nutrition Education/Counseling:  Education needed   Coordination of Nutrition Care:  Continue to monitor while inpatient    Goals:  Pt will consume at least half of her meals and supplements Nutrition Monitoring and Evaluation:   Behavioral-Environmental Outcomes:  None Identified   Food/Nutrient Intake Outcomes:  Food and Nutrient Intake, Supplement Intake  Physical Signs/Symptoms Outcomes:  Biochemical Data, Skin, Weight     Discharge Planning:    Recommend pursue outpatient diabetes education     Electronically signed by Earl Crystal RD, LD on 6/16/21 at 2:15 PM EDT    Contact: [phone]

## 2021-06-16 NOTE — ANESTHESIA PRE PROCEDURE
Department of Anesthesiology  Preprocedure Note       Name:  Azeb Silver   Age:  41 y.o.  :  1980                                          MRN:  [id number]         Date:  2021      Surgeon: Dario Gage):  Edita Lee MD    Procedure: Procedure(s):  RIGHT 5TH TOE AMPUTATION    Medications prior to admission:   Prior to Admission medications    Medication Sig Start Date End Date Taking?  Authorizing Provider   ciprofloxacin (CIPRO) 500 MG tablet Take 1 tablet by mouth 2 times daily 21  ALISTAIR Rios CNP   sulfamethoxazole-trimethoprim (BACTRIM DS;SEPTRA DS) 800-160 MG per tablet Take 1 tablet by mouth 2 times daily 21  ALISTAIR Rios CNP   Probiotic Acidophilus [facility]) TABS Take 1 tablet by mouth 2 times daily 21  ALISTAIR Rios CNP   lisinopril (PRINIVIL;ZESTRIL) 2.5 MG tablet Take 1 tablet by mouth every morning 21   ALISTAIR Vyas NP   atorvastatin (LIPITOR) 40 MG tablet Take 1 tablet by mouth every morning 21   ALISTAIR Vyas NP   Dulaglutide 1.5 MG/0.5ML SOPN Inject 1.5 mg into the skin once a week Dose increase as of 20   ALISTAIR Vyas NP   glipiZIDE (GLUCOTROL XL) 10 MG extended release tablet Take 1 tablet by mouth every morning 21   ALISTAIR Vyas NP   insulin glargine (LANTUS SOLOSTAR) 100 UNIT/ML injection pen Inject 15 Units into the skin nightly 21   ALISTAIR Vyas NP   Lancets MISC To test up to 4 times per day- nighttime and with each meal 21   ALISTAIR Vyas NP   blood glucose test strips (EXACTECH TEST) strip 1 each by In Vitro route daily Embrace test strips to test BS 4x daily 21   ALISTAIR Vyas NP   Insulin Pen Needle 32G X 5 MM MISC To use with Lantus pen and Novolog pen 21   ALISTAIR Vyas NP   aspirin EC 81 MG EC tablet Take 1 tablet by mouth daily 9/11/20 4/3/21  Juan Diego Thomason Alton Pepe NP       Current medications:    Current Facility-Administered Medications   Medication Dose Route Frequency Provider Last Rate Last Admin    sodium chloride flush 0.9 % injection 5-40 mL  5-40 mL Intravenous 2 times per day Lisbeth Catalan MD        sodium chloride flush 0.9 % injection 5-40 mL  5-40 mL Intravenous PRN Lisbeth Catalan MD        0.9 % sodium chloride infusion  25 mL Intravenous PRN Lisbeth Catalan MD   Stopped at 06/16/21 0525    ondansetron (ZOFRAN-ODT) disintegrating tablet 4 mg  4 mg Oral Q8H PRN Lisbeth Catalan MD        Or    [facility]ron [facility] injection 4 mg  4 mg Intravenous Q6H PRN Lisbeth Catalan MD   4 mg at 06/16/21 0439    polyethylene glycol (GLYCOLAX) packet 17 g  17 g Oral Daily PRN Lisbeth Catalan MD        acetaminophen (TYLENOL) tablet 650 mg  650 mg Oral Q6H PRN Lisbeth Catalan MD   650 mg at 06/16/21 0440    Or    acetaminophen (TYLENOL) suppository 650 mg  650 mg Rectal Q6H PRN Lisbeth Catalan MD        insulin lispro (HUMALOG) injection vial 0-12 Units  0-12 Units Subcutaneous TID WC Lisbeth Catalan MD        insulin lispro (HUMALOG) injection vial 0-6 Units  0-6 Units Subcutaneous Nightly Lisbeth Catalan MD   3 Units at 06/16/21 0131    glucose (GLUTOSE) 40 % oral gel 15 g  15 g Oral PRN Lisbeth Catalan MD        dextrose 50 % IV solution  12.5 g Intravenous PRN Lisbeth Catalan MD        glucagon (rDNA) injection 1 mg  1 mg Intramuscular PRN Lisbeth Catalan MD        dextrose 5 % solution  100 mL/hr Intravenous PRN Lisbeth Catalan MD        aspirin EC tablet 81 mg  81 mg Oral Daily Lisbeth Catalan MD        atorvastatin (LIPITOR) tablet 40 mg  40 mg Oral QAM Lisbeth Catalan MD        insulin glargine (LANTUS) injection vial 10 Units  10 Units Subcutaneous Nightly Lisbeth Catalan MD   10 Units at 06/16/21 0130    lisinopril (PRINIVIL;ZESTRIL) tablet 2.5 mg 2.5 mg Oral QAM Lake Link MD        cefepime (MAXIPIME) 1000 mg IVPB minibag  1,000 mg Intravenous Nicolas Liu MD   Stopped at 06/16/21 0525    vancomycin (VANCOCIN) 1,250 mg in dextrose 5 % 250 mL IVPB  1,250 mg Intravenous Q12H Lake Link MD        cefepime (MAXIPIME) 2,000 mg in dextrose 5 % 50 mL IVPB  2,000 mg Intravenous On Call to Lopez Hernández MD           Allergies:     Allergies   Allergen Reactions    Penicillins Rash       Problem List:    Patient Active Problem List   Diagnosis Code    Uncontrolled type 2 diabetes mellitus without complication, with long-term current use of insulin [id number]    Essential hypertension I10    Gastroesophageal reflux disease without esophagitis K21.9    Anxiety F41.9    Chronic neck pain M54.2, G89.29    Chronic back pain M54.9, G89.29    Hyperlipidemia associated with type 2 diabetes mellitus ([facility] E11.69, E78.5    Allergic rhinitis J30.9    Atopic dermatitis L20.9    Class 2 severe obesity due to excess calories with serious comorbidity and body mass index (BMI) of 38.0 to 38.9 in [facility]) E66.01, Z68.38    Uncontrolled type 2 diabetes mellitus with complication, with long-term current use of insulin (HCC) E11.8, E11.65, Z79.4    Poor compliance with medication- due to financial issues Z91.14    Bilateral sciatica M54.31, M54.32    Smoker F17.200    WD-Diabetic ulcer of right heel with fat layer exposed ([facility] E11.621, L97.412    WD-Diabetic ulcer of right fifth toe ([facility]) E11.621, L97.519    Open wound of foot, right, sequela S91.301S    Osteomyelitis ([facility]) M86.9       Past Medical History:        Diagnosis Date    Allergic rhinitis     allergy shots- Dr. Andreea Corcoran Allergic rhinitis     Dr. Frannie kwan and flonase    Anxiety 10/24/2012    Atopic dermatitis     Dr. Frannie kwan    Back pain 10/24/2012    Bilateral sciatica 1/9/2020    Depression     Diabetes mellitus ([facility]     Diabetes mellitus type II, uncontrolled ([facility]     Diabetic eye exam: 2011, Dr. Hector Trujillo , needs record [V72.0T][    GERD (gastroesophageal reflux disease)     Headache(784.0)     Hyperlipidemia LDL goal < 100 2012    Hypertension     Morbid obesity ([facility]     No retinopathy on exam     Dr Hector Trujillo 13 Diabetes with no significatn diabetic retinopathy found in both eyes    Non compliance w medication regimen 2014    Sprain of neck 10/4/2011       Past Surgical History:        Procedure Laterality Date     SECTION      HYSTERECTOMY      OTHER SURGICAL HISTORY  2017    I&D of right perineal abcess        Social History:    Social History     Tobacco Use    Smoking status: Current Every Day Smoker     Packs/day: 0.25     Types: Cigarettes     Last attempt to quit: 2017     Years since quitting: 3.9    Smokeless tobacco: Never Used    Tobacco comment: trying to slow down    Substance Use Topics    Alcohol use: Yes     Comment: rare                                Ready to quit: Not Answered  Counseling given: Not Answered  Comment: trying to slow down       Vital Signs (Current):   Vitals:    06/15/21 1757 06/15/21 2345 21 0000 21 0250   BP: (!) 155/94 135/86 124/74 121/69   Pulse: 117 103 103 95   Resp: 18 19 18 18   Temp: 37.1 °C (98.8 °F)  36.9 °C (98.5 °F) 37.1 °C (98.7 °F)   TempSrc: Oral  Oral Oral   SpO2: 98% 98% 98% 98%   Weight: 219 lb (99.3 kg)  219 lb (99.3 kg)    Height: 5' 5\" (1.651 m)  5' 5\" (1.651 m)                                               BP Readings from Last 3 Encounters:   21 121/69   21 120/83   21 120/76       NPO Status: Time of last liquid consumption: 0059                        Time of last solid consumption: 1259                        Date of last liquid consumption: 06/15/21                        Date of last solid food consumption: 06/15/21    BMI:   Wt Readings from Last 3 Encounters:   21 219 lb (99.3 kg) 04/14/21 217 lb 12.8 oz (98.8 kg)   04/08/21 217 lb 6.4 oz (98.6 kg)     Body mass index is 36.44 kg/m².     CBC:   Lab Results   Component Value Date    WBC 13.7 06/15/2021    RBC 4.17 06/15/2021    HGB 13.6 06/15/2021    HCT 38.8 06/15/2021    MCV 93.0 06/15/2021    RDW 11.9 06/15/2021     06/15/2021       CMP:   Lab Results   Component Value Date     06/15/2021    K 3.8 06/15/2021    CL 97 06/15/2021    CO2 23 06/15/2021    BUN 9 06/15/2021    CREATININE 0.8 06/15/2021    GFRAA >60 06/15/2021    AGRATIO 1.6 01/13/2021    LABGLOM >60 06/15/2021    GLUCOSE 196 06/15/2021    PROT 7.5 06/15/2021    PROT 7.4 07/31/2012    CALCIUM 8.9 06/15/2021    BILITOT 0.4 06/15/2021    ALKPHOS 95 06/15/2021    AST 12 06/15/2021    ALT 9 06/15/2021       POC Tests:   Recent Labs     06/16/21  0625   POCGLU 235*       Coags:   Lab Results   Component Value Date    PROTIME 11.2 04/06/2011    INR 1.02 04/06/2011       HCG (If Applicable):   Lab Results   Component Value Date    PREGTESTUR NEGATIVE 04/07/2011        ABGs: No results found for: PHART, PO2ART, WBD9FOO, VGA3WRZ, BEART, U7ADCTYW     Type & Screen (If Applicable):  No results found for: LABABO, LABRH    Drug/Infectious Status (If Applicable):  No results found for: HIV, HEPCAB    COVID-19 Screening (If Applicable):   Lab Results   Component Value Date    COVID19 NOT DETECTED 06/16/2021           Anesthesia Evaluation    Airway: Mallampati: II  TM distance: >3 FB   Neck ROM: full  Mouth opening: > = 3 FB Dental:    (+) edentulous      Pulmonary:normal exam    (+) current smoker                           Cardiovascular:    (+) hypertension:, hyperlipidemia                  Neuro/Psych:   (+) headaches:, depression/anxiety             GI/Hepatic/Renal:   (+) GERD:,           Endo/Other:    (+) DiabetesType II DM, , .                 Abdominal:   (+) obese,         Vascular:                                        Anesthesia Plan      MAC     ASA 3       Induction:

## 2021-06-17 ENCOUNTER — HOSPITAL ENCOUNTER (OUTPATIENT)
Dept: WOUND CARE | Age: 41
Discharge: HOME | End: 2021-06-17

## 2021-06-17 LAB
DOSE AMOUNT: ABNORMAL
DOSE TIME: ABNORMAL
GLUCOSE BLD-MCNC: 133 MG/DL (ref 70–99)
GLUCOSE BLD-MCNC: 195 MG/DL (ref 70–99)
GLUCOSE BLD-MCNC: 227 MG/DL (ref 70–99)
GLUCOSE BLD-MCNC: 252 MG/DL (ref 70–99)
VANCOMYCIN TROUGH: 7.4 UG/ML (ref 10–20)

## 2021-06-17 PROCEDURE — 2580000003 HC RX 258: Performed by: SURGERY

## 2021-06-17 PROCEDURE — 6360000002 HC RX W HCPCS: Performed by: SURGERY

## 2021-06-17 PROCEDURE — 82962 GLUCOSE BLOOD TEST: CPT

## 2021-06-17 PROCEDURE — 99211 OFF/OP EST MAY X REQ PHY/QHP: CPT

## 2021-06-17 PROCEDURE — 1200000000 HC SEMI PRIVATE

## 2021-06-17 PROCEDURE — 6370000000 HC RX 637 (ALT 250 FOR IP): Performed by: SURGERY

## 2021-06-17 PROCEDURE — 36415 COLL VENOUS BLD VENIPUNCTURE: CPT

## 2021-06-17 PROCEDURE — 94761 N-INVAS EAR/PLS OXIMETRY MLT: CPT

## 2021-06-17 PROCEDURE — 82565 ASSAY OF CREATININE: CPT

## 2021-06-17 PROCEDURE — 80202 ASSAY OF VANCOMYCIN: CPT

## 2021-06-17 PROCEDURE — 99024 POSTOP FOLLOW-UP VISIT: CPT | Performed by: SURGERY

## 2021-06-17 RX ADMIN — ACETAMINOPHEN 650 MG: 325 TABLET ORAL at 13:38

## 2021-06-17 RX ADMIN — ACETAMINOPHEN 650 MG: 325 TABLET ORAL at 08:37

## 2021-06-17 RX ADMIN — ACETAMINOPHEN 650 MG: 325 TABLET ORAL at 05:36

## 2021-06-17 RX ADMIN — LISINOPRIL 2.5 MG: 2.5 TABLET ORAL at 08:37

## 2021-06-17 RX ADMIN — ACETAMINOPHEN 650 MG: 325 TABLET ORAL at 19:13

## 2021-06-17 RX ADMIN — ASPIRIN 81 MG: 81 TABLET, COATED ORAL at 08:37

## 2021-06-17 RX ADMIN — CEFEPIME HYDROCHLORIDE 1000 MG: 1 INJECTION, POWDER, FOR SOLUTION INTRAMUSCULAR; INTRAVENOUS at 05:36

## 2021-06-17 RX ADMIN — VANCOMYCIN HYDROCHLORIDE 1250 MG: 5 INJECTION, POWDER, LYOPHILIZED, FOR SOLUTION INTRAVENOUS at 05:36

## 2021-06-17 RX ADMIN — CEFEPIME HYDROCHLORIDE 1000 MG: 1 INJECTION, POWDER, FOR SOLUTION INTRAMUSCULAR; INTRAVENOUS at 13:38

## 2021-06-17 RX ADMIN — SODIUM CHLORIDE, PRESERVATIVE FREE 10 ML: 5 INJECTION INTRAVENOUS at 08:37

## 2021-06-17 RX ADMIN — SODIUM CHLORIDE, PRESERVATIVE FREE 10 ML: 5 INJECTION INTRAVENOUS at 22:11

## 2021-06-17 RX ADMIN — VANCOMYCIN HYDROCHLORIDE 1250 MG: 5 INJECTION, POWDER, LYOPHILIZED, FOR SOLUTION INTRAVENOUS at 21:59

## 2021-06-17 RX ADMIN — ATORVASTATIN CALCIUM 40 MG: 40 TABLET, FILM COATED ORAL at 08:37

## 2021-06-17 RX ADMIN — INSULIN LISPRO 4 UNITS: 100 INJECTION, SOLUTION INTRAVENOUS; SUBCUTANEOUS at 16:31

## 2021-06-17 RX ADMIN — CEFEPIME HYDROCHLORIDE 1000 MG: 1 INJECTION, POWDER, FOR SOLUTION INTRAMUSCULAR; INTRAVENOUS at 21:59

## 2021-06-17 RX ADMIN — INSULIN LISPRO 1 UNITS: 100 INJECTION, SOLUTION INTRAVENOUS; SUBCUTANEOUS at 22:03

## 2021-06-17 RX ADMIN — INSULIN LISPRO 6 UNITS: 100 INJECTION, SOLUTION INTRAVENOUS; SUBCUTANEOUS at 08:38

## 2021-06-17 RX ADMIN — INSULIN GLARGINE 10 UNITS: 100 INJECTION, SOLUTION SUBCUTANEOUS at 22:01

## 2021-06-17 RX ADMIN — ACETAMINOPHEN 650 MG: 325 TABLET ORAL at 21:59

## 2021-06-17 ASSESSMENT — PAIN SCALES - GENERAL
PAINLEVEL_OUTOF10: 0
PAINLEVEL_OUTOF10: 0
PAINLEVEL_OUTOF10: 1
PAINLEVEL_OUTOF10: 0
PAINLEVEL_OUTOF10: 2
PAINLEVEL_OUTOF10: 0
PAINLEVEL_OUTOF10: 2

## 2021-06-17 ASSESSMENT — PAIN DESCRIPTION - DESCRIPTORS: DESCRIPTORS: ACHING

## 2021-06-17 ASSESSMENT — PAIN DESCRIPTION - PAIN TYPE: TYPE: SURGICAL PAIN

## 2021-06-17 ASSESSMENT — PAIN DESCRIPTION - FREQUENCY: FREQUENCY: CONTINUOUS

## 2021-06-17 ASSESSMENT — PAIN DESCRIPTION - PROGRESSION: CLINICAL_PROGRESSION: NOT CHANGED

## 2021-06-17 ASSESSMENT — PAIN DESCRIPTION - ORIENTATION: ORIENTATION: RIGHT

## 2021-06-17 ASSESSMENT — PAIN DESCRIPTION - LOCATION: LOCATION: FOOT

## 2021-06-17 ASSESSMENT — PAIN - FUNCTIONAL ASSESSMENT: PAIN_FUNCTIONAL_ASSESSMENT: ACTIVITIES ARE NOT PREVENTED

## 2021-06-17 ASSESSMENT — PAIN DESCRIPTION - ONSET: ONSET: GRADUAL

## 2021-06-17 NOTE — PROGRESS NOTES
GENERAL SURGERY PROGRESS NOTE    Radha Diaz is a 41 y.o. female s/p right 5th toe amputation and foot debridement on 6/16. Subjective:  Doing ok this morning. Pain well controlled. VAC with good seal.    Objective:    Vitals: VITALS:  /62   Pulse 97   Temp 98.1 °F (36.7 °C) (Oral)   Resp 16   Ht 5' 5\" (1.651 m)   Wt 219 lb (99.3 kg)   SpO2 96%   BMI 36.44 kg/m²     I/O: 06/16 0701 - 06/17 0700  In: 1486.7 [P.O.:360; I.V.:310]  Out: -     Labs/Imaging Results:   Lab Results   Component Value Date     06/16/2021    K 3.9 06/16/2021    CL 99 06/16/2021    CO2 22 06/16/2021    BUN 7 06/16/2021    CREATININE 0.6 06/16/2021    GLUCOSE 228 06/16/2021    CALCIUM 8.4 06/16/2021      Lab Results   Component Value Date    WBC 12.4 (H) 06/16/2021    HGB 12.3 (L) 06/16/2021    HCT 34.9 (L) 06/16/2021    MCV 91.6 06/16/2021     06/16/2021       IV Fluids: sodium chloride Last Rate: Stopped (06/16/21 0525)    dextrose    Scheduled Meds:   sodium chloride flush, 5-40 mL, Intravenous, 2 times per day    insulin lispro, 0-12 Units, Subcutaneous, TID WC    insulin lispro, 0-6 Units, Subcutaneous, Nightly    aspirin EC, 81 mg, Oral, Daily    atorvastatin, 40 mg, Oral, QAM    insulin glargine, 10 Units, Subcutaneous, Nightly    lisinopril, 2.5 mg, Oral, QAM    cefepime, 1,000 mg, Intravenous, Q8H    [COMPLETED] vancomycin, 2,000 mg, Intravenous, Once **FOLLOWED BY** vancomycin, 1,250 mg, Intravenous, Q12H    acetaminophen, 650 mg, Oral, Q4H While awake    Physical Exam:  General: A&O x 3, no distress. HEENT: Anicteric sclerae, MMM. Extremities:right foot with VAC in place, foot with improving erythema  Abdomen: Soft, nontender, nondistended. Assessment and Plan:  41 y.o. female with DM foot wound and right 5th toe osteomyelitis.     Patient Active Problem List:     Uncontrolled type 2 diabetes mellitus without complication, with long-term current use of insulin     Essential

## 2021-06-17 NOTE — PROGRESS NOTES
Hospitalist Progress Note      Name:  Tiffany Stringer /Age/Sex: 1980  (41 y.o. female)   MRN & CSN:  [id number] & [id number] Admission Date/Time: 6/15/2021  6:54 PM   [facility]  [facility] PCP: Sam Domínguez MD         Hospital Day: 3    Assessment and Plan:   Tiffany Stringer is a 41 y.o.  female  who presents with <principal problem not specified>     #. Osteomyelitis of the right fifth phalanx:  -Would obtain ID consult, however service is currently unavailable at this time. Will await results of surgical/tissue cultures   -Postop 2021, wound VAC in place, underwent amputation  -Patient was on Bactrim, ciprofloxacin for a month.  -Wound cultures-preliminary showing Enterococcus faecalis, awaiting susceptibility at this time. Would consult ID, however service is currently unavailable. Will d/w pharmacy tomorrow. 2021-Klebsiella pneumoniae, Citrobacter sensitive to cephalosporins  2021-Staph aureus-MSSA-clindamycin resistant, Enterococcus faecalis, Morganella morganii  -Patient received cefepime in ER  -ESR, CRP ordered   -Continue vancomycin, cefepime for now, awaiting susceptibilities    #. Sepsis secondary to osteomyelitis/cellulitis  -Tachycardia, leukocytosis, infected toe.  -Lactic acid 1.5  -Continue vancomycin, cefepime  -Culture sent from wound clinic-2021-pending     #.  Mild peripheral vascular disease:  -Arterial Doppler done-2021.     #. Uncontrolled diabetes  -DaU2r-0.9-2021  -Patient is on glargine 15 units nightly, Trulicity q. weekly, has not been taking glipizide (patient reports that she was advised not to take oral medication when she is on antibiotics). -Continue 10 units of Lantus nightly, insulin sliding scale with hypoglycemia protocol.  -Patient is on lisinopril 2.5 mg for renal protection.     #.   Hyperlipidemia: Continue atorvastatin    Disposition: Pending culture results, recs for abx therapy, and home wound care as patient currently has wound vac     DVT Prophylaxis: Resume lovenox if OK with surgery  GI Prophylaxis: Not indicated  Code Status: FULL.       Case d/w ED physician    Diet ADULT DIET; Regular; 4 carb choices (60 gm/meal)  Adult Oral Nutrition Supplement; Wound Healing Oral Supplement   DVT Prophylaxis [] Lovenox, []  Heparin, [x] SCDs, [] Ambulation   GI Prophylaxis [] PPI,  [] H2 Blocker,  [] Carafate,  [] Diet/Tube Feeds   Code Status Full Code   Disposition Patient requires continued admission due to post-op   MDM [] Low, [x] Moderate,[]  High  Patient's risk as above due to active infection and co-morbidities     History of Present Illness:     Chief Complaint: <principal problem not specified>  Selena Navarro is a 41 y.o.  female  who presents with toe pain. Seen perioperatively, complains of toe pain       Ten point ROS reviewed negative, unless as noted above    Objective: Intake/Output Summary (Last 24 hours) at 6/17/2021 1846  Last data filed at 6/17/2021 0544  Gross per 24 hour   Intake 686.67 ml   Output --   Net 686.67 ml      Vitals:   Vitals:    06/17/21 1430   BP: 104/61   Pulse: 86   Resp: 22   Temp: 98 °F (36.7 °C)   SpO2: 98%     Physical Exam:   GEN Awake female, sitting upright in bed in no apparent distress. Appears given age. EYES Pupils are equally round. No scleral erythema, discharge, or conjunctivitis. HENT Mucous membranes are moist. Oral pharynx without exudates, no evidence of thrush. NECK Supple, no apparent thyromegaly or masses. RESP Clear to auscultation, no wheezes, rales or rhonchi. Symmetric chest movement while on room air. CARDIO/VASC S1/S2 auscultated. Regular rate without appreciable murmurs, rubs, or gallops. No JVD or carotid bruits. Peripheral pulses equal bilaterally and palpable. No peripheral edema. GI Abdomen is soft without significant tenderness, masses, or guarding. Bowel sounds are normoactive. Rectal exam deferred.  No costovertebral angle tenderness. Normal appearing external genitalia. Henry catheter is not present. HEME/LYMPH No palpable cervical lymphadenopathy and no hepatosplenomegaly. No petechiae or ecchymoses. MSK No gross joint deformities. SKIN Normal coloration, warm, dry. NEURO Cranial nerves appear grossly intact, normal speech, no lateralizing weakness. PSYCH Awake, alert, oriented x 4. Affect appropriate.     Medications:   Medications:    sodium chloride flush  5-40 mL Intravenous 2 times per day    insulin lispro  0-12 Units Subcutaneous TID WC    insulin lispro  0-6 Units Subcutaneous Nightly    aspirin EC  81 mg Oral Daily    atorvastatin  40 mg Oral QAM    insulin glargine  10 Units Subcutaneous Nightly    lisinopril  2.5 mg Oral QAM    cefepime  1,000 mg Intravenous Q8H    vancomycin  1,250 mg Intravenous Q12H    acetaminophen  650 mg Oral Q4H While awake      Infusions:    sodium chloride Stopped (06/16/21 0525)    dextrose       PRN Meds: sodium chloride flush, 5-40 mL, PRN  sodium chloride, 25 mL, PRN  ondansetron, 4 mg, Q8H PRN   Or  ondansetron, 4 mg, Q6H PRN  polyethylene glycol, 17 g, Daily PRN  acetaminophen, 650 mg, Q6H PRN   Or  acetaminophen, 650 mg, Q6H PRN  glucose, 15 g, PRN  dextrose, 12.5 g, PRN  glucagon (rDNA), 1 mg, PRN  dextrose, 100 mL/hr, PRN  oxyCODONE, 5 mg, Q4H PRN  oxyCODONE, 10 mg, Q4H PRN          Electronically signed by Rex Collazo MD on 6/17/2021 at 6:46 PM

## 2021-06-17 NOTE — CARE COORDINATION
[facility] wound vac application submitted per [facility] and information with prescription faxed at this time.

## 2021-06-17 NOTE — CONSULTS
[address] [address] Continence Nurse  Consult Note       Marilyn Nnamdi Rayo  AGE: 41 y.o. GENDER: female  : 1980  TODAY'S DATE:  2021    Subjective:     Reason for  Evaluation and Assessment: wound care assessment.        Radha Pereira is a 41 y.o. female referred by:   [x] Physician  [] Nursing  [] Other:     Wound Identification:  Wound Type: diabetic and non-healing surgical  Contributing Factors: diabetes and chronic pressure        PAST MEDICAL HISTORY        Diagnosis Date    Allergic rhinitis     allergy shots- Dr. [facility] Allergic rhinitis     Dr. Gagan kwan and flonase    Anxiety 10/24/2012    Atopic dermatitis     Dr. Gagan kwan    Back pain 10/24/2012    Bilateral sciatica 2020    Depression     [facility]     Diabetes mellitus type II, uncontrolled ([facility]     Diabetic eye exam: 2011, Dr. Mcakenzie Avitia , needs record [V72.0T][    GERD (gastroesophageal reflux disease)     Headache(784.0)     Hyperlipidemia LDL goal < 100 2012    Hypertension     Morbid obesity ([facility]     No retinopathy on exam     Dr Mackenzie Avitia 13 Diabetes with no significatn diabetic retinopathy found in both eyes    Non compliance w medication regimen 2014    Sprain of neck 10/4/2011       PAST SURGICAL HISTORY    Past Surgical History:   Procedure Laterality Date     SECTION      HYSTERECTOMY      OTHER SURGICAL HISTORY  2017    I&D of right perineal abcess     TOE AMPUTATION Right 2021    RIGHT 5TH TOE AMPUTATION performed by Js Gomez MD at  [facility] History   Problem Relation Age of Onset    Heart Disease Mother     Diabetes Mother     Asthma Mother     High Cholesterol Mother     Depression Mother     Heart Disease Father     High Cholesterol Father        SOCIAL HISTORY    Social History     Tobacco Use    Smoking status: Current Every Day Smoker     Packs/day: 0.25     Types: Cigarettes     Last attempt to quit: 07/2017     Years since quitting: 3.9    Smokeless tobacco: Never Used    Tobacco comment: trying to slow down    Vaping Use    Vaping Use: Never used   Substance Use Topics    Alcohol use: Yes     Comment: rare    Drug use: No       ALLERGIES    Allergies   Allergen Reactions    Penicillins Rash       MEDICATIONS    No current facility-administered medications on file prior to encounter.      Current Outpatient Medications on File Prior to Encounter   Medication Sig Dispense Refill    lisinopril (PRINIVIL;ZESTRIL) 2.5 MG tablet Take 1 tablet by mouth every morning 90 tablet 3    atorvastatin (LIPITOR) 40 MG tablet Take 1 tablet by mouth every morning 90 tablet 3    Dulaglutide 1.5 MG/0.5ML SOPN Inject 1.5 mg into the skin once a week Dose increase as of 1/9/20 4 pen 5    glipiZIDE (GLUCOTROL XL) 10 MG extended release tablet Take 1 tablet by mouth every morning 90 tablet 3    insulin glargine (LANTUS SOLOSTAR) 100 UNIT/ML injection pen Inject 15 Units into the skin nightly 10 pen 5    Lancets MISC To test up to 4 times per day- nighttime and with each meal 300 each 5    blood glucose test strips (EXACTECH TEST) strip 1 each by In Vitro route daily Embrace test strips to test BS 4x daily 300 each 6    Insulin Pen Needle 32G X 5 MM MISC To use with Lantus pen and Novolog pen 30 each 11    aspirin EC 81 MG EC tablet Take 1 tablet by mouth daily 90 tablet 0         Objective:      /61   Pulse 92   Temp 98 °F (36.7 °C) (Oral)   Resp 16   Ht 5' 5\" (1.651 m)   Wt 219 lb (99.3 kg)   SpO2 94%   BMI 36.44 kg/m²   Sherman Risk Score: Sherman Scale Score: 20    LABS    CBC:   Lab Results   Component Value Date    WBC 12.4 06/16/2021    RBC 3.81 06/16/2021    HGB 12.3 06/16/2021    HCT 34.9 06/16/2021    MCV 91.6 06/16/2021    MCH 32.3 06/16/2021    MCHC 35.2 06/16/2021    RDW 11.7 06/16/2021     06/16/2021    MPV 9.5 06/16/2021     CMP:    Lab Results   Component Value Date offloading required 06/14/21 0804   Wound Length (cm) 3.2 cm 06/14/21 0804   Wound Width (cm) 4 cm 06/14/21 0804   Wound Depth (cm) 0.5 cm 06/14/21 0804   Wound Surface Area (cm^2) 12.8 cm^2 06/14/21 0804   Change in Wound Size % (l*w) -966.67 06/14/21 0804   Wound Volume (cm^3) 6.4 cm^3 06/14/21 0804   Wound Healing % -2567 06/14/21 0804   Post-Procedure Length (cm) 3.2 cm 06/07/21 0814   Post-Procedure Width (cm) 4 cm 06/07/21 0814   Post-Procedure Depth (cm) 0.5 cm 06/07/21 0814   Post-Procedure Surface Area (cm^2) 12.8 cm^2 06/07/21 0814   Post-Procedure Volume (cm^3) 6.4 cm^3 06/07/21 0814   Distance Tunneling (cm) 0 cm 06/14/21 0804   Tunneling Position ___ O'Clock 0 06/14/21 0804   Undermining Starts ___ O'Clock 0 06/14/21 0804   Undermining Ends___ O'Clock 0 06/14/21 0804   Undermining Maxium Distance (cm) 0 06/14/21 0804   Wound Assessment Granulation tissue;Pink/red;Slough 06/16/21 0052   Drainage Amount Moderate 06/16/21 0052   Drainage Description Serosanguinous 06/16/21 0052   Odor None 06/16/21 0052   Yamilex-wound Assessment Maceration 06/16/21 0052   Margins Defined edges 06/16/21 0052   Wound Thickness Description not for Pressure Injury Full thickness 06/16/21 0052   Number of days: 64       Wound 06/02/21 Foot Left;Medial #2 (onset 2 days) Left Medial Foot (Active)   Wound Image   06/14/21 0804   Wound Etiology Diabetic 06/17/21 0930   Dressing Status New dressing applied 06/17/21 0930   Wound Cleansed Cleansed with saline 06/17/21 0930   Dressing/Treatment Collagen 06/17/21 0930   Offloading for Diabetic Foot Ulcers No offloading required 06/14/21 0804   Wound Length (cm) 0.7 cm 06/17/21 0930   Wound Width (cm) 0.5 cm 06/17/21 0930   Wound Depth (cm) 0.1 cm 06/17/21 0930   Wound Surface Area (cm^2) 0.35 cm^2 06/17/21 0930   Change in Wound Size % (l*w) 93.75 06/17/21 0930   Wound Volume (cm^3) 0.04 cm^3 06/17/21 0930   Wound Healing % 93 06/17/21 0930   Post-Procedure Length (cm) 2 cm 06/02/21 1110 understanding of wound care. Electronically signed by Rachell Butler.  ANAHI Waters,  on 6/17/2021 at 10:44 AM

## 2021-06-17 NOTE — PROGRESS NOTES
[address] [facility]  consulted by Dr. Fox Tariq for monitoring and adjustment. Indication for treatment: Osteomyelitis & cellulitis of right foot  Goal trough: 15 mcg/mL    Pertinent Laboratory Values:   Temp Readings from Last 3 Encounters:   06/17/21 98 °F (36.7 °C) (Oral)   06/14/21 97.7 °F (36.5 °C) (Temporal)   06/07/21 97.8 °F (36.6 °C) (Temporal)     Recent Labs     06/15/21  2007 06/16/21  1036   WBC 13.7* 12.4*   LACTATE 1.5  --      Recent Labs     06/15/21  2007 06/16/21  1036   BUN 9 7   CREATININE 0.8 0.6     Estimated Creatinine Clearance: 142 mL/min (based on SCr of 0.6 mg/dL). Intake/Output Summary (Last 24 hours) at 6/17/2021 1610  Last data filed at 6/17/2021 0544  Gross per 24 hour   Intake 686.67 ml   Output --   Net 686.67 ml       Pertinent Cultures:  Date    Source    Results  6/14    Wound   Ent. Faecalis      Vancomycin level:   TROUGH:  No results for input(s): VANCOTROUGH in the last 72 hours. RANDOM:  No results for input(s): VANCORANDOM in the last 72 hours. Assessment:  · WBC and temperature: WBC elevated, patient remains afebrile  · SCr, BUN, and urine output: renal function appears stable  · Day(s) of therapy: # 2  · Vancomycin concentration: Pending    Plan:  · Continue Vancomycin 1250 mg IVPB q12h  · Trough level due today @ 1630. · Pharmacy will continue to monitor patient and adjust therapy as indicated    Norma 3 6/17/21 @1630    Thank you for the consult.   Humza Dawson [facility]  6/17/2021 4:10 PM

## 2021-06-18 VITALS
TEMPERATURE: 98.1 F | HEIGHT: 65 IN | SYSTOLIC BLOOD PRESSURE: 131 MMHG | DIASTOLIC BLOOD PRESSURE: 83 MMHG | BODY MASS INDEX: 36.49 KG/M2 | OXYGEN SATURATION: 99 % | RESPIRATION RATE: 16 BRPM | HEART RATE: 90 BPM | WEIGHT: 219 LBS

## 2021-06-18 LAB
CREAT SERPL-MCNC: 0.6 MG/DL (ref 0.6–1.1)
GFR AFRICAN AMERICAN: >60 ML/MIN/1.73M2
GFR NON-AFRICAN AMERICAN: >60 ML/MIN/1.73M2
GLUCOSE BLD-MCNC: 134 MG/DL (ref 70–99)
GLUCOSE BLD-MCNC: 197 MG/DL (ref 70–99)
GLUCOSE BLD-MCNC: 240 MG/DL (ref 70–99)

## 2021-06-18 PROCEDURE — 6370000000 HC RX 637 (ALT 250 FOR IP): Performed by: SURGERY

## 2021-06-18 PROCEDURE — 2580000003 HC RX 258: Performed by: HOSPITALIST

## 2021-06-18 PROCEDURE — 6360000002 HC RX W HCPCS: Performed by: SURGERY

## 2021-06-18 PROCEDURE — C1751 CATH, INF, PER/CENT/MIDLINE: HCPCS

## 2021-06-18 PROCEDURE — 94761 N-INVAS EAR/PLS OXIMETRY MLT: CPT

## 2021-06-18 PROCEDURE — 76937 US GUIDE VASCULAR ACCESS: CPT

## 2021-06-18 PROCEDURE — 82962 GLUCOSE BLOOD TEST: CPT

## 2021-06-18 PROCEDURE — 97605 NEG PRS WND THER DME<=50SQCM: CPT

## 2021-06-18 PROCEDURE — 2580000003 HC RX 258: Performed by: SURGERY

## 2021-06-18 PROCEDURE — 6360000002 HC RX W HCPCS: Performed by: HOSPITALIST

## 2021-06-18 PROCEDURE — 36569 INSJ PICC 5 YR+ W/O IMAGING: CPT

## 2021-06-18 PROCEDURE — 2500000003 HC RX 250 WO HCPCS: Performed by: HOSPITALIST

## 2021-06-18 RX ORDER — GREEN TEA/HOODIA GORDONII 315-12.5MG
1 CAPSULE ORAL 2 TIMES DAILY
Qty: 60 TABLET | Refills: 2 | Status: SHIPPED | OUTPATIENT
Start: 2021-06-18 | End: 2021-09-16

## 2021-06-18 RX ORDER — PIPERACILLIN SODIUM, TAZOBACTAM SODIUM 3; .375 G/15ML; G/15ML
3375 INJECTION, POWDER, LYOPHILIZED, FOR SOLUTION INTRAVENOUS EVERY 8 HOURS
Qty: 405000 MG | Refills: 0 | Status: SHIPPED | OUTPATIENT
Start: 2021-06-18 | End: 2021-06-18 | Stop reason: SDUPTHER

## 2021-06-18 RX ORDER — SODIUM CHLORIDE 0.9 % (FLUSH) 0.9 %
5-40 SYRINGE (ML) INJECTION EVERY 12 HOURS SCHEDULED
Status: DISCONTINUED | OUTPATIENT
Start: 2021-06-18 | End: 2021-06-18 | Stop reason: HOSPADM

## 2021-06-18 RX ORDER — LIDOCAINE HYDROCHLORIDE 10 MG/ML
5 INJECTION, SOLUTION EPIDURAL; INFILTRATION; INTRACAUDAL; PERINEURAL ONCE
Status: COMPLETED | OUTPATIENT
Start: 2021-06-18 | End: 2021-06-18

## 2021-06-18 RX ORDER — PIPERACILLIN SODIUM, TAZOBACTAM SODIUM 3; .375 G/15ML; G/15ML
3375 INJECTION, POWDER, LYOPHILIZED, FOR SOLUTION INTRAVENOUS EVERY 8 HOURS
Qty: 405000 MG | Refills: 0 | Status: SHIPPED | OUTPATIENT
Start: 2021-06-18 | End: 2021-07-15

## 2021-06-18 RX ORDER — VANCOMYCIN 1.75 G/350ML
1250 INJECTION, SOLUTION INTRAVENOUS EVERY 12 HOURS
Qty: 20000 ML | Refills: 0 | Status: CANCELLED | OUTPATIENT
Start: 2021-06-18 | End: 2021-07-28

## 2021-06-18 RX ORDER — SODIUM CHLORIDE 9 MG/ML
25 INJECTION, SOLUTION INTRAVENOUS PRN
Status: DISCONTINUED | OUTPATIENT
Start: 2021-06-18 | End: 2021-06-18 | Stop reason: HOSPADM

## 2021-06-18 RX ORDER — SODIUM CHLORIDE 0.9 % (FLUSH) 0.9 %
5-40 SYRINGE (ML) INJECTION PRN
Status: DISCONTINUED | OUTPATIENT
Start: 2021-06-18 | End: 2021-06-18 | Stop reason: HOSPADM

## 2021-06-18 RX ADMIN — ATORVASTATIN CALCIUM 40 MG: 40 TABLET, FILM COATED ORAL at 08:36

## 2021-06-18 RX ADMIN — CEFEPIME HYDROCHLORIDE 1000 MG: 1 INJECTION, POWDER, FOR SOLUTION INTRAMUSCULAR; INTRAVENOUS at 04:07

## 2021-06-18 RX ADMIN — PIPERACILLIN AND TAZOBACTAM 3375 MG: 3; .375 INJECTION, POWDER, FOR SOLUTION INTRAVENOUS at 14:45

## 2021-06-18 RX ADMIN — ACETAMINOPHEN 650 MG: 325 TABLET ORAL at 04:07

## 2021-06-18 RX ADMIN — ACETAMINOPHEN 650 MG: 325 TABLET ORAL at 08:36

## 2021-06-18 RX ADMIN — INSULIN LISPRO 4 UNITS: 100 INJECTION, SOLUTION INTRAVENOUS; SUBCUTANEOUS at 11:43

## 2021-06-18 RX ADMIN — SODIUM CHLORIDE, PRESERVATIVE FREE 5 ML: 5 INJECTION INTRAVENOUS at 10:22

## 2021-06-18 RX ADMIN — VANCOMYCIN HYDROCHLORIDE 1250 MG: 5 INJECTION, POWDER, LYOPHILIZED, FOR SOLUTION INTRAVENOUS at 05:30

## 2021-06-18 RX ADMIN — ASPIRIN 81 MG: 81 TABLET, COATED ORAL at 08:36

## 2021-06-18 RX ADMIN — INSULIN LISPRO 2 UNITS: 100 INJECTION, SOLUTION INTRAVENOUS; SUBCUTANEOUS at 08:35

## 2021-06-18 RX ADMIN — LIDOCAINE HYDROCHLORIDE 5 ML: 10 INJECTION, SOLUTION EPIDURAL; INFILTRATION; INTRACAUDAL; PERINEURAL at 14:34

## 2021-06-18 RX ADMIN — LISINOPRIL 2.5 MG: 2.5 TABLET ORAL at 08:36

## 2021-06-18 RX ADMIN — ACETAMINOPHEN 650 MG: 325 TABLET ORAL at 14:46

## 2021-06-18 ASSESSMENT — PAIN SCALES - GENERAL
PAINLEVEL_OUTOF10: 0
PAINLEVEL_OUTOF10: 0
PAINLEVEL_OUTOF10: 3
PAINLEVEL_OUTOF10: 0
PAINLEVEL_OUTOF10: 0

## 2021-06-18 ASSESSMENT — PAIN DESCRIPTION - PAIN TYPE: TYPE: SURGICAL PAIN

## 2021-06-18 ASSESSMENT — PAIN DESCRIPTION - PROGRESSION: CLINICAL_PROGRESSION: GRADUALLY WORSENING

## 2021-06-18 ASSESSMENT — PAIN DESCRIPTION - ORIENTATION: ORIENTATION: RIGHT

## 2021-06-18 ASSESSMENT — PAIN DESCRIPTION - LOCATION: LOCATION: FOOT

## 2021-06-18 ASSESSMENT — PAIN DESCRIPTION - ONSET: ONSET: GRADUAL

## 2021-06-18 ASSESSMENT — PAIN - FUNCTIONAL ASSESSMENT: PAIN_FUNCTIONAL_ASSESSMENT: ACTIVITIES ARE NOT PREVENTED

## 2021-06-18 ASSESSMENT — PAIN DESCRIPTION - FREQUENCY: FREQUENCY: CONTINUOUS

## 2021-06-18 ASSESSMENT — PAIN DESCRIPTION - DESCRIPTORS: DESCRIPTORS: ACHING;DISCOMFORT

## 2021-06-18 NOTE — PROGRESS NOTES
[facility] Liaison spoke with pt and pt is agreeable to hhc at discharge. Checking benefit for hhc infusion.  Please place inpatient consult to home health needs order in Epic at [place].

## 2021-06-18 NOTE — PLAN OF CARE
Problem: Pain:  Goal: Pain level will decrease  Description: Pain level will decrease  Outcome: Completed  Goal: Control of acute pain  Description: Control of acute pain  Outcome: Completed  Goal: Control of chronic pain  Description: Control of chronic pain  Outcome: Completed     Problem: Skin Integrity:  Goal: Will show no infection signs and symptoms  Description: Will show no infection signs and symptoms  Outcome: Completed  Goal: Absence of new skin breakdown  Description: Absence of new skin breakdown  Outcome: Completed     Problem: Safety:  Goal: Free from accidental physical injury  Description: Free from accidental physical injury  Outcome: Completed  Goal: Free from intentional harm  Description: Free from intentional harm  Outcome: Completed     Problem: Daily Care:  Goal: Daily care needs are met  Description: Daily care needs are met  Outcome: Completed     Problem: Discharge Planning:  Goal: Patients continuum of care needs are met  Description: Patients continuum of care needs are met  Outcome: Completed     Problem: Nutrition  Goal: Optimal nutrition therapy  Outcome: Completed     Problem: Falls - Risk of:  Goal: Will remain free from falls  Description: Will remain free from falls  Outcome: Completed  Goal: Absence of physical injury  Description: Absence of physical injury  Outcome: Completed

## 2021-06-18 NOTE — CONSULTS
PICC Consult complete. Education regarding PICC insertion discussed and risks and benefits assessed with patient. patient verbalized understanding. Consent signed by patient. Time out done @ 1400. PICC inserted in RUE Basilic vessel without difficulty per protocol. Placement verified via VPSG[employer] monitoring system. Good blood return and flushes easily on single port. Patient tolerated well. Education pamphlets left in chart  Ultrasound photos of vein diameter and doppler signature placed in chart. Please consult IV/PICC team if patient's needs change. PICC OK to use.

## 2021-06-18 NOTE — CONSULTS
[address] [address] Continence Nurse  Consult Note       Marilyn Hurley  AGE: 41 y.o. GENDER: female  : 1980  TODAY'S DATE:  2021    Subjective:     Reason for  Evaluation and Assessment: wound care for NPWT dressing change to rt foot amp site.      Maggie Betts is a 41 y.o. female referred by:   [x] Physician  [] Nursing  [] Other:     Wound Identification:  Wound Type: diabetic and non-healing surgical  Contributing Factors: diabetes and chronic pressure        PAST MEDICAL HISTORY        Diagnosis Date    Allergic rhinitis     allergy shots- Dr. Hraris Groom Allergic rhinitis     Dr. Kirill kwan and flonase    Anxiety 10/24/2012    Atopic dermatitis     Dr. Kirill kwan    Back pain 10/24/2012    Bilateral sciatica 2020    Depression     Diabetes mellitus ([facility]     Diabetes mellitus type II, uncontrolled ([facility]     Diabetic eye exam: 2011, Dr. Ester Alfonso , needs record [V72.0T][    GERD (gastroesophageal reflux disease)     Headache(784.0)     Hyperlipidemia LDL goal < 100 2012    Hypertension     Morbid obesity ([facility]     No retinopathy on exam     Dr Ester Alfonso 13 Diabetes with no significatn diabetic retinopathy found in both eyes    Non compliance w medication regimen 2014    Sprain of neck 10/4/2011       PAST SURGICAL HISTORY    Past Surgical History:   Procedure Laterality Date     SECTION      HYSTERECTOMY      OTHER SURGICAL HISTORY  2017    I&D of right perineal abcess     TOE AMPUTATION Right 2021    RIGHT 5TH TOE AMPUTATION performed by Tatiana Lindo MD at [address] [place] [address] History   Problem Relation Age of Onset    Heart Disease Mother     Diabetes Mother     Asthma Mother     High Cholesterol Mother     Depression Mother     Heart Disease Father     High Cholesterol Father        SOCIAL HISTORY    Social History     Tobacco Use    Smoking status: Current Every Day Smoker     Packs/day: 0.25 Types: Cigarettes     Last attempt to quit: 07/2017     Years since quitting: 3.9    Smokeless tobacco: Never Used    Tobacco comment: trying to slow down    Vaping Use    Vaping Use: Never used   Substance Use Topics    Alcohol use: Yes     Comment: rare    Drug use: No       ALLERGIES    Allergies   Allergen Reactions    Penicillins Rash       MEDICATIONS    No current facility-administered medications on file prior to encounter.      Current Outpatient Medications on File Prior to Encounter   Medication Sig Dispense Refill    lisinopril (PRINIVIL;ZESTRIL) 2.5 MG tablet Take 1 tablet by mouth every morning 90 tablet 3    atorvastatin (LIPITOR) 40 MG tablet Take 1 tablet by mouth every morning 90 tablet 3    Dulaglutide 1.5 MG/0.5ML SOPN Inject 1.5 mg into the skin once a week Dose increase as of 1/9/20 4 pen 5    glipiZIDE (GLUCOTROL XL) 10 MG extended release tablet Take 1 tablet by mouth every morning 90 tablet 3    insulin glargine (LANTUS SOLOSTAR) 100 UNIT/ML injection pen Inject 15 Units into the skin nightly 10 pen 5    Lancets MISC To test up to 4 times per day- nighttime and with each meal 300 each 5    blood glucose test strips (EXACTECH TEST) strip 1 each by In Vitro route daily Embrace test strips to test BS 4x daily 300 each 6    Insulin Pen Needle 32G X 5 MM MISC To use with Lantus pen and Novolog pen 30 each 11    aspirin EC 81 MG EC tablet Take 1 tablet by mouth daily 90 tablet 0         Objective:      /83   Pulse 90   Temp 98.1 °F (36.7 °C) (Oral)   Resp 16   Ht 5' 5\" (1.651 m)   Wt 219 lb (99.3 kg)   SpO2 99%   BMI 36.44 kg/m²   Sherman Risk Score: Sherman Scale Score: 20    LABS    CBC:   Lab Results   Component Value Date    WBC 12.4 06/16/2021    RBC 3.81 06/16/2021    HGB 12.3 06/16/2021    HCT 34.9 06/16/2021    MCV 91.6 06/16/2021    MCH 32.3 06/16/2021    MCHC 35.2 06/16/2021    RDW 11.7 06/16/2021     06/16/2021    MPV 9.5 06/16/2021     CMP:    Lab Changed 06/18/21 0907   Dressing Changed Changed/New 06/18/21 0907   Drainage Amount Moderate 06/18/21 0907   Drainage Description Serosanguinous 06/18/21 0907   Odor None 06/18/21 0907   Number of days: 0       Wound 04/13/21 Foot #1 4th and 5th Toe Web Space- rt 5th toe amp site  (Active)   Wound Image   06/14/21 0804   Wound Etiology Surgical 06/18/21 0907   Dressing Status New dressing applied 06/18/21 0907   Wound Cleansed Cleansed with saline 06/18/21 0907   Dressing/Treatment Negative pressure wound therapy 06/18/21 0907   Offloading for Diabetic Foot Ulcers No offloading required 06/14/21 0804   Wound Length (cm) 6.4 cm 06/18/21 0907   Wound Width (cm) 2 cm 06/18/21 0907   Wound Depth (cm) 1.7 cm 06/18/21 0907   Wound Surface Area (cm^2) 12.8 cm^2 06/18/21 0907   Change in Wound Size % (l*w) -966.67 06/18/21 0907   Wound Volume (cm^3) 21.76 cm^3 06/18/21 0907   Wound Healing % -8967 06/18/21 0907   Post-Procedure Length (cm) 3.2 cm 06/07/21 0814   Post-Procedure Width (cm) 4 cm 06/07/21 0814   Post-Procedure Depth (cm) 0.5 cm 06/07/21 0814   Post-Procedure Surface Area (cm^2) 12.8 cm^2 06/07/21 0814   Post-Procedure Volume (cm^3) 6.4 cm^3 06/07/21 0814   Distance Tunneling (cm) 0 cm 06/18/21 0907   Tunneling Position ___ O'Clock 0 06/18/21 0907   Undermining Starts ___ O'Clock 0 06/18/21 0907   Undermining Ends___ O'Clock 0 06/18/21 0907   Undermining Maxium Distance (cm) 0 06/18/21 0907   Wound Assessment Exposed structure tendon 06/18/21 0907   Drainage Amount Moderate 06/18/21 0907   Drainage Description Serosanguinous 06/18/21 0907   Odor None 06/18/21 0907   Yamilex-wound Assessment Intact 06/18/21 0907   Margins Defined edges 06/18/21 0907   Wound Thickness Description not for Pressure Injury Full thickness 06/18/21 0907   Number of days: 65       Wound 06/02/21 Foot Left;Medial #2 (onset 2 days) Left Medial Foot (Active)   Wound Image   06/14/21 0804   Wound Etiology Diabetic 06/18/21 0907   Dressing Status New dressing applied 06/18/21 0907   Wound Cleansed Cleansed with saline 06/18/21 0907   Dressing/Treatment Collagen 06/18/21 0907   Offloading for Diabetic Foot Ulcers No offloading required 06/14/21 0804   Wound Length (cm) 0.7 cm 06/17/21 0930   Wound Width (cm) 0.5 cm 06/17/21 0930   Wound Depth (cm) 0.1 cm 06/17/21 0930   Wound Surface Area (cm^2) 0.35 cm^2 06/17/21 0930   Change in Wound Size % (l*w) 93.75 06/17/21 0930   Wound Volume (cm^3) 0.04 cm^3 06/17/21 0930   Wound Healing % 93 06/17/21 0930   Post-Procedure Length (cm) 2 cm 06/02/21 1110   Post-Procedure Width (cm) 2 cm 06/02/21 1110   Post-Procedure Depth (cm) 0.1 cm 06/02/21 1110   Post-Procedure Surface Area (cm^2) 4 cm^2 06/02/21 1110   Post-Procedure Volume (cm^3) 0.4 cm^3 06/02/21 1110   Distance Tunneling (cm) 0 cm 06/18/21 0907   Tunneling Position ___ O'Clock 0 06/18/21 0907   Undermining Starts ___ O'Clock 0 06/18/21 0907   Undermining Ends___ O'Clock 0 06/18/21 0907   Undermining Maxium Distance (cm) 0 06/18/21 0907   Wound Assessment Pink/red 06/17/21 0930   Drainage Amount Moderate 06/18/21 0907   Drainage Description Serosanguinous; Yellow 06/18/21 0907   Odor None 06/18/21 0907   Yamilex-wound Assessment Intact 06/18/21 0907   Margins Defined edges 06/18/21 0907   Wound Thickness Description not for Pressure Injury Full thickness 06/18/21 0907   Number of days: 15       Response to treatment:  With complaints of pain.      Pain Assessment:  Severity:  mild  Quality of pain: sore  Wound Pain Timing/Severity: dressing change  Premedicated:  yes    Plan:     Plan of Care: Wound 06/02/21 Foot Left;Medial #2 (onset 2 days) Left Medial Foot-Dressing/Treatment: Collagen (optifoam border)  Wound 04/13/21 Foot #1 4th and 5th Toe Web Space- rt 5th toe amp site -Dressing/Treatment: Negative pressure wound therapy (mastisol duoderm stoma ring white foam x 1 black foam x3)     Patient sitting up in bed agreeable to wound care for NPWT dressing change to rt foot. Pt had rt 5th toe amputation done 6/16/21 by Dr Rosalina Adam. Dressing removed soaked with NS and cleansed pt had some pain with removal. Wound measured and pictured. There is some tendon exposure covered with white foam x 1 then black foam x 3 bridged to anterior foot. Adequate seal obtained @ 125mmhg continuous. Changed dressing to left medial foot as above. Pt is generally not at risk for skin breakdon JOSEP elizabeth. Wound care to change vac dressing again on Monday. Specialty Bed Required : no  [] Low Air Loss   [] Pressure Redistribution  [] Fluid Immersion  [] Bariatric  [] Total Pressure Relief  [] Other:     Discharge Plan:  Placement for patient upon discharge:  home  Hospice Care:no  Patient appropriate for Outpatient [address] [facility]: yes pt to continue after discharge    Patient/Caregiver Teaching:  Level of patient/caregiver understanding able to:  Pt verbalized understanding of dressing change-wound vac. Electronically signed by Kwadwo Waters RN, on 6/18/2021 at 9:15 AM

## 2021-06-18 NOTE — PROGRESS NOTES
[facility] [facility]  consulted by Dr. Phani Steven for monitoring and adjustment. 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 Intake/Output Summary (Last 24 hours) at 6/18/2021 1201  Last data filed at 6/18/2021 1022  Gross per 24 hour   Intake 715 ml   Output --   Net 715 ml       Pertinent Cultures:  Date    Source    Results  6/14    Wound   Ent. Faecalis      Vancomycin level:   TROUGH:    Recent Labs     06/17/21  1929   VANCOTROUGH 7.4*     RANDOM:  No results for input(s): VANCORANDOM in the last 72 hours.     Assessment:  · WBC and temperature: WBC elevated, patient remains afebrile  · SCr, BUN, and urine output:  · Stable, at baseline  · Repeat Scr ordered as add-on, pending  · Day(s) of therapy: #3  · Vancomycin concentration:  · 7.4, level drawn 14h post-dose (falsely low)    Plan:  · Continue Vancomycin 1250 mg IVPB q12h  · Level yesterday collected 14h after dose of vancomycin and is falsely low  · Extrapolated trough level (drawn 12h post-dose) predicted to be therapeutic, in range 10-15  · Follow-up on add-on serum creatinine to make sure renal function stable  · Repeat next trough level on Monday as outpatient (discussed with provider)  · Pharmacy will continue to monitor patient and adjust therapy as indicated    VANCOMYCIN CONCENTRATION SCHEDULED FOR 6/21/21    Thank you for the consult,  Alen Peabody, [address] [place] [address]  6/18/2021 12:01 PM

## 2021-06-19 LAB
CULTURE: NORMAL
Lab: NORMAL
SPECIMEN: NORMAL

## 2021-06-19 NOTE — DISCHARGE SUMMARY
Discharge Summary    Name:  Anna Oneill /Age/Sex: 1980  (41 y.o. female)   MRN & CSN:  [id number] & [id number] Admission Date/Time: 6/15/2021  6:54 PM   Attending:  No att. providers found Discharging Physician: Alondra Bain MD     Hospital Course:   Anna Oneill is a 41 y.o.  female  who presents with:    (HPI taken from H&P, Dr. Mark Rivas)  Anna Oneill is a 41 y.o. female with diabetes mellitus type 2, on chronic insulin, hyperlipidemia, moderate obesity, quit smoking few days ago, history of perineal necrotizing fasciitis s/p excisional debridment (2017), who had an injury to her right fifth toe in  presented to ED with complaints of worsening swelling, pain. Patient reported that she hit her toe with a grocery cart in April. Since then the wound was getting progressively worse. Patient has been to wound care clinic and getting debridement. She also has also been on antibiotics(Bactrim, ciprofloxacin) since 2021. But patient continued to notice worsening wound, swelling, discoloration and pain radiating to her thigh. Patient denied any fever, but had chills. Patient denied any other complaints no chest pain, shortness of breath, fever, chills, cough, denied any abdominal pain, denied any urinary complaints, denied any constipation or diarrhea. Vitals in ED-/94, , RR 18, temp 98.8, saturating 98% on room air. Lab work significant for sodium 133, random glucose 196, WBC 13.7. Doppler of right lower extremity-no DVT. Patient received cefepime depending on prior cultures. Surgery was consulted, patient underwent amputation of left fifth digit on 2021, and was placed on a wound VAC. Patient was initially started on broad-spectrum antibiotics with Vanco and cefepime on admission due to underlying osteomyelitis. Patient's clinical status improved.   An infectious disease consult was sought, however service was unavailable at the hospital during patient's MG/0.5ML SOPN  Inject 1.5 mg into the skin once a week Dose increase as of 1/9/20             glipiZIDE (GLUCOTROL XL) 10 MG extended release tablet  Take 1 tablet by mouth every morning             insulin glargine (LANTUS SOLOSTAR) 100 UNIT/ML injection pen  Inject 15 Units into the skin nightly             Insulin Pen Needle 32G X 5 MM MISC  To use with Lantus pen and Novolog pen             Lancets MISC  To test up to 4 times per day- nighttime and with each meal             lisinopril (PRINIVIL;ZESTRIL) 2.5 MG tablet  Take 1 tablet by mouth every morning             piperacillin-tazobactam (ZOSYN) 3.375 (3-0.375) g injection  Inject 3,375 mg into the muscle every 8 hours 6 weeks total treatment, end date 7/28/2021             Probiotic Acidophilus (FLORANEX) TABS  Take 1 tablet by mouth 2 times daily                 Objective Findings at Discharge:   /83   Pulse 90   Temp 98.1 °F (36.7 °C) (Oral)   Resp 16   Ht 5' 5\" (1.651 m)   Wt 219 lb (99.3 kg)   SpO2 99%   BMI 36.44 kg/m²            PHYSICAL EXAM   GEN Awake female, sitting upright in bed in no apparent distress. Appears given age. EYES Pupils are equally round. No scleral erythema, discharge, or conjunctivitis. HENT Mucous membranes are moist. Oral pharynx without exudates, no evidence of thrush. NECK Supple, no apparent thyromegaly or masses. RESP Clear to auscultation, no wheezes, rales or rhonchi. Symmetric chest movement while on room air. CARDIO/VASC S1/S2 auscultated. Regular rate without appreciable murmurs, rubs, or gallops. No JVD or carotid bruits. Peripheral pulses equal bilaterally and palpable. No peripheral edema. GI Abdomen is soft without significant tenderness, masses, or guarding. Bowel sounds are normoactive. Rectal exam deferred.  No costovertebral angle tenderness. Normal appearing external genitalia. Henry catheter is not present. HEME/LYMPH No palpable cervical lymphadenopathy and no hepatosplenomegaly.  No petechiae or ecchymoses. MSK LLE s/p fifth digit amputation with wound vac   SKIN Normal coloration, warm, dry. NEURO Cranial nerves appear grossly intact, normal speech, no lateralizing weakness. PSYCH Awake, alert, oriented x 4. Affect appropriate. BMP/CBC  Recent Labs     06/16/21  1036 06/17/21  1929   *  --    K 3.9  --    CL 99  --    CO2 22  --    BUN 7  --    CREATININE 0.6 0.6   WBC 12.4*  --    HCT 34.9*  --      --        Discharge Time of 50 minutes in coordination with case management, pharmacy and home health care liasion.     Electronically signed by Krystin Whitmore MD on 6/19/2021 at 8:56 AM

## 2021-06-21 ENCOUNTER — CARE COORDINATION (OUTPATIENT)
Dept: CARE COORDINATION | Age: 41
End: 2021-06-21

## 2021-06-21 ENCOUNTER — HOSPITAL ENCOUNTER (OUTPATIENT)
Age: 41
Setting detail: SPECIMEN
Discharge: HOME | End: 2021-06-21
Payer: MEDICARE

## 2021-06-21 LAB
ALBUMIN SERPL-MCNC: 3.5 GM/DL (ref 3.4–5)
ALP BLD-CCNC: 97 IU/L (ref 40–129)
ALT SERPL-CCNC: 18 U/L (ref 10–40)
ANION GAP SERPL CALCULATED.3IONS-SCNC: 7 MMOL/L (ref 4–16)
AST SERPL-CCNC: 17 IU/L (ref 15–37)
BASOPHILS ABSOLUTE: 0.1 K/CU MM
BASOPHILS RELATIVE PERCENT: 0.6 % (ref 0–1)
BILIRUB SERPL-MCNC: 0.3 MG/DL (ref 0–1)
BUN BLDV-MCNC: 9 MG/DL (ref 6–23)
CALCIUM SERPL-MCNC: 8.8 MG/DL (ref 8.3–10.6)
CHLORIDE BLD-SCNC: 102 MMOL/L (ref 99–110)
CO2: 30 MMOL/L (ref 21–32)
CREAT SERPL-MCNC: 0.6 MG/DL (ref 0.6–1.1)
CULTURE: ABNORMAL
DIFFERENTIAL TYPE: ABNORMAL
EOSINOPHILS ABSOLUTE: 0.5 K/CU MM
EOSINOPHILS RELATIVE PERCENT: 5 % (ref 0–3)
ERYTHROCYTE SEDIMENTATION RATE: 119 MM/HR (ref 0–20)
GFR AFRICAN AMERICAN: >60 ML/MIN/1.73M2
GFR NON-AFRICAN AMERICAN: >60 ML/MIN/1.73M2
GLUCOSE BLD-MCNC: 135 MG/DL (ref 70–99)
HCT VFR BLD CALC: 37 % (ref 37–47)
HEMOGLOBIN: 12.5 GM/DL (ref 12.5–16)
IMMATURE NEUTROPHIL %: 0.5 % (ref 0–0.43)
LYMPHOCYTES ABSOLUTE: 3.4 K/CU MM
LYMPHOCYTES RELATIVE PERCENT: 30.8 % (ref 24–44)
Lab: ABNORMAL
Lab: ABNORMAL
MCH RBC QN AUTO: 31.5 PG (ref 27–31)
MCHC RBC AUTO-ENTMCNC: 33.8 % (ref 32–36)
MCV RBC AUTO: 93.2 FL (ref 78–100)
MONOCYTES ABSOLUTE: 0.7 K/CU MM
MONOCYTES RELATIVE PERCENT: 6.2 % (ref 0–4)
PDW BLD-RTO: 11.7 % (ref 11.7–14.9)
PLATELET # BLD: 360 K/CU MM (ref 140–440)
PMV BLD AUTO: 9.8 FL (ref 7.5–11.1)
POTASSIUM SERPL-SCNC: 3.9 MMOL/L (ref 3.5–5.1)
RBC # BLD: 3.97 M/CU MM (ref 4.2–5.4)
SEGMENTED NEUTROPHILS ABSOLUTE COUNT: 6.2 K/CU MM
SEGMENTED NEUTROPHILS RELATIVE PERCENT: 56.9 % (ref 36–66)
SODIUM BLD-SCNC: 139 MMOL/L (ref 135–145)
SPECIMEN: ABNORMAL
SPECIMEN: ABNORMAL
TOTAL IMMATURE NEUTOROPHIL: 0.05 K/CU MM
TOTAL PROTEIN: 6.2 GM/DL (ref 6.4–8.2)
WBC # BLD: 10.9 K/CU MM (ref 4–10.5)

## 2021-06-21 PROCEDURE — 80053 COMPREHEN METABOLIC PANEL: CPT

## 2021-06-21 PROCEDURE — 86140 C-REACTIVE PROTEIN: CPT

## 2021-06-21 PROCEDURE — 85652 RBC SED RATE AUTOMATED: CPT

## 2021-06-21 PROCEDURE — 36415 COLL VENOUS BLD VENIPUNCTURE: CPT

## 2021-06-21 PROCEDURE — 85025 COMPLETE CBC W/AUTO DIFF WBC: CPT

## 2021-06-21 NOTE — CARE COORDINATION
First attempt made to outreach patient. Left HIPAA compliant message and provided call back number    Call placed to [phone] [facility]  And confirmed start of care has begun for pt.  Since she is on IV abx

## 2021-06-22 ENCOUNTER — CARE COORDINATION (OUTPATIENT)
Dept: CARE COORDINATION | Age: 41
End: 2021-06-22

## 2021-06-22 DIAGNOSIS — I10 ESSENTIAL HYPERTENSION: Primary | ICD-10-CM

## 2021-06-22 LAB — C-REACTIVE PROTEIN, HIGH SENSITIVITY: 14.9 MG/L

## 2021-06-22 PROCEDURE — 1111F DSCHRG MED/CURRENT MED MERGE: CPT | Performed by: FAMILY MEDICINE

## 2021-06-22 NOTE — CARE COORDINATION
[facility] Transitions Initial Follow Up Call    Call within 2 business days of discharge: Yes    Patient: Nicholas Ritchie Patient : 1980   MRN: <[id number]>  Reason for Admission: osteomyelitis of right foot, s/p amputation toe  Discharge Date: 21 RARS: Readmission Risk Score: 10      Last Discharge [facility]       Complaint Diagnosis Description Type Department Provider    6/15/21 Toe Injury Other acute osteomyelitis of right foot ([facility] [address] . .. ED to Hosp-Admission (Discharged) (ADMITTED) [facility] Lizett Jones MD; Lars Laboy. .. Spoke with: Patient reports she was set up with IV abx, her neighbor is helping her with this and was instructed on how to administer. Reports she has some burning in right foot and is taking Ibuprofen with little relief. States she has an appointment tomorrow with wound care and will address then. Pt denies fever, chills or sob. States her appetite is good and her blood sugar is running around 126. Pt reports she has active [address] with [address] [place] [address] Rd and is using a walker to ambulate. Denies any needs at this time. States she's compliant with all meds (1111F completed) and her f/u with surgeon is scheduled for 21. Transitions of Care Initial Call    Was this an external facility discharge? No Discharge Facility:     Challenges to be reviewed by the provider   Additional needs identified to be addressed with provider: No  none             Method of communication with provider : none      Advance Care Planning:   Does patient have an Advance Directive:  not on file. Was this a readmission? No  Patient stated reason for admission: foot infection  Patients top risk factors for readmission: medical condition-osteomyelitis    Care Transition Nurse (CTN) contacted the patient by telephone to perform post hospital discharge assessment. Verified name and  with patient as identifiers. Provided introduction to self, and explanation of the CTN role.      CTN reviewed discharge instructions, medical action plan and red flags with patient who verbalized understanding. Patient given an opportunity to ask questions and does not have any further questions or concerns at this time. Were discharge instructions available to patient? Yes. Reviewed appropriate site of care based on symptoms and resources available to patient including: PCP, Specialist and Home health. The patient agrees to contact the PCP office for questions related to their healthcare. Medication reconciliation was performed with patient, who verbalizes understanding of administration of home medications. Advised obtaining a 90-day supply of all daily and as-needed medications. Covid Risk Education     Educated patient about risk for severe COVID-19 due to risk factors according to CDC guidelines. CTN reviewed discharge instructions, medical action plan and red flag symptoms with the patient who verbalized understanding. Discussed COVID vaccination status: No. Education provided on COVID-19 vaccination as appropriate. Discussed exposure protocols and quarantine with CDC Guidelines. Patient was given an opportunity to verbalize any questions and concerns and agrees to contact CTN or health care provider for questions related to their healthcare. Reviewed and educated patient on any new and changed medications related to discharge diagnosis. Was patient discharged with a pulse oximeter? No Discussed and confirmed pulse oximeter discharge instructions and when to notify provider or seek emergency care. CTN provided contact information. Plan for follow-up call in 5-7 days based on severity of symptoms and risk factors.       Non-face-to-face services provided:  Obtained and reviewed discharge summary and/or continuity of care documents    Care Transitions 24 Hour Call    Do you have any ongoing symptoms?: Yes  Patient-reported symptoms: Pain  Interventions for patient-reported symptoms: Other  Do you have a copy of your discharge instructions?: Yes  Do you have all of your prescriptions and are they filled?: Yes  Have you been contacted by a [address] [place] [address] Avenue?: No  Have you scheduled your follow up appointment?: Yes  How are you going to get to your appointment?: Car - family or friend to transport  Were you discharged with any Home Care or Post Acute Services: Yes  Post Acute Services: Home Health  Patient DME: Theodor Mode  Patient Home Equipment: Home Infusion (Comment: IV Abx, wound vac - 6/22/21)  Do you have support at home?: Child  Do you feel like you have everything you need to keep you well at home?: Yes  Are you an active caregiver in your home?: Yes  Care Transitions Interventions         Follow Up  Future Appointments   Date Time Provider Cynthia Breaux   6/23/2021  8:30 AM ALISTAIR Lozoya - [address] [facility]   7/1/2021 10:00 AM Ivet Felix MD [facility] G[facility]URG [facility]   7/14/2021  9:30 AM Pedro Hernandez MD [address] [facility]   7/14/2021 11:00 AM ALISTAIR Barcenas  [place] [facility]   1/17/2022 11:00 AM ALISTAIR Barcenas  [place] [facility]       Joesph Johnston RN

## 2021-06-23 ENCOUNTER — HOSPITAL ENCOUNTER (OUTPATIENT)
Dept: WOUND CARE | Age: 41
Discharge: HOME | End: 2021-06-23
Payer: MEDICARE

## 2021-06-23 VITALS
TEMPERATURE: 97.3 F | DIASTOLIC BLOOD PRESSURE: 82 MMHG | RESPIRATION RATE: 16 BRPM | SYSTOLIC BLOOD PRESSURE: 123 MMHG | HEART RATE: 93 BPM

## 2021-06-23 DIAGNOSIS — Z89.421 STATUS POST AMPUTATION OF LESSER TOE OF RIGHT FOOT (HCC): ICD-10-CM

## 2021-06-23 DIAGNOSIS — L97.519 DIABETIC ULCER OF RIGHT FIFTH TOE (HCC): Primary | ICD-10-CM

## 2021-06-23 DIAGNOSIS — T14.8XXA PAIN ASSOCIATED WITH WOUND: ICD-10-CM

## 2021-06-23 DIAGNOSIS — E11.621 DIABETIC ULCER OF RIGHT FIFTH TOE (HCC): Primary | ICD-10-CM

## 2021-06-23 DIAGNOSIS — R52 PAIN ASSOCIATED WITH WOUND: ICD-10-CM

## 2021-06-23 PROCEDURE — 11043 DBRDMT MUSC&/FSCA 1ST 20/<: CPT

## 2021-06-23 PROCEDURE — 11043 DBRDMT MUSC&/FSCA 1ST 20/<: CPT | Performed by: NURSE PRACTITIONER

## 2021-06-23 PROCEDURE — 97605 NEG PRS WND THER DME<=50SQCM: CPT

## 2021-06-23 RX ORDER — LIDOCAINE HYDROCHLORIDE 40 MG/ML
SOLUTION TOPICAL ONCE
Status: DISCONTINUED | OUTPATIENT
Start: 2021-06-23 | End: 2021-06-24 | Stop reason: HOSPADM

## 2021-06-23 RX ORDER — BACITRACIN ZINC AND POLYMYXIN B SULFATE 500; 1000 [USP'U]/G; [USP'U]/G
OINTMENT TOPICAL ONCE
Status: CANCELLED | OUTPATIENT
Start: 2021-06-23 | End: 2021-06-23

## 2021-06-23 RX ORDER — LIDOCAINE HYDROCHLORIDE 40 MG/ML
SOLUTION TOPICAL ONCE
Status: CANCELLED | OUTPATIENT
Start: 2021-06-23 | End: 2021-06-23

## 2021-06-23 RX ORDER — BACITRACIN, NEOMYCIN, POLYMYXIN B 400; 3.5; 5 [USP'U]/G; MG/G; [USP'U]/G
OINTMENT TOPICAL ONCE
Status: CANCELLED | OUTPATIENT
Start: 2021-06-23 | End: 2021-06-23

## 2021-06-23 RX ORDER — LIDOCAINE HYDROCHLORIDE 20 MG/ML
JELLY TOPICAL ONCE
Status: CANCELLED | OUTPATIENT
Start: 2021-06-23 | End: 2021-06-23

## 2021-06-23 RX ORDER — GINSENG 100 MG
CAPSULE ORAL ONCE
Status: CANCELLED | OUTPATIENT
Start: 2021-06-23 | End: 2021-06-23

## 2021-06-23 RX ORDER — LIDOCAINE 50 MG/G
OINTMENT TOPICAL ONCE
Status: CANCELLED | OUTPATIENT
Start: 2021-06-23 | End: 2021-06-23

## 2021-06-23 RX ORDER — CLOBETASOL PROPIONATE 0.5 MG/G
OINTMENT TOPICAL ONCE
Status: CANCELLED | OUTPATIENT
Start: 2021-06-23 | End: 2021-06-23

## 2021-06-23 RX ORDER — HYDROCODONE BITARTRATE AND ACETAMINOPHEN 5; 325 MG/1; MG/1
1 TABLET ORAL EVERY 8 HOURS PRN
Qty: 20 TABLET | Refills: 0 | Status: SHIPPED | OUTPATIENT
Start: 2021-06-23 | End: 2021-06-23

## 2021-06-23 RX ORDER — BETAMETHASONE DIPROPIONATE 0.05 %
OINTMENT (GRAM) TOPICAL ONCE
Status: CANCELLED | OUTPATIENT
Start: 2021-06-23 | End: 2021-06-23

## 2021-06-23 RX ORDER — LIDOCAINE 40 MG/G
CREAM TOPICAL ONCE
Status: CANCELLED | OUTPATIENT
Start: 2021-06-23 | End: 2021-06-23

## 2021-06-23 RX ORDER — HYDROCODONE BITARTRATE AND ACETAMINOPHEN 5; 325 MG/1; MG/1
1 TABLET ORAL EVERY 8 HOURS PRN
Qty: 20 TABLET | Refills: 0 | Status: SHIPPED | OUTPATIENT
Start: 2021-06-23 | End: 2021-06-30

## 2021-06-23 RX ORDER — GENTAMICIN SULFATE 1 MG/G
OINTMENT TOPICAL ONCE
Status: CANCELLED | OUTPATIENT
Start: 2021-06-23 | End: 2021-06-23

## 2021-06-23 ASSESSMENT — PAIN DESCRIPTION - PAIN TYPE: TYPE: SURGICAL PAIN

## 2021-06-23 ASSESSMENT — PAIN DESCRIPTION - LOCATION: LOCATION: FOOT;TOE (COMMENT WHICH ONE)

## 2021-06-23 ASSESSMENT — PAIN DESCRIPTION - ORIENTATION: ORIENTATION: RIGHT

## 2021-06-23 ASSESSMENT — PAIN SCALES - GENERAL: PAINLEVEL_OUTOF10: 4

## 2021-06-23 NOTE — PROGRESS NOTES
Negative Pressure    NAME:  Amy Rolin Paget  YOB: 1980  MEDICAL RECORD NUMBER:  [id number]  DATE:  6/23/2021     Applied Negative Pressure to right fifth toe amp site wound(s)/ulcer(s).  [x] Applied skin barrier prep to valerie-wound.  [x] Cut strips of plastic drape to picture frame wound so that valerie-wound is     covered with the drape.  [x] If bridging dressing to less prominent site, cover any intact skin that will come in contact with the Negative Pressure Therapy sponge, gauze or channel drain with plastic drape. The sponge should never touch intact skin.  [x] Cut sponge, gauze or channel drain to size which will fit into the wound/ulcer bed without being forced.  [x] Be sure the sponge is large enough to hold the entire round plastic flange which is attached to the tubing. Never allow flange to be larger than the sponge or it will produce suction damaging intact skin.  Total number of individual pieces of foam used within the wound bed: ONE   [x] If bridging the dressing away from the primary site, be sure the bridge leads to a piece of sponge large enough to hold the entire flange without allowing any of the flange to overlap onto intact skin.  [x] Covered sponge, gauze or channel drain with plastic drape.  [x] Cut a hole in this plastic drape directly over the sponge the same size as the plastic drain tubing.  [x] Removed plastic liner from flange and apply it directly over the hole you cut.  [x] Removed the plastic cover from the flange.  [x] Attached the tubing to the wound/ulcer Negative Pressure Therapy and turn it on to be sure a vacuum is created and that there are no leaks.  [x] If air leaks occur, use plastic drape to patch them.  [x] Secured Negative Pressure Therapy dressing with ace wrap loosely if located on an extremity. Maintain tubing outside of ace wrap. Tubing must not exert pressure on intact skin.     Applied per [employer] Guidelines Electronically signed by Codi Mott LPN on 1/64/1087 at 7:88 AM

## 2021-06-23 NOTE — PROGRESS NOTES
flonase    Anxiety 10/24/2012    Atopic dermatitis     Dr. Frannie Garcia- claritin    Back pain 10/24/2012    Bilateral sciatica 2020    Depression     Diabetes mellitus ([facility]     Diabetes mellitus type II, uncontrolled ([facility]     Diabetic eye exam: 2011, Dr. Chad Colorado , needs record [V72.0T][    GERD (gastroesophageal reflux disease)     Headache(784.0)     Hyperlipidemia LDL goal < 100 2012    Hypertension     Morbid obesity ([facility]     No retinopathy on exam     Dr Chad Colorado 13 Diabetes with no significatn diabetic retinopathy found in both eyes    Non compliance w medication regimen 2014    Sprain of neck 10/4/2011       PAST SURGICAL HISTORY    Past Surgical History:   Procedure Laterality Date     SECTION      HYSTERECTOMY      OTHER SURGICAL HISTORY  2017    I&D of right perineal abcess     TOE AMPUTATION Right 2021    RIGHT 5TH TOE AMPUTATION performed by Dorinda Smith MD at [address] [place] [address] History   Problem Relation Age of Onset    Heart Disease Mother     Diabetes Mother     Asthma Mother     High Cholesterol Mother     Depression Mother     Heart Disease Father     High Cholesterol Father        SOCIAL HISTORY    Social History     Tobacco Use    Smoking status: Former Smoker     Packs/day: 0.25     Types: Cigarettes     Quit date: 5/10/2021     Years since quittin.1    Smokeless tobacco: Never Used   Vaping Use    Vaping Use: Never used   Substance Use Topics    Alcohol use: Yes     Comment: rare    Drug use: No       ALLERGIES    Allergies   Allergen Reactions    Penicillins Rash       MEDICATIONS    Current Outpatient Medications on File Prior to Encounter   Medication Sig Dispense Refill    Probiotic Acidophilus (FLORANEX) TABS Take 1 tablet by mouth 2 times daily 60 tablet 2    piperacillin-tazobactam (ZOSYN) 3.375 (3-0.375) g injection Inject 3,375 mg into the muscle every 8 hours 6 weeks total treatment, end date 7/28/2021 [id number] mg 0    lisinopril (PRINIVIL;ZESTRIL) 2.5 MG tablet Take 1 tablet by mouth every morning 90 tablet 3    atorvastatin (LIPITOR) 40 MG tablet Take 1 tablet by mouth every morning 90 tablet 3    Dulaglutide 1.5 MG/0.5ML SOPN Inject 1.5 mg into the skin once a week Dose increase as of 1/9/20 4 pen 5    glipiZIDE (GLUCOTROL XL) 10 MG extended release tablet Take 1 tablet by mouth every morning 90 tablet 3    insulin glargine (LANTUS SOLOSTAR) 100 UNIT/ML injection pen Inject 15 Units into the skin nightly 10 pen 5    Lancets MISC To test up to 4 times per day- nighttime and with each meal 300 each 5    blood glucose test strips (EXACTECH TEST) strip 1 each by In Vitro route daily Embrace test strips to test BS 4x daily 300 each 6    Insulin Pen Needle 32G X 5 MM MISC To use with Lantus pen and Novolog pen 30 each 11    aspirin EC 81 MG EC tablet Take 1 tablet by mouth daily 90 tablet 0     No current facility-administered medications on file prior to encounter. REVIEW OF SYSTEMS    Pertinent items are noted in HPI. Constitutional: Negative for systemic symptoms including fever, chills and malaise. Objective:      /82   Pulse 93   Temp 97.3 °F (36.3 °C) (Temporal)   Resp 16     PHYSICAL EXAM    General: The patient is in no acute distress. Mental status:  Patient is appropriate, is  oriented to place and plan of care. Dermatologic exam: Visual inspection of the periwound reveals the skin to be moist and edematous  Wound exam: see wound description below in procedure note      Assessment:     Problem List Items Addressed This Visit     WD-Diabetic ulcer of right fifth toe (HCC) - Primary    Relevant Medications    lidocaine (XYLOCAINE) 4 % external solution    Other Relevant Orders    Initiate Outpatient Wound Care Protocol    Neg.  Pressure Wound Therapy    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 ([address]      Other Visit Dressing Change Due 06/24/21 06/23/21 0938   Number of days: 0       Wound 06/23/21 Foot Right #2 FIFTH TOE AMP SITE (Active)   Wound Image   06/23/21 0851   Wound Etiology Diabetic Chua 3 06/23/21 0935   Dressing Status Clean;Dry;New dressing applied 06/23/21 0935   Wound Cleansed Vashe;Cleansed with saline 06/23/21 0851   Offloading for Diabetic Foot Ulcers Post op shoe 06/23/21 0851   Wound Length (cm) 6.2 cm 06/23/21 0851   Wound Width (cm) 2.1 cm 06/23/21 0851   Wound Depth (cm) 0.8 cm 06/23/21 0851   Wound Surface Area (cm^2) 13.02 cm^2 06/23/21 0851   Wound Volume (cm^3) 10.42 cm^3 06/23/21 0851   Post-Procedure Length (cm) 6.2 cm 06/23/21 0912   Post-Procedure Width (cm) 2.1 cm 06/23/21 0912   Post-Procedure Depth (cm) 0.8 cm 06/23/21 0912   Post-Procedure Surface Area (cm^2) 13.02 cm^2 06/23/21 0912   Post-Procedure Volume (cm^3) 10.42 cm^3 06/23/21 0912   Distance Tunneling (cm) 0 cm 06/23/21 0851   Tunneling Position ___ O'Clock 0 06/23/21 0851   Undermining Starts ___ O'Clock 0 06/23/21 0851   Undermining Ends___ O'Clock 0 06/23/21 0851   Undermining Maxium Distance (cm) 0 06/23/21 0851   Wound Assessment Exposed structure tendon;Devitalized tissue;Slough;Granulation tissue 06/23/21 0851   Drainage Amount Moderate 06/23/21 0851   Drainage Description Serosanguinous 06/23/21 0851   Odor None 06/23/21 0851   Yamilex-wound Assessment Intact;Edematous 06/23/21 0851   Margins Defined edges 06/23/21 0851   Wound Thickness Description not for Pressure Injury Full thickness 06/23/21 0851   Number of days: 0       Percent of Wound(s) Debrided: approximately 100%    Total  Area  Debrided:  13.02 sq cm     Bleeding:  Minimal    Hemostasis Achieved:  by pressure    Procedural Pain:  0  / 10     Post Procedural Pain:  0 / 10     Response to treatment:  Well tolerated by patient.      Status of wound progress and description from last visit: The patient is 7 days post op today, and present to the wound clinic for the first time S/P right fifth ray amputation with wound vac. There is visible tendon in the wound bed, she has areas of eschar developing within the wound bed and the valerie wound is macerated. Will add Douderm to valerie wound, and santyl to the wound bed with the wound vac. Spent greater than 20 minutes discussing aspects of wound healing and factors that inhibit wound healing. We discussed diabetic management, the importance of continuing to not smoke, keep wound clean and dry, no showering and minimal weight bearing. The patient verbalizes pain with her wound, pain is impacting her sleep and the wound vac dressing change is painful. Will prescribe patient opioid pain medication at this time. Patient understands all side effects and contraindications of opioids. No indication of abuse or seeking behavior. OARRS report checked at this time. We will continue to monitor. She will follow with Dr. Sarbjit Fuentes. Diabetes education provided today:    Diabetes pathoetiology, difference between type 1 and type 2 diabetes, and progressive nature of Type 2 DM. Diabetic Neuropathy: signs and therapy. Foot care: advised to wash feet daily, pat dry and apply lotion at night, avoiding between toes. Need to look at feet daily and report to a physician any signs of inflammation or skin damage. Discussed diabetes shoes and socks. Nutrition as a mainstream of diabetes therapy. [place] about label reading. Carbs: good carbs and bad carbs, importance of carb counting, incorporation of protein with each meal to reduce Glycemic index, importance of portions, Carb/insulin ratio. Different diabetic medications. Managing high and low sugar readings. Plan:       Discharge Instructions       PHYSICIAN ORDERS AND DISCHARGE INSTRUCTIONS     NOTE: Upon discharge from the [address], you will receive a patient experience survey.  We would be grateful if you would take the time to fill this survey out.     Wound care order history:                ROBIN's   Right 1.17      Left 1.2   Date 4/13/21              Vascular studies:                 Cultures:  4/13/21, 06/14/21              Antibiotics:                  HbA1c:   8.9 4/14/21              Compression/Lymph Pumps:              Grafts:                  Continuing wound care orders and information:              Residence: Private              Continue home health care with: [employer]              Your wound-care supplies will be provided by: . [address]                            QUXGC cleansing:                           EX not scrub or use excessive force.                          Wash hands with soap and water before and after dressing changes.                         Prior to applying a clean dressing, cleanse wound with normal saline,                          wound cleanser, or mild soap and water.                           Ask your physician or nurse before getting the wound(s) wet in the shower.              Daily Wound management:                          Keep weight off wounds and reposition every 2 hours.                          Avoid standing for long periods of time.                          Apply wraps/stockings in AM and remove at bedtime.                          Elevate legs to the level of the heart or above for 30 minutes 4-5 times a day and/or when sitting.                                               When taking antibiotics take entire prescription as ordered by MD do not stop taking until medicine is all gone.                              Orders for this week (6/23/21):     Right 4th toe Wound Vac Therapy:  Apply mastisol and duoderm to periwound for protection. Apply Santyl to wound bed  Gently pack with black foam to wound  Secure with vac drape. Set wound vac to 125 continuous suction. Change canister with each dressing change or as needed.    Change on  Tuesday, Thursday, and Saturday     Script for [place] sent to pharmacy    Follow up with Dr. Reji Norton on In 1 week in the wound care center  Call [phone] for any questions or concerns.   Date__________   Time____________        Treatment Note Wound 06/23/21 Foot Right #2 FIFTH TOE AMP SITE-Dressing/Treatment:  (mastisol,duoderm,santyl,black foam,drape )    Written Patient Dismissal Instructions Given            Electronically signed by ALISTAIR Leonard CNP on 6/23/2021 at 9:57 AM

## 2021-06-25 ENCOUNTER — TELEPHONE (OUTPATIENT)
Dept: WOUND CARE | Age: 41
End: 2021-06-25

## 2021-06-25 NOTE — TELEPHONE ENCOUNTER
Elizabeth Purdy at [address] called requesting verbal for home care. SN called back to give verbal ok.     Electronically signed by Jose Garcia RN on 6/25/2021 at 10:13 AM

## 2021-06-29 ENCOUNTER — CARE COORDINATION (OUTPATIENT)
Dept: CASE MANAGEMENT | Age: 41
End: 2021-06-29

## 2021-06-29 ENCOUNTER — HOSPITAL ENCOUNTER (OUTPATIENT)
Age: 41
Setting detail: SPECIMEN
Discharge: HOME | End: 2021-06-29
Payer: MEDICARE

## 2021-06-29 LAB
ALBUMIN SERPL-MCNC: 3.4 GM/DL (ref 3.4–5)
ALP BLD-CCNC: 78 IU/L (ref 40–129)
ALT SERPL-CCNC: 12 U/L (ref 10–40)
ANION GAP SERPL CALCULATED.3IONS-SCNC: 9 MMOL/L (ref 4–16)
AST SERPL-CCNC: 10 IU/L (ref 15–37)
BASOPHILS ABSOLUTE: 0.1 K/CU MM
BASOPHILS RELATIVE PERCENT: 0.5 % (ref 0–1)
BILIRUB SERPL-MCNC: 0.5 MG/DL (ref 0–1)
BUN BLDV-MCNC: 10 MG/DL (ref 6–23)
CALCIUM SERPL-MCNC: 8.8 MG/DL (ref 8.3–10.6)
CHLORIDE BLD-SCNC: 104 MMOL/L (ref 99–110)
CO2: 27 MMOL/L (ref 21–32)
CREAT SERPL-MCNC: 0.7 MG/DL (ref 0.6–1.1)
DIFFERENTIAL TYPE: ABNORMAL
EOSINOPHILS ABSOLUTE: 0.5 K/CU MM
EOSINOPHILS RELATIVE PERCENT: 4.9 % (ref 0–3)
ERYTHROCYTE SEDIMENTATION RATE: 74 MM/HR (ref 0–20)
GFR AFRICAN AMERICAN: >60 ML/MIN/1.73M2
GFR NON-AFRICAN AMERICAN: >60 ML/MIN/1.73M2
GLUCOSE BLD-MCNC: 182 MG/DL (ref 70–99)
HCT VFR BLD CALC: 36.2 % (ref 37–47)
HEMOGLOBIN: 12 GM/DL (ref 12.5–16)
HIGH SENSITIVE C-REACTIVE PROTEIN: 8 MG/L
IMMATURE NEUTROPHIL %: 0.5 % (ref 0–0.43)
LYMPHOCYTES ABSOLUTE: 2.2 K/CU MM
LYMPHOCYTES RELATIVE PERCENT: 23.2 % (ref 24–44)
MCH RBC QN AUTO: 30.7 PG (ref 27–31)
MCHC RBC AUTO-ENTMCNC: 33.1 % (ref 32–36)
MCV RBC AUTO: 92.6 FL (ref 78–100)
MONOCYTES ABSOLUTE: 0.5 K/CU MM
MONOCYTES RELATIVE PERCENT: 4.8 % (ref 0–4)
PDW BLD-RTO: 12.3 % (ref 11.7–14.9)
PLATELET # BLD: 291 K/CU MM (ref 140–440)
PMV BLD AUTO: 10.1 FL (ref 7.5–11.1)
POTASSIUM SERPL-SCNC: 3.6 MMOL/L (ref 3.5–5.1)
RBC # BLD: 3.91 M/CU MM (ref 4.2–5.4)
SEGMENTED NEUTROPHILS ABSOLUTE COUNT: 6.3 K/CU MM
SEGMENTED NEUTROPHILS RELATIVE PERCENT: 66.1 % (ref 36–66)
SODIUM BLD-SCNC: 140 MMOL/L (ref 135–145)
TOTAL IMMATURE NEUTOROPHIL: 0.05 K/CU MM
TOTAL PROTEIN: 6.5 GM/DL (ref 6.4–8.2)
WBC # BLD: 9.5 K/CU MM (ref 4–10.5)

## 2021-06-29 PROCEDURE — 86141 C-REACTIVE PROTEIN HS: CPT

## 2021-06-29 PROCEDURE — 85025 COMPLETE CBC W/AUTO DIFF WBC: CPT

## 2021-06-29 PROCEDURE — 85652 RBC SED RATE AUTOMATED: CPT

## 2021-06-29 PROCEDURE — 80053 COMPREHEN METABOLIC PANEL: CPT

## 2021-06-29 NOTE — CARE COORDINATION
[facility] Transitions Follow Up Call    2021    Patient: Tiffany Stringer  Patient : 1980   MRN: <[id number]>  Reason for Admission: osteomyelitis of right foot, s/p amputation toe  Discharge Date: 21 RARS: Readmission Risk Score: 10         Spoke with: NA    Attempted to reach patient via phone for initial post hospital transition call. VM left stating purpose of call along with my contact information requesting a return call. Cassius Byers [address]  [phone]    Care Transitions Subsequent and Final Call    Subsequent and Final Calls  Care Transitions Interventions  Other Interventions:            Follow Up  Future Appointments   Date Time Provider Cynthia Breaux   2021 10:00 AM Cr Colindres MD [facility]   2021  3:15 PM Cr Colindres MD [facility]   2021  9:30 AM Pam Adamson MD [facility] ID [facility]   2021 11:00 AM ALISTAIR Burger NP [facility]   2022 11:00 AM ALISTAIR Burger  [facility]       Irena Prescott LPN

## 2021-07-01 ENCOUNTER — OFFICE VISIT (OUTPATIENT)
Dept: SURGERY | Age: 41
End: 2021-07-01

## 2021-07-01 ENCOUNTER — TELEPHONE (OUTPATIENT)
Dept: WOUND CARE | Age: 41
End: 2021-07-01

## 2021-07-01 VITALS
WEIGHT: 219 LBS | HEART RATE: 85 BPM | SYSTOLIC BLOOD PRESSURE: 120 MMHG | DIASTOLIC BLOOD PRESSURE: 74 MMHG | HEIGHT: 65 IN | BODY MASS INDEX: 36.49 KG/M2

## 2021-07-01 DIAGNOSIS — Z09 POSTOPERATIVE EXAMINATION: Primary | ICD-10-CM

## 2021-07-01 PROCEDURE — 99024 POSTOP FOLLOW-UP VISIT: CPT | Performed by: SURGERY

## 2021-07-01 NOTE — PROGRESS NOTES
Post-Operative Clinic Note    Chief Complaint   Patient presents with    Post-Op Check     1st P/O Rt 5th Toe Amputation, with WoundVac Placement, 21         SUBJECTIVE:  Patient is here today for a post-operative visit. Patient is s/p right 5th toe amputation and foot debridement on 21. She reports doing well. Has had the wound VAC changed by home health and been seen in wound care clinic. Denies problems or complaints.      Past Surgical History:   Procedure Laterality Date     SECTION      HYSTERECTOMY      OTHER SURGICAL HISTORY  2017    I&D of right perineal abcess     TOE AMPUTATION Right 2021    RIGHT 5TH TOE AMPUTATION performed by Suellen Solorzano MD at [address]     Past Medical History:   Diagnosis Date    Allergic rhinitis     allergy shots- Dr. Michele Dueñas Allergic rhinitis     Dr. Manuela kwan and flonase    Anxiety 10/24/2012    Atopic dermatitis     Dr. Manuela kwan    Back pain 10/24/2012    Bilateral sciatica 2020    Depression     Diabetes mellitus ([address]     Diabetes mellitus type II, uncontrolled ([address]     Diabetic eye exam: 2011, Dr. Raoul Hamman , needs record [V72.0T][    GERD (gastroesophageal reflux disease)     Headache(784.0)     Hyperlipidemia LDL goal < 100 2012    Hypertension     [facility]     No retinopathy on exam     Dr Raoul Hamman 13 Diabetes with no significatn diabetic retinopathy found in both eyes    Non compliance w medication regimen 2014    Sprain of neck 10/4/2011     Family History   Problem Relation Age of Onset    Heart Disease Mother     Diabetes Mother     Asthma Mother     High Cholesterol Mother     Depression Mother     Heart Disease Father     High Cholesterol Father      Social History     Socioeconomic History    Marital status: Single     Spouse name: Not on file    Number of children: Not on file    Years of education: Not on file    Highest education level: Not on file   Occupational History    Not on file   Tobacco Use    Smoking status: Former Smoker     Packs/day: 0.25     Types: Cigarettes     Quit date: 5/10/2021     Years since quittin.1    Smokeless tobacco: Never Used   Vaping Use    Vaping Use: Never used   Substance and Sexual Activity    Alcohol use: Yes     Comment: rare    Drug use: No    Sexual activity: Not on file   Other Topics Concern    Not on file   Social History Narrative    Not on file     Social Determinants of Health     Financial Resource Strain:     Difficulty of Paying Living Expenses:    Food Insecurity:     Worried About Running Out of Food in the Last Year:     [address] in the Last Year:    Transportation Needs:     Lack of Transportation (Medical):  Lack of Transportation (Non-Medical):    Physical Activity:     Days of Exercise per Week:     Minutes of Exercise per Session:    Stress:     Feeling of Stress :    Social Connections:     Frequency of Communication with Friends and Family:     Frequency of Social Gatherings with Friends and Family:     Attends Church Services:     Active Member of Clubs or Organizations:     Attends Club or Organization Meetings:     Marital Status:    Intimate Partner Violence:     Fear of Current or Ex-Partner:     Emotionally Abused:     Physically Abused:     Sexually Abused:        OBJECTIVE:  Physical Exam  General: awake, alert, in no acute distress  HEENT: mucous membranes moist  Respiratory: normal effort, no wheezes appreciated  CV: appears well perfused  Abdomen: Soft, nontender, non-distended. Skin: warm and dry    Extremities: right foot wound vac changed, minimal necrotic tissue, there is a small amount of fibrinous tissue on the wound, 10% granulated, no odor or purulence    Neuro: no focal deficits noted  Psych: mood normal        ASSESSMENT:  41 y.o. female s/p toe amputation and foot debridement. Doing well. VAC changed today.     1. Postoperative examination        PLAN:  - continue VAC therapy, will have Marilyn follow with me in wound care clinic going forward      Jewels Sullivan MD  7/1/2021  11:16 AM

## 2021-07-06 ENCOUNTER — HOSPITAL ENCOUNTER (OUTPATIENT)
Age: 41
Setting detail: SPECIMEN
Discharge: HOME | End: 2021-07-06
Payer: MEDICARE

## 2021-07-06 LAB
ALBUMIN SERPL-MCNC: 3.5 GM/DL (ref 3.4–5)
ALP BLD-CCNC: 75 IU/L (ref 40–128)
ALT SERPL-CCNC: 14 U/L (ref 10–40)
ANION GAP SERPL CALCULATED.3IONS-SCNC: 11 MMOL/L (ref 4–16)
AST SERPL-CCNC: 13 IU/L (ref 15–37)
BASOPHILS ABSOLUTE: 0.1 K/CU MM
BASOPHILS RELATIVE PERCENT: 1 % (ref 0–1)
BILIRUB SERPL-MCNC: 0.5 MG/DL (ref 0–1)
BUN BLDV-MCNC: 8 MG/DL (ref 6–23)
C-REACTIVE PROTEIN, HIGH SENSITIVITY: 9.8 MG/L
CALCIUM SERPL-MCNC: 8.9 MG/DL (ref 8.3–10.6)
CHLORIDE BLD-SCNC: 104 MMOL/L (ref 99–110)
CO2: 24 MMOL/L (ref 21–32)
CREAT SERPL-MCNC: 0.6 MG/DL (ref 0.6–1.1)
DIFFERENTIAL TYPE: ABNORMAL
EOSINOPHILS ABSOLUTE: 0.5 K/CU MM
EOSINOPHILS RELATIVE PERCENT: 8.3 % (ref 0–3)
ERYTHROCYTE SEDIMENTATION RATE: 59 MM/HR (ref 0–20)
GFR AFRICAN AMERICAN: >60 ML/MIN/1.73M2
GFR NON-AFRICAN AMERICAN: >60 ML/MIN/1.73M2
GLUCOSE BLD-MCNC: 114 MG/DL (ref 70–99)
HCT VFR BLD CALC: 37.4 % (ref 37–47)
HEMOGLOBIN: 12.7 GM/DL (ref 12.5–16)
IMMATURE NEUTROPHIL %: 0.3 % (ref 0–0.43)
LYMPHOCYTES ABSOLUTE: 1.4 K/CU MM
LYMPHOCYTES RELATIVE PERCENT: 24.6 % (ref 24–44)
MCH RBC QN AUTO: 32.2 PG (ref 27–31)
MCHC RBC AUTO-ENTMCNC: 34 % (ref 32–36)
MCV RBC AUTO: 94.9 FL (ref 78–100)
MONOCYTES ABSOLUTE: 0.5 K/CU MM
MONOCYTES RELATIVE PERCENT: 8.8 % (ref 0–4)
NUCLEATED RBC %: 0 %
PDW BLD-RTO: 12.8 % (ref 11.7–14.9)
PLATELET # BLD: 239 K/CU MM (ref 140–440)
PMV BLD AUTO: 10.3 FL (ref 7.5–11.1)
POTASSIUM SERPL-SCNC: 3.7 MMOL/L (ref 3.5–5.1)
RBC # BLD: 3.94 M/CU MM (ref 4.2–5.4)
SEGMENTED NEUTROPHILS ABSOLUTE COUNT: 3.3 K/CU MM
SEGMENTED NEUTROPHILS RELATIVE PERCENT: 57 % (ref 36–66)
SODIUM BLD-SCNC: 139 MMOL/L (ref 135–145)
TOTAL IMMATURE NEUTOROPHIL: 0.02 K/CU MM
TOTAL NUCLEATED RBC: 0 K/CU MM
TOTAL PROTEIN: 6.8 GM/DL (ref 6.4–8.2)
WBC # BLD: 5.8 K/CU MM (ref 4–10.5)

## 2021-07-06 PROCEDURE — 85025 COMPLETE CBC W/AUTO DIFF WBC: CPT

## 2021-07-06 PROCEDURE — 80053 COMPREHEN METABOLIC PANEL: CPT

## 2021-07-06 PROCEDURE — 86140 C-REACTIVE PROTEIN: CPT

## 2021-07-06 PROCEDURE — 85652 RBC SED RATE AUTOMATED: CPT

## 2021-07-07 ENCOUNTER — CARE COORDINATION (OUTPATIENT)
Dept: CASE MANAGEMENT | Age: 41
End: 2021-07-07

## 2021-07-07 NOTE — CARE COORDINATION
[facility] Transitions Follow Up Call    2021    Patient: Efraín Chinchilla  Patient : 1980   MRN: [id number]  Reason for Admission: osteomyelitis of right foot, s/p amputation toe  Discharge Date: 21 RARS: Readmission Risk Score: 10    Spoke with: jatin    [facility] 2nd attempted outreach. Left message and contact information for call back. Nilay Dunlap LPN  Care Coordinator    Care Transitions Subsequent and Final Call    Subsequent and Final Calls  Care Transitions Interventions  Other Interventions:            Follow Up  Future Appointments   Date Time Provider Cynthia Breaux   2021  3:15 PM ALISTAIR Davis CNP [facility] Chrystine Person   2021  9:30 AM Shawna Carlisle MD [address] [facility]   2021 11:00 AM Taylor Greenhouse, APRN - NP [facility]   2022 11:00 AM ALISTAIR Muniz NP, LPN

## 2021-07-08 ENCOUNTER — HOSPITAL ENCOUNTER (OUTPATIENT)
Dept: WOUND CARE | Age: 41
Discharge: HOME | End: 2021-07-08
Payer: MEDICARE

## 2021-07-08 VITALS — TEMPERATURE: 97.3 F

## 2021-07-08 DIAGNOSIS — E11.621 DIABETIC ULCER OF TOE OF RIGHT FOOT ASSOCIATED WITH TYPE 2 DIABETES MELLITUS, WITH FAT LAYER EXPOSED (HCC): ICD-10-CM

## 2021-07-08 DIAGNOSIS — L97.512 DIABETIC ULCER OF TOE OF RIGHT FOOT ASSOCIATED WITH TYPE 2 DIABETES MELLITUS, WITH FAT LAYER EXPOSED (HCC): ICD-10-CM

## 2021-07-08 DIAGNOSIS — Z89.421 STATUS POST AMPUTATION OF LESSER TOE OF RIGHT FOOT (HCC): ICD-10-CM

## 2021-07-08 DIAGNOSIS — M86.171 OTHER ACUTE OSTEOMYELITIS OF RIGHT FOOT (HCC): Primary | ICD-10-CM

## 2021-07-08 PROCEDURE — 97605 NEG PRS WND THER DME<=50SQCM: CPT

## 2021-07-08 PROCEDURE — 11042 DBRDMT SUBQ TIS 1ST 20SQCM/<: CPT

## 2021-07-08 PROCEDURE — 11042 DBRDMT SUBQ TIS 1ST 20SQCM/<: CPT | Performed by: NURSE PRACTITIONER

## 2021-07-08 RX ORDER — LIDOCAINE HYDROCHLORIDE 20 MG/ML
JELLY TOPICAL ONCE
Status: CANCELLED | OUTPATIENT
Start: 2021-07-08 | End: 2021-07-08

## 2021-07-08 RX ORDER — LIDOCAINE HYDROCHLORIDE 40 MG/ML
SOLUTION TOPICAL ONCE
Status: CANCELLED | OUTPATIENT
Start: 2021-07-08 | End: 2021-07-08

## 2021-07-08 RX ORDER — CLOBETASOL PROPIONATE 0.5 MG/G
OINTMENT TOPICAL ONCE
Status: CANCELLED | OUTPATIENT
Start: 2021-07-08 | End: 2021-07-08

## 2021-07-08 RX ORDER — BETAMETHASONE DIPROPIONATE 0.05 %
OINTMENT (GRAM) TOPICAL ONCE
Status: CANCELLED | OUTPATIENT
Start: 2021-07-08 | End: 2021-07-08

## 2021-07-08 RX ORDER — BACITRACIN ZINC AND POLYMYXIN B SULFATE 500; 1000 [USP'U]/G; [USP'U]/G
OINTMENT TOPICAL ONCE
Status: CANCELLED | OUTPATIENT
Start: 2021-07-08 | End: 2021-07-08

## 2021-07-08 RX ORDER — GINSENG 100 MG
CAPSULE ORAL ONCE
Status: CANCELLED | OUTPATIENT
Start: 2021-07-08 | End: 2021-07-08

## 2021-07-08 RX ORDER — LIDOCAINE HYDROCHLORIDE 40 MG/ML
SOLUTION TOPICAL ONCE
Status: DISCONTINUED | OUTPATIENT
Start: 2021-07-08 | End: 2021-07-09 | Stop reason: HOSPADM

## 2021-07-08 RX ORDER — GENTAMICIN SULFATE 1 MG/G
OINTMENT TOPICAL ONCE
Status: CANCELLED | OUTPATIENT
Start: 2021-07-08 | End: 2021-07-08

## 2021-07-08 RX ORDER — LIDOCAINE 40 MG/G
CREAM TOPICAL ONCE
Status: CANCELLED | OUTPATIENT
Start: 2021-07-08 | End: 2021-07-08

## 2021-07-08 RX ORDER — BACITRACIN, NEOMYCIN, POLYMYXIN B 400; 3.5; 5 [USP'U]/G; MG/G; [USP'U]/G
OINTMENT TOPICAL ONCE
Status: CANCELLED | OUTPATIENT
Start: 2021-07-08 | End: 2021-07-08

## 2021-07-08 RX ORDER — LIDOCAINE 50 MG/G
OINTMENT TOPICAL ONCE
Status: CANCELLED | OUTPATIENT
Start: 2021-07-08 | End: 2021-07-08

## 2021-07-08 NOTE — PROGRESS NOTES
Wound Care Center Progress Note With Procedure    Marilyn Barraza  AGE: 41 y.o. GENDER: female  : 1980  EPISODE DATE:  2021     Subjective:     Chief Complaint   Patient presents with    Wound Check     R foot          HISTORY of PRESENT ILLNESS     Marilyn Rahman is a 41 y. o. female who presents to the Wound Clinic for a visit for evaluation and treatment of a nonhealing surgical wound, status post right fifth ray amputation 21 per Dr. Talita Desouza. Initially the wound was traumatic and diabetic from hitting her foot on a grocery cart 2021. The right foot wound was between the web of the 4th and 5th toe and the medial 5th toe. The condition is now of of marked severity. The ulcer had been present for 2.5 weeks at initial wound clinic visit. [facility] underlying cause is thought to be uncontrolled diabeties.       She is a diabetic, last hemoglobin A1c was 8.9 on 21. She states that she follows our orders and has quit smoking. She is not on a blood thinner.     Wound Pain Timing/Severity: Aching, tender  Quality of pain: waxing and waning, moderate  Severity of pain:  5 / 10   Modifying Factors: diabetes, poor glucose control, obesity and pressure  Associated Signs/Symptoms: drainage, edema and pain     Wound History: The patients care to date has included a visit to the ED, where she was given doxycycline and Lotrimin. The doxy appeared ineffective to the patient, so her PCP placed her on Bactrim. The patient has significant underlying medical conditions as below. Patient had an arterial scan which showed evidence of mild stenosis. She also had an XR that did not find osteo. Culture was completed and patient is on month long bactrim and cipro course. Surgical amputation right fifth ray per Dr. Talita Desouza 21. Home with a PICC line and Zosyn through 21.         PAST MEDICAL HISTORY        Diagnosis Date    Allergic rhinitis     allergy shots- Dr. Pavan Ta Allergic rhinitis     Dr. Douglas Reynolds Swetha kwan and flonase    Anxiety 10/24/2012    Atopic dermatitis     Dr. Bowen kwan    Back pain 10/24/2012    Bilateral sciatica 2020    Depression     [facility]     Diabetes mellitus type II, uncontrolled ([facility]     Diabetic eye exam: 2011, Dr. Sidney Valero , needs record [V72.0T][    GERD (gastroesophageal reflux disease)     Headache(784.0)     Hyperlipidemia LDL goal < 100 2012    Hypertension     Morbid obesity ([facility]     No retinopathy on exam     Dr Sidney Valero 13 Diabetes with no significatn diabetic retinopathy found in both eyes    Non compliance w medication regimen 2014    Sprain of neck 10/4/2011       PAST SURGICAL HISTORY    Past Surgical History:   Procedure Laterality Date     SECTION      HYSTERECTOMY      OTHER SURGICAL HISTORY  2017    I&D of right perineal abcess     TOE AMPUTATION Right 2021    RIGHT 5TH TOE AMPUTATION performed by Frank Quintero MD at [address] [place] [address] History   Problem Relation Age of Onset    Heart Disease Mother     Diabetes Mother     Asthma Mother     High Cholesterol Mother     Depression Mother     Heart Disease Father     High Cholesterol Father        SOCIAL HISTORY    Social History     Tobacco Use    Smoking status: Former Smoker     Packs/day: 0.25     Types: Cigarettes     Quit date: 5/10/2021     Years since quittin.1    Smokeless tobacco: Never Used   Vaping Use    Vaping Use: Never used   Substance Use Topics    Alcohol use: Yes     Comment: rare    Drug use: No       ALLERGIES    Allergies   Allergen Reactions    Penicillins Rash       MEDICATIONS    Current Outpatient Medications on File Prior to Encounter   Medication Sig Dispense Refill    Probiotic Acidophilus (FLORANEX) TABS Take 1 tablet by mouth 2 times daily 60 tablet 2    piperacillin-tazobactam (ZOSYN) 3.375 (3-0.375) g injection Inject 3,375 mg into the muscle every 8 hours 6 weeks total treatment, end date 7/28/2021 207407 mg 0    lisinopril (PRINIVIL;ZESTRIL) 2.5 MG tablet Take 1 tablet by mouth every morning 90 tablet 3    atorvastatin (LIPITOR) 40 MG tablet Take 1 tablet by mouth every morning 90 tablet 3    Dulaglutide 1.5 MG/0.5ML SOPN Inject 1.5 mg into the skin once a week Dose increase as of 1/9/20 4 pen 5    glipiZIDE (GLUCOTROL XL) 10 MG extended release tablet Take 1 tablet by mouth every morning 90 tablet 3    insulin glargine (LANTUS SOLOSTAR) 100 UNIT/ML injection pen Inject 15 Units into the skin nightly 10 pen 5    Lancets MISC To test up to 4 times per day- nighttime and with each meal 300 each 5    blood glucose test strips (EXACTECH TEST) strip 1 each by In Vitro route daily Embrace test strips to test BS 4x daily 300 each 6    Insulin Pen Needle 32G X 5 MM MISC To use with Lantus pen and Novolog pen 30 each 11    aspirin EC 81 MG EC tablet Take 1 tablet by mouth daily 90 tablet 0     No current facility-administered medications on file prior to encounter. REVIEW OF SYSTEMS    Pertinent items are noted in HPI. Constitutional: Negative for systemic symptoms including fever, chills and malaise. Objective:      Temp 97.3 °F (36.3 °C) (Temporal)     PHYSICAL EXAM      General: The patient is in no acute distress. Mental status:  Patient is appropriate, is  oriented to place and plan of care. Dermatologic exam: Visual inspection of the periwound reveals the skin to be moist, coarse and edematous  Wound exam: see wound description below in procedure note      Assessment:     Problem List Items Addressed This Visit     WD-Diabetic ulcer of right heel with fat layer exposed ([facility] - Primary    WD-Diabetic ulcer of right fifth toe (HCC)    Relevant Medications    lidocaine (XYLOCAINE) 4 % external solution (Start on 7/8/2021  3:30 PM)    Other Relevant Orders    Initiate Outpatient Wound Care Protocol    Neg.  Pressure Wound Therapy    Osteomyelitis 07/08/21 1506   Margins Defined edges 07/08/21 1506   Wound Thickness Description not for Pressure Injury Full thickness 07/08/21 1506   Number of days: 15       Percent of Wound(s) Debrided: approximately 100%    Total  Area  Debrided:  16 sq cm     Bleeding:  Minimal    Hemostasis Achieved:  by pressure    Procedural Pain:  0  / 10     Post Procedural Pain:  0 / 10     Response to treatment:  Well tolerated by patient. Status of wound progress and description from last visit:   Improved in appearance. There is very little tough slough to remove this week, and we will not need santyl at this time. Will update orders to reflect changes. Wound bed is clean and there is good granulating tissue present. Patient reports a decrease in pain, and she does not need a refill on her narcotics this week. Handicap placard written at this time per patient request. Patient has been cleared for driving. We will apply for grafts at this time. Plan:       Discharge Instructions       PHYSICIAN ORDERS AND DISCHARGE INSTRUCTIONS     NOTE: Upon discharge from the [address], you will receive a patient experience survey. We would be grateful if you would take the time to fill this survey out.     Wound care order history:                 ROBIN's   Right 1.17      Left 1.2   Date 4/13/21              Vascular studies:                 Cultures:  4/13/21, 06/14/21              Antibiotics:                  HbA1c:   8.9 4/14/21              Compression/Lymph Pumps:              Grafts:  Applied for on 07/08/21                Continuing wound care orders and information:              Residence: Private              Continue home health care with: [facility]              Your wound-care supplies will be provided by: . [facility]                            VECINDY cleansing:                           SK not scrub or use excessive force.                          Wash hands with soap and water before and after dressing changes.                         Prior to applying a clean dressing, cleanse wound with normal saline,                          wound cleanser, or mild soap and water.                           Ask your physician or nurse before getting the wound(s) wet in the shower.              Daily Wound management:                          Keep weight off wounds and reposition every 2 hours.                          Avoid standing for long periods of time.                          Apply wraps/stockings in AM and remove at bedtime.                          Elevate legs to the level of the heart or above for 30 minutes 4-5 times a day and/or when sitting.                                               When taking antibiotics take entire prescription as ordered by MD do not stop taking until medicine is all gone.                              Orders for this week (7/8/21):     Right 4th toe Wound Vac Therapy:  Apply mastisol and duoderm to periwound for protection. Apply anasept mixed with stimulen powder to wound bed  Gently pack with black foam to wound. Secure with vac drape. Set wound vac to 125 continuous suction. Change canister with each dressing change or as needed. [facility] to Change on  Tuesday, Thursday, and Saturday          Follow up with Dr. Talita Desouza on In 1 week in the wound care center  Call [phone] for any questions or concerns.   Date__________   Time____________        Treatment Note Wound 06/23/21 Foot Right #2 FIFTH TOE AMP SITE-Dressing/Treatment:  (anaseptgel,stimulen powder,mastisol,duoderm,black foam,drape)    Written Patient Dismissal Instructions Given            Electronically signed by ALISTAIR Davis CNP on 7/8/2021 at 3:26 PM

## 2021-07-12 ENCOUNTER — TELEPHONE (OUTPATIENT)
Dept: INFECTIOUS DISEASES | Age: 41
End: 2021-07-12

## 2021-07-12 NOTE — TELEPHONE ENCOUNTER
Called from [facility] states pt is having itching during the infusion that lasts about 10 minutes after the infusion is finished. The itching started on Saturday 7/10/21. Pt is allergic to PCN. Is there a different ABX that we can use? Pt has stopped the infusion. Please advise.

## 2021-07-13 ENCOUNTER — HOSPITAL ENCOUNTER (OUTPATIENT)
Age: 41
Setting detail: SPECIMEN
Discharge: HOME | End: 2021-07-13
Payer: MEDICARE

## 2021-07-13 LAB
ALBUMIN SERPL-MCNC: 3.5 GM/DL (ref 3.4–5)
ALP BLD-CCNC: 132 IU/L (ref 40–129)
ALT SERPL-CCNC: 46 U/L (ref 10–40)
ANION GAP SERPL CALCULATED.3IONS-SCNC: 9 MMOL/L (ref 4–16)
AST SERPL-CCNC: 22 IU/L (ref 15–37)
BASOPHILS ABSOLUTE: 0 K/CU MM
BASOPHILS RELATIVE PERCENT: 0.5 % (ref 0–1)
BILIRUB SERPL-MCNC: 0.4 MG/DL (ref 0–1)
BUN BLDV-MCNC: 10 MG/DL (ref 6–23)
CALCIUM SERPL-MCNC: 8.8 MG/DL (ref 8.3–10.6)
CHLORIDE BLD-SCNC: 104 MMOL/L (ref 99–110)
CO2: 24 MMOL/L (ref 21–32)
CREAT SERPL-MCNC: 0.6 MG/DL (ref 0.6–1.1)
DIFFERENTIAL TYPE: ABNORMAL
EOSINOPHILS ABSOLUTE: 0.8 K/CU MM
EOSINOPHILS RELATIVE PERCENT: 13.9 % (ref 0–3)
ERYTHROCYTE SEDIMENTATION RATE: 56 MM/HR (ref 0–20)
GFR AFRICAN AMERICAN: >60 ML/MIN/1.73M2
GFR NON-AFRICAN AMERICAN: >60 ML/MIN/1.73M2
GLUCOSE BLD-MCNC: 288 MG/DL (ref 70–99)
HCT VFR BLD CALC: 34.2 % (ref 37–47)
HEMOGLOBIN: 11.7 GM/DL (ref 12.5–16)
HIGH SENSITIVE C-REACTIVE PROTEIN: 12.3 MG/L
IMMATURE NEUTROPHIL %: 0.2 % (ref 0–0.43)
LYMPHOCYTES ABSOLUTE: 2.1 K/CU MM
LYMPHOCYTES RELATIVE PERCENT: 37.9 % (ref 24–44)
MCH RBC QN AUTO: 31.2 PG (ref 27–31)
MCHC RBC AUTO-ENTMCNC: 34.2 % (ref 32–36)
MCV RBC AUTO: 91.2 FL (ref 78–100)
MONOCYTES ABSOLUTE: 0.4 K/CU MM
MONOCYTES RELATIVE PERCENT: 7.9 % (ref 0–4)
PDW BLD-RTO: 12.9 % (ref 11.7–14.9)
PLATELET # BLD: 172 K/CU MM (ref 140–440)
PMV BLD AUTO: 9.9 FL (ref 7.5–11.1)
POTASSIUM SERPL-SCNC: 3.7 MMOL/L (ref 3.5–5.1)
RBC # BLD: 3.75 M/CU MM (ref 4.2–5.4)
SEGMENTED NEUTROPHILS ABSOLUTE COUNT: 2.2 K/CU MM
SEGMENTED NEUTROPHILS RELATIVE PERCENT: 39.6 % (ref 36–66)
SODIUM BLD-SCNC: 137 MMOL/L (ref 135–145)
TOTAL IMMATURE NEUTOROPHIL: 0.01 K/CU MM
TOTAL PROTEIN: 6 GM/DL (ref 6.4–8.2)
WBC # BLD: 5.5 K/CU MM (ref 4–10.5)

## 2021-07-13 PROCEDURE — 80053 COMPREHEN METABOLIC PANEL: CPT

## 2021-07-13 PROCEDURE — 86141 C-REACTIVE PROTEIN HS: CPT

## 2021-07-13 PROCEDURE — 85652 RBC SED RATE AUTOMATED: CPT

## 2021-07-13 PROCEDURE — 85025 COMPLETE CBC W/AUTO DIFF WBC: CPT

## 2021-07-13 NOTE — TELEPHONE ENCOUNTER
I have not established a relationship with this patient. Looking at the discharge summary an appointment was supposed to be set up, it doesn't appear to have happened. She should follow up with her PCP, who I suspect is managing the abx. Thank you.

## 2021-07-14 ENCOUNTER — OFFICE VISIT (OUTPATIENT)
Dept: FAMILY MEDICINE CLINIC | Age: 41
End: 2021-07-14
Payer: MEDICARE

## 2021-07-14 VITALS
OXYGEN SATURATION: 98 % | WEIGHT: 227.6 LBS | HEART RATE: 67 BPM | DIASTOLIC BLOOD PRESSURE: 76 MMHG | BODY MASS INDEX: 37.92 KG/M2 | SYSTOLIC BLOOD PRESSURE: 124 MMHG | HEIGHT: 65 IN

## 2021-07-14 DIAGNOSIS — I10 ESSENTIAL HYPERTENSION: ICD-10-CM

## 2021-07-14 DIAGNOSIS — E66.01 CLASS 2 SEVERE OBESITY DUE TO EXCESS CALORIES WITH SERIOUS COMORBIDITY AND BODY MASS INDEX (BMI) OF 38.0 TO 38.9 IN ADULT (HCC): ICD-10-CM

## 2021-07-14 DIAGNOSIS — K21.9 GASTROESOPHAGEAL REFLUX DISEASE WITHOUT ESOPHAGITIS: ICD-10-CM

## 2021-07-14 DIAGNOSIS — E11.69 HYPERLIPIDEMIA ASSOCIATED WITH TYPE 2 DIABETES MELLITUS (HCC): ICD-10-CM

## 2021-07-14 DIAGNOSIS — E78.5 HYPERLIPIDEMIA ASSOCIATED WITH TYPE 2 DIABETES MELLITUS (HCC): ICD-10-CM

## 2021-07-14 DIAGNOSIS — F41.9 ANXIETY: ICD-10-CM

## 2021-07-14 DIAGNOSIS — Z89.421 STATUS POST AMPUTATION OF LESSER TOE OF RIGHT FOOT (HCC): ICD-10-CM

## 2021-07-14 PROCEDURE — 2022F DILAT RTA XM EVC RTNOPTHY: CPT | Performed by: NURSE PRACTITIONER

## 2021-07-14 PROCEDURE — G8427 DOCREV CUR MEDS BY ELIG CLIN: HCPCS | Performed by: NURSE PRACTITIONER

## 2021-07-14 PROCEDURE — 99214 OFFICE O/P EST MOD 30 MIN: CPT | Performed by: NURSE PRACTITIONER

## 2021-07-14 PROCEDURE — 1036F TOBACCO NON-USER: CPT | Performed by: NURSE PRACTITIONER

## 2021-07-14 PROCEDURE — G8417 CALC BMI ABV UP PARAM F/U: HCPCS | Performed by: NURSE PRACTITIONER

## 2021-07-14 PROCEDURE — 3052F HG A1C>EQUAL 8.0%<EQUAL 9.0%: CPT | Performed by: NURSE PRACTITIONER

## 2021-07-14 PROCEDURE — 1111F DSCHRG MED/CURRENT MED MERGE: CPT | Performed by: NURSE PRACTITIONER

## 2021-07-14 NOTE — PROGRESS NOTES
2021     Marilyn Kamara (:  1980) is a 41 y.o. female, here for evaluation of the following medical concerns:              Presents for follow-up on chronic conditions :     Diabetes type 2   taking Trulicity weekly with some weight loss.    Lantus 15 units before bed. Hasn't needed SSI - has it ordered, but does not use. Does not always check glucose before meals.    Taking glipizide 10mg XL once per day in the AM .   NO low blood sugars less than 70.   Highest glucose 152  Last hemoglobin A1c 8 in 2021  Diabetic dermopathy bilateral lower extremities  neuropathy in feet - three times per day average. Denies numbness. \"feels like needles sticking in my feet\". Goes away on its own. Happens when she is up and moving around or driving.   intermittent restless leg     Right foot fifth toe amputation 2021 at [address] with [address]. Charan Rodríguez. She was discharged home with home health care on 2021. Has been seeing home health care once per week, but going to wound care once a week as well. She was sent home with a PICC line and scheduled to follow-up with infectious disease on 7/15/2021. Her appointment was canceled at infectious disease when she called to report her allergic reaction to Zosyn. Provider stated that she was not seen in the hospital by infectious disease and was not going to be seen outpatient and it should be managed by her primary care. Patient stopped taking her Zosyn IV antibiotics 3 days ago due to rash, \" hot sensation\" and itching that happened during the infusion and resolved directly after stopping the infusions. She never had any  shortness of breath or trouble breathing, face or tongue swelling. Does have a penicillin allergy. Reports that she does not have any fever sweating or chills. Also reports that her wound is doing much better and healing.   Reports that her pain is under control.       Hypertensioncontrolled and takes blood pressure at home.  Less than 140/90.  Tolerating medicine well.     Hyperlipidemia  tolerating statin well     Anxiety is doing well. not medicated     Obesity.     Chronic back pain right side sciatica intermittent.  Denies complaints today.  zanaflex PRN.     Smoker- quit may 4th 2021                                           Review of Systems    Prior to Visit Medications    Medication Sig Taking?  Authorizing Provider   Probiotic Acidophilus (FLORANEX) TABS Take 1 tablet by mouth 2 times daily Yes Mayank Foley MD   lisinopril (PRINIVIL;ZESTRIL) 2.5 MG tablet Take 1 tablet by mouth every morning Yes ALISTAIR Herrera NP   atorvastatin (LIPITOR) 40 MG tablet Take 1 tablet by mouth every morning Yes ALISTAIR Herrera NP   Dulaglutide 1.5 MG/0.5ML SOPN Inject 1.5 mg into the skin once a week Dose increase as of 20 Yes ALISTAIR Herrera NP   glipiZIDE (GLUCOTROL XL) 10 MG extended release tablet Take 1 tablet by mouth every morning Yes ALISTAIR Herrera NP   insulin glargine (LANTUS SOLOSTAR) 100 UNIT/ML injection pen Inject 15 Units into the skin nightly Yes ALISTAIR Herrera NP   Lancets MISC To test up to 4 times per day- nighttime and with each meal Yes ALISTAIR Herrera NP   blood glucose test strips (EXACTECH TEST) strip 1 each by In Vitro route daily Embrace test strips to test BS 4x daily Yes ALISTAIR Herrera NP   Insulin Pen Needle 32G X 5 MM MISC To use with Lantus pen and Novolog pen Yes ALISTAIR Herrera NP   aspirin EC 81 MG EC tablet Take 1 tablet by mouth daily Yes Mariana Higuera APRN - NP   linezolid (ZYVOX) 600 MG tablet Take 1 tablet by mouth 2 times daily for 13 days  Rafael Parra MD        Social History     Tobacco Use    Smoking status: Former Smoker     Packs/day: 0.25     Types: Cigarettes     Quit date: 5/10/2021     Years since quittin.1    Smokeless tobacco: Never Used   Substance Use Topics    Alcohol use: Yes     Comment: rare        Vitals:    07/14/21 1052   BP: 124/76   Site: Left Upper Arm   Position: Sitting   Cuff Size: Large Adult   Pulse: 67   SpO2: 98%   Weight: 227 lb 9.6 oz (103.2 kg)   Height: 5' 5\" (1.651 m)     Estimated body mass index is 37.87 kg/m² as calculated from the following:    Height as of this encounter: 5' 5\" (1.651 m). Weight as of this encounter: 227 lb 9.6 oz (103.2 kg). Physical Exam  Vitals reviewed. Constitutional:       General: She is not in acute distress. Appearance: Normal appearance. She is obese. She is not ill-appearing, toxic-appearing or diaphoretic. HENT:      Head: Normocephalic and atraumatic. Nose: Nose normal.   Eyes:      Extraocular Movements: Extraocular movements intact. Pupils: Pupils are equal, round, and reactive to light. Cardiovascular:      Rate and Rhythm: Normal rate and regular rhythm. Heart sounds: Normal heart sounds. Pulmonary:      Effort: Pulmonary effort is normal.      Breath sounds: Normal breath sounds. Abdominal:      General: Bowel sounds are normal. There is no distension. Palpations: Abdomen is soft. There is no mass. Tenderness: There is no abdominal tenderness. Hernia: No hernia is present. Musculoskeletal:         General: Normal range of motion. Cervical back: Normal range of motion and neck supple. Feet:      Comments: She is wearing a surgical shoe on her right foot. Wound VAC is attached. Unable to visualize wound. There is no warmth or erythema to her ankle or foot or leg otherwise. There is minimal +1 pitting edema to the right ankle. There is a PICC line present in the right upper arm. The area is nontender, not warm, swollen or erythematous. No signs or symptoms of infection noted dressing is clean dry and intact. PICC line looks to be in correct position and supported appropriately with tape. Skin:     General: Skin is warm and dry.    Neurological:      General: No focal deficit present. Mental Status: She is alert and oriented to person, place, and time. Mental status is at baseline. Psychiatric:         Mood and Affect: Mood normal.         Behavior: Behavior normal.         Thought Content: Thought content normal.         Judgment: Judgment normal.         ASSESSMENT/PLAN:  1. Uncontrolled type 2 diabetes mellitus with complication, with long-term current use of insulin (HCC)  Continue current medications  Lantus 15 units nightly, glipizide 10 mg daily, Trulicity 1.5 weekly  Need hemoglobin A1c    2. Hyperlipidemia associated with type 2 diabetes mellitus ([facility])  Continue Lipitor 40 mg once daily  Low cholesterol diet. Avoid fast food, fried food, processed foods. Exercise as tolerated. Start with walking 20 minutes three times per week. Increase fiber and omega 3 fatty acids in diet. - elevated cholesterol increases risk for heart attack and stroke       3. Class 2 severe obesity due to excess calories with serious comorbidity and body mass index (BMI) of 38.0 to 38.9 in [facility])  Diet portion control low-carb low-fat exercise as tolerated    4. Essential hypertension  Controlled. Continue current medication regimen. DASH diet. BP goal less than 140/90. Continue lisinopril    5. 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 ([facility])  Follow with wound care and Dr. Froylan Stern  PCP and Dr. Froylan Stern to address plan together  Advised to call either provider with any concerns or new problems  She is not following with infectious disease  PICC line removed by Dr. Anna Overton started    6. Gastroesophageal reflux disease without esophagitis  Not medicateddenies need    7. Anxiety  Not medicatedstates she is doing fine        PICC line looks good. Patient tried Benadryl with Zosyn without relief of symptoms. Ultimately, Dr. Froylan Stern remove the PICC line and started Zyvox oral for 13 days.   He states that he will continue to treat this and monitor the patient. Patient will not be seeing infectious disease. Zosyn added to allergy list      Noted elevation of CRPshe is on a statin and aspirin. No history of MRI and does not see cardiology. Likely related to inflammation. ? All care gaps addressed     All questions answered    Discussed use, benefit, and side effects of prescribed medications. Barriers to compliance discussed. All patient questions answered. Pt voiced understanding. Present to the ER for any emergent or acute symptoms not managed at home or in office. Please note that this chart was generated using dragon dictation software. Although every effort was made to ensure the accuracy of this automated transcription, some errors in transcription may have occurred. Return in about 2 months (around 9/14/2021) for htn DM2. An electronic signature was used to authenticate this note.     --ALISTAIR Sullivan NP on 7/16/2021 at 3:10 PM

## 2021-07-15 ENCOUNTER — HOSPITAL ENCOUNTER (OUTPATIENT)
Dept: WOUND CARE | Age: 41
Discharge: HOME | End: 2021-07-15
Payer: MEDICARE

## 2021-07-15 VITALS
HEART RATE: 112 BPM | TEMPERATURE: 99.3 F | DIASTOLIC BLOOD PRESSURE: 79 MMHG | RESPIRATION RATE: 16 BRPM | SYSTOLIC BLOOD PRESSURE: 109 MMHG

## 2021-07-15 DIAGNOSIS — M86.171 OTHER ACUTE OSTEOMYELITIS OF RIGHT FOOT (HCC): ICD-10-CM

## 2021-07-15 DIAGNOSIS — Z89.421 STATUS POST AMPUTATION OF LESSER TOE OF RIGHT FOOT (HCC): ICD-10-CM

## 2021-07-15 DIAGNOSIS — E11.621 DIABETIC ULCER OF TOE OF RIGHT FOOT ASSOCIATED WITH TYPE 2 DIABETES MELLITUS, WITH FAT LAYER EXPOSED (HCC): Primary | ICD-10-CM

## 2021-07-15 DIAGNOSIS — L97.512 DIABETIC ULCER OF TOE OF RIGHT FOOT ASSOCIATED WITH TYPE 2 DIABETES MELLITUS, WITH FAT LAYER EXPOSED (HCC): Primary | ICD-10-CM

## 2021-07-15 PROCEDURE — 97605 NEG PRS WND THER DME<=50SQCM: CPT

## 2021-07-15 PROCEDURE — 11043 DBRDMT MUSC&/FSCA 1ST 20/<: CPT | Performed by: SURGERY

## 2021-07-15 PROCEDURE — 11042 DBRDMT SUBQ TIS 1ST 20SQCM/<: CPT

## 2021-07-15 RX ORDER — LINEZOLID 600 MG/1
600 TABLET, FILM COATED ORAL 2 TIMES DAILY
Qty: 26 TABLET | Refills: 0 | Status: SHIPPED | OUTPATIENT
Start: 2021-07-15 | End: 2021-07-28

## 2021-07-15 RX ORDER — LIDOCAINE 50 MG/G
OINTMENT TOPICAL ONCE
Status: CANCELLED | OUTPATIENT
Start: 2021-07-15 | End: 2021-07-15

## 2021-07-15 RX ORDER — LIDOCAINE HYDROCHLORIDE 40 MG/ML
SOLUTION TOPICAL ONCE
Status: DISCONTINUED | OUTPATIENT
Start: 2021-07-15 | End: 2021-07-16 | Stop reason: HOSPADM

## 2021-07-15 RX ORDER — BACITRACIN ZINC AND POLYMYXIN B SULFATE 500; 1000 [USP'U]/G; [USP'U]/G
OINTMENT TOPICAL ONCE
Status: CANCELLED | OUTPATIENT
Start: 2021-07-15 | End: 2021-07-15

## 2021-07-15 RX ORDER — LIDOCAINE HYDROCHLORIDE 20 MG/ML
JELLY TOPICAL ONCE
Status: CANCELLED | OUTPATIENT
Start: 2021-07-15 | End: 2021-07-15

## 2021-07-15 RX ORDER — LIDOCAINE HYDROCHLORIDE 40 MG/ML
SOLUTION TOPICAL ONCE
Status: CANCELLED | OUTPATIENT
Start: 2021-07-15 | End: 2021-07-15

## 2021-07-15 RX ORDER — BETAMETHASONE DIPROPIONATE 0.05 %
OINTMENT (GRAM) TOPICAL ONCE
Status: CANCELLED | OUTPATIENT
Start: 2021-07-15 | End: 2021-07-15

## 2021-07-15 RX ORDER — CLOBETASOL PROPIONATE 0.5 MG/G
OINTMENT TOPICAL ONCE
Status: CANCELLED | OUTPATIENT
Start: 2021-07-15 | End: 2021-07-15

## 2021-07-15 RX ORDER — GINSENG 100 MG
CAPSULE ORAL ONCE
Status: CANCELLED | OUTPATIENT
Start: 2021-07-15 | End: 2021-07-15

## 2021-07-15 RX ORDER — GENTAMICIN SULFATE 1 MG/G
OINTMENT TOPICAL ONCE
Status: CANCELLED | OUTPATIENT
Start: 2021-07-15 | End: 2021-07-15

## 2021-07-15 RX ORDER — LIDOCAINE 40 MG/G
CREAM TOPICAL ONCE
Status: CANCELLED | OUTPATIENT
Start: 2021-07-15 | End: 2021-07-15

## 2021-07-15 RX ORDER — BACITRACIN, NEOMYCIN, POLYMYXIN B 400; 3.5; 5 [USP'U]/G; MG/G; [USP'U]/G
OINTMENT TOPICAL ONCE
Status: CANCELLED | OUTPATIENT
Start: 2021-07-15 | End: 2021-07-15

## 2021-07-15 NOTE — PROGRESS NOTES
Pt states she can not take benadryl for redness that occurs at PICC line due to antibiotic treatment.

## 2021-07-16 NOTE — PROGRESS NOTES
[facility] Care Center Progress Note With Procedure    Marilyn Reagan  AGE: 41 y.o. GENDER: female  : 1980  EPISODE DATE:  7/15/2021     Subjective:     Chief Complaint   Patient presents with    Wound Check     right foot          HISTORY of PRESENT ILLNESS      Marilyn Reagan is a 41 y.o. female who presents today for wound evaluation of Acute on chronic diabetic, pressure and neuropathic ulcer(s) of the right lateral foot. The ulcer is of marked severity. The underlying cause of the wound is DM infection. She required 5th toe amputation and foot debridement by me on 21 and has been managed with a wound VAC since then. She was kept on IV zosyn after discharge and had been planned for follow up with ID but this has fallen through. She reports rash and intense \"needle like\" feeling all over recently when she has been receiving her Zosyn infusions. She attempted premedication with benadryl and tylenol but this did not help. Cultures from the wound have grown enterococcus sensitive to Vanc and ampicillin. She has a PCN allergy and is not showing allergy to zosyn.     Wound Pain Timing/Severity: mild  Quality of pain: dull  Severity of pain:  2 / 10   Modifying Factors: edema, diabetes, poor glucose control, obesity and chronic pressure  Associated Signs/Symptoms: edema, drainage and numbness        PAST MEDICAL HISTORY        Diagnosis Date    Allergic rhinitis     allergy shots- Dr. Ehsan Rojas    Allergic rhinitis     Dr. Chelsey kwan and flonase    Anxiety 10/24/2012    Atopic dermatitis     Dr. Chelsey kwan    Back pain 10/24/2012    Bilateral sciatica 2020    Depression     Diabetes mellitus ([address]     Diabetes mellitus type II, uncontrolled ([address]     Diabetic eye exam: 2011, Dr. Sree Scott , needs record [V72.0T][    GERD (gastroesophageal reflux disease)     Headache(784.0)     Hyperlipidemia LDL goal < 100 2012    Hypertension     Morbid obesity ([address]     No SOLOSTAR) 100 UNIT/ML injection pen Inject 15 Units into the skin nightly 10 pen 5    Lancets MISC To test up to 4 times per day- nighttime and with each meal 300 each 5    blood glucose test strips (EXACTECH TEST) strip 1 each by In Vitro route daily Embrace test strips to test BS 4x daily 300 each 6    Insulin Pen Needle 32G X 5 MM MISC To use with Lantus pen and Novolog pen 30 each 11    aspirin EC 81 MG EC tablet Take 1 tablet by mouth daily 90 tablet 0     No current facility-administered medications on file prior to encounter. REVIEW OF SYSTEMS    Pertinent items are noted in HPI. Constitutional: Negative for systemic symptoms including fever, chills and malaise. Objective:      /79   Pulse 112   Temp 99.3 °F (37.4 °C) (Temporal)   Resp 16     PHYSICAL EXAM  General Appearance: alert and oriented to person, place and time, well-developed and well-nourished, in no acute distress    General: The patient is in no acute distress. Mental status:  Patient is appropriate, is  oriented to place and plan of care. Dermatologic exam: Visual inspection of the periwound reveals the skin to be well hydrated, cool  and edematous  Wound exam: see wound description below in procedure note      Assessment:   41 y.o. female with poor glucose control and recent admission with DM foot wound requiring 5th toe amputation and foot debridement. Wound is doing ok with VAC but she has developed an allergy to Zosyn. Problem List Items Addressed This Visit     O[address]omyelitis ([facility]    Relevant Medications    lidocaine (XYLOCAINE) 4 % external solution    Other Relevant Orders    Initiate Outpatient Wound Care Protocol    Neg. 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  Pressure Wound Therapy    WD-Diabetic ulcer of toe of right foot associated with type 2 diabetes mellitus, with fat layer exposed ([address] - Primary    Relevant Medications    lidocaine (XYLOCAINE) 4 % external solution    Other Relevant Orders    Initiate Outpatient Wound Care Protocol    Neg. Pressure Wound Therapy        Procedure Note    Indications:  Based on my examination of this patient's wound(s) today, sharp excision into necrotic dermis, subcutaneous tissue and muscle/fascia is required to promote healing and evaluate the extent of previous healing. Performed by: Arabella Watson MD    Consent obtained: Yes    Time out taken:  Yes    Pain Control: n/a       Debridement:Excisional Debridement    Using curette the wound(s) was/were sharply debrided down through and including the removal of subcutaneous tissue and muscle/fascia. Devitalized Tissue Debrided:  slough and necrotic/eschar    Pre Debridement Measurements:  Are located in the Wound Documentation Flow Sheet    All active wounds listed below with today's date are evaluated  Wound(s)    debrided this date include # : 1     Post  Debridement Measurements:  Negative Pressure Wound Therapy Foot Right (Active)   Wound Type Surgical 06/18/21 0907   Unit Type kci 06/18/21 0907   Dressing Type Black foam;White foam 06/18/21 0907   Cycle Continuous 06/18/21 0907   Target Pressure (mmHg) 125 06/18/21 0907   Canister changed? No 06/18/21 0907   Dressing Status Changed 06/18/21 0907   Dressing Changed Changed/New 06/18/21 0907   Drainage Amount Moderate 06/18/21 0907   Drainage Description Serosanguinous 06/18/21 0907   Odor None 06/18/21 0907   Number of days: 27       Negative Pressure Wound Therapy Toe (Comment  which one) Anterior;Right (Active)   Unit Type KCI 07/15/21 1531   Dressing Type Black foam 07/15/21 1531   Number of pieces used 1 07/15/21 1531   Cycle Continuous 07/15/21 1531   Target Pressure (mmHg) 125 07/15/21 1531   Canister changed?  Yes 07/15/21 1531   Dressing Status Dry;Clean 07/15/21 1531   Dressing Changed Changed/New 07/15/21 1531   Drainage Amount Moderate 07/15/21 1531   Drainage Description Serosanguinous 07/15/21 1531   Dressing Change Due 07/18/21 07/15/21 1531   Number of days: 22       Wound 06/23/21 Foot Right #2 FIFTH TOE AMP SITE (Active)   Wound Image   07/08/21 1506   Wound Etiology Diabetic Chua 3 06/23/21 0935   Dressing Status Clean;Dry;New dressing applied 07/15/21 1529   Wound Cleansed Soap and water;Vashe 07/15/21 1445   Offloading for Diabetic Foot Ulcers Post op shoe 07/15/21 1445   Wound Length (cm) 5.8 cm 07/15/21 1445   Wound Width (cm) 2 cm 07/15/21 1445   Wound Depth (cm) 0.3 cm 07/15/21 1445   Wound Surface Area (cm^2) 11.6 cm^2 07/15/21 1445   Change in Wound Size % (l*w) 10.91 07/15/21 1445   Wound Volume (cm^3) 3.48 cm^3 07/15/21 1445   Wound Healing % 67 07/15/21 1445   Post-Procedure Length (cm) 5.8 cm 07/15/21 1515   Post-Procedure Width (cm) 2 cm 07/15/21 1515   Post-Procedure Depth (cm) 0.3 cm 07/15/21 1515   Post-Procedure Surface Area (cm^2) 11.6 cm^2 07/15/21 1515   Post-Procedure Volume (cm^3) 3.48 cm^3 07/15/21 1515   Distance Tunneling (cm) 0 cm 07/15/21 1445   Tunneling Position ___ O'Clock 0 07/15/21 1445   Undermining Starts ___ O'Clock 900 07/15/21 1445   Undermining Ends___ O'Clock 1100 07/15/21 1445   Undermining Maxium Distance (cm) 0.2 07/15/21 1445   Wound Assessment Pink/red;Slough 07/15/21 1445   Drainage Amount Moderate 07/15/21 1445   Drainage Description Serosanguinous 07/15/21 1445   Odor None 07/15/21 1445   Yamilex-wound Assessment Maceration 07/15/21 1445   Margins Defined edges 07/15/21 1445   Wound Thickness Description not for Pressure Injury Full thickness 07/15/21 1445   Number of days: 22       Percent of Wound(s) Debrided: approximately 100%    Total  Area  Debrided:  11.6 sq cm     Bleeding:  Minimal    Hemostasis Achieved:  by pressure    Procedural Pain:  2  / 10     Post Procedural Pain:  0 / 10     Response to treatment:  Well tolerated by patient. Status of wound progress and description from last visit:   Improving with decrease in size and less non-viable tissue. Plan:       Discharge Instructions       PHYSICIAN ORDERS AND DISCHARGE INSTRUCTIONS     NOTE: Upon discharge from the [address], you will receive a patient experience survey. We would be grateful if you would take the time to fill this survey out.     Wound care order history:                 ROBIN's   Right 1.17      Left 1.2   Date 4/13/21              Vascular studies:                 Cultures:  4/13/21, 06/14/21              Antibiotics:                  HbA1c:   8.9 4/14/21              Compression/Lymph Pumps:              Grafts:  Applied for on 07/08/21                Continuing wound care orders and information:              Residence: Private              Continue [facility] care with: [facility]              Your wound-care supplies will be provided by: . [address]                            INNQE cleansing:                           IG not scrub or use excessive force.                          Wash hands with soap and water before and after dressing changes.                           Prior to applying a clean dressing, cleanse wound with normal saline,                          wound cleanser, or mild soap and water.                           Ask your physician or nurse before getting the wound(s) wet in the shower.              Daily Wound management:                          Keep weight off wounds and reposition every 2 hours.                          Avoid standing for long periods of time.                          Apply wraps/stockings in AM and remove at bedtime.                          Elevate legs to the level of the heart or above for 30 minutes 4-5 times a day and/or when sitting.                                               When taking antibiotics take entire prescription as ordered by MD

## 2021-07-22 ENCOUNTER — HOSPITAL ENCOUNTER (OUTPATIENT)
Dept: WOUND CARE | Age: 41
Discharge: HOME | End: 2021-07-22
Payer: MEDICARE

## 2021-07-22 ENCOUNTER — TELEPHONE (OUTPATIENT)
Dept: FAMILY MEDICINE CLINIC | Age: 41
End: 2021-07-22

## 2021-07-22 VITALS
HEART RATE: 111 BPM | TEMPERATURE: 98.2 F | RESPIRATION RATE: 18 BRPM | DIASTOLIC BLOOD PRESSURE: 74 MMHG | SYSTOLIC BLOOD PRESSURE: 109 MMHG

## 2021-07-22 DIAGNOSIS — Z89.421 STATUS POST AMPUTATION OF LESSER TOE OF RIGHT FOOT (HCC): ICD-10-CM

## 2021-07-22 DIAGNOSIS — E11.621 DIABETIC ULCER OF TOE OF RIGHT FOOT ASSOCIATED WITH TYPE 2 DIABETES MELLITUS, WITH FAT LAYER EXPOSED (HCC): Primary | ICD-10-CM

## 2021-07-22 DIAGNOSIS — L97.512 DIABETIC ULCER OF TOE OF RIGHT FOOT ASSOCIATED WITH TYPE 2 DIABETES MELLITUS, WITH FAT LAYER EXPOSED (HCC): Primary | ICD-10-CM

## 2021-07-22 DIAGNOSIS — M86.171 OTHER ACUTE OSTEOMYELITIS OF RIGHT FOOT (HCC): ICD-10-CM

## 2021-07-22 PROCEDURE — 97605 NEG PRS WND THER DME<=50SQCM: CPT

## 2021-07-22 PROCEDURE — 11042 DBRDMT SUBQ TIS 1ST 20SQCM/<: CPT | Performed by: SURGERY

## 2021-07-22 PROCEDURE — 11042 DBRDMT SUBQ TIS 1ST 20SQCM/<: CPT

## 2021-07-22 RX ORDER — LIDOCAINE HYDROCHLORIDE 20 MG/ML
JELLY TOPICAL ONCE
Status: CANCELLED | OUTPATIENT
Start: 2021-07-22 | End: 2021-07-22

## 2021-07-22 RX ORDER — BETAMETHASONE DIPROPIONATE 0.05 %
OINTMENT (GRAM) TOPICAL ONCE
Status: CANCELLED | OUTPATIENT
Start: 2021-07-22 | End: 2021-07-22

## 2021-07-22 RX ORDER — LIDOCAINE 40 MG/G
CREAM TOPICAL ONCE
Status: CANCELLED | OUTPATIENT
Start: 2021-07-22 | End: 2021-07-22

## 2021-07-22 RX ORDER — LIDOCAINE 50 MG/G
OINTMENT TOPICAL ONCE
Status: CANCELLED | OUTPATIENT
Start: 2021-07-22 | End: 2021-07-22

## 2021-07-22 RX ORDER — GENTAMICIN SULFATE 1 MG/G
OINTMENT TOPICAL ONCE
Status: CANCELLED | OUTPATIENT
Start: 2021-07-22 | End: 2021-07-22

## 2021-07-22 RX ORDER — CLOBETASOL PROPIONATE 0.5 MG/G
OINTMENT TOPICAL ONCE
Status: CANCELLED | OUTPATIENT
Start: 2021-07-22 | End: 2021-07-22

## 2021-07-22 RX ORDER — BACITRACIN ZINC AND POLYMYXIN B SULFATE 500; 1000 [USP'U]/G; [USP'U]/G
OINTMENT TOPICAL ONCE
Status: CANCELLED | OUTPATIENT
Start: 2021-07-22 | End: 2021-07-22

## 2021-07-22 RX ORDER — GINSENG 100 MG
CAPSULE ORAL ONCE
Status: CANCELLED | OUTPATIENT
Start: 2021-07-22 | End: 2021-07-22

## 2021-07-22 RX ORDER — LIDOCAINE HYDROCHLORIDE 40 MG/ML
SOLUTION TOPICAL ONCE
Status: CANCELLED | OUTPATIENT
Start: 2021-07-22 | End: 2021-07-22

## 2021-07-22 RX ORDER — LIDOCAINE HYDROCHLORIDE 40 MG/ML
SOLUTION TOPICAL ONCE
Status: DISCONTINUED | OUTPATIENT
Start: 2021-07-22 | End: 2021-07-23 | Stop reason: HOSPADM

## 2021-07-22 RX ORDER — BACITRACIN, NEOMYCIN, POLYMYXIN B 400; 3.5; 5 [USP'U]/G; MG/G; [USP'U]/G
OINTMENT TOPICAL ONCE
Status: CANCELLED | OUTPATIENT
Start: 2021-07-22 | End: 2021-07-22

## 2021-07-22 NOTE — PROGRESS NOTES
Depression     Diabetes mellitus ([facility]     Diabetes mellitus type II, uncontrolled ([facility]     Diabetic eye exam: 2011, Dr. Chelsey Lebron , needs record [V72.0T][    GERD (gastroesophageal reflux disease)     Headache(784.0)     Hyperlipidemia LDL goal < 100 2012    Hypertension     Morbid obesity ([facility]     No retinopathy on exam     Dr Chelsey Lebron 13 Diabetes with no significatn diabetic retinopathy found in both eyes    Non compliance w medication regimen 2014    Sprain of neck 10/4/2011       PAST SURGICAL HISTORY    Past Surgical History:   Procedure Laterality Date     SECTION      HYSTERECTOMY      OTHER SURGICAL HISTORY  2017    I&D of right perineal abcess     TOE AMPUTATION Right 2021    RIGHT 5TH TOE AMPUTATION performed by Jewels Sullivan MD at [address] [place] [address] History   Problem Relation Age of Onset    Heart Disease Mother     Diabetes Mother     Asthma Mother     High Cholesterol Mother     Depression Mother     Heart Disease Father     High Cholesterol Father        SOCIAL HISTORY    Social History     Tobacco Use    Smoking status: Former Smoker     Packs/day: 0.25     Types: Cigarettes     Quit date: 5/10/2021     Years since quittin.2    Smokeless tobacco: Never Used   Vaping Use    Vaping Use: Never used   Substance Use Topics    Alcohol use: Yes     Comment: rare    Drug use: No       ALLERGIES    Allergies   Allergen Reactions    Zosyn [Piperacillin Sod-Tazobactam So] Rash     Rash and severe \"being poked by needles all over\" feeling    Penicillins Rash       MEDICATIONS    Current Outpatient Medications on File Prior to Encounter   Medication Sig Dispense Refill    linezolid (ZYVOX) 600 MG tablet Take 1 tablet by mouth 2 times daily for 13 days 26 tablet 0    Probiotic Acidophilus (FLORANEX) TABS Take 1 tablet by mouth 2 times daily 60 tablet 2    lisinopril (PRINIVIL;ZESTRIL) 2.5 MG tablet Take 1 tablet by mouth every morning 90 tablet 3    atorvastatin (LIPITOR) 40 MG tablet Take 1 tablet by mouth every morning 90 tablet 3    Dulaglutide 1.5 MG/0.5ML SOPN Inject 1.5 mg into the skin once a week Dose increase as of 1/9/20 4 pen 5    glipiZIDE (GLUCOTROL XL) 10 MG extended release tablet Take 1 tablet by mouth every morning 90 tablet 3    insulin glargine (LANTUS SOLOSTAR) 100 UNIT/ML injection pen Inject 15 Units into the skin nightly 10 pen 5    Lancets MISC To test up to 4 times per day- nighttime and with each meal 300 each 5    blood glucose test strips (EXACTECH TEST) strip 1 each by In Vitro route daily Embrace test strips to test BS 4x daily 300 each 6    Insulin Pen Needle 32G X 5 MM MISC To use with Lantus pen and Novolog pen 30 each 11    aspirin EC 81 MG EC tablet Take 1 tablet by mouth daily 90 tablet 0     No current facility-administered medications on file prior to encounter. REVIEW OF SYSTEMS    Pertinent items are noted in HPI. Constitutional: Negative for systemic symptoms including fever, chills and malaise. Objective:      /74   Pulse 111   Temp 98.2 °F (36.8 °C) (Temporal)   Resp 18     PHYSICAL EXAM  General Appearance: alert and oriented to person, place and time, well-developed and well-nourished, in no acute distress    General: The patient is in no acute distress. Mental status:  Patient is appropriate, is  oriented to place and plan of care. Dermatologic exam: Visual inspection of the periwound reveals the skin to be normal in turgor and texture  Wound exam: see wound description below in procedure note      Assessment:   41 y.o. female with DM foot wound s/p right 5th toe amputation and wound debridement.     Problem List Items Addressed This Visit     Osteomyelitis ([address]    Relevant Medications    lidocaine (XYLOCAINE) 4 % external solution    Other Relevant Orders    Initiate Outpatient Wound Care Protocol    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 (HCC)    Relevant Medications    lidocaine (XYLOCAINE) 4 % external solution    Other Relevant Orders    Initiate Outpatient Wound Care Protocol    WD-Diabetic ulcer of toe of right foot associated with type 2 diabetes mellitus, with fat layer exposed ([address] - Primary    Relevant Medications    lidocaine (XYLOCAINE) 4 % external solution    Other Relevant Orders    Initiate Outpatient Wound Care Protocol        Procedure Note    Indications:  Based on my examination of this patient's wound(s) today, sharp excision into necrotic dermis and subcutaneous tissue is required to promote healing and evaluate the extent of previous healing. Performed by: Maldonado Nowak MD    Consent obtained: Yes    Time out taken:  Yes    Pain Control: Anesthetic  Anesthetic: 4% Lidocaine Liquid Topical     Debridement:Excisional Debridement    Using curette the wound(s) was/were sharply debrided down through and including the removal of dermis and subcutaneous tissue. Devitalized Tissue Debrided:  slough    Pre Debridement Measurements:  Are located in the Wound Documentation Flow Sheet    All active wounds listed below with today's date are evaluated  Wound(s)    debrided this date include # : 1     Post  Debridement Measurements:  Negative Pressure Wound Therapy Foot Right (Active)   Number of days: 34       Negative Pressure Wound Therapy Toe (Comment  which one) Anterior;Right (Active)   Unit Type KCI 07/22/21 1547   Dressing Type Black foam 07/22/21 1547   Number of pieces used 1 07/22/21 1547   Cycle Continuous 07/22/21 1547   Target Pressure (mmHg) 125 07/22/21 1547   Canister changed? No 07/22/21 1547   Dressing Status Clean;Dry; Intact 07/22/21 1547   Dressing Changed Changed/New 07/22/21 1547   Drainage Amount Moderate 07/22/21 1547   Drainage Description Serosanguinous 07/22/21 1547   Dressing Change Due 07/18/21 07/15/21 1531   Number of days: 29       Wound 06/23/21 Foot Right #2 FIFTH TOE AMP discharge from the [address] [facility],[address], you will receive a patient experience survey. We would be grateful if you would take the time to fill this survey out.     Wound care order history:                 ROBIN's   Right 1.17      Left 1.2   Date 4/13/21              Vascular studies:                 Cultures:  4/13/21, 06/14/21              Antibiotics:                  HbA1c:   8.9 4/14/21              Compression/Lymph Pumps:              Grafts:  Applied for on 07/08/21                Continuing wound care orders and information:              Residence: Private              [facility] home health care with: [facility]              Your wound-care supplies will be provided by: . [address]                            OVDKF cleansing:                           GW not scrub or use excessive force.                          Wash hands with soap and water before and after dressing changes.                         Prior to applying a clean dressing, cleanse wound with normal saline,                          wound cleanser, or mild soap and water.                           Ask your physician or nurse before getting the wound(s) wet in the shower.              Daily Wound management:                          Keep weight off wounds and reposition every 2 hours.                          Avoid standing for long periods of time.                          Apply wraps/stockings in AM and remove at bedtime.                          Elevate legs to the level of the heart or above for 30 minutes 4-5 times a day and/or when sitting.                                               When taking antibiotics take entire prescription as ordered by MD do not stop taking until medicine is all gone.                              Orders for this week (7/22/21): Right 4th toe Wound Vac Therapy:  Apply mastisol and duoderm to periwound for protection. Apply anasept gel mixed with stimulen powder to wound bed  Gently pack with black foam to wound. Cover exposed tendon with sorbact or mepitel. Secure with vac drape. Apply wound vac  Set wound vac to 125 continuous suction. Change canister with each dressing change or as needed. [facility] to Change on  Tuesday, Thursday, and Saturday     Follow up with Dr. Froylan Stern on In 1 week in the wound care center  Call [phone] for any questions or concerns. Date__________   Time____________          Treatment Note Wound 06/23/21 Foot Right #2 FIFTH TOE AMP SITE-Dressing/Treatment:  (mastisol,duodrm,anasept,stimulen powder,sorbact,drape,vac)    Written Patient Dismissal Instructions Given     - continue VAC therapy and Zyvox  - CBC to be drawn by home health  - continue aggressive treatment for constipation, no further narcotic pain medication. Suppositories and magnesium citrate were recommended.        Electronically signed by Marquise Snowden MD on 7/22/2021 at 4:18 PM

## 2021-07-22 NOTE — PROGRESS NOTES
Wound vac applied per discharge instructions. Operating at 125, with no leaks. Pt tolerated procedure well.      Electronically signed by Destini Lopez RN on 7/22/2021 at 3:50 PM

## 2021-07-24 ENCOUNTER — HOSPITAL ENCOUNTER (OUTPATIENT)
Age: 41
Setting detail: SPECIMEN
Discharge: HOME | End: 2021-07-24
Payer: MEDICARE

## 2021-07-24 LAB
BASOPHILS ABSOLUTE: 0.1 K/CU MM
BASOPHILS RELATIVE PERCENT: 1 % (ref 0–1)
DIFFERENTIAL TYPE: ABNORMAL
EOSINOPHILS ABSOLUTE: 0.8 K/CU MM
EOSINOPHILS RELATIVE PERCENT: 7.4 % (ref 0–3)
HCT VFR BLD CALC: 37.8 % (ref 37–47)
HEMOGLOBIN: 13.2 GM/DL (ref 12.5–16)
IMMATURE NEUTROPHIL %: 0.2 % (ref 0–0.43)
LYMPHOCYTES ABSOLUTE: 2.5 K/CU MM
LYMPHOCYTES RELATIVE PERCENT: 24.8 % (ref 24–44)
MCH RBC QN AUTO: 31.7 PG (ref 27–31)
MCHC RBC AUTO-ENTMCNC: 34.9 % (ref 32–36)
MCV RBC AUTO: 90.6 FL (ref 78–100)
MONOCYTES ABSOLUTE: 0.6 K/CU MM
MONOCYTES RELATIVE PERCENT: 6.1 % (ref 0–4)
PDW BLD-RTO: 12.6 % (ref 11.7–14.9)
PLATELET # BLD: 275 K/CU MM (ref 140–440)
PMV BLD AUTO: 9.7 FL (ref 7.5–11.1)
RBC # BLD: 4.17 M/CU MM (ref 4.2–5.4)
SEGMENTED NEUTROPHILS ABSOLUTE COUNT: 6.2 K/CU MM
SEGMENTED NEUTROPHILS RELATIVE PERCENT: 60.5 % (ref 36–66)
TOTAL IMMATURE NEUTOROPHIL: 0.02 K/CU MM
WBC # BLD: 10.2 K/CU MM (ref 4–10.5)

## 2021-07-24 PROCEDURE — 85025 COMPLETE CBC W/AUTO DIFF WBC: CPT

## 2021-07-24 PROCEDURE — 83036 HEMOGLOBIN GLYCOSYLATED A1C: CPT

## 2021-07-24 PROCEDURE — 36415 COLL VENOUS BLD VENIPUNCTURE: CPT

## 2021-07-25 LAB
ESTIMATED AVERAGE GLUCOSE: 163 MG/DL
HBA1C MFR BLD: 7.3 % (ref 4.2–6.3)

## 2021-07-29 ENCOUNTER — HOSPITAL ENCOUNTER (OUTPATIENT)
Dept: WOUND CARE | Age: 41
Discharge: HOME | End: 2021-07-29
Payer: MEDICARE

## 2021-07-29 VITALS — RESPIRATION RATE: 18 BRPM | TEMPERATURE: 98.4 F | SYSTOLIC BLOOD PRESSURE: 121 MMHG | DIASTOLIC BLOOD PRESSURE: 77 MMHG

## 2021-07-29 DIAGNOSIS — L97.512 DIABETIC ULCER OF TOE OF RIGHT FOOT ASSOCIATED WITH TYPE 2 DIABETES MELLITUS, WITH FAT LAYER EXPOSED (HCC): Primary | ICD-10-CM

## 2021-07-29 DIAGNOSIS — E11.621 DIABETIC ULCER OF TOE OF RIGHT FOOT ASSOCIATED WITH TYPE 2 DIABETES MELLITUS, WITH FAT LAYER EXPOSED (HCC): Primary | ICD-10-CM

## 2021-07-29 DIAGNOSIS — M86.171 OTHER ACUTE OSTEOMYELITIS OF RIGHT FOOT (HCC): ICD-10-CM

## 2021-07-29 DIAGNOSIS — Z89.421 STATUS POST AMPUTATION OF LESSER TOE OF RIGHT FOOT (HCC): ICD-10-CM

## 2021-07-29 PROCEDURE — 15275 SKIN SUB GRAFT FACE/NK/HF/G: CPT

## 2021-07-29 PROCEDURE — 15004 WOUND PREP F/N/HF/G: CPT | Performed by: SURGERY

## 2021-07-29 PROCEDURE — 15275 SKIN SUB GRAFT FACE/NK/HF/G: CPT | Performed by: SURGERY

## 2021-07-29 PROCEDURE — 97605 NEG PRS WND THER DME<=50SQCM: CPT

## 2021-07-29 RX ORDER — LIDOCAINE HYDROCHLORIDE 40 MG/ML
SOLUTION TOPICAL ONCE
Status: DISCONTINUED | OUTPATIENT
Start: 2021-07-29 | End: 2021-07-30 | Stop reason: HOSPADM

## 2021-07-29 RX ORDER — LIDOCAINE 40 MG/G
CREAM TOPICAL ONCE
Status: CANCELLED | OUTPATIENT
Start: 2021-07-29 | End: 2021-07-29

## 2021-07-29 RX ORDER — LIDOCAINE HYDROCHLORIDE 20 MG/ML
JELLY TOPICAL ONCE
Status: CANCELLED | OUTPATIENT
Start: 2021-07-29 | End: 2021-07-29

## 2021-07-29 RX ORDER — GENTAMICIN SULFATE 1 MG/G
OINTMENT TOPICAL ONCE
Status: CANCELLED | OUTPATIENT
Start: 2021-07-29 | End: 2021-07-29

## 2021-07-29 RX ORDER — BETAMETHASONE DIPROPIONATE 0.05 %
OINTMENT (GRAM) TOPICAL ONCE
Status: CANCELLED | OUTPATIENT
Start: 2021-07-29 | End: 2021-07-29

## 2021-07-29 RX ORDER — LIDOCAINE 50 MG/G
OINTMENT TOPICAL ONCE
Status: CANCELLED | OUTPATIENT
Start: 2021-07-29 | End: 2021-07-29

## 2021-07-29 RX ORDER — LIDOCAINE HYDROCHLORIDE 40 MG/ML
SOLUTION TOPICAL ONCE
Status: CANCELLED | OUTPATIENT
Start: 2021-07-29 | End: 2021-07-29

## 2021-07-29 RX ORDER — BACITRACIN, NEOMYCIN, POLYMYXIN B 400; 3.5; 5 [USP'U]/G; MG/G; [USP'U]/G
OINTMENT TOPICAL ONCE
Status: CANCELLED | OUTPATIENT
Start: 2021-07-29 | End: 2021-07-29

## 2021-07-29 RX ORDER — CLOBETASOL PROPIONATE 0.5 MG/G
OINTMENT TOPICAL ONCE
Status: CANCELLED | OUTPATIENT
Start: 2021-07-29 | End: 2021-07-29

## 2021-07-29 RX ORDER — BACITRACIN ZINC AND POLYMYXIN B SULFATE 500; 1000 [USP'U]/G; [USP'U]/G
OINTMENT TOPICAL ONCE
Status: CANCELLED | OUTPATIENT
Start: 2021-07-29 | End: 2021-07-29

## 2021-07-29 RX ORDER — GINSENG 100 MG
CAPSULE ORAL ONCE
Status: CANCELLED | OUTPATIENT
Start: 2021-07-29 | End: 2021-07-29

## 2021-07-29 NOTE — PROGRESS NOTES
NuShieldTreatment Note    NAME:  Efraín Chinchilla  YOB: 1980  MEDICAL RECORD NUMBER:  [id number]  DATE:  7/29/2021    Goal:  Patient will receive safe and proper application of skin substitute. Patient will comply with caring for dressing, and reporting complications. [x]Expiration date checked immediately prior to use. [x] Package intact prior to use and no damage noted. [x] Transport temperature controlled and acceptable. Nushield was removed from protective sterile packaging by physician and            applied Chorion side down to the prepared ulcer bed. Nushield was hydrated with sterile normal saline per physician. Nushield was applied to right foot and affixed with steri-strips by the physician. [x] Nushield was covered with non-adherent ulcer dressing. [x] Applied black foam over non-adherent. [x] Applied dry gauze and/or roll gauze. Patient/caregiver was instructed not to remove dressing and to keep it clean    and dry. Pt/family/caregiver was instructed on signs and symptoms of complications       to report such as draining through dressing, dressing falling down/slipping,           getting wet, or severe pain or tingling. NuShield may be applied a total of 10 times per wound over a 12 month period. Date of first application of NuShield for this current wound is July 29, 2021 .     Guidelines followed    Electronically signed by Neli Meyers RN on 7/29/2021 at 3:41 PM

## 2021-07-29 NOTE — PROGRESS NOTES
Negative Pressure    NAME:  Marilyn Arellano  YOB: 1980  MEDICAL RECORD NUMBER:  [id number]  DATE:  7/29/2021     Applied Negative Pressure to right foot wound(s)/ulcer(s).  [x] Applied mastisol to valerie-wound.  [x] Cut strips of duoderm to picture frame wound so that valerie-wound is  covered    [x] If bridging dressing to less prominent site, cover any intact skin that will come in contact with the Negative Pressure Therapy sponge, gauze or channel drain with plastic drape. The sponge should never touch intact skin.  [x] Cut sponge, gauze or channel drain to size which will fit into the wound/ulcer bed without being forced.  [x] Be sure the sponge is large enough to hold the entire round plastic flange which is attached to the tubing. Never allow flange to be larger than the sponge or it will produce suction damaging intact skin.  [x] If bridging the dressing away from the primary site, be sure the bridge leads to a piece of sponge large enough to hold the entire flange without allowing any of the flange to overlap onto intact skin.  [x] Covered sponge, gauze or channel drain with plastic drape.  [x] Cut a hole in this plastic drape directly over the sponge the same size as the plastic drain tubing.  [x] Removed plastic liner from flange and apply it directly over the hole you cut.  [x] Removed the plastic cover from the flange.  [x] Attached the tubing to the wound/ulcer Negative Pressure Therapy and turn it on to be sure a vacuum is created and that there are no leaks.  [x] If air leaks occur, use plastic drape to patch them.  [x] Secured Negative Pressure Therapy dressing with ace wrap loosely if located on an extremity. Maintain tubing outside of ace wrap. Tubing must not exert pressure on intact skin.     Applied per  Guidelines      Electronically signed by Jazmín Swenson RN on 7/29/2021 at 2:49 PM

## 2021-07-29 NOTE — PROGRESS NOTES
Wound Care Center Progress Note and Bioengineered Skin Application      Marliyn Rahman  AGE: 41 y.o. GENDER: female  : 1980  EPISODE DATE:  2021     Subjective:     Chief Complaint   Patient presents with    Wound Check     RLE         HISTORY of PRESENT ILLNESS   7/15/21  Marilyn Rhaman is a 41 y. o. female who presents today for wound evaluation of Acute on chronic diabetic, pressure and neuropathic ulcer(s) of the right lateral foot.  The ulcer is of marked severity.  The underlying cause of the wound is DM infection.  She required 5th toe amputation and foot debridement by me on 21 and has been managed with a wound VAC since then.  She was kept on IV zosyn after discharge and had been planned for follow up with ID but this has fallen through. Ambika Carballo reports rash and intense \"needle like\" feeling all over recently when she has been receiving her Zosyn infusions.  She attempted premedication with benadryl and tylenol but this did not help.  Cultures from the wound have grown enterococcus sensitive to Vanc and ampicillin.  She has a PCN allergy and is not showing allergy to zosyn.     2021  Re-evaluated today. Reports taking Zyvox but having nausea recently due to constipation. It has been 4 days since having a BM. She reports using over the counter stool softeners and laxative. Denies problems with her foot wound or the wound VAC. She did not have her CBC drawn prior to today's appointment.     2021  Denies problems with her wound since last seen. Has been doing wound VAC therapy. Zyvox to finish tomorrow.     Wound Pain Timing/Severity: mild  Quality of pain: dull  Severity of pain:  2 / 10   Modifying Factors: edema, diabetes, poor glucose control, obesity and chronic pressure  Associated Signs/Symptoms: edema, drainage and numbness                             PAST MEDICAL HISTORY        Diagnosis Date    Allergic rhinitis     allergy shots- Dr. Marisol Burgess Allergic rhinitis     Dr. Florencio Wayne Keke Ovens- claritin and flonase    Anxiety 10/24/2012    Atopic dermatitis     Dr. Shruthi Mak- claritin    Back pain 10/24/2012    Bilateral sciatica 2020    Depression     [facility]     Diabetes mellitus type II, uncontrolled ([facility]     Diabetic eye exam: 2011, Dr. Emi Powers , needs record [V72.0T][    GERD (gastroesophageal reflux disease)     Headache(784.0)     Hyperlipidemia LDL goal < 100 2012    Hypertension     Morbid obesity ([facility]     No retinopathy on exam     Dr Emi Powers 13 Diabetes with no significatn diabetic retinopathy found in both eyes    Non compliance w medication regimen 2014    Sprain of neck 10/4/2011       PAST SURGICAL HISTORY    Past Surgical History:   Procedure Laterality Date     SECTION      HYSTERECTOMY      OTHER SURGICAL HISTORY  2017    I&D of right perineal abcess     TOE AMPUTATION Right 2021    RIGHT 5TH TOE AMPUTATION performed by Elizabeth Dumont MD at [address] [place] [address] History   Problem Relation Age of Onset    Heart Disease Mother     Diabetes Mother     Asthma Mother     High Cholesterol Mother     Depression Mother     Heart Disease Father     High Cholesterol Father        SOCIAL HISTORY    Social History     Tobacco Use    Smoking status: Former Smoker     Packs/day: 0.25     Types: Cigarettes     Quit date: 5/10/2021     Years since quittin.2    Smokeless tobacco: Never Used   Vaping Use    Vaping Use: Never used   Substance Use Topics    Alcohol use: Yes     Comment: rare    Drug use: No       ALLERGIES    Allergies   Allergen Reactions    Zosyn [Piperacillin Sod-Tazobactam So] Rash     Rash and severe \"being poked by needles all over\" feeling    Penicillins Rash       MEDICATIONS    Current Outpatient Medications on File Prior to Encounter   Medication Sig Dispense Refill    Probiotic Acidophilus (FLORANEX) TABS Take 1 tablet by mouth 2 times daily 60 tablet 2  lisinopril (PRINIVIL;ZESTRIL) 2.5 MG tablet Take 1 tablet by mouth every morning 90 tablet 3    atorvastatin (LIPITOR) 40 MG tablet Take 1 tablet by mouth every morning 90 tablet 3    Dulaglutide 1.5 MG/0.5ML SOPN Inject 1.5 mg into the skin once a week Dose increase as of 1/9/20 4 pen 5    glipiZIDE (GLUCOTROL XL) 10 MG extended release tablet Take 1 tablet by mouth every morning 90 tablet 3    insulin glargine (LANTUS SOLOSTAR) 100 UNIT/ML injection pen Inject 15 Units into the skin nightly 10 pen 5    Lancets MISC To test up to 4 times per day- nighttime and with each meal 300 each 5    blood glucose test strips (EXACTECH TEST) strip 1 each by In Vitro route daily Embrace test strips to test BS 4x daily 300 each 6    Insulin Pen Needle 32G X 5 MM MISC To use with Lantus pen and Novolog pen 30 each 11    aspirin EC 81 MG EC tablet Take 1 tablet by mouth daily 90 tablet 0     No current facility-administered medications on file prior to encounter. REVIEW OF SYSTEMS    Pertinent items are noted in HPI. Constitutional: Negative for systemic symptoms including fever, chills and malaise. Objective:      /77   Temp 98.4 °F (36.9 °C) (Temporal)   Resp 18     PHYSICAL EXAM  General Appearance: alert and oriented to person, place and time, well-developed and well-nourished, in no acute distress    General: The patient is in no acute distress. Mental status:  Patient is appropriate, is  oriented to place and plan of care. Dermatologic exam: Visual inspection of the periwound reveals the skin to be normal in turgor and texture  Wound exam: see wound description below in procedure note      Assessment:   41 y.o. female with DM foot wound s/p amputation of the right 5th toe. Doing well.     Problem List Items Addressed This Visit     Osteomyelitis ([address]    Relevant Medications    lidocaine (XYLOCAINE) 4 % external solution    Other Relevant Orders    Initiate Outpatient Wound Care Protocol Neg. 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 Pressure Wound Therapy    WD-Diabetic ulcer of toe of right foot associated with type 2 diabetes mellitus, with fat layer exposed ([facility] - Primary    Relevant Medications    lidocaine (XYLOCAINE) 4 % external solution    Other Relevant Orders    Initiate Outpatient Wound Care Protocol    Neg. Pressure Wound Therapy          Conditions above and those listed in the past history are being treated appropriately and are considered under adequate control so as not to preclude the use of a graft. SKIN SUBSTITUTES/GRAFT APPLICATIONS PROCEDURE NOTE    Indicaton:     Chronic ulcer(s) of the right foot  that has(have) failed to heal with the usual wound protocol used for greater than 30 days. See Epic chart for previous modalities and details of previous treatment. The patient has no contraindications or evidence of infection. The patient's competency and support system is appropriate for proper care and follow up for the use of this graft, therefore a graft was placed as below. Procedure: The wound bed was cleansed and prepared as necessary to accept the graft. Debridement was required. Consent obtained: Yes    Time out taken: Yes    Using curette sharp Excisional Debridement of the wound(s) was/were performed down through and including the removal of dermis and subcutaneous tissue. Devitalized Tissue Debrided:  slough      Bleeding:  Minimal    Hemostasis Achieved:  by pressure    Procedural Pain:  2  / 10     Post Procedural Pain:  0 / 10     Having prepared the wound bed, the skin graft was completed as below.     Product Utilized:          [] APLIGRAF   []44 sq cm   []88 sq cm    []132 sq cm  []176 sq cm           [] DERMAGRAFT  [] 38 sq cm   []76 sq cm    []114 sq cm  []152 sq cm      [] NUSHIELD  [] 1.6 sq/cm disc   [] (2X3) 6 sq/cm    [] (2X4) 8 sq/cm         [x] (3X4) 12 sq/cm  [] (4x4) 16 sq/cm   [] (4X6) 24 sq/cm         [] (6X6) 36 sq/cm        [] AFFINITY    [] (1.5 cm x 1.5cm) 2.25 cm   [] (2.5 cm x 2.5 cm) 6.25 cm         [] THERASKIN  [] 13 sq cm   []37.34 sq cm             [] EpiFix   [] 1.54 sq cm disc    [] 2 pieces (3.08 sq cm)    [] 3  pieces (4.62 sq  cm)   [] 6 sq/cm    [] 16 sq cm     [] 18 sq cm   Fenestrated        [] OASIS   [] 10.5 sq cm   []21 sq cm    []35 sq cm Tri-Matrix  []70 sq cm          Tri-Matrix                    [] PURAPLY   [] 1.6 sq cm disc     [] 4 sq cm   [] 8 sq cm   [] 25sq cm  [] 54 sq cm                  [] Grafix      [] 1.54 sq cm disc    [] 3 sq cm    [] 6 sq cm   [] 12 sq cm   [] 25 sq cm        Skin Substitute Applied:    Performed by: Rajani Escalante MD    Consent obtained: Yes    Time out taken: Yes     Fenestrated: No    Skin Substitute was Applied to Wound Number(s):     1      Negative Pressure Wound Therapy Foot Right (Active)   Number of days: 41       Negative Pressure Wound Therapy Toe (Comment  which one) Anterior;Right (Active)   Unit Type kci 07/29/21 1447   Dressing Type Black foam 07/29/21 1447   Number of pieces used 1 07/29/21 1447   Cycle Continuous 07/29/21 1447   Target Pressure (mmHg) 125 07/29/21 1447   Canister changed? No 07/29/21 1447   Dressing Status Clean;Dry; Intact 07/29/21 1447   Dressing Changed Changed/New 07/29/21 1447   Drainage Amount Moderate 07/29/21 1447   Drainage Description Serosanguinous 07/29/21 1447   Dressing Change Due 07/18/21 07/15/21 1531   Number of days: 36       Wound 06/23/21 Foot Right #2 FIFTH TOE AMP SITE (Active)   Wound Image   07/22/21 1427   Wound Etiology Diabetic Chua 3 06/23/21 0935   Dressing Status New dressing applied 07/29/21 1447   Wound Cleansed Vashe 07/29/21 1404   Dressing/Treatment Negative pressure wound therapy 07/29/21 1443 with vac drape. Apply wound vac  Set wound vac to 125 continuous suction. Change canister with each dressing change or as needed. [facility] to Change on  Monday and Thursday     Follow up with Dr. Romina Rico on In 1 week in the wound care center  Call [phone] for any questions or concerns.   Date__________   Time____________        Treatment Note Wound 06/23/21 Foot Right #2 FIFTH TOE AMP SITE-Dressing/Treatment: Negative pressure wound therapy    Written Patient Dismissal Instructions Given            Electronically signed by Frank Quintero MD on 7/29/2021 at 3:19 PM

## 2021-08-05 ENCOUNTER — HOSPITAL ENCOUNTER (OUTPATIENT)
Dept: WOUND CARE | Age: 41
Discharge: HOME | End: 2021-08-05
Payer: MEDICARE

## 2021-08-05 VITALS
RESPIRATION RATE: 18 BRPM | HEART RATE: 101 BPM | DIASTOLIC BLOOD PRESSURE: 77 MMHG | TEMPERATURE: 98.6 F | SYSTOLIC BLOOD PRESSURE: 112 MMHG

## 2021-08-05 DIAGNOSIS — Z89.421 STATUS POST AMPUTATION OF LESSER TOE OF RIGHT FOOT (HCC): ICD-10-CM

## 2021-08-05 DIAGNOSIS — L97.512 DIABETIC ULCER OF TOE OF RIGHT FOOT ASSOCIATED WITH TYPE 2 DIABETES MELLITUS, WITH FAT LAYER EXPOSED (HCC): Primary | ICD-10-CM

## 2021-08-05 DIAGNOSIS — M86.171 OTHER ACUTE OSTEOMYELITIS OF RIGHT FOOT (HCC): ICD-10-CM

## 2021-08-05 DIAGNOSIS — E11.621 DIABETIC ULCER OF TOE OF RIGHT FOOT ASSOCIATED WITH TYPE 2 DIABETES MELLITUS, WITH FAT LAYER EXPOSED (HCC): Primary | ICD-10-CM

## 2021-08-05 PROCEDURE — 15275 SKIN SUB GRAFT FACE/NK/HF/G: CPT

## 2021-08-05 PROCEDURE — 15271 SKIN SUB GRAFT TRNK/ARM/LEG: CPT

## 2021-08-05 PROCEDURE — 15275 SKIN SUB GRAFT FACE/NK/HF/G: CPT | Performed by: SURGERY

## 2021-08-05 RX ORDER — LIDOCAINE HYDROCHLORIDE 40 MG/ML
SOLUTION TOPICAL ONCE
Status: DISCONTINUED | OUTPATIENT
Start: 2021-08-05 | End: 2021-08-06 | Stop reason: HOSPADM

## 2021-08-05 RX ORDER — CLOBETASOL PROPIONATE 0.5 MG/G
OINTMENT TOPICAL ONCE
Status: CANCELLED | OUTPATIENT
Start: 2021-08-05 | End: 2021-08-05

## 2021-08-05 RX ORDER — GINSENG 100 MG
CAPSULE ORAL ONCE
Status: CANCELLED | OUTPATIENT
Start: 2021-08-05 | End: 2021-08-05

## 2021-08-05 RX ORDER — LIDOCAINE HYDROCHLORIDE 20 MG/ML
JELLY TOPICAL ONCE
Status: CANCELLED | OUTPATIENT
Start: 2021-08-05 | End: 2021-08-05

## 2021-08-05 RX ORDER — LIDOCAINE HYDROCHLORIDE 40 MG/ML
SOLUTION TOPICAL ONCE
Status: CANCELLED | OUTPATIENT
Start: 2021-08-05 | End: 2021-08-05

## 2021-08-05 RX ORDER — BACITRACIN, NEOMYCIN, POLYMYXIN B 400; 3.5; 5 [USP'U]/G; MG/G; [USP'U]/G
OINTMENT TOPICAL ONCE
Status: CANCELLED | OUTPATIENT
Start: 2021-08-05 | End: 2021-08-05

## 2021-08-05 RX ORDER — LIDOCAINE 50 MG/G
OINTMENT TOPICAL ONCE
Status: CANCELLED | OUTPATIENT
Start: 2021-08-05 | End: 2021-08-05

## 2021-08-05 RX ORDER — GENTAMICIN SULFATE 1 MG/G
OINTMENT TOPICAL ONCE
Status: CANCELLED | OUTPATIENT
Start: 2021-08-05 | End: 2021-08-05

## 2021-08-05 RX ORDER — BETAMETHASONE DIPROPIONATE 0.05 %
OINTMENT (GRAM) TOPICAL ONCE
Status: CANCELLED | OUTPATIENT
Start: 2021-08-05 | End: 2021-08-05

## 2021-08-05 RX ORDER — BACITRACIN ZINC AND POLYMYXIN B SULFATE 500; 1000 [USP'U]/G; [USP'U]/G
OINTMENT TOPICAL ONCE
Status: CANCELLED | OUTPATIENT
Start: 2021-08-05 | End: 2021-08-05

## 2021-08-05 RX ORDER — LIDOCAINE 40 MG/G
CREAM TOPICAL ONCE
Status: CANCELLED | OUTPATIENT
Start: 2021-08-05 | End: 2021-08-05

## 2021-08-05 NOTE — PROGRESS NOTES
Wound Care Center Progress Note With Procedure    Marilyn Pinzon  AGE: 41 y.o. GENDER: female  : 1980  EPISODE DATE:  2021     Subjective:     Chief Complaint   Patient presents with    Wound Check     Right fifth toe amp site          HISTORY of PRESENT ILLNESS     7/15/21  Marilyn Rahman is a 41 y. o. female who presents today for wound evaluation of Acute on chronic diabetic, pressure and neuropathic ulcer(s) of the right lateral foot.  The ulcer is of marked severity.  The underlying cause of the wound is DM infection.  She required 5th toe amputation and foot debridement by me on 21 and has been managed with a wound VAC since then.  She was kept on IV zosyn after discharge and had been planned for follow up with ID but this has fallen through. Florina Barros reports rash and intense \"needle like\" feeling all over recently when she has been receiving her Zosyn infusions.  She attempted premedication with benadryl and tylenol but this did not help.  Cultures from the wound have grown enterococcus sensitive to Vanc and ampicillin.  She has a PCN allergy and is not showing allergy to zosyn.     2021  Re-evaluated today.  Reports taking Zyvox but having nausea recently due to constipation.  It has been 4 days since having a BM.  She reports using over the counter stool softeners and laxative.  Denies problems with her foot wound or the wound VAC.  She did not have her CBC drawn prior to today's appointment.     2021  Denies problems with her wound since last seen. Has been doing wound VAC therapy. Zyvox to finish tomorrow.     2021  Denies problems over the last week. Has been undergoing VAC therapy. Hopes the wound will be healed by October so she can go to [place] [facility].     Wound Pain Timing/Severity: mild  Quality of pain: dull  Severity of pain:  1 / 10   Modifying Factors: edema, diabetes, poor glucose control, obesity and chronic pressure  Associated Signs/Symptoms: edema, drainage and numbness                          PAST MEDICAL HISTORY        Diagnosis Date    Allergic rhinitis     allergy shots- Dr. Donna Mathias Allergic rhinitis     Dr. Montes De Oca Serum- claritin and flonase    Anxiety 10/24/2012    Atopic dermatitis     Dr. Montes De Oca Serum- claritin    Back pain 10/24/2012    Bilateral sciatica 2020    Depression     Diabetes mellitus ([facility]     Diabetes mellitus type II, uncontrolled ([facility] [facility]     Diabetic eye exam: 2011, Dr. Cordelia Johnson , needs record [V72.0T][    GERD (gastroesophageal reflux disease)     Headache(784.0)     Hyperlipidemia LDL goal < 100 2012    Hypertension     Morbid obesity ([facility]     No retinopathy on exam     Dr Cordelia Johnson 13 Diabetes with no significatn diabetic retinopathy found in both eyes    Non compliance w medication regimen 2014    Sprain of neck 10/4/2011       PAST SURGICAL HISTORY    Past Surgical History:   Procedure Laterality Date     SECTION      HYSTERECTOMY      OTHER SURGICAL HISTORY  2017    I&D of right perineal abcess     TOE AMPUTATION Right 2021    RIGHT 5TH TOE AMPUTATION performed by Drew Cedeño MD at [address] [place] [address] History   Problem Relation Age of Onset    Heart Disease Mother     Diabetes Mother     Asthma Mother     High Cholesterol Mother     Depression Mother     Heart Disease Father     High Cholesterol Father        SOCIAL HISTORY    Social History     Tobacco Use    Smoking status: Former Smoker     Packs/day: 0.25     Types: Cigarettes     Quit date: 5/10/2021     Years since quittin.2    Smokeless tobacco: Never Used   Vaping Use    Vaping Use: Never used   Substance Use Topics    Alcohol use: Yes     Comment: rare    Drug use: No       ALLERGIES    Allergies   Allergen Reactions    Zosyn [Piperacillin Sod-Tazobactam So] Rash     Rash and severe \"being poked by needles all over\" feeling    Penicillins Rash       MEDICATIONS    Current Addressed This Visit     Osteomyelitis ([facility]    Relevant Medications    lidocaine (XYLOCAINE) 4 % external solution    Other Relevant Orders    Initiate Outpatient Wound Care Protocol    Neg. 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 Pressure Wound Therapy    WD-Diabetic ulcer of toe of right foot associated with type 2 diabetes mellitus, with fat layer exposed ([facility] - Primary    Relevant Medications    lidocaine (XYLOCAINE) 4 % external solution    Other Relevant Orders    Initiate Outpatient Wound Care Protocol    Neg. Pressure Wound Therapy        Procedure Note    Indications:  Based on my examination of this patient's wound(s) today, sharp excision into necrotic dermis and subcutaneous tissue is required to promote healing and evaluate the extent of previous healing. Performed by: Jewels Sullivan MD    Consent obtained: Yes    Time out taken:  Yes    Pain Control: Anesthetic  Anesthetic: 4% Lidocaine Liquid Topical     Debridement:Excisional Debridement    Using curette the wound(s) was/were sharply debrided down through and including the removal of dermis and subcutaneous tissue.         Devitalized Tissue Debrided:  slough    Pre Debridement Measurements:  Are located in the Wound Documentation Flow Sheet    All active wounds listed below with today's date are evaluated  Wound(s)    debrided this date include # : 1     Post  Debridement Measurements:  Negative Pressure Wound Therapy Foot Right (Active)   Number of days: 48       Negative Pressure Wound Therapy Toe (Comment  which one) Anterior;Right (Active)   Unit Type kci 08/05/21 1508   Dressing Type Black foam 08/05/21 1508   Number of pieces used 1 08/05/21 1508   Cycle Continuous 08/05/21 1508   Target Pressure (mmHg) 125 08/05/21 1508   Canister changed? Yes 08/05/21 1508   Dressing Status Clean;Dry; Intact 08/05/21 1508   Dressing Changed Changed/New 08/05/21 1508   Drainage Amount Moderate 08/05/21 1508   Drainage Description Serosanguinous 08/05/21 1508   Dressing Change Due 07/18/21 07/15/21 1531   Number of days: 43       Wound 06/23/21 Foot Right #2 FIFTH TOE AMP SITE (Active)   Wound Image   08/05/21 1422   Wound Etiology Diabetic Chua 3 06/23/21 0935   Dressing Status New dressing applied 08/05/21 1508   Wound Cleansed Vashe;Cleansed with saline 08/05/21 1422   Dressing/Treatment Negative pressure wound therapy 08/05/21 1508   Offloading for Diabetic Foot Ulcers Post op shoe 07/29/21 1447   Wound Length (cm) 5.3 cm 08/05/21 1422   Wound Width (cm) 1.6 cm 08/05/21 1422   Wound Depth (cm) 0.2 cm 08/05/21 1422   Wound Surface Area (cm^2) 8.48 cm^2 08/05/21 1422   Change in Wound Size % (l*w) 34.87 08/05/21 1422   Wound Volume (cm^3) 1.696 cm^3 08/05/21 1422   Wound Healing % 84 08/05/21 1422   Post-Procedure Length (cm) 5.3 cm 07/29/21 1439   Post-Procedure Width (cm) 1.9 cm 07/29/21 1439   Post-Procedure Depth (cm) 0.2 cm 07/29/21 1439   Post-Procedure Surface Area (cm^2) 10.07 cm^2 07/29/21 1439   Post-Procedure Volume (cm^3) 2.014 cm^3 07/29/21 1439   Distance Tunneling (cm) 0 cm 08/05/21 1422   Tunneling Position ___ O'Clock 0 08/05/21 1422   Undermining Starts ___ O'Clock 7 08/05/21 1422   Undermining Ends___ O'Clock 10 08/05/21 1422   Undermining Maxium Distance (cm) 0.3 08/05/21 1422   Wound Assessment Exposed structure tendon;Granulation tissue;Slough 08/05/21 1422   Drainage Amount Moderate 08/05/21 1422   Drainage Description Serosanguinous 08/05/21 1422   Odor None 08/05/21 1422   Yamilex-wound Assessment Maceration 08/05/21 1422   Margins Defined edges 08/05/21 1422   Wound Thickness Description not for Pressure Injury Full thickness 08/05/21 1422   Number of days: 43       Percent of Wound(s) Debrided: approximately 100%    Total  Area Debrided:  8.5 sq cm     Bleeding:  Estimated amount of blood loss is 5ml. Hemostasis Achieved:  by pressure    Procedural Pain:  2  / 10     Post Procedural Pain:  0 / 10     Response to treatment:  Well tolerated by patient. Status of wound progress and description from last visit:   Improving, healthy wound base and smaller in size. Plan:       Discharge Instructions       PHYSICIAN ORDERS AND DISCHARGE INSTRUCTIONS     NOTE: Upon discharge from the [address], you will receive a patient experience survey. We would be grateful if you would take the time to fill this survey out.     Wound care order history:                 ROBIN's   Right 1.17      Left 1.2   Date 4/13/21              Vascular studies:                 Cultures:  4/13/21, 06/14/21              Antibiotics:                  HbA1c:   8.9 4/14/21              Compression/Lymph Pumps:              Grafts:  Nushield #1 7/29/21, Nushield #2 8/5/21                Continuing wound care orders and information:              Residence: [facility]              Continue [facility] health care with: [facility]              Your wound-care supplies will be provided by: [place] [facility]                            DDZJV cleansing:                           NA not scrub or use excessive force.                          Wash hands with soap and water before and after dressing changes.                           Prior to applying a clean dressing, cleanse wound with normal saline,                          wound cleanser, or mild soap and water.                           Ask your physician or nurse before getting the wound(s) wet in the shower.              Daily Wound management:                          Keep weight off wounds and reposition every 2 hours.                          Avoid standing for long periods of time.                          Apply wraps/stockings in AM and remove at bedtime.                          Elevate legs to the level of the heart or above for 30 minutes 4-5 times a day and/or when sitting.                                               When taking antibiotics take entire prescription as ordered by MD do not stop taking until medicine is all gone.                              Orders for this week (8/5/21): Right 4th toe Wound Vac Therapy:  Nushield #2 applied by Dr. Marciano Chris and secured with sorbact, mastisol and steristrips  Extra layer of Mepitel placed over top to help secure Graft  Apply mastisol and duoderm to periwound for protection. Gently pack with black foam to wound. Secure with vac drape. Apply wound vac  Set wound vac to 125 continuous suction. Change canister with each dressing change or as needed. [facility] to Change on  Monday and Thursday     Follow up with Dr. Marciano Chris on In 1 week in the wound care center  Call [phone] for any questions or concerns.   Date__________   Time____________           Treatment Note Wound 06/23/21 Foot Right #2 FIFTH TOE AMP SITE-Dressing/Treatment: Negative pressure wound therapy (duoderm, mastisol, mepitel,black foam, drape, vac)    Written Patient Dismissal Instructions Given            Electronically signed by Dianne Perla MD on 8/5/2021 at 3:38 PM

## 2021-08-05 NOTE — PROGRESS NOTES
NuShieldTreatment Note    NAME:  Kathleen Bullock  YOB: 1980  MEDICAL RECORD NUMBER:  [id number]  DATE:  8/5/2021    Goal:  Patient will receive safe and proper application of skin substitute. Patient will comply with caring for dressing, and reporting complications. [x]Expiration date checked immediately prior to use. [x] Package intact prior to use and no damage noted. [x] Transport temperature controlled and acceptable. Nushield was removed from protective sterile packaging by physician and            applied Chorion side down to the prepared ulcer bed. Nushield was hydrated with sterile normal saline per physician. Nushield was applied to right footand affixed with steri-strips by the physician. [x] Nushield was covered with non-adherent ulcer dressing. [x] Applied black foamover non-adherent. [x] Applied dry gauze and/or roll gauze. Patient/caregiver was instructed not to remove dressing and to keep it clean    and dry. Pt/family/caregiver was instructed on signs and symptoms of complications       to report such as draining through dressing, dressing falling down/slipping,           getting wet, or severe pain or tingling. NuShield may be applied a total of 10 times per wound over a 12 month period. Date of first application of NuShield for this current wound is June 27, 2021 .     Guidelines followed    Electronically signed by Malina Magallanes RN on 8/5/2021 at 2:45 PM

## 2021-08-12 ENCOUNTER — HOSPITAL ENCOUNTER (OUTPATIENT)
Dept: WOUND CARE | Age: 41
Discharge: HOME | End: 2021-08-12
Payer: MEDICARE

## 2021-08-12 VITALS
SYSTOLIC BLOOD PRESSURE: 124 MMHG | TEMPERATURE: 97.9 F | HEART RATE: 112 BPM | DIASTOLIC BLOOD PRESSURE: 78 MMHG | RESPIRATION RATE: 18 BRPM

## 2021-08-12 DIAGNOSIS — M86.171 OTHER ACUTE OSTEOMYELITIS OF RIGHT FOOT (HCC): ICD-10-CM

## 2021-08-12 DIAGNOSIS — E11.621 DIABETIC ULCER OF TOE OF RIGHT FOOT ASSOCIATED WITH TYPE 2 DIABETES MELLITUS, WITH FAT LAYER EXPOSED (HCC): Primary | ICD-10-CM

## 2021-08-12 DIAGNOSIS — Z89.421 STATUS POST AMPUTATION OF LESSER TOE OF RIGHT FOOT (HCC): ICD-10-CM

## 2021-08-12 DIAGNOSIS — L97.512 DIABETIC ULCER OF TOE OF RIGHT FOOT ASSOCIATED WITH TYPE 2 DIABETES MELLITUS, WITH FAT LAYER EXPOSED (HCC): Primary | ICD-10-CM

## 2021-08-12 PROCEDURE — 97605 NEG PRS WND THER DME<=50SQCM: CPT

## 2021-08-12 PROCEDURE — 15004 WOUND PREP F/N/HF/G: CPT | Performed by: SURGERY

## 2021-08-12 PROCEDURE — 15275 SKIN SUB GRAFT FACE/NK/HF/G: CPT

## 2021-08-12 PROCEDURE — 15275 SKIN SUB GRAFT FACE/NK/HF/G: CPT | Performed by: SURGERY

## 2021-08-12 RX ORDER — LIDOCAINE HYDROCHLORIDE 20 MG/ML
JELLY TOPICAL ONCE
Status: CANCELLED | OUTPATIENT
Start: 2021-08-12 | End: 2021-08-12

## 2021-08-12 RX ORDER — CLOBETASOL PROPIONATE 0.5 MG/G
OINTMENT TOPICAL ONCE
Status: CANCELLED | OUTPATIENT
Start: 2021-08-12 | End: 2021-08-12

## 2021-08-12 RX ORDER — LIDOCAINE 40 MG/G
CREAM TOPICAL ONCE
Status: CANCELLED | OUTPATIENT
Start: 2021-08-12 | End: 2021-08-12

## 2021-08-12 RX ORDER — BETAMETHASONE DIPROPIONATE 0.05 %
OINTMENT (GRAM) TOPICAL ONCE
Status: CANCELLED | OUTPATIENT
Start: 2021-08-12 | End: 2021-08-12

## 2021-08-12 RX ORDER — GENTAMICIN SULFATE 1 MG/G
OINTMENT TOPICAL ONCE
Status: CANCELLED | OUTPATIENT
Start: 2021-08-12 | End: 2021-08-12

## 2021-08-12 RX ORDER — LIDOCAINE HYDROCHLORIDE 40 MG/ML
SOLUTION TOPICAL ONCE
Status: CANCELLED | OUTPATIENT
Start: 2021-08-12 | End: 2021-08-12

## 2021-08-12 RX ORDER — LIDOCAINE 50 MG/G
OINTMENT TOPICAL ONCE
Status: CANCELLED | OUTPATIENT
Start: 2021-08-12 | End: 2021-08-12

## 2021-08-12 RX ORDER — BACITRACIN ZINC AND POLYMYXIN B SULFATE 500; 1000 [USP'U]/G; [USP'U]/G
OINTMENT TOPICAL ONCE
Status: CANCELLED | OUTPATIENT
Start: 2021-08-12 | End: 2021-08-12

## 2021-08-12 RX ORDER — BACITRACIN, NEOMYCIN, POLYMYXIN B 400; 3.5; 5 [USP'U]/G; MG/G; [USP'U]/G
OINTMENT TOPICAL ONCE
Status: CANCELLED | OUTPATIENT
Start: 2021-08-12 | End: 2021-08-12

## 2021-08-12 RX ORDER — GINSENG 100 MG
CAPSULE ORAL ONCE
Status: CANCELLED | OUTPATIENT
Start: 2021-08-12 | End: 2021-08-12

## 2021-08-12 RX ORDER — LIDOCAINE HYDROCHLORIDE 40 MG/ML
SOLUTION TOPICAL ONCE
Status: DISCONTINUED | OUTPATIENT
Start: 2021-08-12 | End: 2021-08-13 | Stop reason: HOSPADM

## 2021-08-12 NOTE — PROGRESS NOTES
Negative Pressure    NAME:  Marilyn Robledo  YOB: 1980  MEDICAL RECORD NUMBER:  [id number]  DATE:  8/12/2021     Applied Negative Pressure to right fifth toe amp site. wound(s)/ulcer(s).  [x] Applied skin barrier prep to valerie-wound.  [x] Cut strips of plastic drape to picture frame wound so that valerie-wound is     covered with the drape.  [x] If bridging dressing to less prominent site, cover any intact skin that will come in contact with the Negative Pressure Therapy sponge, gauze or channel drain with plastic drape. The sponge should never touch intact skin.  [x] Cut sponge, gauze or channel drain to size which will fit into the wound/ulcer bed without being forced.  [x] Be sure the sponge is large enough to hold the entire round plastic flange which is attached to the tubing. Never allow flange to be larger than the sponge or it will produce suction damaging intact skin.  Total number of individual pieces of foam used within the wound bed: ONE    [x] If bridging the dressing away from the primary site, be sure the bridge leads to a piece of sponge large enough to hold the entire flange without allowing any of the flange to overlap onto intact skin.  [x] Covered sponge, gauze or channel drain with plastic drape.  [x] Cut a hole in this plastic drape directly over the sponge the same size as the plastic drain tubing.  [x] Removed plastic liner from flange and apply it directly over the hole you cut.  [x] Removed the plastic cover from the flange.  [x] Attached the tubing to the wound/ulcer Negative Pressure Therapy and turn it on to be sure a vacuum is created and that there are no leaks.  [x] If air leaks occur, use plastic drape to patch them.  [x] Secured Negative Pressure Therapy dressing with ace wrap loosely if located on an extremity. Maintain tubing outside of ace wrap. Tubing must not exert pressure on intact skin.     Applied per [facility] Guidelines      Electronically signed by Umberto Woodruff LPN on 7/06/9663 at 7:92 PM

## 2021-08-12 NOTE — PROGRESS NOTES
NuShieldTreatment Note    NAME:  Tequila Sheikh  YOB: 1980  MEDICAL RECORD NUMBER:  [id number]  DATE:  8/12/2021    Goal:  Patient will receive safe and proper application of skin substitute. Patient will comply with caring for dressing, and reporting complications. [x]Expiration date checked immediately prior to use. [x] Package intact prior to use and no damage noted. [x] Transport temperature controlled and acceptable. Nushield was removed from protective sterile packaging by physician and            applied Chorion side down to the prepared ulcer bed. Nushield was hydrated with sterile normal saline per physician. Nushield was applied to right foot and affixed with steri-strips by the physician. [x] Nushield was covered with non-adherent ulcer dressing. [x] Applied vacover non-adherent. [x] Applied dry gauze and/or roll gauze. Patient/caregiver was instructed not to remove dressing and to keep it clean    and dry. Pt/family/caregiver was instructed on signs and symptoms of complications       to report such as draining through dressing, dressing falling down/slipping,           getting wet, or severe pain or tingling. NuShield may be applied a total of 10 times per wound over a 12 month period. Date of first application of NuShield for this current wound is July 27, 2021 .     Guidelines followed    Electronically signed by Julio C Abdullahi RN on 8/12/2021 at 3:06 PM

## 2021-08-12 NOTE — PROGRESS NOTES
Wound Care Center Progress Note and Bioengineered Skin Application      Marilyn Rahman  AGE: 41 y.o. GENDER: female  : 1980  EPISODE DATE:  2021     Subjective:     Chief Complaint   Patient presents with    Wound Check     Right foot         HISTORY of PRESENT ILLNESS     7/15/21  Marilyn Rahman is a 41 y. o. female who presents today for wound evaluation of Acute on chronic diabetic, pressure and neuropathic ulcer(s) of the right lateral foot.  The ulcer is of marked severity.  The underlying cause of the wound is DM infection.  She required 5th toe amputation and foot debridement by me on 21 and has been managed with a wound VAC since then.  She was kept on IV zosyn after discharge and had been planned for follow up with ID but this has fallen through. Piyush Wilson reports rash and intense \"needle like\" feeling all over recently when she has been receiving her Zosyn infusions.  She attempted premedication with benadryl and tylenol but this did not help.  Cultures from the wound have grown enterococcus sensitive to Vanc and ampicillin.  She has a PCN allergy and is not showing allergy to zosyn.     2021  Re-evaluated today.  Reports taking Zyvox but having nausea recently due to constipation.  It has been 4 days since having a BM.  She reports using over the counter stool softeners and laxative.  Denies problems with her foot wound or the wound VAC.  She did not have her CBC drawn prior to today's appointment.     2021  Denies problems with her wound since last seen. Has been doing wound VAC therapy.  Zyvox to finish tomorrow.     2021  Denies problems over the last week. Has been undergoing VAC therapy. Hopes the wound will be healed by October so she can go to [facility] [place] [facility].     2021  Doing well today. Reports some leaking from the vac but keeping a seal.  Denies new problems or complaints.     Wound Pain Timing/Severity: mild  Quality of pain: dull  Severity of pain:   10   Modifying Factors: edema, diabetes, poor glucose control, obesity and chronic pressure  Associated Signs/Symptoms: edema, drainage and numbness                PAST MEDICAL HISTORY        Diagnosis Date    Allergic rhinitis     allergy shots- Dr. Michele Dueñas Allergic rhinitis     Dr. Manuela kwan and flonase    Anxiety 10/24/2012    Atopic dermatitis     Dr. Manuela kwan    Back pain 10/24/2012    Bilateral sciatica 2020    Depression     [facility] [facility]     Diabetes mellitus type II, uncontrolled ([facility] [address] [facility]     Diabetic eye exam: 2011, Dr. Raoul Hamman , needs record [V72.0T][    GERD (gastroesophageal reflux disease)     Headache(784.0)     Hyperlipidemia LDL goal < 100 2012    Hypertension     Morbid obesity ([facility] [address] [facility]     No retinopathy on exam     Dr Raoul Hamman 13 Diabetes with no significatn diabetic retinopathy found in both eyes    Non compliance w medication regimen 2014    Sprain of neck 10/4/2011       PAST SURGICAL HISTORY    Past Surgical History:   Procedure Laterality Date     SECTION      HYSTERECTOMY      OTHER SURGICAL HISTORY  2017    I&D of right perineal abcess     TOE AMPUTATION Right 2021    RIGHT 5TH TOE AMPUTATION performed by Suellen Solorzano MD at [address] [place] [address] History   Problem Relation Age of Onset    Heart Disease Mother     Diabetes Mother     Asthma Mother     High Cholesterol Mother     Depression Mother     Heart Disease Father     High Cholesterol Father        SOCIAL HISTORY    Social History     Tobacco Use    Smoking status: Former Smoker     Packs/day: 0.25     Types: Cigarettes     Quit date: 5/10/2021     Years since quittin.2    Smokeless tobacco: Never Used   Vaping Use    Vaping Use: Never used   Substance Use Topics    Alcohol use: Yes     Comment: rare    Drug use: No       ALLERGIES    Allergies   Allergen Reactions    Zosyn [Piperacillin Sod-Tazobactam So] Rash     Rash and severe \"being poked by needles all over\" feeling    Penicillins Rash       MEDICATIONS    Current Outpatient Medications on File Prior to Encounter   Medication Sig Dispense Refill    Probiotic Acidophilus (FLORANEX) TABS Take 1 tablet by mouth 2 times daily 60 tablet 2    lisinopril (PRINIVIL;ZESTRIL) 2.5 MG tablet Take 1 tablet by mouth every morning 90 tablet 3    atorvastatin (LIPITOR) 40 MG tablet Take 1 tablet by mouth every morning 90 tablet 3    Dulaglutide 1.5 MG/0.5ML SOPN Inject 1.5 mg into the skin once a week Dose increase as of 1/9/20 4 pen 5    glipiZIDE (GLUCOTROL XL) 10 MG extended release tablet Take 1 tablet by mouth every morning 90 tablet 3    insulin glargine (LANTUS SOLOSTAR) 100 UNIT/ML injection pen Inject 15 Units into the skin nightly 10 pen 5    Lancets MISC To test up to 4 times per day- nighttime and with each meal 300 each 5    blood glucose test strips (EXACTECH TEST) strip 1 each by In Vitro route daily Embrace test strips to test BS 4x daily 300 each 6    Insulin Pen Needle 32G X 5 MM MISC To use with Lantus pen and Novolog pen 30 each 11    aspirin EC 81 MG EC tablet Take 1 tablet by mouth daily 90 tablet 0     No current facility-administered medications on file prior to encounter. REVIEW OF SYSTEMS    Pertinent items are noted in HPI. Constitutional: Negative for systemic symptoms including fever, chills and malaise. Objective:      /78   Pulse 112   Temp 97.9 °F (36.6 °C)   Resp 18     PHYSICAL EXAM  General Appearance: alert and oriented to person, place and time, well-developed and well-nourished, in no acute distress    General: The patient is in no acute distress. Mental status:  Patient is appropriate, is  oriented to place and plan of care.   Dermatologic exam: Visual inspection of the periwound reveals the skin to be edematous  Wound exam: see wound description below in procedure note      Assessment:   41 y.o. female with right foot wound s/p 5th toe amputation and foot debridement. Doing well with VAC and skin substitute grafts. Problem List Items Addressed This Visit     Osteomyelitis ([facility] [address]    Relevant Medications    lidocaine (XYLOCAINE) 4 % external solution    Other Relevant Orders    Initiate Outpatient Wound Care Protocol    Neg. 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 Pressure Wound Therapy    WD-Diabetic ulcer of toe of right foot associated with type 2 diabetes mellitus, with fat layer exposed ([facility] [address] - Primary    Relevant Medications    lidocaine (XYLOCAINE) 4 % external solution    Other Relevant Orders    Initiate Outpatient Wound Care Protocol    Neg. Pressure Wound Therapy          Conditions above and those listed in the past history are being treated appropriately and are considered under adequate control so as not to preclude the use of a graft. SKIN SUBSTITUTES/GRAFT APPLICATIONS PROCEDURE NOTE    Indicaton:     Chronic ulcer of the right foot  that has(have) failed to heal with the usual wound protocol used for greater than 30 days. See Epic chart for previous modalities and details of previous treatment. The patient has no contraindications or evidence of infection. The patient's competency and support system is appropriate for proper care and follow up for the use of this graft, therefore a graft was placed as below. Procedure: The wound bed was cleansed and prepared as necessary to accept the graft. Debridement was required. Consent obtained: Yes    Time out taken: Yes    Using curette sharp Excisional Debridement of the wound(s) was/were performed down through and including the removal of epidermis, dermis and subcutaneous tissue.      Devitalized Tissue Debrided: slough      Bleeding:  Estimated amount of blood loss is 5cc. Hemostasis Achieved:  by pressure    Procedural Pain:  0  / 10     Post Procedural Pain:  0 / 10     Having prepared the wound bed, the skin graft was completed as below. Product Utilized:          [] APLIGRAF   []44 sq cm   []88 sq cm    []132 sq cm  []176 sq cm           [] DERMAGRAFT  [] 38 sq cm   []76 sq cm    []114 sq cm  []152 sq cm      [] NUSHIELD  [] 1.6 sq/cm disc   [] (2X3) 6 sq/cm    [x] (2X4) 8 sq/cm         [] (3X4) 12 sq/cm  [] (4x4) 16 sq/cm   [] (4X6) 24 sq/cm         [] (6X6) 36 sq/cm        [] AFFINITY    [] (1.5 cm x 1.5cm) 2.25 cm   [] (2.5 cm x 2.5 cm) 6.25 cm         [] THERASKIN  [] 13 sq cm   []37.34 sq cm             [] EpiFix   [] 1.54 sq cm disc    [] 2 pieces (3.08 sq cm)    [] 3  pieces (4.62 sq  cm)   [] 6 sq/cm    [] 16 sq cm     [] 18 sq cm   Fenestrated        [] OASIS   [] 10.5 sq cm   []21 sq cm    []35 sq cm Tri-Matrix  []70 sq cm          Tri-Matrix                    [] PURAPLY   [] 1.6 sq cm disc     [] 4 sq cm   [] 8 sq cm   [] 25sq cm  [] 54 sq cm                  [] Grafix      [] 1.54 sq cm disc    [] 3 sq cm    [] 6 sq cm   [] 12 sq cm   [] 25 sq cm        Skin Substitute Applied:    Performed by: Siva Bridges MD    Consent obtained: Yes    Time out taken: Yes     Fenestrated: No    Skin Substitute was Applied to Wound Number(s):     2      Negative Pressure Wound Therapy Foot Right (Active)   Number of days: 55       Negative Pressure Wound Therapy Toe (Comment  which one) Anterior;Right (Active)   Unit Type kci 08/05/21 1508   Dressing Type Black foam 08/05/21 1508   Number of pieces used 1 08/05/21 1508   Cycle Continuous 08/05/21 1508   Target Pressure (mmHg) 125 08/05/21 1508   Canister changed? Yes 08/05/21 1508   Dressing Status Clean;Dry; Intact 08/05/21 1508   Dressing Changed Changed/New 08/05/21 1508   Drainage Amount Moderate 08/05/21 1508   Drainage Description Serosanguinous 08/05/21 VAC    Procedural Pain: 0/10     Post Procedural Pain: 0 / 10    Response to Treatment:  Well tolerated by patient. Status of wound progress and description from last visit:   improving. Plan:     Discharge instructions:  Discharge Instructions       PHYSICIAN ORDERS AND DISCHARGE INSTRUCTIONS     NOTE: Upon discharge from the [address], you will receive a patient experience survey. We would be grateful if you would take the time to fill this survey out.     Wound care order history:                 ROBIN's   Right 1.17      Left 1.2   Date 4/13/21              Vascular studies:                 Cultures:  4/13/21, 06/14/21              Antibiotics:                  HbA1c:   8.9 4/14/21              Compression/Lymph Pumps:              Grafts:  Nushield #1 7/29/21, Nushield #2 8/5/21, Nushield #3 8/12/21                Continuing wound care orders and information:              Residence: Private              [facility]: [facility]              Your wound-care supplies will be provided by: . [address]                            PKKGA cleansing:                           EL not scrub or use excessive force.                          Wash hands with soap and water before and after dressing changes.                           Prior to applying a clean dressing, cleanse wound with normal saline,                          wound cleanser, or mild soap and water.                           Ask your physician or nurse before getting the wound(s) wet in the shower.              Daily Wound management:                          Keep weight off wounds and reposition every 2 hours.                          GQUSN standing for long periods of time.                          IBTLG wraps/stockings in AM and remove at bedtime.                          Elevate legs to the level of the heart or above for 30 minutes 4-5 times a day and/or when sitting.                                               When taking antibiotics take entire prescription as ordered by MD do not stop taking until medicine is all gone.                              Orders for this week (8/12/21): Right 4th toe Wound Vac Therapy:  Nushield #3 applied by Dr. Janelle Felix and secured with sorbact, mastisol and steristrips  Extra layer of Mepitel placed over top to help secure Graft  Apply mastisol and duoderm to periwound for protection. Gently pack with black foam to wound. Secure with vac drape. Apply wound vac  Set wound vac to 125 continuous suction. Change canister with each dressing change or as needed. [facility] to Change on  Monday and Thursday     Follow up with Dr. Janelle Felix on In 1 week in the wound care center  Call [phone] for any questions or concerns.   Date__________   Time____________        Treatment Note Wound 06/23/21 Foot Right #2 FIFTH TOE AMP SITE-Dressing/Treatment:  (mastisol, duoderm, black foam, drape, vac )    Written Patient Dismissal Instructions Given            Electronically signed by Gaby Kebede MD on 8/12/2021 at 3:36 PM

## 2021-08-19 ENCOUNTER — HOSPITAL ENCOUNTER (OUTPATIENT)
Dept: WOUND CARE | Age: 41
Discharge: HOME | End: 2021-08-19
Payer: MEDICARE

## 2021-08-19 VITALS
TEMPERATURE: 98.4 F | HEART RATE: 90 BPM | RESPIRATION RATE: 16 BRPM | DIASTOLIC BLOOD PRESSURE: 84 MMHG | SYSTOLIC BLOOD PRESSURE: 120 MMHG

## 2021-08-19 DIAGNOSIS — Z89.421 STATUS POST AMPUTATION OF LESSER TOE OF RIGHT FOOT (HCC): ICD-10-CM

## 2021-08-19 DIAGNOSIS — L97.512 DIABETIC ULCER OF TOE OF RIGHT FOOT ASSOCIATED WITH TYPE 2 DIABETES MELLITUS, WITH FAT LAYER EXPOSED (HCC): Primary | ICD-10-CM

## 2021-08-19 DIAGNOSIS — E11.621 DIABETIC ULCER OF TOE OF RIGHT FOOT ASSOCIATED WITH TYPE 2 DIABETES MELLITUS, WITH FAT LAYER EXPOSED (HCC): Primary | ICD-10-CM

## 2021-08-19 DIAGNOSIS — M86.171 OTHER ACUTE OSTEOMYELITIS OF RIGHT FOOT (HCC): ICD-10-CM

## 2021-08-19 PROCEDURE — 97605 NEG PRS WND THER DME<=50SQCM: CPT

## 2021-08-19 PROCEDURE — 15004 WOUND PREP F/N/HF/G: CPT | Performed by: SURGERY

## 2021-08-19 PROCEDURE — 15275 SKIN SUB GRAFT FACE/NK/HF/G: CPT | Performed by: SURGERY

## 2021-08-19 PROCEDURE — 15275 SKIN SUB GRAFT FACE/NK/HF/G: CPT

## 2021-08-19 RX ORDER — BACITRACIN ZINC AND POLYMYXIN B SULFATE 500; 1000 [USP'U]/G; [USP'U]/G
OINTMENT TOPICAL ONCE
Status: CANCELLED | OUTPATIENT
Start: 2021-08-19 | End: 2021-08-19

## 2021-08-19 RX ORDER — LIDOCAINE 40 MG/G
CREAM TOPICAL ONCE
Status: CANCELLED | OUTPATIENT
Start: 2021-08-19 | End: 2021-08-19

## 2021-08-19 RX ORDER — LIDOCAINE HYDROCHLORIDE 40 MG/ML
SOLUTION TOPICAL ONCE
Status: CANCELLED | OUTPATIENT
Start: 2021-08-19 | End: 2021-08-19

## 2021-08-19 RX ORDER — BACITRACIN, NEOMYCIN, POLYMYXIN B 400; 3.5; 5 [USP'U]/G; MG/G; [USP'U]/G
OINTMENT TOPICAL ONCE
Status: CANCELLED | OUTPATIENT
Start: 2021-08-19 | End: 2021-08-19

## 2021-08-19 RX ORDER — BETAMETHASONE DIPROPIONATE 0.05 %
OINTMENT (GRAM) TOPICAL ONCE
Status: CANCELLED | OUTPATIENT
Start: 2021-08-19 | End: 2021-08-19

## 2021-08-19 RX ORDER — LIDOCAINE 50 MG/G
OINTMENT TOPICAL ONCE
Status: CANCELLED | OUTPATIENT
Start: 2021-08-19 | End: 2021-08-19

## 2021-08-19 RX ORDER — LIDOCAINE HYDROCHLORIDE 20 MG/ML
JELLY TOPICAL ONCE
Status: CANCELLED | OUTPATIENT
Start: 2021-08-19 | End: 2021-08-19

## 2021-08-19 RX ORDER — GINSENG 100 MG
CAPSULE ORAL ONCE
Status: CANCELLED | OUTPATIENT
Start: 2021-08-19 | End: 2021-08-19

## 2021-08-19 RX ORDER — LIDOCAINE HYDROCHLORIDE 40 MG/ML
SOLUTION TOPICAL ONCE
Status: DISCONTINUED | OUTPATIENT
Start: 2021-08-19 | End: 2021-08-20 | Stop reason: HOSPADM

## 2021-08-19 RX ORDER — CLOBETASOL PROPIONATE 0.5 MG/G
OINTMENT TOPICAL ONCE
Status: CANCELLED | OUTPATIENT
Start: 2021-08-19 | End: 2021-08-19

## 2021-08-19 RX ORDER — GENTAMICIN SULFATE 1 MG/G
OINTMENT TOPICAL ONCE
Status: CANCELLED | OUTPATIENT
Start: 2021-08-19 | End: 2021-08-19

## 2021-08-19 NOTE — PLAN OF CARE
Problem: Wound:  Intervention: Assess pain status  Note: See flowsheet  Intervention: Assess wound size, appearance and drainage  Note: See flowsheet  Intervention: Assess pedal pulses bilaterally if patient has a foot or leg ulcer  Note: See flowsheet  Intervention: Doppler if unable to palpate pedal pulse  Note: See flowsheet

## 2021-08-19 NOTE — PROGRESS NOTES
Wound Care Center Progress Note and Bioengineered Skin Application      Marilyn Rahman  AGE: 41 y.o. GENDER: female  : 1980  EPISODE DATE:  2021     Subjective:     Chief Complaint   Patient presents with    Wound Check     right foot         HISTORY of PRESENT ILLNESS     7/15/21  Marilyn Rahman is a 41 y. o. female who presents today for wound evaluation of Acute on chronic diabetic, pressure and neuropathic ulcer(s) of the right lateral foot.  The ulcer is of marked severity.  The underlying cause of the wound is DM infection.  She required 5th toe amputation and foot debridement by me on 21 and has been managed with a wound VAC since then.  She was kept on IV zosyn after discharge and had been planned for follow up with ID but this has fallen through. Dina Muniz reports rash and intense \"needle like\" feeling all over recently when she has been receiving her Zosyn infusions.  She attempted premedication with benadryl and tylenol but this did not help.  Cultures from the wound have grown enterococcus sensitive to Vanc and ampicillin.  She has a PCN allergy and is not showing allergy to zosyn.     2021  Re-evaluated today.  Reports taking Zyvox but having nausea recently due to constipation.  It has been 4 days since having a BM.  She reports using over the counter stool softeners and laxative.  Denies problems with her foot wound or the wound VAC.  She did not have her CBC drawn prior to today's appointment.     2021  Denies problems with her wound since last seen. Has been doing wound VAC therapy.  Zyvox to finish tomorrow.     2021  Denies problems over the last week.  Has been undergoing VAC therapy.  Hopes the wound will be healed by October so she can go to [place] [facility].     2021  Doing well today. Reports some leaking from the vac but keeping a seal.  Denies new problems or complaints.     2021  Evaluated again today. Denies complaints.   Reports her wound looking good with VAC changes.     Wound Pain Timing/Severity: mild  Quality of pain: dull  Severity of pain:  1 / 10   Modifying Factors: edema, diabetes, poor glucose control, obesity and chronic pressure  Associated Signs/Symptoms: edema, drainage and numbness                                               PAST MEDICAL HISTORY        Diagnosis Date    Allergic rhinitis     allergy shots- Dr. Halima Bradley Allergic rhinitis     Dr. Taylor kwan and flonase    Anxiety 10/24/2012    Atopic dermatitis     Dr. Taylor kwan    Back pain 10/24/2012    Bilateral sciatica 2020    Depression     Diabetes mellitus ([facility]     Diabetes mellitus type II, uncontrolled ([facility]     Diabetic eye exam: 2011, Dr. Nancy Ramirez , needs record [V72.0T][    GERD (gastroesophageal reflux disease)     Headache(784.0)     Hyperlipidemia LDL goal < 100 2012    Hypertension     Morbid obesity ([facility]     No retinopathy on exam     Dr Nancy Ramirez 13 Diabetes with no significatn diabetic retinopathy found in both eyes    Non compliance w medication regimen 2014    Sprain of neck 10/4/2011       PAST SURGICAL HISTORY    Past Surgical History:   Procedure Laterality Date     SECTION      HYSTERECTOMY      OTHER SURGICAL HISTORY  2017    I&D of right perineal abcess     TOE AMPUTATION Right 2021    RIGHT 5TH TOE AMPUTATION performed by Jcakelyn Vázquez MD at [address] [place] [address] History   Problem Relation Age of Onset    Heart Disease Mother     Diabetes Mother     Asthma Mother     High Cholesterol Mother     Depression Mother     Heart Disease Father     High Cholesterol Father        SOCIAL HISTORY    Social History     Tobacco Use    Smoking status: Former Smoker     Packs/day: 0.25     Types: Cigarettes     Quit date: 5/10/2021     Years since quittin.2    Smokeless tobacco: Never Used   Vaping Use    Vaping Use: Never used   Substance Use Topics    Alcohol use: Yes     Comment: rare    Drug use: No       ALLERGIES    Allergies   Allergen Reactions    Zosyn [Piperacillin Sod-Tazobactam So] Rash     Rash and severe \"being poked by needles all over\" feeling    Penicillins Rash       MEDICATIONS    Current Outpatient Medications on File Prior to Encounter   Medication Sig Dispense Refill    Probiotic Acidophilus (FLORANEX) TABS Take 1 tablet by mouth 2 times daily 60 tablet 2    lisinopril (PRINIVIL;ZESTRIL) 2.5 MG tablet Take 1 tablet by mouth every morning 90 tablet 3    atorvastatin (LIPITOR) 40 MG tablet Take 1 tablet by mouth every morning 90 tablet 3    Dulaglutide 1.5 MG/0.5ML SOPN Inject 1.5 mg into the skin once a week Dose increase as of 1/9/20 4 pen 5    glipiZIDE (GLUCOTROL XL) 10 MG extended release tablet Take 1 tablet by mouth every morning 90 tablet 3    insulin glargine (LANTUS SOLOSTAR) 100 UNIT/ML injection pen Inject 15 Units into the skin nightly 10 pen 5    Lancets MISC To test up to 4 times per day- nighttime and with each meal 300 each 5    blood glucose test strips (EXACTECH TEST) strip 1 each by In Vitro route daily Embrace test strips to test BS 4x daily 300 each 6    Insulin Pen Needle 32G X 5 MM MISC To use with Lantus pen and Novolog pen 30 each 11    aspirin EC 81 MG EC tablet Take 1 tablet by mouth daily 90 tablet 0     No current facility-administered medications on file prior to encounter. REVIEW OF SYSTEMS    Pertinent items are noted in HPI. Constitutional: Negative for systemic symptoms including fever, chills and malaise. Objective:      /84   Pulse 90   Temp 98.4 °F (36.9 °C) (Temporal)   Resp 16     PHYSICAL EXAM  General Appearance: alert and oriented to person, place and time, well-developed and well-nourished, in no acute distress    General: The patient is in no acute distress. Mental status:  Patient is appropriate, is  oriented to place and plan of care.   Dermatologic exam: Visual inspection of the periwound reveals the skin to be edematous  Wound exam: see wound description below in procedure note      Assessment:   41 y.o. female with DM foot wound s/p right 5th toe amputation and foot debridement. Doing well with graft and wound VAC. Problem List Items Addressed This Visit     Osteomyelitis ([facility]    Relevant Medications    lidocaine (XYLOCAINE) 4 % external solution    Other Relevant Orders    Initiate Outpatient Wound Care Protocol    Neg. 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 Pressure Wound Therapy    WD-Diabetic ulcer of toe of right foot associated with type 2 diabetes mellitus, with fat layer exposed ([facility] - Primary    Relevant Medications    lidocaine (XYLOCAINE) 4 % external solution    Other Relevant Orders    Initiate Outpatient Wound Care Protocol    Neg. Pressure Wound Therapy          Conditions above and those listed in the past history are being treated appropriately and are considered under adequate control so as not to preclude the use of a graft. SKIN SUBSTITUTES/GRAFT APPLICATIONS PROCEDURE NOTE    Indicaton:     Chronic ulcer(s) of the right 5th toe  that has(have) failed to heal with the usual wound protocol used for greater than 30 days. See Epic chart for previous modalities and details of previous treatment. The patient has no contraindications or evidence of infection. The patient's competency and support system is appropriate for proper care and follow up for the use of this graft, therefore a graft was placed as below. Procedure: The wound bed was cleansed and prepared as necessary to accept the graft. Debridement was required.     Consent obtained: Yes    Time out taken: Yes    Using curette sharp Excisional Debridement of the wound(s) was/were performed down through and including the removal of dermis and subcutaneous tissue. Devitalized Tissue Debrided:  slough and necrotic/eschar      Bleeding:  Estimated amount of blood loss is 5cc. Hemostasis Achieved:  by pressure    Procedural Pain:  2  / 10     Post Procedural Pain:  0 / 10     Having prepared the wound bed, the skin graft was completed as below. Product Utilized:          [] APLIGRAF   []44 sq cm   []88 sq cm    []132 sq cm  []176 sq cm           [] DERMAGRAFT  [] 38 sq cm   []76 sq cm    []114 sq cm  []152 sq cm      [] NUSHIELD  [] 1.6 sq/cm disc   [x] (2X3) 6 sq/cm    [] (2X4) 8 sq/cm         [] (3X4) 12 sq/cm  [] (4x4) 16 sq/cm   [] (4X6) 24 sq/cm         [] (6X6) 36 sq/cm        [] AFFINITY    [] (1.5 cm x 1.5cm) 2.25 cm   [] (2.5 cm x 2.5 cm) 6.25 cm         [] THERASKIN  [] 13 sq cm   []37.34 sq cm             [] EpiFix   [] 1.54 sq cm disc    [] 2 pieces (3.08 sq cm)    [] 3  pieces (4.62 sq  cm)   [] 6 sq/cm    [] 16 sq cm     [] 18 sq cm   Fenestrated        [] OASIS   [] 10.5 sq cm   []21 sq cm    []35 sq cm Tri-Matrix  []70 sq cm          Tri-Matrix                    [] PURAPLY   [] 1.6 sq cm disc     [] 4 sq cm   [] 8 sq cm   [] 25sq cm  [] 54 sq cm                  [] Grafix      [] 1.54 sq cm disc    [] 3 sq cm    [] 6 sq cm   [] 12 sq cm   [] 25 sq cm        Skin Substitute Applied:    Performed by: Ekaterina Nowak MD    Consent obtained: Yes    Time out taken: Yes     Fenestrated: No    Skin Substitute was Applied to Wound Number(s):    2      Negative Pressure Wound Therapy Foot Right (Active)   Number of days: 62       Negative Pressure Wound Therapy Toe (Comment  which one) Anterior;Right (Active)   Unit Type kci 08/05/21 1508   Dressing Type Black foam 08/05/21 1508   Number of pieces used 1 08/05/21 1508   Cycle Continuous 08/05/21 1508   Target Pressure (mmHg) 125 08/05/21 1508   Canister changed? Yes 08/05/21 1508   Dressing Status Clean;Dry; Intact 08/05/21 1508   Dressing Changed Changed/New 08/05/21 1508   Drainage Amount Moderate 08/05/21 1508   Drainage Description Serosanguinous 08/05/21 1508   Dressing Change Due 07/18/21 07/15/21 1531   Number of days: 57       Wound 06/23/21 Foot Right #2 Fifth Toe Amp Site (Active)   Wound Image   08/19/21 1456   Wound Etiology Diabetic Chua 3 06/23/21 0935   Dressing Status Clean;Dry; Intact; New dressing applied 08/19/21 1521   Wound Cleansed Soap and water; Wound cleanser 08/19/21 1456   Dressing/Treatment Negative pressure wound therapy 08/19/21 1521   Offloading for Diabetic Foot Ulcers Post op shoe 08/19/21 1456   Wound Length (cm) 4.2 cm 08/19/21 1456   Wound Width (cm) 1.4 cm 08/19/21 1456   Wound Depth (cm) 0.1 cm 08/19/21 1456   Wound Surface Area (cm^2) 5.88 cm^2 08/19/21 1456   Change in Wound Size % (l*w) 54.84 08/19/21 1456   Wound Volume (cm^3) 0.588 cm^3 08/19/21 1456   Wound Healing % 94 08/19/21 1456   Post-Procedure Length (cm) 4.2 cm 08/19/21 1506   Post-Procedure Width (cm) 1.4 cm 08/19/21 1506   Post-Procedure Depth (cm) 0.1 cm 08/19/21 1506   Post-Procedure Surface Area (cm^2) 5.88 cm^2 08/19/21 1506   Post-Procedure Volume (cm^3) 0.588 cm^3 08/19/21 1506   Distance Tunneling (cm) 0 cm 08/19/21 1456   Tunneling Position ___ O'Clock 0 08/19/21 1456   Undermining Starts ___ O'Clock 0700 08/19/21 1456   Undermining Ends___ O'Clock 0800 08/19/21 1456   Undermining Maxium Distance (cm) 0.4 08/19/21 1456   Wound Assessment Granulation tissue 08/19/21 1456   Drainage Amount Moderate 08/19/21 1456   Drainage Description Serosanguinous 08/19/21 1456   Odor None 08/19/21 1456   Yamilex-wound Assessment Intact;Dry/flaky 08/19/21 1456   Margins Defined edges; Unattached edges 08/19/21 1456   Wound Thickness Description not for Pressure Injury Full thickness 08/19/21 1456   Number of days: 57         Total Surface Area Covered 6 sq/cm    Was the Product Layered  No      Amount Wasted 0 sq/cm      Secured: Yes    Instruments used to complete placement of graft::forceps    Secured With:   [] N/A  [x]Steri Strips    []Sutures     []Staples []Other Mepitel and wound VAC    Procedural Pain: 0/10     Post Procedural Pain: 0 / 10    Response to Treatment:  Well tolerated by patient. Status of wound progress and description from last visit:   improving. Plan:     Discharge instructions:  Discharge Instructions         Discharge Instructions        PHYSICIAN ORDERS AND DISCHARGE INSTRUCTIONS     NOTE: Upon discharge from the [address], you will receive a patient experience survey. We would be grateful if you would take the time to fill this survey out.     Wound care order history:                 ROBIN's   Right 1.17      Left 1.2   Date 4/13/21              Vascular studies:                 Cultures:  4/13/21, 06/14/21              Antibiotics:                  HbA1c:   8.9 4/14/21              Compression/Lymph Pumps:              Grafts:  Nushield #1 7/29/21, Nushield #2 8/5/21, Nushield #3 8/12/21, Nushield #4 8/19/21                Continuing wound care orders and information:              Residence: Private              [facility] home health care with: [facility]              Your wound-care supplies will be provided by: . [facility]                            XPJZY cleansing:                           ZN not scrub or use excessive force.                          Wash hands with soap and water before and after dressing changes.                           Prior to applying a clean dressing, cleanse wound with normal saline,                          wound cleanser, or mild soap and water.                           Ask your physician or nurse before getting the wound(s) wet in the shower.              Daily Wound management:                          Keep weight off wounds and reposition every 2 hours.                          UFBGD standing for long periods of time.                          KOXFS wraps/stockings in AM and remove at bedtime.                          Elevate legs to the level of the heart or above for 30 minutes 4-5 times a day and/or when sitting.                                               When taking antibiotics take entire prescription as ordered by MD do not stop taking until medicine is all gone.                              Orders for this week (8/12/21): Right 4th toe Wound Vac Therapy:  Nushield #4 applied by Dr. Jayda Kellogg and secured with sorbact, mastisol and steristrips  Extra layer of Mepitel placed over top to help secure Graft  Apply mastisol and duoderm to periwound for protection. Gently pack with black foam to wound. Secure with vac drape. Apply wound vac  Set wound vac to 125 continuous suction. Change canister with each dressing change or as needed. [facility] to Change on  Monday and Thursday     Follow up with Dr. Jayda Kellogg on In 1 week in the wound care center  Call [phone] for any questions or concerns.   Date__________   Time____________          Treatment Note Wound 06/23/21 Foot Right #2 Fifth Toe Amp Site-Dressing/Treatment: Negative pressure wound therapy (mastisol, duoderm, black foam, vac drape,)    Written Patient Dismissal Instructions Given            Electronically signed by Jackelyn Vázquez MD on 8/19/2021 at 3:40 PM

## 2021-08-19 NOTE — PROGRESS NOTES
NuShieldTreatment Note    NAME:  Simon Reed  YOB: 1980  MEDICAL RECORD NUMBER:  [id number]  DATE:  8/19/2021    Goal:  Patient will receive safe and proper application of skin substitute. Patient will comply with caring for dressing, and reporting complications. [x]Expiration date checked immediately prior to use. [x] Package intact prior to use and no damage noted. [x] Transport temperature controlled and acceptable. Nushield was removed from protective sterile packaging by physician and            applied Chorion side down to the prepared ulcer bed. Nushield was hydrated with sterile normal saline per physician. Nushield was applied to right foot and affixed with steri-strips by the physician. [x] Nushield was covered with non-adherent ulcer dressing. [x] Applied vac over non-adherent. [x] Applied dry gauze and/or roll gauze. Patient/caregiver was instructed not to remove dressing and to keep it clean    and dry. Pt/family/caregiver was instructed on signs and symptoms of complications       to report such as draining through dressing, dressing falling down/slipping,           getting wet, or severe pain or tingling. NuShield may be applied a total of 10 times per wound over a 12 month period. Date of first application of NuShield for this current wound is July 29, 2021 .     Guidelines followed    Electronically signed by Ibeth Perez RN on 8/19/2021 at 3:13 PM

## 2021-08-26 ENCOUNTER — HOSPITAL ENCOUNTER (OUTPATIENT)
Dept: WOUND CARE | Age: 41
Discharge: HOME | End: 2021-08-26
Payer: MEDICARE

## 2021-08-26 VITALS
TEMPERATURE: 98.4 F | SYSTOLIC BLOOD PRESSURE: 99 MMHG | DIASTOLIC BLOOD PRESSURE: 70 MMHG | RESPIRATION RATE: 16 BRPM | HEART RATE: 90 BPM

## 2021-08-26 DIAGNOSIS — M86.171 OTHER ACUTE OSTEOMYELITIS OF RIGHT FOOT (HCC): ICD-10-CM

## 2021-08-26 DIAGNOSIS — E11.621 DIABETIC ULCER OF TOE OF RIGHT FOOT ASSOCIATED WITH TYPE 2 DIABETES MELLITUS, WITH FAT LAYER EXPOSED (HCC): Primary | ICD-10-CM

## 2021-08-26 DIAGNOSIS — L97.512 DIABETIC ULCER OF TOE OF RIGHT FOOT ASSOCIATED WITH TYPE 2 DIABETES MELLITUS, WITH FAT LAYER EXPOSED (HCC): Primary | ICD-10-CM

## 2021-08-26 DIAGNOSIS — Z89.421 STATUS POST AMPUTATION OF LESSER TOE OF RIGHT FOOT (HCC): ICD-10-CM

## 2021-08-26 PROCEDURE — 15275 SKIN SUB GRAFT FACE/NK/HF/G: CPT

## 2021-08-26 PROCEDURE — 15275 SKIN SUB GRAFT FACE/NK/HF/G: CPT | Performed by: NURSE PRACTITIONER

## 2021-08-26 PROCEDURE — 15271 SKIN SUB GRAFT TRNK/ARM/LEG: CPT

## 2021-08-26 RX ORDER — LIDOCAINE HYDROCHLORIDE 20 MG/ML
JELLY TOPICAL ONCE
Status: CANCELLED | OUTPATIENT
Start: 2021-08-26 | End: 2021-08-26

## 2021-08-26 RX ORDER — CLOBETASOL PROPIONATE 0.5 MG/G
OINTMENT TOPICAL ONCE
Status: CANCELLED | OUTPATIENT
Start: 2021-08-26 | End: 2021-08-26

## 2021-08-26 RX ORDER — LIDOCAINE 50 MG/G
OINTMENT TOPICAL ONCE
Status: CANCELLED | OUTPATIENT
Start: 2021-08-26 | End: 2021-08-26

## 2021-08-26 RX ORDER — LIDOCAINE HYDROCHLORIDE 40 MG/ML
SOLUTION TOPICAL ONCE
Status: DISCONTINUED | OUTPATIENT
Start: 2021-08-26 | End: 2021-08-27 | Stop reason: HOSPADM

## 2021-08-26 RX ORDER — LIDOCAINE 40 MG/G
CREAM TOPICAL ONCE
Status: CANCELLED | OUTPATIENT
Start: 2021-08-26 | End: 2021-08-26

## 2021-08-26 RX ORDER — BACITRACIN, NEOMYCIN, POLYMYXIN B 400; 3.5; 5 [USP'U]/G; MG/G; [USP'U]/G
OINTMENT TOPICAL ONCE
Status: CANCELLED | OUTPATIENT
Start: 2021-08-26 | End: 2021-08-26

## 2021-08-26 RX ORDER — BETAMETHASONE DIPROPIONATE 0.05 %
OINTMENT (GRAM) TOPICAL ONCE
Status: CANCELLED | OUTPATIENT
Start: 2021-08-26 | End: 2021-08-26

## 2021-08-26 RX ORDER — GENTAMICIN SULFATE 1 MG/G
OINTMENT TOPICAL ONCE
Status: CANCELLED | OUTPATIENT
Start: 2021-08-26 | End: 2021-08-26

## 2021-08-26 RX ORDER — BACITRACIN ZINC AND POLYMYXIN B SULFATE 500; 1000 [USP'U]/G; [USP'U]/G
OINTMENT TOPICAL ONCE
Status: CANCELLED | OUTPATIENT
Start: 2021-08-26 | End: 2021-08-26

## 2021-08-26 RX ORDER — LIDOCAINE HYDROCHLORIDE 40 MG/ML
SOLUTION TOPICAL ONCE
Status: CANCELLED | OUTPATIENT
Start: 2021-08-26 | End: 2021-08-26

## 2021-08-26 RX ORDER — GINSENG 100 MG
CAPSULE ORAL ONCE
Status: CANCELLED | OUTPATIENT
Start: 2021-08-26 | End: 2021-08-26

## 2021-08-26 ASSESSMENT — PAIN SCALES - GENERAL: PAINLEVEL_OUTOF10: 0

## 2021-08-26 NOTE — PROGRESS NOTES
Wound Care Center Progress Note and Bioengineered Skin Application      Marilyn Rahman  AGE: 41 y.o. GENDER: female  : 1980  EPISODE DATE:  2021     Subjective:     Chief Complaint   Patient presents with    Wound Check     Right Foot          HISTORY of PRESENT ILLNESS     Marilyn Rahman is a 41 y. o. female who presents today for wound evaluation of Acute on chronic diabetic, pressure and neuropathic ulcer(s) of the right lateral foot.  The ulcer is of marked severity.  The underlying cause of the wound is DM infection.  She required 5th toe amputation and foot debridement by me on 21 and has been managed with a wound VAC since then.  She was kept on IV zosyn after discharge and had been planned for follow up with ID but this has fallen through. Sherri Sweet reports rash and intense \"needle like\" feeling all over recently when she has been receiving her Zosyn infusions.  She attempted premedication with benadryl and tylenol but this did not help.  Cultures from the wound have grown enterococcus sensitive to Vanc and ampicillin.  She has a PCN allergy and is not showing allergy to zosyn. 2021: Patient continues to improve. We will likely be able to remove NPT device today and add a compression wrap following graft placement.         PAST MEDICAL HISTORY        Diagnosis Date    Allergic rhinitis     allergy shots- Dr. Holley Double Allergic rhinitis     Dr. Randell Gowers- aniya and flonase    Anxiety 10/24/2012    Atopic dermatitis     Dr. Randell Gowers- claritin    Back pain 10/24/2012    Bilateral sciatica 2020    Depression     Diabetes mellitus ([address]     Diabetes mellitus type II, uncontrolled ([address]     Diabetic eye exam: 2011, Dr. Anshu Lal , needs record [V72.0T][    GERD (gastroesophageal reflux disease)     Headache(784.0)     Hyperlipidemia LDL goal < 100 2012    Hypertension     Morbid obesity ([address]     No retinopathy on exam     Dr Anshu Lal 13 Diabetes with no significatn diabetic retinopathy found in both eyes    Non compliance w medication regimen 2014    Sprain of neck 10/4/2011       PAST SURGICAL HISTORY    Past Surgical History:   Procedure Laterality Date     SECTION      HYSTERECTOMY      OTHER SURGICAL HISTORY  2017    I&D of right perineal abcess     TOE AMPUTATION Right 2021    RIGHT 5TH TOE AMPUTATION performed by Traci Valdez MD at [facility] History   Problem Relation Age of Onset    Heart Disease Mother     Diabetes Mother     Asthma Mother     High Cholesterol Mother     Depression Mother     Heart Disease Father     High Cholesterol Father        SOCIAL HISTORY    Social History     Tobacco Use    Smoking status: Former Smoker     Packs/day: 0.25     Types: Cigarettes     Quit date: 5/10/2021     Years since quittin.2    Smokeless tobacco: Never Used   Vaping Use    Vaping Use: Never used   Substance Use Topics    Alcohol use: Yes     Comment: rare    Drug use: No       ALLERGIES    Allergies   Allergen Reactions    Zosyn [Piperacillin Sod-Tazobactam So] Rash     Rash and severe \"being poked by needles all over\" feeling    Penicillins Rash       MEDICATIONS    Current Outpatient Medications on File Prior to Encounter   Medication Sig Dispense Refill    Probiotic Acidophilus (FLORANEX) TABS Take 1 tablet by mouth 2 times daily 60 tablet 2    lisinopril (PRINIVIL;ZESTRIL) 2.5 MG tablet Take 1 tablet by mouth every morning 90 tablet 3    atorvastatin (LIPITOR) 40 MG tablet Take 1 tablet by mouth every morning 90 tablet 3    Dulaglutide 1.5 MG/0.5ML SOPN Inject 1.5 mg into the skin once a week Dose increase as of 20 4 pen 5    glipiZIDE (GLUCOTROL XL) 10 MG extended release tablet Take 1 tablet by mouth every morning 90 tablet 3    insulin glargine (LANTUS SOLOSTAR) 100 UNIT/ML injection pen Inject 15 Units into the skin nightly 10 pen 5    Lancets MISC To test up to 4 times per day- nighttime and with each meal 300 each 5    blood glucose test strips (EXACTECH TEST) strip 1 each by In Vitro route daily Embrace test strips to test BS 4x daily 300 each 6    Insulin Pen Needle 32G X 5 MM MISC To use with Lantus pen and Novolog pen 30 each 11    aspirin EC 81 MG EC tablet Take 1 tablet by mouth daily 90 tablet 0     No current facility-administered medications on file prior to encounter. REVIEW OF SYSTEMS    Pertinent items are noted in HPI. Constitutional: Negative for systemic symptoms including fever, chills and malaise. Objective:      BP 99/70   Pulse 90   Temp 98.4 °F (36.9 °C) (Temporal)   Resp 16     PHYSICAL EXAM      General: The patient is in no acute distress. Mental status:  Patient is appropriate, is  oriented to place and plan of care.   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 (HCC)    Relevant Medications    lidocaine (XYLOCAINE) 4 % external solution (Start on 8/26/2021  3:00 PM)    Other Relevant Orders    Initiate Outpatient Wound Care Protocol    WD-Diabetic ulcer of toe of right foot associated with type 2 diabetes mellitus, with fat layer exposed ([facility] - Primary    Relevant Medications    lidocaine (XYLOCAINE) 4 % external solution (Start on 8/26/2021  3:00 PM)    Other Relevant Orders    Initiate Outpatient Wound Care Protocol          Conditions above and those listed in the past history are being treated appropriately and are considered under adequate control so as not to preclude the use of a graft. SKIN SUBSTITUTES/GRAFT APPLICATIONS PROCEDURE NOTE    Indicaton:     Chronic ulcer(s) of the right lateral foot  that has(have) failed to heal with the usual wound protocol used for greater than 30 days. See Epic chart for previous modalities and details of previous treatment. The patient has no contraindications or evidence of infection. The patient's competency and support system is appropriate for proper care and follow up for the use of this graft, therefore a graft was placed as below. Procedure: The wound bed was cleansed and prepared as necessary to accept the graft. Debridement was required. Consent obtained: Yes    Time out taken: Yes    Using #15 blade scalpel sharp Excisional Debridement of the wound(s) was/were performed down through and including the removal of subcutaneous tissue. Devitalized Tissue Debrided:  fibrin, biofilm, slough and exudate      Bleeding:  Minimal    Hemostasis Achieved:  by pressure    Procedural Pain:  0  / 10     Post Procedural Pain:  0 / 10     Having prepared the wound bed, the skin graft was completed as below.     Product Utilized:          [] APLIGRAF   []44 sq cm   []88 sq cm    []132 sq cm  []176 sq cm           [] DERMAGRAFT  [] 38 sq cm   []76 sq cm    []114 sq cm  []152 sq cm      [x] NUSHIELD  [] 1.6 sq/cm disc   [x] (2X3) 6 sq/cm    [] (2X4) 8 sq/cm         [] (3X4) 12 sq/cm  [] (4x4) 16 sq/cm   [] (4X6) 24 sq/cm         [] (6X6) 36 sq/cm        [] AFFINITY    [] (1.5 cm x 1.5cm) 2.25 cm   [] (2.5 cm x 2.5 cm) 6.25 cm         [] THERASKIN  [] 13 sq cm   []37.34 sq cm             [] EpiFix   [] 1.54 sq cm disc    [] 2 pieces (3.08 sq cm)    [] 3  pieces (4.62 sq  cm)   [] 6 sq/cm    [] 16 sq cm     [] 18 sq cm   Fenestrated        [] OASIS   [] 10.5 sq cm   []21 sq cm    []35 sq cm Tri-Matrix  []70 sq cm          Tri-Matrix                    [] PURAPLY   [] 1.6 sq cm disc     [] 4 sq cm   [] 8 sq cm   [] 25sq cm  [] 54 sq cm [] Grafix      [] 1.54 sq cm disc    [] 3 sq cm    [] 6 sq cm   [] 12 sq cm   [] 25 sq cm        Skin Substitute Applied:    Performed by: ALISTAIR Rivera CNP    Consent obtained: Yes    Time out taken: Yes     Fenestrated: No    Skin Substitute was Applied to Wound Number(s):     2      Negative Pressure Wound Therapy Foot Right (Active)   Number of days: 69       Negative Pressure Wound Therapy Toe (Comment  which one) Anterior;Right (Active)   Unit Type kci 08/05/21 1508   Dressing Type Black foam 08/05/21 1508   Number of pieces used 1 08/05/21 1508   Cycle Continuous 08/05/21 1508   Target Pressure (mmHg) 125 08/05/21 1508   Canister changed? Yes 08/05/21 1508   Dressing Status Clean;Dry; Intact 08/05/21 1508   Dressing Changed Changed/New 08/05/21 1508   Drainage Amount Moderate 08/05/21 1508   Drainage Description Serosanguinous 08/05/21 1508   Dressing Change Due 07/18/21 07/15/21 1531   Number of days: 64       Wound 06/23/21 Foot Right #2 Fifth Toe Amp Site (Active)   Wound Image   08/19/21 1456   Wound Etiology Diabetic Chua 3 06/23/21 0935   Dressing Status Clean;Dry; Intact; New dressing applied 08/19/21 1521   Wound Cleansed Soap and water 08/26/21 1430   Dressing/Treatment Negative pressure wound therapy 08/19/21 1521   Offloading for Diabetic Foot Ulcers Post op shoe 08/26/21 1430   Wound Length (cm) 2.7 cm 08/26/21 1430   Wound Width (cm) 1.3 cm 08/26/21 1430   Wound Depth (cm) 0.1 cm 08/26/21 1430   Wound Surface Area (cm^2) 3.51 cm^2 08/26/21 1430   Change in Wound Size % (l*w) 73.04 08/26/21 1430   Wound Volume (cm^3) 0.351 cm^3 08/26/21 1430   Wound Healing % 97 08/26/21 1430   Post-Procedure Length (cm) 2.7 cm 08/26/21 1448   Post-Procedure Width (cm) 1.3 cm 08/26/21 1448   Post-Procedure Depth (cm) 0.1 cm 08/26/21 1448   Post-Procedure Surface Area (cm^2) 3.51 cm^2 08/26/21 1448   Post-Procedure Volume (cm^3) 0.351 cm^3 08/26/21 1448   Distance Tunneling (cm) 0 cm 08/19/21 1456   Tunneling Position ___ O'Clock 0 08/19/21 1456   Undermining Starts ___ O'Clock 0700 08/19/21 1456   Undermining Ends___ O'Clock 0800 08/19/21 1456   Undermining Maxium Distance (cm) 0.4 08/19/21 1456   Wound Assessment Granulation tissue;Pink/red 08/26/21 1430   Drainage Amount Moderate 08/26/21 1430   Drainage Description Serosanguinous 08/26/21 1430   Odor None 08/26/21 1430   Yamilex-wound Assessment Intact; Maceration 08/26/21 1430   Margins Defined edges 08/26/21 1430   Wound Thickness Description not for Pressure Injury Full thickness 08/26/21 1430   Number of days: 64         Total Surface Area Covered 3.51 sq/cm    Was the Product Layered  Yes      Amount Wasted 0 sq/cm    Reason for Waste: material provided was greater than wound size      Secured: Yes    Instruments used to complete placement of graft::forceps    Secured With:   [] N/A  [x]Steri Strips    []Sutures     []Staples []Other    Procedural Pain: 0/10     Post Procedural Pain: 0 / 10    Response to Treatment:  Well tolerated by patient. Status of wound progress and description from last visit:   Continued improvement in size this week. After speaking with Laurita, I am ok with removing NPT device this week a long as patient does not send it back until she is seen in 1 week. She is ok wearing her compression wrap all week. Follow up 1 week and continue to monitor       Plan:     Discharge instructions:  Discharge Instructions            [address]     NOTE: Upon discharge from the [address], you will receive a patient experience survey.  We would be grateful if you would take the time to fill this survey out.     Wound care order history:                 ROBIN's   Right 1.17      Left 1.2   Date 4/13/21              Vascular studies:                 Cultures:  4/13/21, 06/14/21              Antibiotics:                  HbA1c:   8.9 4/14/21              Compression/Lymph Pumps:              Grafts:  Nushield #1 7/29/21, Nushield #2 8/5/21, Nushield #3 8/12/21, Nushield #4 8/19/21, Nushield #5 8/26/21                 Continuing wound care orders and information:              Residence: Private              [facility] home health care with: [facility]              Your wound-care supplies will be provided by: . [address]                            EJIUK cleansing:                           XN not scrub or use excessive force.                          Wash hands with soap and water before and after dressing changes.                         Prior to applying a clean dressing, cleanse wound with normal saline,                          wound cleanser, or mild soap and water.                           Ask your physician or nurse before getting the wound(s) wet in the shower.              Daily Wound management:                          Keep weight off wounds and reposition every 2 hours.                          DHZQP standing for long periods of time.                          Apply wraps/stockings in AM and remove at bedtime.                          Elevate legs to the level of the heart or above for 30 minutes 4-5 times a day and/or when sitting.                                               When taking antibiotics take entire prescription as ordered by MD do not stop taking until medicine is all gone.                              Orders for this week (8/26/21): Right 4th toe Wound Vac Therapy:  Nushield #5 applied by Ld Jane CNP and secured with sorbact, mastisol and steristrips  Cover with ABD  Wrap with Coban 2  secure with tape  Leave in place x 1 week     Follow up with Dr. Daksha Key on In 1 week in the wound care center  Call [phone] for any questions or concerns.   Date__________   Time____________             Treatment Note Wound 06/23/21 Foot Right #2 Fifth Toe Amp Site-Dressing/Treatment:  (nushield;mepitel;steristrips)    Written Patient Dismissal Instructions Given            Electronically signed by Pedro Brown, APRN - CNP on 8/26/2021 at 2:56 PM

## 2021-08-26 NOTE — PROGRESS NOTES
Multilayer Compression Wrap   (Not Unna) Below the Knee    NAME:  Marilyn Thorne  YOB: 1980  MEDICAL RECORD NUMBER:  [id number]  DATE:  8/26/2021    Multilayer compression wrap: Removed old Multilayer wrap if indicated and wash leg with mild soap/water. Applied moisturizing agent to dry skin as needed. Applied primary and secondary dressing as ordered. Applied multilayered dressing below the knee to right lower leg. Instructed patient/caregiver not to remove dressing and to keep it clean and dry. Instructed patient/caregiver on complications to report to provider, such as pain, numbness in toes, heavy drainage, and slippage of dressing. Instructed patient on purpose of compression dressing and on activity and exercise recommendations.       Electronically signed by Arlette Steven LPN on 6/71/3969 at 7:55 PM

## 2021-08-26 NOTE — PROGRESS NOTES
NuShieldTreatment Note    NAME:  Melina Dobson  YOB: 1980  MEDICAL RECORD NUMBER:  [id number]  DATE:  8/26/2021    Goal:  Patient will receive safe and proper application of skin substitute. Patient will comply with caring for dressing, and reporting complications. [x]Expiration date checked immediately prior to use. [x] Package intact prior to use and no damage noted. [x] Transport temperature controlled and acceptable. Nushield was removed from protective sterile packaging by physician and            applied Chorion side down to the prepared ulcer bed. Nushield was hydrated with sterile normal saline per physician. Nushield was applied to right tfoot and affixed with steri-strips by the physician. [x] Nushield was covered with non-adherent ulcer dressing. [x] Applied abd over non-adherent. [x] Applied dry gauze and/or roll gauze. Patient/caregiver was instructed not to remove dressing and to keep it clean    and dry. Pt/family/caregiver was instructed on signs and symptoms of complications       to report such as draining through dressing, dressing falling down/slipping,           getting wet, or severe pain or tingling. NuShield may be applied a total of 10 times per wound over a 12 month period. Date of first application of NuShield for this current wound is July 29, 2021 .     Guidelines followed    Electronically signed by Mariana Duenas RN on 8/26/2021 at 3:04 PM

## 2021-09-02 ENCOUNTER — HOSPITAL ENCOUNTER (OUTPATIENT)
Dept: WOUND CARE | Age: 41
Discharge: HOME | End: 2021-09-02
Payer: MEDICARE

## 2021-09-02 VITALS
DIASTOLIC BLOOD PRESSURE: 80 MMHG | RESPIRATION RATE: 18 BRPM | HEART RATE: 90 BPM | SYSTOLIC BLOOD PRESSURE: 99 MMHG | TEMPERATURE: 98.4 F

## 2021-09-02 DIAGNOSIS — L97.512 DIABETIC ULCER OF TOE OF RIGHT FOOT ASSOCIATED WITH TYPE 2 DIABETES MELLITUS, WITH FAT LAYER EXPOSED (HCC): Primary | ICD-10-CM

## 2021-09-02 DIAGNOSIS — M86.171 OTHER ACUTE OSTEOMYELITIS OF RIGHT FOOT (HCC): ICD-10-CM

## 2021-09-02 DIAGNOSIS — E11.621 DIABETIC ULCER OF TOE OF RIGHT FOOT ASSOCIATED WITH TYPE 2 DIABETES MELLITUS, WITH FAT LAYER EXPOSED (HCC): Primary | ICD-10-CM

## 2021-09-02 DIAGNOSIS — Z89.421 STATUS POST AMPUTATION OF LESSER TOE OF RIGHT FOOT (HCC): ICD-10-CM

## 2021-09-02 PROCEDURE — 15275 SKIN SUB GRAFT FACE/NK/HF/G: CPT | Performed by: SURGERY

## 2021-09-02 PROCEDURE — 15275 SKIN SUB GRAFT FACE/NK/HF/G: CPT

## 2021-09-02 PROCEDURE — 15004 WOUND PREP F/N/HF/G: CPT | Performed by: SURGERY

## 2021-09-02 RX ORDER — CLOBETASOL PROPIONATE 0.5 MG/G
OINTMENT TOPICAL ONCE
Status: CANCELLED | OUTPATIENT
Start: 2021-09-02 | End: 2021-09-02

## 2021-09-02 RX ORDER — LIDOCAINE HYDROCHLORIDE 20 MG/ML
JELLY TOPICAL ONCE
Status: CANCELLED | OUTPATIENT
Start: 2021-09-02 | End: 2021-09-02

## 2021-09-02 RX ORDER — BACITRACIN, NEOMYCIN, POLYMYXIN B 400; 3.5; 5 [USP'U]/G; MG/G; [USP'U]/G
OINTMENT TOPICAL ONCE
Status: CANCELLED | OUTPATIENT
Start: 2021-09-02 | End: 2021-09-02

## 2021-09-02 RX ORDER — SULFAMETHOXAZOLE AND TRIMETHOPRIM 800; 160 MG/1; MG/1
1 TABLET ORAL 2 TIMES DAILY
Qty: 14 TABLET | Refills: 0 | Status: SHIPPED | OUTPATIENT
Start: 2021-09-02 | End: 2021-09-09

## 2021-09-02 RX ORDER — LIDOCAINE HYDROCHLORIDE 40 MG/ML
SOLUTION TOPICAL ONCE
Status: CANCELLED | OUTPATIENT
Start: 2021-09-02 | End: 2021-09-02

## 2021-09-02 RX ORDER — GENTAMICIN SULFATE 1 MG/G
OINTMENT TOPICAL ONCE
Status: CANCELLED | OUTPATIENT
Start: 2021-09-02 | End: 2021-09-02

## 2021-09-02 RX ORDER — BETAMETHASONE DIPROPIONATE 0.05 %
OINTMENT (GRAM) TOPICAL ONCE
Status: CANCELLED | OUTPATIENT
Start: 2021-09-02 | End: 2021-09-02

## 2021-09-02 RX ORDER — LIDOCAINE 50 MG/G
OINTMENT TOPICAL ONCE
Status: CANCELLED | OUTPATIENT
Start: 2021-09-02 | End: 2021-09-02

## 2021-09-02 RX ORDER — LIDOCAINE 40 MG/G
CREAM TOPICAL ONCE
Status: CANCELLED | OUTPATIENT
Start: 2021-09-02 | End: 2021-09-02

## 2021-09-02 RX ORDER — LIDOCAINE HYDROCHLORIDE 40 MG/ML
SOLUTION TOPICAL ONCE
Status: DISCONTINUED | OUTPATIENT
Start: 2021-09-02 | End: 2021-09-03 | Stop reason: HOSPADM

## 2021-09-02 RX ORDER — BACITRACIN ZINC AND POLYMYXIN B SULFATE 500; 1000 [USP'U]/G; [USP'U]/G
OINTMENT TOPICAL ONCE
Status: CANCELLED | OUTPATIENT
Start: 2021-09-02 | End: 2021-09-02

## 2021-09-02 RX ORDER — GINSENG 100 MG
CAPSULE ORAL ONCE
Status: CANCELLED | OUTPATIENT
Start: 2021-09-02 | End: 2021-09-02

## 2021-09-02 NOTE — PROGRESS NOTES
Wound Care Center Progress Note and Bioengineered Skin Application      Marilyn Rahman  AGE: 41 y.o. GENDER: female  : 1980  EPISODE DATE:  2021     Subjective:     Chief Complaint   Patient presents with    Wound Check     Foot         HISTORY of PRESENT ILLNESS     7/15/21  Marilyn Rahman is a 41 y. o. female who presents today for wound evaluation of Acute on chronic diabetic, pressure and neuropathic ulcer(s) of the right lateral foot.  The ulcer is of marked severity.  The underlying cause of the wound is DM infection.  She required 5th toe amputation and foot debridement by me on 21 and has been managed with a wound VAC since then.  She was kept on IV zosyn after discharge and had been planned for follow up with ID but this has fallen through. Indira Haro reports rash and intense \"needle like\" feeling all over recently when she has been receiving her Zosyn infusions.  She attempted premedication with benadryl and tylenol but this did not help.  Cultures from the wound have grown enterococcus sensitive to Vanc and ampicillin.  She has a PCN allergy and is not showing allergy to zosyn.     2021  Re-evaluated today.  Reports taking Zyvox but having nausea recently due to constipation.  It has been 4 days since having a BM.  She reports using over the counter stool softeners and laxative.  Denies problems with her foot wound or the wound VAC.  She did not have her CBC drawn prior to today's appointment.     2021  Denies problems with her wound since last seen. Has been doing wound VAC therapy.  Zyvox to finish tomorrow.     2021  Denies problems over the last week.  Has been undergoing VAC therapy.  Hopes the wound will be healed by October so she can go [facility] [place] [facility].     2021  Doing well today.  Reports some leaking from the vac but keeping a seal.  Denies new problems or complaints.     2021  Evaluated again today. Denies complaints.   Reports her wound looking good with VAC changes.     2021  Doing ok this week. Was seen by Stan Tolentino last week and VAC d/c'd. Doing well with nushield grafts. She does report a new wound on the medial aspect of the right great toe at the nail bed.     Wound Pain Timing/Severity: mild  Quality of pain: dull  Severity of pain:  1 / 10   Modifying Factors: edema, diabetes, poor glucose control, obesity and chronic pressure  Associated Signs/Symptoms: edema, drainage and numbness      PAST MEDICAL HISTORY        Diagnosis Date    Allergic rhinitis     allergy shots- Dr. Boogie Paniagua Allergic rhinitis     Dr. Sherman kwan and flonase    Anxiety 10/24/2012    Atopic dermatitis     Dr. Sherman kwan    Back pain 10/24/2012    Bilateral sciatica 2020    Depression     Diabetes mellitus ([address]     Diabetes mellitus type II, uncontrolled ([address]     Diabetic eye exam: 2011, Dr. Estela Man , needs record [V72.0T][    GERD (gastroesophageal reflux disease)     Headache(784.0)     Hyperlipidemia LDL goal < 100 2012    Hypertension     Morbid obesity ([address]     No retinopathy on exam     Dr Estela Man 13 Diabetes with no significatn diabetic retinopathy found in both eyes    Non compliance w medication regimen 2014    Sprain of neck 10/4/2011       PAST SURGICAL HISTORY    Past Surgical History:   Procedure Laterality Date     SECTION      HYSTERECTOMY      OTHER SURGICAL HISTORY  2017    I&D of right perineal abcess     TOE AMPUTATION Right 2021    RIGHT 5TH TOE AMPUTATION performed by Sammie Ga MD at [address] [place] [address] History   Problem Relation Age of Onset    Heart Disease Mother     Diabetes Mother     Asthma Mother     High Cholesterol Mother     Depression Mother     Heart Disease Father     High Cholesterol Father        SOCIAL HISTORY    Social History     Tobacco Use    Smoking status: Former Smoker     Packs/day: 0.25     Types: Cigarettes distress    General: The patient is in no acute distress. Mental status:  Patient is appropriate, is  oriented to place and plan of care. Dermatologic exam: Visual inspection of the periwound reveals the skin to be edematous  Wound exam: see wound description below in procedure note      Assessment:   41 y.o. female with right foot wound s/p amputation of the right 5th toe and debridement of the foot. Wound is healing well. She does unfortunately have a small wound and drainage from the medial aspect of the great toe due to ingrown nail. Problem List Items Addressed This Visit     Osteomyelitis ([address]    Relevant Medications    lidocaine (XYLOCAINE) 4 % external solution    Other Relevant Orders    Initiate Outpatient Wound Care Protocol    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 (HCC)    Relevant Medications    lidocaine (XYLOCAINE) 4 % external solution    Other Relevant Orders    Initiate Outpatient Wound Care Protocol    WD-Diabetic ulcer of toe of right foot associated with type 2 diabetes mellitus, with fat layer exposed ([facility] - Primary    Relevant Medications    lidocaine (XYLOCAINE) 4 % external solution    Other Relevant Orders    Initiate Outpatient Wound Care Protocol          Conditions above and those listed in the past history are being treated appropriately and are considered under adequate control so as not to preclude the use of a graft. SKIN SUBSTITUTES/GRAFT APPLICATIONS PROCEDURE NOTE    Indicaton:     Chronic ulcer(s) of the right lateral foot  that has(have) failed to heal with the usual wound protocol used for greater than 30 days. See Epic chart for previous modalities and details of previous treatment. The patient has no contraindications or evidence of infection. The patient's competency and support system is appropriate for proper care and follow up for the use of this graft, therefore a graft was placed as below. Procedure:     The wound bed was cleansed and prepared as necessary to accept the graft. Debridement was required. Consent obtained: Yes    Time out taken: Yes    Using curette sharp Excisional Debridement of the wound(s) was/were performed down through and including the removal of dermis and subcutaneous tissue. Devitalized Tissue Debrided:  slough      Bleeding:  Minimal    Hemostasis Achieved:  by pressure    Procedural Pain:  1  / 10     Post Procedural Pain:  0 / 10     Having prepared the wound bed, the skin graft was completed as below.     Product Utilized:          [] APLIGRAF   []44 sq cm   []88 sq cm    []132 sq cm  []176 sq cm           [] DERMAGRAFT  [] 38 sq cm   []76 sq cm    []114 sq cm  []152 sq cm      [] NUSHIELD  [] 1.6 sq/cm disc   [x] (2X3) 6 sq/cm    [] (2X4) 8 sq/cm         [] (3X4) 12 sq/cm  [] (4x4) 16 sq/cm   [] (4X6) 24 sq/cm         [] (6X6) 36 sq/cm        [] AFFINITY    [] (1.5 cm x 1.5cm) 2.25 cm   [] (2.5 cm x 2.5 cm) 6.25 cm         [] THERASKIN  [] 13 sq cm   []37.34 sq cm             [] EpiFix   [] 1.54 sq cm disc    [] 2 pieces (3.08 sq cm)    [] 3  pieces (4.62 sq  cm)   [] 6 sq/cm    [] 16 sq cm     [] 18 sq cm   Fenestrated        [] OASIS   [] 10.5 sq cm   []21 sq cm    []35 sq cm Tri-Matrix  []70 sq cm          Tri-Matrix                    [] PURAPLY   [] 1.6 sq cm disc     [] 4 sq cm   [] 8 sq cm   [] 25sq cm  [] 54 sq cm                  [] Grafix      [] 1.54 sq cm disc    [] 3 sq cm    [] 6 sq cm   [] 12 sq cm   [] 25 sq cm        Skin Substitute Applied:    Performed by: Gaby Kebede MD    Consent obtained: Yes    Time out taken: Yes     Fenestrated: No    Skin Substitute was Applied to Wound Number(s):     2      Negative Pressure Wound Therapy Foot Right (Active)   Number of days: 76       Negative Pressure Wound Therapy Toe (Comment  which one) Anterior;Right (Active)   Unit Type i 08/05/21 1508   Dressing Type Black foam 08/05/21 1508   Number of pieces used 1 08/05/21 1508 Cycle Continuous 08/05/21 1508   Target Pressure (mmHg) 125 08/05/21 1508   Canister changed? Yes 08/05/21 1508   Dressing Status Clean;Dry; Intact 08/05/21 1508   Dressing Changed Changed/New 08/05/21 1508   Drainage Amount Moderate 08/05/21 1508   Drainage Description Serosanguinous 08/05/21 1508   Dressing Change Due 07/18/21 07/15/21 1531   Number of days: 71       Wound 06/23/21 Foot Right #2 Fifth Toe Amp Site (Active)   Wound Image   09/02/21 1438   Wound Etiology Diabetic Chua 3 06/23/21 0935   Dressing Status Clean;Dry; Intact; New dressing applied 09/02/21 1521   Wound Cleansed Soap and water 09/02/21 1438   Dressing/Treatment ABD;Coban/self-adherent bandages 09/02/21 1521   Offloading for Diabetic Foot Ulcers Post op shoe 09/02/21 1438   Wound Length (cm) 2.2 cm 09/02/21 1438   Wound Width (cm) 1.3 cm 09/02/21 1438   Wound Depth (cm) 0.1 cm 09/02/21 1438   Wound Surface Area (cm^2) 2.86 cm^2 09/02/21 1438   Change in Wound Size % (l*w) 78.03 09/02/21 1438   Wound Volume (cm^3) 0.286 cm^3 09/02/21 1438   Wound Healing % 97 09/02/21 1438   Post-Procedure Length (cm) 2.7 cm 08/26/21 1448   Post-Procedure Width (cm) 1.3 cm 08/26/21 1448   Post-Procedure Depth (cm) 0.1 cm 08/26/21 1448   Post-Procedure Surface Area (cm^2) 3.51 cm^2 08/26/21 1448   Post-Procedure Volume (cm^3) 0.351 cm^3 08/26/21 1448   Distance Tunneling (cm) 0 cm 09/02/21 1438   Tunneling Position ___ O'Clock 0 09/02/21 1438   Undermining Starts ___ O'Clock 0 09/02/21 1438   Undermining Ends___ O'Clock 0 09/02/21 1438   Undermining Maxium Distance (cm) 0 09/02/21 1438   Wound Assessment Granulation tissue;Pink/red 09/02/21 1438   Drainage Amount Moderate 09/02/21 1438   Drainage Description Serosanguinous 09/02/21 1438   Odor None 09/02/21 1438   Yamilex-wound Assessment Intact; Maceration 09/02/21 1438   Margins Defined edges 09/02/21 1438   Wound Thickness Description not for Pressure Injury Full thickness 09/02/21 1438   Number of days: [facility], you will receive a patient experience survey. We would be grateful if you would take the time to fill this survey out.     Wound care order history:                 ROBIN's   Right 1.17      Left 1.2   Date 4/13/21              Vascular studies:                 Cultures:  4/13/21, 06/14/21              Antibiotics:                  HbA1c:   8.9 4/14/21              Compression/Lymph Pumps:              Grafts:  Nushield #1 7/29/21, Nushield #2 8/5/21, Nushield #3 8/12/21, Nushield #4 8/19/21, Nushield #5 8/26/21,Nushield #6 9/2/21                  Continuing wound care orders and information:              Residence: Private              [facility], located within [facility] home health care with: [facility]              Your wound-care supplies will be provided by: . [id number]                            ADTGS cleansing:                           QH not scrub or use excessive force.                          Wash hands with soap and water before and after dressing changes.                         Prior to applying a clean dressing, cleanse wound with normal saline,                          wound cleanser, or mild soap and water.                           Ask your physician or nurse before getting the wound(s) wet in the shower.              Daily Wound management:                          Keep weight off wounds and reposition every 2 hours.                          JZPRJ standing for long periods of time.                          DIEPQ wraps/stockings in AM and remove at bedtime.                          Elevate legs to the level of the heart or above for 30 minutes 4-5 times a day and/or when sitting.                                               When taking antibiotics take entire prescription as ordered by MD do not stop taking until medicine is all gone.                              Orders for this week (9/2/21): Right 4th toe -Wash with mild soap and water.    Nushield #6 applied by Tere Gilliland CNP and secured with sorbact, mastisol and steristrips  Cover with ABD  Wrap with Coban 2  secure with tape  Leave in place x 1 week    Right Great Toe- Wash with mild soap and water, rinse with saline and pat dry with 4x4. Apply Silver Alginate to wound bed  Wrap with conform and secure with tape. Change Daily. Okay to Send back wound vac    Oral Antibiotic sent to pharmacy - Please       Follow up with Dr. Marlin Kong on In 1 week in the wound care center  Call [phone] for any questions or concerns. Date__________   Time____________        Treatment Note Wound 06/23/21 Foot Right #2 Fifth Toe Amp Site-Dressing/Treatment: ABD, Coban/self-adherent bandages (ABD, coban 2,)  Wound 09/02/21 Toe (Comment  which one) Right #2 Right Great Toe-Dressing/Treatment: Alginate with Ag (alginate Ag, conform, ttape,)    Written Patient Dismissal Instructions Given     - will give 1 week of bactrim for the erythema associated with her ingrown toenail. I encouraged her to establish with a Podiatrist for further management, possible nail removal.  She knows to be re-evaluated promptly if the redness or drainage at the site worsens.        Electronically signed by Brent Barnett MD on 9/2/2021 at 3:32 PM

## 2021-09-02 NOTE — PROGRESS NOTES
NuShieldTreatment Note    NAME:  Arnold Washburn  YOB: 1980  MEDICAL RECORD NUMBER:  [id number]  DATE:  9/2/2021    Goal:  Patient will receive safe and proper application of skin substitute. Patient will comply with caring for dressing, and reporting complications. [x]Expiration date checked immediately prior to use. [x] Package intact prior to use and no damage noted. [x] Transport temperature controlled and acceptable. Nushield was removed from protective sterile packaging by physician and            applied Chorion side down to the prepared ulcer bed. Nushield was hydrated with sterile normal saline per physician. Nushield was applied to right 5th toe amp site and affixed with steri-strips by the physician. [x] Nushield was covered with non-adherent ulcer dressing. [x] Applied alginate over non-adherent. [x] Applied dry gauze and/or roll gauze. Patient/caregiver was instructed not to remove dressing and to keep it clean    and dry. Pt/family/caregiver was instructed on signs and symptoms of complications       to report such as draining through dressing, dressing falling down/slipping,           getting wet, or severe pain or tingling. NuShield may be applied a total of 10 times per wound over a 12 month period. Date of first application of NuShield for this current wound is July 21, 2021 .     Guidelines followed    Electronically signed by Walker Wong RN on 9/2/2021 at 3:09 PM

## 2021-09-02 NOTE — PROGRESS NOTES
Multilayer Compression Wrap   (Not Unna) Below the Knee    NAME:  Marilyn Barraza  YOB: 1980  MEDICAL RECORD NUMBER:  [id number]  DATE:  9/2/2021    Multilayer compression wrap: Removed old Multilayer wrap if indicated and wash leg with mild soap/water. Applied moisturizing agent to dry skin as needed. Applied primary and secondary dressing as ordered. Applied multilayered dressing below the knee to right lower leg. Instructed patient/caregiver not to remove dressing and to keep it clean and dry. Instructed patient/caregiver on complications to report to provider, such as pain, numbness in toes, heavy drainage, and slippage of dressing. Instructed patient on purpose of compression dressing and on activity and exercise recommendations. Patient tolerated treatment well.       Electronically signed by Jaylene Mills RN on 9/2/2021 at 3:27 PM

## 2021-09-09 ENCOUNTER — HOSPITAL ENCOUNTER (OUTPATIENT)
Dept: WOUND CARE | Age: 41
Discharge: HOME | End: 2021-09-09
Payer: MEDICARE

## 2021-09-09 DIAGNOSIS — M86.171 OTHER ACUTE OSTEOMYELITIS OF RIGHT FOOT (HCC): ICD-10-CM

## 2021-09-09 DIAGNOSIS — E11.621 DIABETIC ULCER OF TOE OF RIGHT FOOT ASSOCIATED WITH TYPE 2 DIABETES MELLITUS, WITH FAT LAYER EXPOSED (HCC): Primary | ICD-10-CM

## 2021-09-09 DIAGNOSIS — L97.512 DIABETIC ULCER OF TOE OF RIGHT FOOT ASSOCIATED WITH TYPE 2 DIABETES MELLITUS, WITH FAT LAYER EXPOSED (HCC): Primary | ICD-10-CM

## 2021-09-09 DIAGNOSIS — Z89.421 STATUS POST AMPUTATION OF LESSER TOE OF RIGHT FOOT (HCC): ICD-10-CM

## 2021-09-09 PROCEDURE — 15275 SKIN SUB GRAFT FACE/NK/HF/G: CPT

## 2021-09-09 PROCEDURE — 11042 DBRDMT SUBQ TIS 1ST 20SQCM/<: CPT

## 2021-09-09 PROCEDURE — 15275 SKIN SUB GRAFT FACE/NK/HF/G: CPT | Performed by: SURGERY

## 2021-09-09 RX ORDER — LIDOCAINE 50 MG/G
OINTMENT TOPICAL ONCE
Status: CANCELLED | OUTPATIENT
Start: 2021-09-09 | End: 2021-09-09

## 2021-09-09 RX ORDER — LIDOCAINE 40 MG/G
CREAM TOPICAL ONCE
Status: CANCELLED | OUTPATIENT
Start: 2021-09-09 | End: 2021-09-09

## 2021-09-09 RX ORDER — GINSENG 100 MG
CAPSULE ORAL ONCE
Status: CANCELLED | OUTPATIENT
Start: 2021-09-09 | End: 2021-09-09

## 2021-09-09 RX ORDER — GENTAMICIN SULFATE 1 MG/G
OINTMENT TOPICAL ONCE
Status: CANCELLED | OUTPATIENT
Start: 2021-09-09 | End: 2021-09-09

## 2021-09-09 RX ORDER — LIDOCAINE HYDROCHLORIDE 40 MG/ML
SOLUTION TOPICAL ONCE
Status: CANCELLED | OUTPATIENT
Start: 2021-09-09 | End: 2021-09-09

## 2021-09-09 RX ORDER — LIDOCAINE HYDROCHLORIDE 20 MG/ML
JELLY TOPICAL ONCE
Status: CANCELLED | OUTPATIENT
Start: 2021-09-09 | End: 2021-09-09

## 2021-09-09 RX ORDER — BETAMETHASONE DIPROPIONATE 0.05 %
OINTMENT (GRAM) TOPICAL ONCE
Status: CANCELLED | OUTPATIENT
Start: 2021-09-09 | End: 2021-09-09

## 2021-09-09 RX ORDER — BACITRACIN ZINC AND POLYMYXIN B SULFATE 500; 1000 [USP'U]/G; [USP'U]/G
OINTMENT TOPICAL ONCE
Status: CANCELLED | OUTPATIENT
Start: 2021-09-09 | End: 2021-09-09

## 2021-09-09 RX ORDER — BACITRACIN, NEOMYCIN, POLYMYXIN B 400; 3.5; 5 [USP'U]/G; MG/G; [USP'U]/G
OINTMENT TOPICAL ONCE
Status: CANCELLED | OUTPATIENT
Start: 2021-09-09 | End: 2021-09-09

## 2021-09-09 RX ORDER — CLOBETASOL PROPIONATE 0.5 MG/G
OINTMENT TOPICAL ONCE
Status: CANCELLED | OUTPATIENT
Start: 2021-09-09 | End: 2021-09-09

## 2021-09-09 RX ORDER — LIDOCAINE HYDROCHLORIDE 40 MG/ML
SOLUTION TOPICAL ONCE
Status: DISCONTINUED | OUTPATIENT
Start: 2021-09-09 | End: 2021-09-10 | Stop reason: HOSPADM

## 2021-09-09 NOTE — PROGRESS NOTES
Multilayer Compression Wrap   (Not Unna) Below the Knee    NAME:  Evelyn Border OF BIRTH:  1980  MEDICAL RECORD NUMBER:  [id number]  DATE:  9/9/2021    Multilayer compression wrap: Removed old Multilayer wrap if indicated and wash leg with mild soap/water. Applied moisturizing agent to dry skin as needed. Applied primary and secondary dressing as ordered. Applied multilayered dressing below the knee to right lower leg. Instructed patient/caregiver not to remove dressing and to keep it clean and dry. Instructed patient/caregiver on complications to report to provider, such as pain, numbness in toes, heavy drainage, and slippage of dressing. Instructed patient on purpose of compression dressing and on activity and exercise recommendations.       Electronically signed by Yonatan Guzman LPN on 2/1/8113 at 7:91 PM

## 2021-09-09 NOTE — PROGRESS NOTES
Affinity graft applied to right foot wound at toe amputation site by Dr Rosalba Jamison using mastisol, sorbact, steristrips and a 4x4. Mitch Ramon organogenesis rep at chair side to aide in application. Wound covered with ABD and wrapped with coban 2. Expiration date of  89/17/43OTUVYAN before application.

## 2021-09-10 NOTE — PROGRESS NOTES
Wound Care Center Progress Note and Bioengineered Skin Application      Marilyn Rahman  AGE: 41 y.o. GENDER: female  : 1980  EPISODE DATE:  2021     Subjective:     Chief Complaint   Patient presents with    Wound Check     left foot          HISTORY of PRESENT ILLNESS   7/15/21  Marilyn Rahman is a 41 y. o. female who presents today for wound evaluation of Acute on chronic diabetic, pressure and neuropathic ulcer(s) of the right lateral foot.  The ulcer is of marked severity.  The underlying cause of the wound is DM infection.  She required 5th toe amputation and foot debridement by me on 21 and has been managed with a wound VAC since then.  She was kept on IV zosyn after discharge and had been planned for follow up with ID but this has fallen through. Richie Stewart reports rash and intense \"needle like\" feeling all over recently when she has been receiving her Zosyn infusions.  She attempted premedication with benadryl and tylenol but this did not help.  Cultures from the wound have grown enterococcus sensitive to Vanc and ampicillin.  She has a PCN allergy and is not showing allergy to zosyn.     2021  Re-evaluated today.  Reports taking Zyvox but having nausea recently due to constipation.  It has been 4 days since having a BM.  She reports using over the counter stool softeners and laxative.  Denies problems with her foot wound or the wound VAC.  She did not have her CBC drawn prior to today's appointment.     2021  Denies problems with her wound since last seen.  Has been doing wound VAC therapy.  Zyvox to finish tomorrow.     2021  Denies problems over the last week.  Has been undergoing VAC therapy.  Hopes the wound will be healed by October so she can go to [facility] [place] [facility].     2021  Doing well today.  Reports some leaking from the vac but keeping a seal.  Denies new problems or complaints.     2021  Evaluated again today.  Denies complaints.  Reports her wound looking good with VAC changes.     2021  Doing ok this week. Was seen by Dg Mcclendon last week and VAC d/c'd. Doing well with nushield grafts. She does report a new wound on the medial aspect of the right great toe at the nail bed.     2021  Evaluated again today. Denies new complaints. Doing well with grafts and wound VAC.     Wound Pain Timing/Severity: mild  Quality of pain: dull  Severity of pain:  1 / 10   Modifying Factors: edema, diabetes, poor glucose control, obesity and chronic pressure  Associated Signs/Symptoms: edema, drainage and numbness           PAST MEDICAL HISTORY        Diagnosis Date    Allergic rhinitis     allergy shots- Dr. Halima Bradley Allergic rhinitis     Dr. Taylor kwan and flonase    Anxiety 10/24/2012    Atopic dermatitis     Dr. Taylor kwan    Back pain 10/24/2012    Bilateral sciatica 2020    Depression     Diabetes mellitus ([address]     Diabetes mellitus type II, uncontrolled ([address]     Diabetic eye exam: 2011, Dr. Nancy Ramirez , needs record [V72.0T][    GERD (gastroesophageal reflux disease)     Headache(784.0)     Hyperlipidemia LDL goal < 100 2012    Hypertension     Morbid obesity [facility] [address] [facility]     No retinopathy on exam     Dr Nancy Ramirez 13 Diabetes with no significatn diabetic retinopathy found in both eyes    Non compliance w medication regimen 2014    Sprain of neck 10/4/2011       PAST SURGICAL HISTORY    Past Surgical History:   Procedure Laterality Date     SECTION      HYSTERECTOMY      OTHER SURGICAL HISTORY  2017    I&D of right perineal abcess     TOE AMPUTATION Right 2021    RIGHT 5TH TOE AMPUTATION performed by Jackelyn Vázquez MD at [address] [place] [address]    Family History   Problem Relation Age of Onset    Heart Disease Mother     Diabetes Mother     Asthma Mother     High Cholesterol Mother     Depression Mother     Heart Disease Father     High Cholesterol Father        SOCIAL chills and malaise. Objective: There were no vitals taken for this visit. PHYSICAL EXAM  General Appearance: alert and oriented to person, place and time, well-developed and well-nourished, in no acute distress    General: The patient is in no acute distress. Mental status:  Patient is appropriate, is  oriented to place and plan of care. Dermatologic exam: Visual inspection of the periwound reveals the skin to be edematous  Wound exam: see wound description below in procedure note      Assessment:   41 y.o. female with DM foot wound s/p right 5th toe amputation and foot debridement. Problem List Items Addressed This Visit     Osteomyelitis ([facility] [address]    Relevant Medications    lidocaine (XYLOCAINE) 4 % external solution    Other Relevant Orders    Initiate Outpatient Wound Care Protocol    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 (HCC)    Relevant Medications    lidocaine (XYLOCAINE) 4 % external solution    Other Relevant Orders    Initiate Outpatient Wound Care Protocol    WD-Diabetic ulcer of toe of right foot associated with type 2 diabetes mellitus, with fat layer exposed ([facility] [address] - Primary    Relevant Medications    lidocaine (XYLOCAINE) 4 % external solution    Other Relevant Orders    Initiate Outpatient Wound Care Protocol          Conditions above and those listed in the past history are being treated appropriately and are considered under adequate control so as not to preclude the use of a graft. SKIN SUBSTITUTES/GRAFT APPLICATIONS PROCEDURE NOTE    Indicaton:     Chronic ulcer(s) of the right lateral foot  that has(have) failed to heal with the usual wound protocol used for greater than 30 days. See Epic chart for previous modalities and details of previous treatment. The patient has no contraindications or evidence of infection.   The patient's competency and support system is appropriate for proper care and follow up for the use of this graft, therefore a graft was placed as below. Procedure: The wound bed was cleansed and prepared as necessary to accept the graft. Debridement was required. Consent obtained: Yes    Time out taken: Yes    Using curette sharp Excisional Debridement of the wound(s) was/were performed down through and including the removal of subcutaneous tissue. Devitalized Tissue Debrided:  necrotic/eschar      Bleeding:  Minimal    Hemostasis Achieved:  by pressure    Procedural Pain:  1  / 10     Post Procedural Pain:  0 / 10     Having prepared the wound bed, the skin graft was completed as below.     Product Utilized:        [] APLIGRAF   []44 sq cm   []88 sq cm    []132 sq cm  []176 sq cm           [] DERMAGRAFT  [] 38 sq cm   []76 sq cm    []114 sq cm  []152 sq cm      [] NUSHIELD  [] 1.6 sq/cm disc   [] (2X3) 6 sq/cm    [] (2X4) 8 sq/cm         [] (3X4) 12 sq/cm  [] (4x4) 16 sq/cm   [] (4X6) 24 sq/cm         [] (6X6) 36 sq/cm        *[x] AFFINITY    [x] (1.5 cm x 1.5cm) 2.25 cm   [] (2.5 cm x 2.5 cm) 6.25 cm         [] THERASKIN  [] 13 sq cm   []37.34 sq cm             [] EpiFix   [] 1.54 sq cm disc    [] 2 pieces (3.08 sq cm)    [] 3  pieces (4.62 sq  cm)   [] 6 sq/cm    [] 16 sq cm     [] 18 sq cm   Fenestrated        [] OASIS   [] 10.5 sq cm   []21 sq cm    []35 sq cm Tri-Matrix  []70 sq cm          Tri-Matrix                    [] PURAPLY   [] 1.6 sq cm disc     [] 4 sq cm   [] 8 sq cm   [] 25sq cm  [] 54 sq cm                  [] Grafix      [] 1.54 sq cm disc    [] 3 sq cm    [] 6 sq cm   [] 12 sq cm   [] 25 sq cm        Skin Substitute Applied:    Performed by: Milo Veliz MD    Consent obtained: Yes    Time out taken: Yes     Fenestrated: No    Skin Substitute was Applied to Wound Number(s):     1      Negative Pressure Wound Therapy Foot Right (Active)   Number of days: 83       Negative Pressure Wound Therapy Toe (Comment  which one) Anterior;Right (Active)   Unit Type [facility] 08/05/21 1508   Dressing Type Black foam 08/05/21 1508   Number of pieces used 1 08/05/21 1508   Cycle Continuous 08/05/21 1508   Target Pressure (mmHg) 125 08/05/21 1508   Canister changed? Yes 08/05/21 1508   Dressing Status Clean;Dry; Intact 08/05/21 1508   Dressing Changed Changed/New 08/05/21 1508   Drainage Amount Moderate 08/05/21 1508   Drainage Description Serosanguinous 08/05/21 1508   Dressing Change Due 07/18/21 07/15/21 1531   Number of days: 78       Wound 06/23/21 Foot Right #2 Fifth Toe Amp Site (Active)   Wound Image   09/09/21 1353   Wound Etiology Diabetic 09/02/21 1438   Dressing Status New dressing applied;Clean;Dry; Intact 09/09/21 1451   Wound Cleansed Wound cleanser;Cleansed with saline 09/09/21 1353   Dressing/Treatment ABD;Coban/self-adherent bandages 09/02/21 1521   Offloading for Diabetic Foot Ulcers Post op shoe 09/09/21 1353   Wound Length (cm) 3.6 cm 09/09/21 1353   Wound Width (cm) 1 cm 09/09/21 1353   Wound Depth (cm) 0.1 cm 09/09/21 1353   Wound Surface Area (cm^2) 3.6 cm^2 09/09/21 1353   Change in Wound Size % (l*w) 72.35 09/09/21 1353   Wound Volume (cm^3) 0.36 cm^3 09/09/21 1353   Wound Healing % 97 09/09/21 1353   Post-Procedure Length (cm) 3.6 cm 09/09/21 1416   Post-Procedure Width (cm) 1 cm 09/09/21 1416   Post-Procedure Depth (cm) 0.1 cm 09/09/21 1416   Post-Procedure Surface Area (cm^2) 3.6 cm^2 09/09/21 1416   Post-Procedure Volume (cm^3) 0.36 cm^3 09/09/21 1416   Distance Tunneling (cm) 0 cm 09/09/21 1353   Tunneling Position ___ O'Clock 0 09/09/21 1353   Undermining Starts ___ O'Clock 0 09/09/21 1353   Undermining Ends___ O'Clock 0 09/09/21 1353   Undermining Maxium Distance (cm) 0 09/09/21 1353   Wound Assessment Dry;Slough;Granulation tissue 09/09/21 1353   Drainage Amount Moderate 09/09/21 1353   Drainage Description Serosanguinous 09/09/21 1353   Odor None 09/09/21 1353   Yamilex-wound Assessment Intact; Maceration;Dry/flaky 09/09/21 1353   Margins Defined edges 09/09/21 1353   Wound Thickness Description not for Pressure Injury Full thickness 09/09/21 1353   Number of days: 78       Wound 09/02/21 Toe (Comment  which one) Right #2 Right Great Toe (Active)   Wound Image   09/09/21 1353   Wound Etiology Diabetic 09/02/21 1439   Dressing Status New dressing applied;Clean;Dry; Intact 09/09/21 1451   Wound Cleansed Soap and water;Cleansed with saline 09/09/21 1353   Dressing/Treatment Alginate with Ag 09/02/21 1521   Offloading for Diabetic Foot Ulcers Post op shoe 09/09/21 1353   Wound Length (cm) 0 cm 09/09/21 1353   Wound Width (cm) 0 cm 09/09/21 1353   Wound Depth (cm) 0 cm 09/09/21 1353   Wound Surface Area (cm^2) 0 cm^2 09/09/21 1353   Change in Wound Size % (l*w) 100 09/09/21 1353   Wound Volume (cm^3) 0 cm^3 09/09/21 1353   Wound Healing % 100 09/09/21 1353   Distance Tunneling (cm) 0 cm 09/09/21 1353   Tunneling Position ___ O'Clock 0 09/09/21 1353   Undermining Starts ___ O'Clock 0 09/09/21 1353   Undermining Ends___ O'Clock 0 09/09/21 1353   Undermining Maxium Distance (cm) 0 09/09/21 1353   Wound Assessment Dry;Slough 09/09/21 1353   Drainage Amount Moderate 09/02/21 1439   Drainage Description Serosanguinous 09/02/21 1439   Odor None 09/02/21 1439   Yamilex-wound Assessment Dry/flaky 09/09/21 1353   Margins Defined edges 09/02/21 1439   Wound Thickness Description not for Pressure Injury Full thickness 09/02/21 1439   Number of days: 7         Total Surface Area Covered 2.25 sq/cm    Was the Product Layered  No  Amount Wasted 0 sq/cm     Secured: Yes    Instruments used to complete placement of graft::forceps    Secured With:   [] N/A  [x]Steri Strips    []Sutures     []Staples [x]Other Wound VAC    Procedural Pain: 0/10     Post Procedural Pain: 0 / 10    Response to Treatment:  Well tolerated by patient. Status of wound progress and description from last visit:   improving.       Plan:     Discharge instructions:  Discharge Instructions       PHYSICIAN ORDERS AND DISCHARGE INSTRUCTIONS     NOTE: Upon discharge from the [address] [facility],[address], you will receive a patient experience survey. We would be grateful if you would take the time to fill this survey out.     Wound care order history:                 ROBIN's   Right 1.17      Left 1.2   Date 4/13/21              Vascular studies:                 Cultures:  4/13/21, 06/14/21              Antibiotics:                  HbA1c:   8.9 4/14/21              Compression/Lymph Pumps:              Grafts:  Nushield #1 7/29/21, Nushield #2 8/5/21, Nushield #3 8/12/21, Nushield #4 8/19/21, Nushield #5 8/26/21,Nushield #6 9/2/21, Affinity #1 Graft #7 09/09/21                Continuing wound care orders and information:              Residence: [facility] home health care with: [facility]              Your wound-care supplies will be provided by: . [address]                            MZWEQ cleansing:                           VK not scrub or use excessive force.                          Wash hands with soap and water before and after dressing changes.                         Prior to applying a clean dressing, cleanse wound with normal saline,                          wound cleanser, or mild soap and water.                           Ask your physician or nurse before getting the wound(s) wet in the shower.              Daily Wound management:                          Keep weight off wounds and reposition every 2 hours.                          DLAYW standing for long periods of time.                          KUJFJ wraps/stockings in AM and remove at bedtime.                          Elevate legs to the level of the heart or above for 30 minutes 4-5 times a day and/or when sitting.                                               When taking antibiotics take entire prescription as ordered by MD do not stop taking until medicine is all gone.                              Orders for this week (9/9/21): Right 4th toe -Wash with mild soap and water.    Affinity #1 Graft # 7 applied by Dr Rosalba Jamison and secured with sorbact, mastisol and steristrips, and 4x4  Apply ludivina to exposed wound   Cover with ABD  Wrap with Coban 2  secure with tape  Leave in place x 1 week     Okay to Send back wound vac     Oral Antibiotic sent to pharmacy - Please       Follow up with Dr. Rosalba Jamison on In 1 week in the wound care center  Call [phone] for any questions or concerns.   Date__________   Time____________           Treatment Note Wound 06/23/21 Foot Right #2 Fifth Toe Amp Site-Dressing/Treatment:  (graft in place, abd, coban 2 )  Wound 09/02/21 Toe (Comment  which one) Right #2 Right Great Toe-Dressing/Treatment:  (open to air )    Written Patient Dismissal Instructions Given            Electronically signed by Suzy Waldrop MD on 9/9/2021 at 8:26 PM

## 2021-09-16 ENCOUNTER — HOSPITAL ENCOUNTER (OUTPATIENT)
Dept: WOUND CARE | Age: 41
Discharge: HOME | End: 2021-09-16
Payer: MEDICARE

## 2021-09-16 VITALS
SYSTOLIC BLOOD PRESSURE: 134 MMHG | TEMPERATURE: 97.4 F | HEART RATE: 88 BPM | DIASTOLIC BLOOD PRESSURE: 91 MMHG | RESPIRATION RATE: 18 BRPM

## 2021-09-16 DIAGNOSIS — E11.621 DIABETIC ULCER OF TOE OF RIGHT FOOT ASSOCIATED WITH TYPE 2 DIABETES MELLITUS, WITH FAT LAYER EXPOSED (HCC): Primary | ICD-10-CM

## 2021-09-16 DIAGNOSIS — L97.512 DIABETIC ULCER OF TOE OF RIGHT FOOT ASSOCIATED WITH TYPE 2 DIABETES MELLITUS, WITH FAT LAYER EXPOSED (HCC): Primary | ICD-10-CM

## 2021-09-16 DIAGNOSIS — Z89.421 STATUS POST AMPUTATION OF LESSER TOE OF RIGHT FOOT (HCC): ICD-10-CM

## 2021-09-16 DIAGNOSIS — M86.171 OTHER ACUTE OSTEOMYELITIS OF RIGHT FOOT (HCC): ICD-10-CM

## 2021-09-16 PROCEDURE — 11042 DBRDMT SUBQ TIS 1ST 20SQCM/<: CPT | Performed by: SURGERY

## 2021-09-16 PROCEDURE — 11042 DBRDMT SUBQ TIS 1ST 20SQCM/<: CPT

## 2021-09-16 RX ORDER — LIDOCAINE 50 MG/G
OINTMENT TOPICAL ONCE
Status: CANCELLED | OUTPATIENT
Start: 2021-09-16 | End: 2021-09-16

## 2021-09-16 RX ORDER — BACITRACIN ZINC AND POLYMYXIN B SULFATE 500; 1000 [USP'U]/G; [USP'U]/G
OINTMENT TOPICAL ONCE
Status: CANCELLED | OUTPATIENT
Start: 2021-09-16 | End: 2021-09-16

## 2021-09-16 RX ORDER — BACITRACIN, NEOMYCIN, POLYMYXIN B 400; 3.5; 5 [USP'U]/G; MG/G; [USP'U]/G
OINTMENT TOPICAL ONCE
Status: CANCELLED | OUTPATIENT
Start: 2021-09-16 | End: 2021-09-16

## 2021-09-16 RX ORDER — GINSENG 100 MG
CAPSULE ORAL ONCE
Status: CANCELLED | OUTPATIENT
Start: 2021-09-16 | End: 2021-09-16

## 2021-09-16 RX ORDER — LIDOCAINE HYDROCHLORIDE 40 MG/ML
SOLUTION TOPICAL ONCE
Status: DISCONTINUED | OUTPATIENT
Start: 2021-09-16 | End: 2021-09-17 | Stop reason: HOSPADM

## 2021-09-16 RX ORDER — LIDOCAINE HYDROCHLORIDE 20 MG/ML
JELLY TOPICAL ONCE
Status: CANCELLED | OUTPATIENT
Start: 2021-09-16 | End: 2021-09-16

## 2021-09-16 RX ORDER — LIDOCAINE 40 MG/G
CREAM TOPICAL ONCE
Status: CANCELLED | OUTPATIENT
Start: 2021-09-16 | End: 2021-09-16

## 2021-09-16 RX ORDER — LIDOCAINE HYDROCHLORIDE 40 MG/ML
SOLUTION TOPICAL ONCE
Status: CANCELLED | OUTPATIENT
Start: 2021-09-16 | End: 2021-09-16

## 2021-09-16 RX ORDER — GENTAMICIN SULFATE 1 MG/G
OINTMENT TOPICAL ONCE
Status: CANCELLED | OUTPATIENT
Start: 2021-09-16 | End: 2021-09-16

## 2021-09-16 RX ORDER — CLOBETASOL PROPIONATE 0.5 MG/G
OINTMENT TOPICAL ONCE
Status: CANCELLED | OUTPATIENT
Start: 2021-09-16 | End: 2021-09-16

## 2021-09-16 RX ORDER — BETAMETHASONE DIPROPIONATE 0.05 %
OINTMENT (GRAM) TOPICAL ONCE
Status: CANCELLED | OUTPATIENT
Start: 2021-09-16 | End: 2021-09-16

## 2021-09-16 NOTE — PROGRESS NOTES
Wound Care Center Progress Note With Procedure    Marilyn Omalley  AGE: 41 y.o. GENDER: female  : 1980  EPISODE DATE:  2021     Subjective:     Chief Complaint   Patient presents with    Wound Check     RLE          HISTORY of PRESENT ILLNESS     7/15/21  Marilyn Rahman is a 41 y. o. female who presents today for wound evaluation of Acute on chronic diabetic, pressure and neuropathic ulcer(s) of the right lateral foot.  The ulcer is of marked severity.  The underlying cause of the wound is DM infection.  She required 5th toe amputation and foot debridement by me on 21 and has been managed with a wound VAC since then.  She was kept on IV zosyn after discharge and had been planned for follow up with ID but this has fallen through. Piyush Wilson reports rash and intense \"needle like\" feeling all over recently when she has been receiving her Zosyn infusions.  She attempted premedication with benadryl and tylenol but this did not help.  Cultures from the wound have grown enterococcus sensitive to Vanc and ampicillin.  She has a PCN allergy and is not showing allergy to zosyn.     2021  Re-evaluated today.  Reports taking Zyvox but having nausea recently due to constipation.  It has been 4 days since having a BM.  She reports using over the counter stool softeners and laxative.  Denies problems with her foot wound or the wound VAC.  She did not have her CBC drawn prior to today's appointment.     2021  Denies problems with her wound since last seen.  Has been doing wound VAC therapy.  Zyvox to finish tomorrow.     2021  Denies problems over the last week.  Has been undergoing VAC therapy.  Hopes the wound will be healed by October so she can go to [facility].     2021  Doing well today.  Reports some leaking from the vac but keeping a seal.  Denies new problems or complaints.     2021  Evaluated again today.  Denies complaints.  Reports her wound looking good with VAC changes.     2021  Doing ok this week. Was seen by Damian Jimenez last week and VAC d/c'd.  Doing well with nushield grafts. Lina Ordonez does report a new wound on the medial aspect of the right great toe at the nail bed.     2021  Evaluated again today. Denies new complaints. Doing well with grafts and wound VAC.     2021  Doing well today. Underwent affinity graft last week. Denies problems with the wound. Does report some right hip pain.     Wound Pain Timing/Severity: mild  Quality of pain: dull  Severity of pain:  1 / 10   Modifying Factors: edema, diabetes, poor glucose control, obesity and chronic pressure  Associated Signs/Symptoms: edema, drainage and numbness       PAST MEDICAL HISTORY        Diagnosis Date    Allergic rhinitis     allergy shots- Dr. Patricia Haskins Allergic rhinitis     Dr. [place]- claritin and flonase    Anxiety 10/24/2012    Atopic dermatitis     Dr. Fair [place]- claritin    Back pain 10/24/2012    Bilateral sciatica 2020    Depression     Diabetes mellitus ([address]     Diabetes mellitus type II, uncontrolled ([address]     Diabetic eye exam: 2011, Dr. Kal Borges , needs record [V72.0T][    GERD (gastroesophageal reflux disease)     Headache(784.0)     Hyperlipidemia LDL goal < 100 2012    Hypertension     Morbid obesity ([address]     No retinopathy on exam     Dr Kal Borges 13 Diabetes with no significatn diabetic retinopathy found in both eyes    Non compliance w medication regimen 2014    Sprain of neck 10/4/2011       PAST SURGICAL HISTORY    Past Surgical History:   Procedure Laterality Date     SECTION      HYSTERECTOMY      OTHER SURGICAL HISTORY  2017    I&D of right perineal abcess     TOE AMPUTATION Right 2021    RIGHT 5TH TOE AMPUTATION performed by Yaneth Streeter [facility] at [facility] 1    Family History   Problem Relation Age of Onset    Heart Disease Mother     Diabetes Mother     Asthma Mother     High Cholesterol Mother     Depression Mother     Heart Disease Father     High Cholesterol Father        SOCIAL HISTORY    Social History     Tobacco Use    Smoking status: Former Smoker     Packs/day: 0.25     Types: Cigarettes     Quit date: 5/10/2021     Years since quittin.3    Smokeless tobacco: Never Used   Vaping Use    Vaping Use: Never used   Substance Use Topics    Alcohol use: Yes     Comment: rare    Drug use: No       ALLERGIES    Allergies   Allergen Reactions    Zosyn [Piperacillin Sod-Tazobactam So] Rash     Rash and severe \"being poked by needles all over\" feeling    Penicillins Rash       MEDICATIONS    Current Outpatient Medications on File Prior to Encounter   Medication Sig Dispense Refill    Probiotic Acidophilus (FLORANEX) TABS Take 1 tablet by mouth 2 times daily 60 tablet 2    lisinopril (PRINIVIL;ZESTRIL) 2.5 MG tablet Take 1 tablet by mouth every morning 90 tablet 3    atorvastatin (LIPITOR) 40 MG tablet Take 1 tablet by mouth every morning 90 tablet 3    Dulaglutide 1.5 MG/0.5ML SOPN Inject 1.5 mg into the skin once a week Dose increase as of 20 4 pen 5    glipiZIDE (GLUCOTROL XL) 10 MG extended release tablet Take 1 tablet by mouth every morning 90 tablet 3    insulin glargine (LANTUS SOLOSTAR) 100 UNIT/ML injection pen Inject 15 Units into the skin nightly 10 pen 5    Lancets MISC To test up to 4 times per day- nighttime and with each meal 300 each 5    blood glucose test strips (EXACTECH TEST) strip 1 each by In Vitro route daily Embrace test strips to test BS 4x daily 300 each 6    Insulin Pen Needle 32G X 5 MM MISC To use with Lantus pen and Novolog pen 30 each 11    aspirin EC 81 MG EC tablet Take 1 tablet by mouth daily 90 tablet 0     No current facility-administered medications on file prior to encounter. REVIEW OF SYSTEMS    Pertinent items are noted in HPI.     Constitutional: Negative for systemic symptoms including fever, chills and malaise. Objective:      BP (!) 134/91   Pulse 88   Temp 97.4 °F (36.3 °C) (Temporal)   Resp 18     PHYSICAL EXAM  General Appearance: alert and oriented to person, place and time, well-developed and well-nourished, in no acute distress    General: The patient is in no acute distress. Mental status:  Patient is appropriate, is  oriented to place and plan of care. Dermatologic exam: Visual inspection of the periwound reveals the skin to be edematous  Wound exam: see wound description below in procedure note      Assessment:   41 y.o. female with right foot wound s/p 5th toe amputation and foot debridement. Problem List Items Addressed This Visit     Osteomyelitis ([facility]    Relevant Medications    lidocaine (XYLOCAINE) 4 % external solution    Other Relevant Orders    Initiate Outpatient Wound Care Protocol    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 (HCC)    Relevant Medications    lidocaine (XYLOCAINE) 4 % external solution    Other Relevant Orders    Initiate Outpatient Wound Care Protocol    WD-Diabetic ulcer of toe of right foot associated with type 2 diabetes mellitus, with fat layer exposed ([facility] - Primary    Relevant Medications    lidocaine (XYLOCAINE) 4 % external solution    Other Relevant Orders    Initiate Outpatient Wound Care Protocol        Procedure Note    Indications:  Based on my examination of this patient's wound(s) today, sharp excision into necrotic subcutaneous tissue is required to promote healing and evaluate the extent of previous healing. Performed by: Lakeisha Borja MD    Consent obtained: Yes    Time out taken:  Yes    Pain Control: Anesthetic  Anesthetic: 4% Lidocaine Liquid Topical     Debridement:Excisional Debridement    Using curette the wound(s) was/were sharply debrided down through and including the removal of subcutaneous tissue.         Devitalized Tissue Debrided:  slough    Pre Debridement Measurements:  Are located in the Wound Documentation Flow Sheet    All active wounds listed below with today's date are evaluated  Wound(s)    debrided this date include # : 1     Post  Debridement Measurements:  Negative Pressure Wound Therapy Foot Right (Active)   Number of days: 90       Negative Pressure Wound Therapy Toe (Comment  which one) Anterior;Right (Active)   Unit Type kci 08/05/21 1508   Dressing Type Black foam 08/05/21 1508   Number of pieces used 1 08/05/21 1508   Cycle Continuous 08/05/21 1508   Target Pressure (mmHg) 125 08/05/21 1508   Canister changed? Yes 08/05/21 1508   Dressing Status Clean;Dry; Intact 08/05/21 1508   Dressing Changed Changed/New 08/05/21 1508   Drainage Amount Moderate 08/05/21 1508   Drainage Description Serosanguinous 08/05/21 1508   Dressing Change Due 07/18/21 07/15/21 1531   Number of days: 85       Wound 06/23/21 Foot Right #2 Fifth Toe Amp Site (Active)   Wound Image   09/09/21 1353   Wound Etiology Diabetic 09/02/21 1438   Dressing Status Clean;Dry;New dressing applied 09/16/21 1419   Wound Cleansed Wound cleanser;Cleansed with saline 09/16/21 1356   Dressing/Treatment ABD;Coban/self-adherent bandages 09/02/21 1521   Offloading for Diabetic Foot Ulcers Post op shoe 09/16/21 1356   Wound Length (cm) 1 cm 09/16/21 1356   Wound Width (cm) 0.2 cm 09/16/21 1356   Wound Depth (cm) 0.1 cm 09/16/21 1356   Wound Surface Area (cm^2) 0.2 cm^2 09/16/21 1356   Change in Wound Size % (l*w) 98.46 09/16/21 1356   Wound Volume (cm^3) 0.02 cm^3 09/16/21 1356   Wound Healing % 100 09/16/21 1356   Post-Procedure Length (cm) 1 cm 09/16/21 1407   Post-Procedure Width (cm) 0.2 cm 09/16/21 1407   Post-Procedure Depth (cm) 0.1 cm 09/16/21 1407   Post-Procedure Surface Area (cm^2) 0.2 cm^2 09/16/21 1407   Post-Procedure Volume (cm^3) 0.02 cm^3 09/16/21 1407   Distance Tunneling (cm) 0 cm 09/16/21 1356   Tunneling Position ___ O'Clock 0 09/16/21 1356   Undermining Starts ___ O'Clock 0 09/16/21 1356   Undermining Ends___ O'Clock 0 09/16/21 1356   Undermining Maxium Distance (cm) 0 09/16/21 1356   Wound Assessment Dry;Slough;Granulation tissue 09/16/21 1356   Drainage Amount Moderate 09/16/21 1356   Drainage Description Serosanguinous 09/16/21 1356   Odor None 09/16/21 1356   Yamilex-wound Assessment Intact; Maceration;Dry/flaky 09/16/21 1356   Margins Defined edges 09/16/21 1356   Wound Thickness Description not for Pressure Injury Full thickness 09/16/21 1356   Number of days: 85       Percent of Wound(s) Debrided: approximately 100%    Total  Area  Debrided:  0.2 sq cm     Bleeding:  Minimal    Hemostasis Achieved:  by pressure    Procedural Pain:  0  / 10     Post Procedural Pain:  0 / 10     Response to treatment:  Well tolerated by patient. Status of wound progress and description from last visit:   Improving, nearly healed--only a sliver open. Plan:       Discharge Instructions            PHYSICIAN ORDERS AND DISCHARGE INSTRUCTIONS     NOTE: Upon discharge from the [address], you will receive a patient experience survey. We would be grateful if you would take the time to fill this survey out.     Wound care order history:                 ROBIN's   Right 1.17      Left 1.2   Date 4/13/21              Vascular studies:                 Cultures:  4/13/21, 06/14/21              Antibiotics:                  HbA1c:   8.9 4/14/21              Compression/Lymph Pumps:              Grafts:  Nushield #1 7/29/21, Nushield #2 8/5/21, Nushield #3 8/12/21, Nushield #4 8/19/21, Nushield #5 8/26/21,Nushield #6 9/2/21, Affinity #1 Graft #7 09/09/21                Continuing wound care orders and information:              Residence: Private              [facility] home health care with: [facility]              Your wound-care supplies will be provided by: . [facility]                             PATY cleansing:                           MH not scrub or use excessive force.                          Wash hands with soap and water before and

## 2021-09-23 ENCOUNTER — HOSPITAL ENCOUNTER (OUTPATIENT)
Dept: WOUND CARE | Age: 41
Discharge: HOME | End: 2021-09-23
Payer: MEDICARE

## 2021-09-23 VITALS
SYSTOLIC BLOOD PRESSURE: 94 MMHG | DIASTOLIC BLOOD PRESSURE: 70 MMHG | TEMPERATURE: 97.8 F | RESPIRATION RATE: 18 BRPM | HEART RATE: 103 BPM

## 2021-09-23 DIAGNOSIS — L97.512 DIABETIC ULCER OF TOE OF RIGHT FOOT ASSOCIATED WITH TYPE 2 DIABETES MELLITUS, WITH FAT LAYER EXPOSED (HCC): Primary | ICD-10-CM

## 2021-09-23 DIAGNOSIS — E11.621 DIABETIC ULCER OF TOE OF RIGHT FOOT ASSOCIATED WITH TYPE 2 DIABETES MELLITUS, WITH FAT LAYER EXPOSED (HCC): Primary | ICD-10-CM

## 2021-09-23 DIAGNOSIS — M86.171 OTHER ACUTE OSTEOMYELITIS OF RIGHT FOOT (HCC): ICD-10-CM

## 2021-09-23 DIAGNOSIS — Z89.421 STATUS POST AMPUTATION OF LESSER TOE OF RIGHT FOOT (HCC): ICD-10-CM

## 2021-09-23 PROCEDURE — 97597 DBRDMT OPN WND 1ST 20 CM/<: CPT

## 2021-09-23 PROCEDURE — 97597 DBRDMT OPN WND 1ST 20 CM/<: CPT | Performed by: SURGERY

## 2021-09-23 RX ORDER — BETAMETHASONE DIPROPIONATE 0.05 %
OINTMENT (GRAM) TOPICAL ONCE
Status: CANCELLED | OUTPATIENT
Start: 2021-09-23 | End: 2021-09-23

## 2021-09-23 RX ORDER — LIDOCAINE 50 MG/G
OINTMENT TOPICAL ONCE
Status: CANCELLED | OUTPATIENT
Start: 2021-09-23 | End: 2021-09-23

## 2021-09-23 RX ORDER — LIDOCAINE HYDROCHLORIDE 20 MG/ML
JELLY TOPICAL ONCE
Status: CANCELLED | OUTPATIENT
Start: 2021-09-23 | End: 2021-09-23

## 2021-09-23 RX ORDER — GENTAMICIN SULFATE 1 MG/G
OINTMENT TOPICAL ONCE
Status: CANCELLED | OUTPATIENT
Start: 2021-09-23 | End: 2021-09-23

## 2021-09-23 RX ORDER — LIDOCAINE 40 MG/G
CREAM TOPICAL ONCE
Status: CANCELLED | OUTPATIENT
Start: 2021-09-23 | End: 2021-09-23

## 2021-09-23 RX ORDER — LIDOCAINE HYDROCHLORIDE 40 MG/ML
SOLUTION TOPICAL ONCE
Status: DISCONTINUED | OUTPATIENT
Start: 2021-09-23 | End: 2021-09-24 | Stop reason: HOSPADM

## 2021-09-23 RX ORDER — CLOBETASOL PROPIONATE 0.5 MG/G
OINTMENT TOPICAL ONCE
Status: CANCELLED | OUTPATIENT
Start: 2021-09-23 | End: 2021-09-23

## 2021-09-23 RX ORDER — BACITRACIN ZINC AND POLYMYXIN B SULFATE 500; 1000 [USP'U]/G; [USP'U]/G
OINTMENT TOPICAL ONCE
Status: CANCELLED | OUTPATIENT
Start: 2021-09-23 | End: 2021-09-23

## 2021-09-23 RX ORDER — BACITRACIN, NEOMYCIN, POLYMYXIN B 400; 3.5; 5 [USP'U]/G; MG/G; [USP'U]/G
OINTMENT TOPICAL ONCE
Status: CANCELLED | OUTPATIENT
Start: 2021-09-23 | End: 2021-09-23

## 2021-09-23 RX ORDER — LIDOCAINE HYDROCHLORIDE 40 MG/ML
SOLUTION TOPICAL ONCE
Status: CANCELLED | OUTPATIENT
Start: 2021-09-23 | End: 2021-09-23

## 2021-09-23 RX ORDER — GINSENG 100 MG
CAPSULE ORAL ONCE
Status: CANCELLED | OUTPATIENT
Start: 2021-09-23 | End: 2021-09-23

## 2021-09-23 ASSESSMENT — PAIN SCALES - GENERAL: PAINLEVEL_OUTOF10: 0

## 2021-09-23 NOTE — PROGRESS NOTES
Wound Care Center Progress Note With Procedure    Marilyn Barraza  AGE: 35 y.o. GENDER: female  : 1980  EPISODE DATE:  2021     Subjective:     Chief Complaint   Patient presents with    Wound Check     Right 5th toe         HISTORY of PRESENT ILLNESS     7/15/21  Marilyn Rahman is a 41 y. o. female who presents today for wound evaluation of Acute on chronic diabetic, pressure and neuropathic ulcer(s) of the right lateral foot.  The ulcer is of marked severity.  The underlying cause of the wound is DM infection.  She required 5th toe amputation and foot debridement by me on 21 and has been managed with a wound VAC since then.  She was kept on IV zosyn after discharge and had been planned for follow up with ID but this has fallen through. Jad Gilbert reports rash and intense \"needle like\" feeling all over recently when she has been receiving her Zosyn infusions.  She attempted premedication with benadryl and tylenol but this did not help.  Cultures from the wound have grown enterococcus sensitive to Vanc and ampicillin.  She has a PCN allergy and is not showing allergy to zosyn.     2021  Re-evaluated today.  Reports taking Zyvox but having nausea recently due to constipation.  It has been 4 days since having a BM.  She reports using over the counter stool softeners and laxative.  Denies problems with her foot wound or the wound VAC.  She did not have her CBC drawn prior to today's appointment.     2021  Denies problems with her wound since last seen.  Has been doing wound VAC therapy.  Zyvox to finish tomorrow.     2021  Denies problems over the last week.  Has been undergoing VAC therapy.  Hopes the wound will be healed by October so she can go to [place] [facility].     2021  Doing well today.  Reports some leaking from the vac but keeping a seal.  Denies new problems or complaints.     2021  Evaluated again today.  Denies complaints.  Reports her wound looking good with VAC changes.     2021  Doing ok this week. Was seen by Nadir Paniagua last week and VAC d/c'd.  Doing well with nushield grafts. Ambika Carballo does report a new wound on the medial aspect of the right great toe at the nail bed.     2021  Evaluated again today.  Denies new complaints. Doing well with grafts and wound VAC.     2021  Doing well today. Underwent affinity graft last week. Denies problems with the wound. Does report some right hip pain.     2021  Doing well this week. Denies problems with the wound. Has been wearing ACE wrap intermittently.     Wound Pain Timing/Severity: mild  Quality of pain: dull  Severity of pain:  0 / 10   Modifying Factors: edema, diabetes, poor glucose control, obesity and chronic pressure  Associated Signs/Symptoms: edema, drainage and numbness            PAST MEDICAL HISTORY        Diagnosis Date    Allergic rhinitis     allergy shots- Dr. Marisol Burgess Allergic rhinitis     Dr. Priscilla kwan and flonase    Anxiety 10/24/2012    Atopic dermatitis     Dr. Priscilla kwan    Back pain 10/24/2012    Bilateral sciatica 2020    Depression     Diabetes mellitus ([address]     Diabetes mellitus type II, uncontrolled ([address]     Diabetic eye exam: 2011, Dr. Leonel Rodriguez , needs record [V72.0T][    GERD (gastroesophageal reflux disease)     Headache(784.0)     Hyperlipidemia LDL goal < 100 2012    Hypertension     Morbid obesity ([address]     No retinopathy on exam     Dr Leonel Rodriguez 13 Diabetes with no significatn diabetic retinopathy found in both eyes    Non compliance w medication regimen 2014    Sprain of neck 10/4/2011       PAST SURGICAL HISTORY    Past Surgical History:   Procedure Laterality Date     SECTION      HYSTERECTOMY      OTHER SURGICAL HISTORY  2017    I&D of right perineal abcess     TOE AMPUTATION Right 2021    RIGHT 5TH TOE AMPUTATION performed by Sarah Coleman MD at [address] [place] [address] History   Problem Relation Age of Onset    Heart Disease Mother     Diabetes Mother     Asthma Mother     High Cholesterol Mother     Depression Mother     Heart Disease Father     High Cholesterol Father        SOCIAL HISTORY    Social History     Tobacco Use    Smoking status: Former Smoker     Packs/day: 0.25     Types: Cigarettes     Quit date: 5/10/2021     Years since quittin.3    Smokeless tobacco: Never Used   Vaping Use    Vaping Use: Never used   Substance Use Topics    Alcohol use: Yes     Comment: rare    Drug use: No       ALLERGIES    Allergies   Allergen Reactions    Zosyn [Piperacillin Sod-Tazobactam So] Rash     Rash and severe \"being poked by needles all over\" feeling    Penicillins Rash       MEDICATIONS    Current Outpatient Medications on File Prior to Encounter   Medication Sig Dispense Refill    lisinopril (PRINIVIL;ZESTRIL) 2.5 MG tablet Take 1 tablet by mouth every morning 90 tablet 3    atorvastatin (LIPITOR) 40 MG tablet Take 1 tablet by mouth every morning 90 tablet 3    Dulaglutide 1.5 MG/0.5ML SOPN Inject 1.5 mg into the skin once a week Dose increase as of 20 4 pen 5    glipiZIDE (GLUCOTROL XL) 10 MG extended release tablet Take 1 tablet by mouth every morning 90 tablet 3    insulin glargine (LANTUS SOLOSTAR) 100 UNIT/ML injection pen Inject 15 Units into the skin nightly 10 pen 5    Lancets MISC To test up to 4 times per day- nighttime and with each meal 300 each 5    blood glucose test strips (EXACTECH TEST) strip 1 each by In Vitro route daily Embrace test strips to test BS 4x daily 300 each 6    Insulin Pen Needle 32G X 5 MM MISC To use with Lantus pen and Novolog pen 30 each 11    aspirin EC 81 MG EC tablet Take 1 tablet by mouth daily 90 tablet 0     No current facility-administered medications on file prior to encounter. REVIEW OF SYSTEMS    Pertinent items are noted in HPI.     Constitutional: Negative for systemic symptoms including fever, chills and malaise. Objective:      BP 94/70   Pulse 103   Temp 97.8 °F (36.6 °C) (Temporal)   Resp 18     PHYSICAL EXAM  General Appearance: alert and oriented to person, place and time, well-developed and well-nourished, in no acute distress    General: The patient is in no acute distress. Mental status:  Patient is appropriate, is  oriented to place and plan of care. Dermatologic exam: Visual inspection of the periwound reveals the skin to be normal in turgor and texture  Wound exam: see wound description below in procedure note      Assessment:   35 y.o. female with DM foot wound s/p right 5th toe amputation and foot debridement. Wound is healed. 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 (HCC)    Relevant Medications    lidocaine (XYLOCAINE) 4 % external solution (Start on 9/23/2021  2:30 PM)    Other Relevant Orders    Initiate Outpatient Wound Care Protocol    WD-Diabetic ulcer of toe of right foot associated with type 2 diabetes mellitus, with fat layer exposed ([facility] - Primary    Relevant Medications    lidocaine (XYLOCAINE) 4 % external solution (Start on 9/23/2021  2:30 PM)    Other Relevant Orders    Initiate Outpatient Wound Care Protocol        Procedure Note    Indications:  Based on my examination of this patient's wound(s) today, sharp excision into necrotic epidermis is required to promote healing and evaluate the extent of previous healing.     Performed by: Satya Gerardo MD    Consent obtained: Yes    Time out taken:  Yes    Pain Control: Anesthetic  Anesthetic: 4% Lidocaine Liquid Topical     Debridement:Excisional Debridement    Using curette the wound(s) was/were sharply debrided down through and including the removal of epidermis. Devitalized Tissue Debrided:  callus and clots    Pre Debridement Measurements:  Are located in the Wound Documentation Flow Sheet    All active wounds listed below with today's date are evaluated  Wound(s)    debrided this date include # : 2     Post  Debridement Measurements:  Negative Pressure Wound Therapy Foot Right (Active)   Number of days: 97       Negative Pressure Wound Therapy Toe (Comment  which one) Anterior;Right (Active)   Unit Type kci 08/05/21 1508   Dressing Type Black foam 08/05/21 1508   Number of pieces used 1 08/05/21 1508   Cycle Continuous 08/05/21 1508   Target Pressure (mmHg) 125 08/05/21 1508   Canister changed? Yes 08/05/21 1508   Dressing Status Clean;Dry; Intact 08/05/21 1508   Dressing Changed Changed/New 08/05/21 1508   Drainage Amount Moderate 08/05/21 1508   Drainage Description Serosanguinous 08/05/21 1508   Dressing Change Due 07/18/21 07/15/21 1531   Number of days: 92       Wound 06/23/21 Foot Right #2 Fifth Toe Amp Site (Active)   Wound Image   09/23/21 1356   Wound Etiology Diabetic 09/02/21 1438   Dressing Status Clean;Dry;New dressing applied 09/16/21 1419   Wound Cleansed Wound cleanser 09/23/21 1356   Dressing/Treatment ABD;Coban/self-adherent bandages 09/02/21 1521   Offloading for Diabetic Foot Ulcers Post op shoe 09/23/21 1356   Wound Length (cm) 1 cm 09/16/21 1356   Wound Width (cm) 0.2 cm 09/16/21 1356   Wound Depth (cm) 0.1 cm 09/23/21 1356   Wound Surface Area (cm^2) 0.2 cm^2 09/16/21 1356   Change in Wound Size % (l*w) 98.46 09/16/21 1356   Wound Volume (cm^3) 0.02 cm^3 09/16/21 1356   Wound Healing % 100 09/16/21 1356   Post-Procedure Length (cm) 0 cm 09/23/21 1406   Post-Procedure Width (cm) 0 cm 09/23/21 1406   Post-Procedure Depth (cm) 0 cm 09/23/21 1406   Post-Procedure Surface Area (cm^2) 0 cm^2 09/23/21 1406   Post-Procedure Volume (cm^3) 0 cm^3 09/23/21 1406   Distance Tunneling (cm) 0 cm 09/23/21 1356   Tunneling Position ___ O'Clock 0 09/23/21 1356   Undermining Starts ___ O'Clock 0 09/23/21 1356   Undermining Ends___ O'Clock 0 09/23/21 1356   Undermining Maxium Distance (cm) 0 09/23/21 1356   Wound Assessment Dry;Pink/red 09/23/21 1356   Drainage Amount Scant 09/23/21 1356   Drainage Description Yellow 09/23/21 1356   Odor None 09/23/21 1356   Yamilex-wound Assessment Dry/flaky 09/23/21 1356   Margins Defined edges 09/23/21 1356   Wound Thickness Description not for Pressure Injury Full thickness 09/23/21 1356   Number of days: 92       Percent of Wound(s) Debrided: approximately 100%    Total  Area  Debrided:  2 sq cm     Bleeding:  None    Hemostasis Achieved:  not needed    Procedural Pain:  0  / 10     Post Procedural Pain:  0 / 10     Response to treatment:  Well tolerated by patient. Status of wound progress and description from last visit:   Wound healed, debrided of some callous and adherent clot/ludivina buildup. Plan:       Discharge Instructions       PHYSICIAN ORDERS AND DISCHARGE INSTRUCTIONS     NOTE: Upon discharge from the [address], you will receive a patient experience survey. We would be grateful if you would take the time to fill this survey out.     Wound care order history:                 ROBIN's   Right 1.17      Left 1.2   Date 4/13/21              Vascular studies:                 Cultures:  4/13/21, 06/14/21              Antibiotics:                  HbA1c:   8.9 4/14/21              Compression/Lymph Pumps:              Grafts:  Nushield #1 7/29/21, Nushield #2 8/5/21, Nushield #3 8/12/21, Nushield #4 8/19/21, Nushield #5 8/26/21,Nushield #6 9/2/21, Affinity #1 Graft #7 09/09/21                Continuing wound care orders and information:              Residence: Private              [facility] with: [facility]              Your wound-care supplies will be provided by: . [address]                             HXJKH cleansing:                           WB not scrub or use excessive force.                          MAMK hands with soap and water before and after dressing changes.                         Prior to applying a clean dressing, cleanse wound with normal saline,                          wound cleanser, or mild soap and water.                           Ask your physician or nurse before getting the wound(s) wet in the shower.              Daily Wound management:                          Keep weight off wounds and reposition every 2 hours.                          Avoid standing for long periods of time.                          Apply wraps/stockings in AM and remove at bedtime.                          Elevate legs to the level of the heart or above for 30 minutes 4-5 times a day and/or when sitting.                                               When taking antibiotics take entire prescription as ordered by MD do not stop taking until medicine is all gone.                              Orders for this week (9/23/21): Right 4th toe: Healed 09/23/21  Cover with gentac border gauze    OK to discharge home health    FAX TO [phone]  Follow up with Dr. Walker Colon if needed: Discharged  Call [phone] for any questions or concerns.   Date__________   Time____________        Treatment Note      Written Patient Dismissal Instructions Given     - call if problems arise  - I encouraged Marilyn to establish with Podiatry for a yearly checkup and consideration of insoles     Electronically signed by Wilfrid Cruz MD on 9/23/2021 at 2:14 PM

## 2021-09-23 NOTE — PLAN OF CARE
Problem: Wound:  Goal: Will show signs of wound healing; wound closure and no evidence of infection  Description: Will show signs of wound healing; wound closure and no evidence of infection  9/23/2021 1419 by Tanika Luevano RN  Outcome: Met This Shift  9/23/2021 1419 by Tanika Luevano RN  Outcome: Ongoing

## 2021-10-12 RX ORDER — DULAGLUTIDE 1.5 MG/.5ML
INJECTION, SOLUTION SUBCUTANEOUS
Qty: 2 PEN | Refills: 5 | Status: SHIPPED
Start: 2021-10-12 | End: 2022-01-17 | Stop reason: DRUGHIGH

## 2021-10-14 ENCOUNTER — TELEPHONE (OUTPATIENT)
Dept: WOUND CARE | Age: 41
End: 2021-10-14

## 2021-10-14 DIAGNOSIS — L97.519 DIABETIC ULCER OF RIGHT FIFTH TOE (HCC): ICD-10-CM

## 2021-10-14 DIAGNOSIS — E11.621 DIABETIC ULCER OF RIGHT FIFTH TOE (HCC): ICD-10-CM

## 2021-10-20 ENCOUNTER — OFFICE VISIT (OUTPATIENT)
Dept: FAMILY MEDICINE CLINIC | Age: 41
End: 2021-10-20
Payer: MEDICARE

## 2021-10-20 VITALS
HEART RATE: 74 BPM | WEIGHT: 223.4 LBS | DIASTOLIC BLOOD PRESSURE: 80 MMHG | BODY MASS INDEX: 37.22 KG/M2 | OXYGEN SATURATION: 99 % | SYSTOLIC BLOOD PRESSURE: 124 MMHG | HEIGHT: 65 IN

## 2021-10-20 DIAGNOSIS — E78.5 HYPERLIPIDEMIA ASSOCIATED WITH TYPE 2 DIABETES MELLITUS (HCC): ICD-10-CM

## 2021-10-20 DIAGNOSIS — F41.9 ANXIETY: ICD-10-CM

## 2021-10-20 DIAGNOSIS — I10 ESSENTIAL HYPERTENSION: ICD-10-CM

## 2021-10-20 DIAGNOSIS — Z89.421 STATUS POST AMPUTATION OF LESSER TOE OF RIGHT FOOT (HCC): ICD-10-CM

## 2021-10-20 DIAGNOSIS — E66.01 CLASS 2 SEVERE OBESITY DUE TO EXCESS CALORIES WITH SERIOUS COMORBIDITY AND BODY MASS INDEX (BMI) OF 37.0 TO 37.9 IN ADULT (HCC): ICD-10-CM

## 2021-10-20 DIAGNOSIS — Z23 NEED FOR INFLUENZA VACCINATION: ICD-10-CM

## 2021-10-20 DIAGNOSIS — M54.41 CHRONIC BILATERAL LOW BACK PAIN WITH BILATERAL SCIATICA: ICD-10-CM

## 2021-10-20 DIAGNOSIS — G89.29 CHRONIC BILATERAL LOW BACK PAIN WITH BILATERAL SCIATICA: ICD-10-CM

## 2021-10-20 DIAGNOSIS — E11.69 HYPERLIPIDEMIA ASSOCIATED WITH TYPE 2 DIABETES MELLITUS (HCC): ICD-10-CM

## 2021-10-20 DIAGNOSIS — M25.551 RIGHT HIP PAIN: ICD-10-CM

## 2021-10-20 DIAGNOSIS — M54.42 CHRONIC BILATERAL LOW BACK PAIN WITH BILATERAL SCIATICA: ICD-10-CM

## 2021-10-20 DIAGNOSIS — K21.9 GASTROESOPHAGEAL REFLUX DISEASE WITHOUT ESOPHAGITIS: ICD-10-CM

## 2021-10-20 PROCEDURE — G8482 FLU IMMUNIZE ORDER/ADMIN: HCPCS | Performed by: NURSE PRACTITIONER

## 2021-10-20 PROCEDURE — 99214 OFFICE O/P EST MOD 30 MIN: CPT | Performed by: NURSE PRACTITIONER

## 2021-10-20 PROCEDURE — G8417 CALC BMI ABV UP PARAM F/U: HCPCS | Performed by: NURSE PRACTITIONER

## 2021-10-20 PROCEDURE — 2022F DILAT RTA XM EVC RTNOPTHY: CPT | Performed by: NURSE PRACTITIONER

## 2021-10-20 PROCEDURE — 90674 CCIIV4 VAC NO PRSV 0.5 ML IM: CPT | Performed by: NURSE PRACTITIONER

## 2021-10-20 PROCEDURE — 1036F TOBACCO NON-USER: CPT | Performed by: NURSE PRACTITIONER

## 2021-10-20 PROCEDURE — G8427 DOCREV CUR MEDS BY ELIG CLIN: HCPCS | Performed by: NURSE PRACTITIONER

## 2021-10-20 PROCEDURE — 3051F HG A1C>EQUAL 7.0%<8.0%: CPT | Performed by: NURSE PRACTITIONER

## 2021-10-20 PROCEDURE — G0008 ADMIN INFLUENZA VIRUS VAC: HCPCS | Performed by: NURSE PRACTITIONER

## 2021-10-20 RX ORDER — ASPIRIN 81 MG/1
81 TABLET ORAL DAILY
Qty: 90 TABLET | Refills: 0 | Status: CANCELLED | OUTPATIENT
Start: 2021-10-20 | End: 2022-01-18

## 2021-10-20 NOTE — PROGRESS NOTES
10/20/2021     Marilyn Moore (:  1980) is a 35 y.o. female, here for evaluation of the following medical concerns:       Presents for follow-up on chronic conditions :     Diabetes type 2   Trulicity weekly  CFGPDT 62 JUKOD before bed.   glipizide 10mg XL once daily  NO  blood sugars less than 105.   Highest glucose 220  Last hemoglobin A1c 7.3 in 2021  Diabetic dermopathy bilateral lower extremities  neuropathy in feet . Denies numbness. \"feels like needles sticking in my feet\"   Goes away on its own. Happens when she is up and moving around or driving.           Right foot fifth toe amputation 2021 at [facility] with Dr. Abby Costa. She is no longer following with wound care, but reports Dr. Abby Costa has ordered her orthotics due to balance issues and pain in her hips due to altered gait with a missing fifth toe. Incision is completely healed and she denies pain.       Hypertension-controlled and takes blood pressure at home.  Less than 140/90.  Tolerating medicine well.     Hyperlipidemia - tolerating statin well without side effects     Anxiety is doing well. not medicated. Denies suicidal thought plan idea, insomnia. Denies need for medicine or therapy     Obesity.     Chronic back pain- right side sciatica intermittent.  Denies complaints today.  zanaflex PRN.     Smoker- quit may 4th 2021        Reports right hip pain since having her right 5th toe removed. Radiates from right hip to her foot. Shooting burning pain. Lying down and resting resolves it. Activity and weight bearing make it worse. Right SI joint tenderness. No limited ROM   No saddle anesthesia. No numbness tingling. She also reports that Dr. Zoe Plummer knows about right hip pain and he wants her to get inserts for her shoe. Reports Two lumps in her right axilla that have been present for 1 week. They were tender, but are getting better and shrinking. There is no drainage, surrounding erythema or swelling. Review of Systems    Prior to Visit Medications    Medication Sig Taking? Authorizing Provider   diclofenac sodium (VOLTAREN) 1 % GEL Apply 4 g topically 4 times daily For hip pain Yes ALISTAIR Gardiner NP   TRULICITY 1.5 QR/4.9BM SOPN INJECT ONE AND ONE-HALF (1 AND 1/2) MG UNDER THE SKIN ONCE A WEEK Yes Jose Ramon Telles MD   lisinopril (PRINIVIL;ZESTRIL) 2.5 MG tablet Take 1 tablet by mouth every morning Yes ALISTAIR Gardiner NP   atorvastatin (LIPITOR) 40 MG tablet Take 1 tablet by mouth every morning Yes ALISTAIR Gardiner NP   glipiZIDE (GLUCOTROL XL) 10 MG extended release tablet Take 1 tablet by mouth every morning Yes ALISTAIR Gardiner NP   insulin glargine (LANTUS SOLOSTAR) 100 UNIT/ML injection pen Inject 15 Units into the skin nightly Yes ALISTAIR Gardiner NP   Lancets MISC To test up to 4 times per day- nighttime and with each meal Yes ALISTAIR Gardiner NP   blood glucose test strips (EXACTECH TEST) strip 1 each by In Vitro route daily Embrace test strips to test BS 4x daily Yes ALISTAIR Gardiner NP   Insulin Pen Needle 32G X 5 MM MISC To use with Lantus pen and Novolog pen Yes ALISTAIR Gardiner NP   aspirin EC 81 MG EC tablet Take 1 tablet by mouth daily Yes ALISTAIR Gardiner NP        Social History     Tobacco Use    Smoking status: Former Smoker     Packs/day: 0.25     Types: Cigarettes     Quit date: 5/10/2021     Years since quittin.4    Smokeless tobacco: Never Used   Substance Use Topics    Alcohol use: Yes     Comment: rare        Vitals:    10/20/21 0935   BP: 124/80   Site: Left Upper Arm   Position: Sitting   Pulse: 74   SpO2: 99%   Weight: 223 lb 6.4 oz (101.3 kg)   Height: 5' 5\" (1.651 m)     Estimated body mass index is 37.18 kg/m² as calculated from the following:    Height as of this encounter: 5' 5\" (1.651 m). Weight as of this encounter: 223 lb 6.4 oz (101.3 kg).     Physical Exam  Vitals reviewed. Constitutional:       General: She is not in acute distress. Appearance: Normal appearance. She is obese. She is not ill-appearing, toxic-appearing or diaphoretic. HENT:      Head: Normocephalic and atraumatic. Nose: Nose normal.   Eyes:      Extraocular Movements: Extraocular movements intact. Pupils: Pupils are equal, round, and reactive to light. Cardiovascular:      Rate and Rhythm: Normal rate and regular rhythm. Heart sounds: Normal heart sounds. Pulmonary:      Effort: Pulmonary effort is normal.      Breath sounds: Normal breath sounds. Abdominal:      General: Bowel sounds are normal. There is no distension. Palpations: Abdomen is soft. There is no mass. Tenderness: There is no abdominal tenderness. Hernia: No hernia is present. Musculoskeletal:         General: Normal range of motion. Cervical back: Normal range of motion and neck supple. Feet:    Feet:      Right foot:      Protective Sensation: 10 sites tested. 10 sites sensed. Skin integrity: Skin integrity normal.      Toenail Condition: Right toenails are normal.      Left foot:      Protective Sensation: 10 sites tested. 10 sites sensed. Skin integrity: Skin integrity normal.      Toenail Condition: Left toenails are normal.      Comments: Right fifth toe amputated. Incision healed. No erythema, swelling, drainage. Skin:     General: Skin is warm and dry. Neurological:      General: No focal deficit present. Mental Status: She is alert and oriented to person, place, and time. Mental status is at baseline. Psychiatric:         Mood and Affect: Mood normal.         Behavior: Behavior normal.         Thought Content: Thought content normal.         Judgment: Judgment normal.         ASSESSMENT/PLAN:  1.  Uncontrolled type 2 diabetes mellitus with complication, with long-term current use of insulin (HCC)  Continue glipizide 10 mg in the morning, Trulicity 1.5 mg weekly, Lantus 15 units nightly,  - Comprehensive Metabolic Panel; Future  - Hemoglobin A1C; Future  - Diabetic Foot Exam    2. Right hip pain  Likely related to altered gait and overcompensation for amputated right fifth toe. Will check x-ray, get inserts for shoe per Dr. Addy Reynoso, try chiropractics as well to make sure pelvis is correct anatomically  - XR HIP RIGHT (2-3 VIEWS); Future  - diclofenac sodium (VOLTAREN) 1 % GEL; Apply 4 g topically 4 times daily For hip pain  Dispense: 150 g; Refill: 1    3. Hyperlipidemia associated with type 2 diabetes mellitus (HCC)  Continue Lipitor 40 mg once daily. Low-cholesterol diet    4. Class 2 severe obesity due to excess calories with serious comorbidity and body mass index (BMI) of 37.0 to 37.9 in adult ([facility]      5. Essential hypertension  Controlled. Continue current medication regimen. DASH diet. BP goal less than 140/90. She is only on lisinopril 2.5 mg daily. More so for her diabetes renal protection      6. 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 ([facility]  Incision is healed. Follow-up with Dr. Addy Reynoso. Will be getting orthotics soon    7. Gastroesophageal reflux disease without esophagitis  Over-the-counter PPI as needed, controlled    8. Anxiety  Controlled without medication. Patient declines meds or therapy    9. Need for influenza vaccination  - INFLUENZA, MDCK QUADV, 2 YRS AND OLDER, IM, PF, PREFILL SYR OR SDV, 0.5ML (FLUCELVAX QUADV, PF)    10. Chronic bilateral low back pain with bilateral sciatica        Warm compress to right axilla tender lumps    All care gaps addressed     All questions answered    Discussed use, benefit, and side effects of prescribed medications. Barriers to compliance discussed. All patient questions answered. Pt voiced understanding. Present to the ER for any emergent or acute symptoms not managed at home or in office.     Please note that this chart was generated using dragon dictation software. Although every effort was made to ensure the accuracy of this automated transcription, some errors in transcription may have occurred. No follow-ups on file. An electronic signature was used to authenticate this note.     --ALISTAIR Benedict NP on 10/22/2021 at 11:48 AM

## 2021-10-26 ENCOUNTER — HOSPITAL ENCOUNTER (OUTPATIENT)
Age: 41
Discharge: HOME | End: 2021-10-26
Payer: MEDICARE

## 2021-10-26 ENCOUNTER — HOSPITAL ENCOUNTER (OUTPATIENT)
Dept: GENERAL RADIOLOGY | Age: 41
Discharge: HOME | End: 2021-10-26
Payer: MEDICARE

## 2021-10-26 DIAGNOSIS — M25.551 RIGHT HIP PAIN: ICD-10-CM

## 2021-10-26 LAB
ALBUMIN SERPL-MCNC: 4.2 GM/DL (ref 3.4–5)
ALP BLD-CCNC: 122 IU/L (ref 40–128)
ALT SERPL-CCNC: 46 U/L (ref 10–40)
ANION GAP SERPL CALCULATED.3IONS-SCNC: 12 MMOL/L (ref 4–16)
AST SERPL-CCNC: 29 IU/L (ref 15–37)
BILIRUB SERPL-MCNC: 0.9 MG/DL (ref 0–1)
BUN BLDV-MCNC: 9 MG/DL (ref 6–23)
CALCIUM SERPL-MCNC: 9.3 MG/DL (ref 8.3–10.6)
CHLORIDE BLD-SCNC: 100 MMOL/L (ref 99–110)
CO2: 26 MMOL/L (ref 21–32)
CREAT SERPL-MCNC: 0.7 MG/DL (ref 0.6–1.1)
ESTIMATED AVERAGE GLUCOSE: 246 MG/DL
GFR AFRICAN AMERICAN: >60 ML/MIN/1.73M2
GFR NON-AFRICAN AMERICAN: >60 ML/MIN/1.73M2
GLUCOSE BLD-MCNC: 285 MG/DL (ref 70–99)
HBA1C MFR BLD: 10.2 % (ref 4.2–6.3)
POTASSIUM SERPL-SCNC: 4 MMOL/L (ref 3.5–5.1)
SODIUM BLD-SCNC: 138 MMOL/L (ref 135–145)
TOTAL PROTEIN: 7.3 GM/DL (ref 6.4–8.2)

## 2021-10-26 PROCEDURE — 83036 HEMOGLOBIN GLYCOSYLATED A1C: CPT

## 2021-10-26 PROCEDURE — 80053 COMPREHEN METABOLIC PANEL: CPT

## 2021-10-26 PROCEDURE — 73502 X-RAY EXAM HIP UNI 2-3 VIEWS: CPT

## 2021-10-26 PROCEDURE — 36415 COLL VENOUS BLD VENIPUNCTURE: CPT

## 2021-11-01 RX ORDER — INSULIN GLARGINE 100 [IU]/ML
15 INJECTION, SOLUTION SUBCUTANEOUS NIGHTLY
Qty: 5 PEN | Refills: 5 | Status: SHIPPED | OUTPATIENT
Start: 2021-11-01

## 2021-11-01 RX ORDER — GLIPIZIDE 10 MG/1
10 TABLET, FILM COATED, EXTENDED RELEASE ORAL EVERY MORNING
Qty: 90 TABLET | Refills: 1 | Status: SHIPPED | OUTPATIENT
Start: 2021-11-01 | End: 2022-04-19 | Stop reason: SDUPTHER

## 2021-11-22 ENCOUNTER — HOSPITAL ENCOUNTER (EMERGENCY)
Age: 41
Discharge: HOME | End: 2021-11-22
Attending: EMERGENCY MEDICINE
Payer: MEDICARE

## 2021-11-22 VITALS
BODY MASS INDEX: 36.61 KG/M2 | RESPIRATION RATE: 18 BRPM | WEIGHT: 220 LBS | HEART RATE: 114 BPM | OXYGEN SATURATION: 98 % | SYSTOLIC BLOOD PRESSURE: 111 MMHG | TEMPERATURE: 97.2 F | DIASTOLIC BLOOD PRESSURE: 75 MMHG

## 2021-11-22 DIAGNOSIS — L73.9 FOLLICULITIS: Primary | ICD-10-CM

## 2021-11-22 PROCEDURE — 99283 EMERGENCY DEPT VISIT LOW MDM: CPT

## 2021-11-22 RX ORDER — SULFAMETHOXAZOLE AND TRIMETHOPRIM 800; 160 MG/1; MG/1
1 TABLET ORAL 2 TIMES DAILY
Qty: 14 TABLET | Refills: 0 | Status: SHIPPED | OUTPATIENT
Start: 2021-11-22 | End: 2021-11-29

## 2021-11-22 RX ORDER — CHLORHEXIDINE GLUCONATE 4 G/100ML
SOLUTION TOPICAL
Qty: 236 ML | Refills: 1 | Status: SHIPPED | OUTPATIENT
Start: 2021-11-22 | End: 2021-12-06

## 2021-11-22 ASSESSMENT — PAIN DESCRIPTION - FREQUENCY: FREQUENCY: CONTINUOUS

## 2021-11-22 ASSESSMENT — PAIN DESCRIPTION - PAIN TYPE: TYPE: ACUTE PAIN

## 2021-11-22 ASSESSMENT — PAIN DESCRIPTION - ONSET: ONSET: ON-GOING

## 2021-11-22 NOTE — ED PROVIDER NOTES
Triage Chief Complaint:   Arm Pain (R armpit pain. feels like a golf ball size under there per pt. )    [place]:  Bernard Whelan is a 35 y.o. female that presents to the ED with pain in her right axilla. She saw her family doctor several weeks ago which was told to use a hot pack. Patient did not undergo any intervention I&D antibiotics or other therapy. No reported fevers or chills. She states she feels like she is on a golf ball in her armpit.   She has no idea what is going on    HPI    Past Medical History:   Diagnosis Date    Allergic rhinitis     allergy shots- Dr. Clyde Valle Allergic rhinitis     Dr. Elias kwan and flonase    Anxiety 10/24/2012    Atopic dermatitis     Dr. Elias kwan    Back pain 10/24/2012    Bilateral sciatica 2020    Depression     Diabetes mellitus ([address]     Diabetes mellitus type II, uncontrolled ([address]     Diabetic eye exam: 2011, Dr. Bunny Ayala , needs record [V72.0T][    GERD (gastroesophageal reflux disease)     Headache(784.0)     Hyperlipidemia LDL goal < 100 2012    Hypertension     Morbid obesity ([address]     No retinopathy on exam     Dr Bunny Ayala 13 Diabetes with no significatn diabetic retinopathy found in both eyes    Non compliance w medication regimen 2014    Sprain of neck 10/4/2011     Past Surgical History:   Procedure Laterality Date     SECTION      HYSTERECTOMY      OTHER SURGICAL HISTORY  2017    I&D of right perineal abcess     TOE AMPUTATION Right 2021    RIGHT 5TH TOE AMPUTATION performed by Blair Avelar MD at [address] History   Problem Relation Age of Onset    Heart Disease Mother     Diabetes Mother     Asthma Mother     High Cholesterol Mother     Depression Mother     Heart Disease Father     High Cholesterol Father      Social History     Socioeconomic History    Marital status: Single     Spouse name: Not on file    Number of children: Not on file    Years of education: Not on file    Highest education level: Not on file   Occupational History    Not on file   Tobacco Use    Smoking status: Former Smoker     Packs/day: 0.25     Types: Cigarettes     Quit date: 5/10/2021     Years since quittin.5    Smokeless tobacco: Never Used   Vaping Use    Vaping Use: Never used   Substance and Sexual Activity    Alcohol use: Yes     Comment: rare    Drug use: No    Sexual activity: Not on file   Other Topics Concern    Not on file   Social History Narrative    Not on file     Social Determinants of Health     Financial Resource Strain:     Difficulty of Paying Living Expenses: Not on file   Food Insecurity:     Worried About Running Out of Food in the Last Year: Not on file    Kendra of Food in the Last Year: Not on file   Transportation Needs:     Lack of Transportation (Medical): Not on file    Lack of Transportation (Non-Medical): Not on file   Physical Activity:     Days of Exercise per Week: Not on file    Minutes of Exercise per Session: Not on file   Stress:     Feeling of Stress : Not on file   Social Connections:     Frequency of Communication with Friends and Family: Not on file    Frequency of Social Gatherings with Friends and Family: Not on file    Attends Scientology Services: Not on file    Active Member of [address] [place] [address] [employer] or Organizations: Not on file    Attends Club or Organization Meetings: Not on file    Marital Status: Not on file   Intimate Partner Violence:     Fear of Current or Ex-Partner: Not on file    Emotionally Abused: Not on file    Physically Abused: Not on file    Sexually Abused: Not on file   Housing Stability:     Unable to Pay for Housing in the Last Year: Not on file    Number of [place] in the Last Year: Not on file    Unstable Housing in the Last Year: Not on file     No current facility-administered medications for this encounter.      Current Outpatient Medications   Medication Sig Dispense Refill    sulfamethoxazole-trimethoprim (BACTRIM DS;SEPTRA DS) 800-160 MG per tablet Take 1 tablet by mouth 2 times daily for 7 days 14 tablet 0    chlorhexidine (HIBICLENS) 4 % external liquid Apply topically daily as needed.>>> used isolated scrub brush and wash your arm pits daily x 30 days for 10 minutes in the shower 236 mL 1    glipiZIDE (GLUCOTROL XL) 10 MG extended release tablet Take 1 tablet by mouth every morning 90 tablet 1    insulin glargine (LANTUS SOLOSTAR) 100 UNIT/ML injection pen Inject 15 Units into the skin nightly 5 pen 5    TRULICITY 1.5 KK/6.9WR SOPN INJECT ONE AND ONE-HALF (1 AND 1/2) MG UNDER THE SKIN ONCE A WEEK 2 pen 5    lisinopril (PRINIVIL;ZESTRIL) 2.5 MG tablet Take 1 tablet by mouth every morning 90 tablet 3    atorvastatin (LIPITOR) 40 MG tablet Take 1 tablet by mouth every morning 90 tablet 3    Lancets MISC To test up to 4 times per day- nighttime and with each meal 300 each 5    blood glucose test strips (EXACTECH TEST) strip 1 each by In Vitro route daily Embrace test strips to test BS 4x daily 300 each 6    Insulin Pen Needle 32G X 5 MM MISC To use with Lantus pen and Novolog pen 30 each 11     Allergies   Allergen Reactions    Zosyn [Piperacillin Sod-Tazobactam So] Rash     Rash and severe \"being poked by needles all over\" feeling    Penicillins Rash         ROS:    Review of Systems   Skin: Positive for rash and wound. All other systems reviewed and are negative. Nursing Notes Reviewed    Physical Exam:  ED Triage Vitals [11/22/21 1244]   Enc Vitals Group      /75      Pulse 114      Resp 18      Temp 97.2 °F (36.2 °C)      Temp src       SpO2 98 %      Weight 220 lb (99.8 kg)      Height       Head Circumference       Peak Flow       Pain Score       Pain Loc       Pain Edu? Excl. in [address]? Physical Exam  Vitals and nursing note reviewed. Constitutional:       Appearance: She is well-developed. She is obese. She is ill-appearing.    HENT:      Head: Normocephalic and atraumatic. Eyes:      Pupils: Pupils are equal, round, and reactive to light. Chest:   Breasts:      Right: Axillary adenopathy present. Musculoskeletal:         General: Normal range of motion. Cervical back: Normal range of motion and neck supple. Lymphadenopathy:      Upper Body:      Right upper body: Axillary adenopathy present. Comments: Axilla reveals diffuse folliculitis. No active drainage no abscess. She has mild erythema in the medial aspect of upper arm. Left side is normal.  There is no infra or supraclavicular nodes   Skin:     General: Skin is warm and dry. Neurological:      Mental Status: She is alert and oriented to person, place, and time. I have reviewed and interpreted all of the currently available lab results from this visit (ifapplicable):  No results found for this visit on 11/22/21. Radiographs (if obtained):  [] The following radiograph wasinterpreted by myself in the absence of a radiologist:   [] Radiologist's Report Reviewed:  No orders to display         EKG (if obtained): (All EKG's are interpreted by myself in the absence of a cardiologist)    Chart review shows recent radiographs:  XR HIP 2-3 VW W PELVIS RIGHT    Result Date: 10/26/2021  EXAMINATION: ONE XRAY VIEW OF THE PELVIS AND TWO XRAY VIEWS RIGHT HIP 10/26/2021 9:27 am COMPARISON: None. HISTORY: ORDERING SYSTEM PROVIDED HISTORY: Right hip pain TECHNOLOGIST PROVIDED HISTORY: Reason for Exam: patient reports chronic right hip pain, pt denies any injury FINDINGS: No acute fracture or dislocation is present involving the right hip. The right hip joint space is maintained. No evidence of erosion or AVN. The pelvic ring is intact. There are mild degenerative changes involving the bilateral SI joints. Pubic symphysis is unremarkable. 1. Unremarkable right hip. 2. Mild degenerative changes involving the bilateral SI joints.        MDM:      Marilyn has acute right axillary folliculitis I recommended no shaving for the next 30 days she will be given chlorhexidine topical solution to use in the shower 10 minutes daily for 30 days. Bactrim DS 1 twice daily    My typical dicussion, presentation, and considerations for this patients' chief complaint, diagnosis, differential diagnosis, medications, medication use, medication safety and medication interactions have been explained and outlined to this patient for this patient encounter. I have stressed need for follow up and reexamination for this encounter and or return to the emergency department if any changes or any concern. I have discussed the findings of today's workup with the patient and present family members and have addressed their questions and concerns. Important warning signs as well as new or worsening symptoms which would necessitate immediate return to the ED were discussed. The plan is to discharge from the ED at this time, and the patient is in stable condition. The patient acknowledged understanding is agreeable with this plan. The patient and/or family and I have discussed the diagnosis and risks, and we agree with discharging home to follow-up with their primary care, specialist or referral doctor. Questions addressed. Disposition and follow-up agreed upon. Specific discharge instructions explained. We have discussed the symptoms which are most concerning that necessitate immediate return. We also discussed returning to the Emergency Department immediately if new or worsening symptoms occur. Clinical Impression:  1. Folliculitis      Disposition referral (if applicable):  Ricarda Marroquin MD  [address] [facility]  [phone]    Schedule an appointment as soon as possible for a visit   If symptoms worsen    Disposition medications (if applicable):  New Prescriptions    CHLORHEXIDINE (HIBICLENS) 4 % EXTERNAL LIQUID    Apply topically daily as needed.>>> used isolated scrub brush and wash your arm pits daily x 30 days for 10 minutes in the shower    SULFAMETHOXAZOLE-TRIMETHOPRIM (BACTRIM DS;SEPTRA DS) 800-160 MG PER TABLET    Take 1 tablet by mouth 2 times daily for 7 days           Preston Lara DO, FACEP      Comment: Please note this report has been produced using speech recognition software and maycontain errors related to that system including errors in grammar, punctuation, and spelling, as well as words and phrases that may be inappropriate. If there are any questions or concerns please feel free to contact thedictating provider for clarification.         Brandon Milian DO  11/22/21 1300

## 2021-11-26 ENCOUNTER — APPOINTMENT (OUTPATIENT)
Dept: ULTRASOUND IMAGING | Age: 41
End: 2021-11-26
Payer: MEDICARE

## 2021-11-26 ENCOUNTER — HOSPITAL ENCOUNTER (EMERGENCY)
Age: 41
Discharge: HOME | End: 2021-11-26
Payer: MEDICARE

## 2021-11-26 VITALS
HEIGHT: 62 IN | RESPIRATION RATE: 18 BRPM | OXYGEN SATURATION: 100 % | DIASTOLIC BLOOD PRESSURE: 82 MMHG | SYSTOLIC BLOOD PRESSURE: 132 MMHG | TEMPERATURE: 98 F | BODY MASS INDEX: 41.41 KG/M2 | WEIGHT: 225 LBS | HEART RATE: 96 BPM

## 2021-11-26 DIAGNOSIS — L03.113 RIGHT ARM CELLULITIS: ICD-10-CM

## 2021-11-26 DIAGNOSIS — E11.65 UNCONTROLLED TYPE 2 DIABETES MELLITUS WITH HYPERGLYCEMIA (HCC): ICD-10-CM

## 2021-11-26 DIAGNOSIS — L02.419 AXILLARY ABSCESS: Primary | ICD-10-CM

## 2021-11-26 LAB
ALBUMIN SERPL-MCNC: 3.3 GM/DL (ref 3.4–5)
ALP BLD-CCNC: 127 IU/L (ref 40–129)
ALT SERPL-CCNC: 17 U/L (ref 10–40)
ANION GAP SERPL CALCULATED.3IONS-SCNC: 13 MMOL/L (ref 4–16)
AST SERPL-CCNC: 10 IU/L (ref 15–37)
BASOPHILS ABSOLUTE: 0 K/CU MM
BASOPHILS RELATIVE PERCENT: 0.3 % (ref 0–1)
BILIRUB SERPL-MCNC: 0.6 MG/DL (ref 0–1)
BUN BLDV-MCNC: 13 MG/DL (ref 6–23)
CALCIUM SERPL-MCNC: 8.9 MG/DL (ref 8.3–10.6)
CHLORIDE BLD-SCNC: 91 MMOL/L (ref 99–110)
CO2: 23 MMOL/L (ref 21–32)
CREAT SERPL-MCNC: 0.8 MG/DL (ref 0.6–1.1)
DIFFERENTIAL TYPE: ABNORMAL
EOSINOPHILS ABSOLUTE: 0.1 K/CU MM
EOSINOPHILS RELATIVE PERCENT: 0.8 % (ref 0–3)
GFR AFRICAN AMERICAN: >60 ML/MIN/1.73M2
GFR NON-AFRICAN AMERICAN: >60 ML/MIN/1.73M2
GLUCOSE BLD-MCNC: 441 MG/DL (ref 70–99)
HCT VFR BLD CALC: 40.9 % (ref 37–47)
HEMOGLOBIN: 13.9 GM/DL (ref 12.5–16)
IMMATURE NEUTROPHIL %: 0.8 % (ref 0–0.43)
LACTATE: 1.2 MMOL/L (ref 0.4–2)
LACTATE: 2.2 MMOL/L (ref 0.4–2)
LYMPHOCYTES ABSOLUTE: 1.7 K/CU MM
LYMPHOCYTES RELATIVE PERCENT: 13.1 % (ref 24–44)
MCH RBC QN AUTO: 30.6 PG (ref 27–31)
MCHC RBC AUTO-ENTMCNC: 34 % (ref 32–36)
MCV RBC AUTO: 90.1 FL (ref 78–100)
MONOCYTES ABSOLUTE: 0.9 K/CU MM
MONOCYTES RELATIVE PERCENT: 6.7 % (ref 0–4)
NUCLEATED RBC %: 0 %
PDW BLD-RTO: 12.8 % (ref 11.7–14.9)
PLATELET # BLD: 337 K/CU MM (ref 140–440)
PMV BLD AUTO: 10.1 FL (ref 7.5–11.1)
POTASSIUM SERPL-SCNC: 3.3 MMOL/L (ref 3.5–5.1)
RBC # BLD: 4.54 M/CU MM (ref 4.2–5.4)
SEGMENTED NEUTROPHILS ABSOLUTE COUNT: 10.2 K/CU MM
SEGMENTED NEUTROPHILS RELATIVE PERCENT: 78.3 % (ref 36–66)
SODIUM BLD-SCNC: 127 MMOL/L (ref 135–145)
TOTAL IMMATURE NEUTOROPHIL: 0.11 K/CU MM
TOTAL NUCLEATED RBC: 0 K/CU MM
TOTAL PROTEIN: 7.4 GM/DL (ref 6.4–8.2)
WBC # BLD: 13 K/CU MM (ref 4–10.5)

## 2021-11-26 PROCEDURE — 99284 EMERGENCY DEPT VISIT MOD MDM: CPT

## 2021-11-26 PROCEDURE — 10061 I&D ABSCESS COMP/MULTIPLE: CPT

## 2021-11-26 PROCEDURE — 85025 COMPLETE CBC W/AUTO DIFF WBC: CPT

## 2021-11-26 PROCEDURE — 36415 COLL VENOUS BLD VENIPUNCTURE: CPT

## 2021-11-26 PROCEDURE — 6370000000 HC RX 637 (ALT 250 FOR IP): Performed by: PHYSICIAN ASSISTANT

## 2021-11-26 PROCEDURE — 80053 COMPREHEN METABOLIC PANEL: CPT

## 2021-11-26 PROCEDURE — 2500000003 HC RX 250 WO HCPCS: Performed by: PHYSICIAN ASSISTANT

## 2021-11-26 PROCEDURE — 93971 EXTREMITY STUDY: CPT

## 2021-11-26 PROCEDURE — 83605 ASSAY OF LACTIC ACID: CPT

## 2021-11-26 RX ORDER — LIDOCAINE HYDROCHLORIDE AND EPINEPHRINE BITARTRATE 20; .01 MG/ML; MG/ML
20 INJECTION, SOLUTION SUBCUTANEOUS ONCE
Status: COMPLETED | OUTPATIENT
Start: 2021-11-26 | End: 2021-11-26

## 2021-11-26 RX ORDER — TRAMADOL HYDROCHLORIDE 50 MG/1
50 TABLET ORAL EVERY 6 HOURS PRN
Qty: 12 TABLET | Refills: 0 | Status: SHIPPED | OUTPATIENT
Start: 2021-11-26 | End: 2021-11-29

## 2021-11-26 RX ORDER — CLINDAMYCIN HYDROCHLORIDE 150 MG/1
300 CAPSULE ORAL 4 TIMES DAILY
Qty: 56 CAPSULE | Refills: 0 | Status: SHIPPED | OUTPATIENT
Start: 2021-11-26 | End: 2021-12-03

## 2021-11-26 RX ORDER — HYDROCODONE BITARTRATE AND ACETAMINOPHEN 5; 325 MG/1; MG/1
1 TABLET ORAL ONCE
Status: COMPLETED | OUTPATIENT
Start: 2021-11-26 | End: 2021-11-26

## 2021-11-26 RX ADMIN — LIDOCAINE HYDROCHLORIDE,EPINEPHRINE BITARTRATE 20 ML: 20; .01 INJECTION, SOLUTION INFILTRATION; PERINEURAL at 14:11

## 2021-11-26 RX ADMIN — HYDROCODONE BITARTRATE AND ACETAMINOPHEN 1 TABLET: 5; 325 TABLET ORAL at 14:10

## 2021-11-26 ASSESSMENT — PAIN DESCRIPTION - ORIENTATION: ORIENTATION: RIGHT;UPPER

## 2021-11-26 ASSESSMENT — PAIN DESCRIPTION - PAIN TYPE: TYPE: ACUTE PAIN

## 2021-11-26 ASSESSMENT — PAIN SCALES - GENERAL
PAINLEVEL_OUTOF10: 9
PAINLEVEL_OUTOF10: 9
PAINLEVEL_OUTOF10: 10

## 2021-11-26 ASSESSMENT — PAIN DESCRIPTION - LOCATION: LOCATION: ARM

## 2021-11-26 NOTE — ED NOTES
Dressing applied to R axilla. Patient discharged with 2 new rx. Patient verbalized understanding of discharge instructions and follow up care.       Laurita Quiles RN  11/26/21 5650

## 2021-11-26 NOTE — ED PROVIDER NOTES
Patient Identification  Ladi Cabrera is a 35 y.o. female    Chief Complaint  Arm Swelling      HPI  (History provided by patient)  This is a 35 y.o. female with h/o DM who was brought in by self for chief complaint of right arm swelling. Onset was a month ago. Patient reports initially had swelling in her right axilla. Swelling has been persistent since onset. 4 days ago began developing worsening swelling in the right upper arm and was seen at [facility], diagnosed with folliculitis and started on Bactrim. States has been compliant with Bactrim, saw dermatologist a few days ago and was also started on prednisone. States that rash on right upper arm has continued to spread and become more painful so came to ED. REVIEW OF SYSTEMS    Constitutional:  Denies fever, chills  HENT:  Denies sore throat or ear pain   Eyes: Denies vision changes, eye pain  Cardiovascular:  Denies chest pain, syncope  Respiratory:  Denies shortness of breath, cough   GI:  Denies abdominal pain, nausea, vomiting  :  Denies dysuria, discharge  Musculoskeletal:  Denies back pain, joint pain  Skin:  Denies pruritis.  + rash  Neurologic:  Denies headache, focal weakness, or sensory changes     See HPI and nursing notes for additional information     I have reviewed the following nursing documentation:  Allergies:    Allergies   Allergen Reactions    Zosyn [Piperacillin Sod-Tazobactam So] Rash     Rash and severe \"being poked by needles all over\" feeling    Penicillins Rash       Past medical history:  has a past medical history of Allergic rhinitis, Allergic rhinitis, Anxiety (10/24/2012), Atopic dermatitis, Back pain (10/24/2012), Bilateral sciatica (1/9/2020), Depression, Diabetes mellitus ([address] Diabetes mellitus type II, uncontrolled ([address] GERD (gastroesophageal reflux disease), Headache(784.0), Hyperlipidemia LDL goal < 100 (12/17/2012), Hypertension, Morbid obesity ([address] No retinopathy on exam, Non compliance w medication regimen (2014), and Sprain of neck (10/4/2011). Past surgical history:  has a past surgical history that includes Hysterectomy;  section; other surgical history (2017); and Toe amputation (Right, 2021). Home medications:   Prior to Admission medications    Medication Sig Start Date End Date Taking? Authorizing Provider   PREDNISONE PO Take by mouth   Yes Historical Provider, MD   clindamycin (CLEOCIN) 150 MG capsule Take 2 capsules by mouth 4 times daily for 7 days Pharmacist: May substitute 150mg tabs and take 2 tabs per dose. 11/26/21 12/3/21 Yes Aaron Baires PA-C   traMADol (ULTRAM) 50 MG tablet Take 1 tablet by mouth every 6 hours as needed for Pain for up to 3 days.  21 Yes Aaron Baires PA-C   sulfamethoxazole-trimethoprim (BACTRIM DS;SEPTRA DS) 800-160 MG per tablet Take 1 tablet by mouth 2 times daily for 7 days 21 Yes Jaleesa Arthur DO   glipiZIDE (GLUCOTROL XL) 10 MG extended release tablet Take 1 tablet by mouth every morning 21  Yes ALISTAIR Quesada NP   insulin glargine (LANTUS SOLOSTAR) 100 UNIT/ML injection pen Inject 15 Units into the skin nightly 21  Yes ALISTAIR Quesada NP   TRULICITY 1.5 EW/5.0SK SOPN INJECT ONE AND ONE-HALF (1 AND 1/2) MG UNDER THE SKIN ONCE A WEEK 10/12/21  Yes Rossi Morales MD   lisinopril (PRINIVIL;ZESTRIL) 2.5 MG tablet Take 1 tablet by mouth every morning 21  Yes ALISTAIR Quesada NP   atorvastatin (LIPITOR) 40 MG tablet Take 1 tablet by mouth every morning 21  Yes ALISTAIR Quesada NP   chlorhexidine (HIBICLENS) 4 % external liquid Apply topically daily as needed.>>> used isolated scrub brush and wash your arm pits daily x 30 days for 10 minutes in the shower 21  DO [place] Price MISC To test up to 4 times per day- nighttime and with each meal 21   ALISTAIR Quesada - NP   blood glucose test strips (EXACTECH TEST) strip 1 each by In Vitro route daily Embrace test strips to test BS 4x daily 4/14/21   ALISTAIR Lincoln NP   Insulin Pen Needle 32G X 5 MM MISC To use with Lantus pen and Novolog pen 4/14/21   ALISTAIR Lincoln NP       Social history:  reports that she quit smoking about 6 months ago. Her smoking use included cigarettes. She smoked 0.25 packs per day. She has never used smokeless tobacco. She reports current alcohol use. She reports that she does not use drugs. Family history:    Family History   Problem Relation Age of Onset    Heart Disease Mother     Diabetes Mother     Asthma Mother     High Cholesterol Mother     Depression Mother     Heart Disease Father     High Cholesterol Father          Exam  /82   Pulse 96   Temp 98 °F (36.7 °C) (Oral)   Resp 18   Ht 5' 2\" (1.575 m)   Wt 225 lb (102.1 kg)   SpO2 100%   BMI 41.15 kg/m²   Nursing note and vitals reviewed. Constitutional: Well developed, well nourished. No acute distress. HENT:      Head: Normocephalic and atraumatic. Ears: External ears normal.      Nose: Nose normal.     Mouth: Membrane mucosa moist and pink. No posterior oropharynx erythema or tonsillar edema  Eyes: Anicteric sclera. No discharge, PERRL  Neck: Supple. Trachea midline. Cardiovascular: RRR, no murmurs, rubs, or gallops, radial pulses 2+ bilaterally. Pulmonary/Chest: Effort normal. No respiratory distress. CTAB. No stridor. No wheezes. No rales. Abdominal: Soft. Nontender to palpation. No distension. No guarding, rebound tenderness, or evidence of ascites. : No CVA tenderness. Musculoskeletal: Moves all extremities. No gross deformity. Neurological: Alert and oriented to person, place, and time. Normal muscle tone. Skin: Warm and dry. Patient has an approximately 8 cm irregularly shaped erythematous mildly swollen rash noted on the right upper inner arm that is warm to touch and TTP.   Patient reports dermatologist circled area with skin marker, rash is spread outside of that marking approximately 1-2 cm. I am unable to palpate any specific area of fluctuance. There is no punctum. There are a few small follicular lesions noted in the right axilla that appear to be resolving. Psychiatric: Normal mood and affect. Behavior is normal.      Incision/Drainage    Date/Time: 11/26/2021 2:56 PM  Performed by: Damon Daugherty PA-C  Authorized by: Damon Daugherty PA-C     Consent:     Consent obtained:  Verbal    Consent given by:  Patient    Risks discussed:  Bleeding, incomplete drainage and pain    Alternatives discussed:  No treatment  Location:     Type:  Abscess    Size:  8 cm    Location: R axilla. Pre-procedure details:     Skin preparation:  Betadine  Procedure type:     Complexity:  Complex  Procedure details:     Incision types:  Single straight    Incision depth:  Subcutaneous    Scalpel blade:  11    Wound management:  Probed and deloculated and irrigated with saline    Drainage:  Bloody and purulent    Drainage amount:  Copious    Wound treatment:  Wound left open    Packing materials:  1/4 in iodoform gauze  Post-procedure details:     Patient tolerance of procedure: Tolerated well, no immediate complications          Radiographs (if obtained):  [] The following radiograph was interpreted by myself in the absence of a radiologist:   [x] Radiologist's Report Reviewed:  VL DUP UPPER EXTREMITY VENOUS RIGHT   Final Result   No evidence of DVT in the right upper extremity. In the right axilla at the palpable area of interest, there is a complex   lesion measuring approximately 8.1 x 2.5 cm, with mobile internal contents. Given provided clinical history, an abscess should be considered.                 Labs  Results for orders placed or performed during the hospital encounter of 11/26/21   CBC Auto Differential   Result Value Ref Range    WBC 13.0 (H) 4.0 - 10.5 K/CU MM    RBC 4.54 4.2 - 5. 4 M/CU MM    Hemoglobin 13.9 12.5 - 16.0 GM/DL    Hematocrit 40.9 37 - 47 %    MCV 90.1 78 - 100 FL    MCH 30.6 27 - 31 PG    MCHC 34.0 32.0 - 36.0 %    RDW 12.8 11.7 - 14.9 %    Platelets 895 344 - 278 K/CU MM    MPV 10.1 7.5 - 11.1 FL    Differential Type AUTOMATED DIFFERENTIAL     Segs Relative 78.3 (H) 36 - 66 %    Lymphocytes % 13.1 (L) 24 - 44 %    Monocytes % 6.7 (H) 0 - 4 %    Eosinophils % 0.8 0 - 3 %    Basophils % 0.3 0 - 1 %    Segs Absolute 10.2 K/CU MM    Lymphocytes Absolute 1.7 K/CU MM    Monocytes Absolute 0.9 K/CU MM    Eosinophils Absolute 0.1 K/CU MM    Basophils Absolute 0.0 K/CU MM    Nucleated RBC % 0.0 %    Total Nucleated RBC 0.0 K/CU MM    Total Immature Neutrophil 0.11 K/CU MM    Immature Neutrophil % 0.8 (H) 0 - 0.43 %   CMP   Result Value Ref Range    Sodium 127 (L) 135 - 145 MMOL/L    Potassium 3.3 (L) 3.5 - 5.1 MMOL/L    Chloride 91 (L) 99 - 110 mMol/L    CO2 23 21 - 32 MMOL/L    BUN 13 6 - 23 MG/DL    CREATININE 0.8 0.6 - 1.1 MG/DL    Glucose 441 (HH) 70 - 99 MG/DL    Calcium 8.9 8.3 - 10.6 MG/DL    Albumin 3.3 (L) 3.4 - 5.0 GM/DL    Total Protein 7.4 6.4 - 8.2 GM/DL    Total Bilirubin 0.6 0.0 - 1.0 MG/DL    ALT 17 10 - 40 U/L    AST 10 (L) 15 - 37 IU/L    Alkaline Phosphatase 127 40 - 129 IU/L    GFR Non-African American >60 >60 mL/min/1.73m2    GFR African American >60 >60 mL/min/1.73m2    Anion Gap 13 4 - 16   Lactic Acid, Plasma   Result Value Ref Range    Lactate 2.2 (HH) 0.4 - 2.0 mMOL/L   Lactic Acid, Plasma   Result Value Ref Range    Lactate 1.2 0.4 - 2.0 mMOL/L         MDM  Patient presents for right arm rash. Has area of cellulitis on the right upper medial arm. Also a small follicular lesion in the right axilla that is unimpressive on initial exam.  Ultrasound reveals no evidence of DVT but does show a large abscess in the right axilla just adjacent to area of cellulitis. Her lactic acid was slightly elevated but improved without treatment.   White blood cell count slightly elevated. Her diabetes appears fairly uncontrolled. She has been on prednisone for the last several days secondary to dermatology visit. Recommended she stop prednisone and continue her home diabetes medication. Her anion gap is 13, her bicarb is normal, doubt DKA. Incision and drainage was performed of the abscess in the right axilla and copious purulent material was obtained. Wound was packed. Discussed all results with patient. Plan is to add clindamycin to her Bactrim for anaerobic and extended gram-positive coverage. Will refer to wound clinic. Recommended seeing him on Monday for recheck and removal of packing. Return to ED for any new or worsening symptoms including spreading rash, fever, worsening pain, numbness or weakness. Assessment and plan discussed with patient understands and agrees. I have independently evaluated this patient. Final Impression  1. Axillary abscess    2. Right arm cellulitis    3. Uncontrolled type 2 diabetes mellitus with hyperglycemia (HCC)        Blood pressure 132/82, pulse 96, temperature 98 °F (36.7 °C), temperature source Oral, resp. rate 18, height 5' 2\" (1.575 m), weight 225 lb (102.1 kg), SpO2 100 %, not currently breastfeeding. Disposition:  Discharge to home in stable condition. Patient was given scripts for the following medications. I counseled patient how to take these medications. New Prescriptions    CLINDAMYCIN (CLEOCIN) 150 MG CAPSULE    Take 2 capsules by mouth 4 times daily for 7 days Pharmacist: May substitute 150mg tabs and take 2 tabs per dose. TRAMADOL (ULTRAM) 50 MG TABLET    Take 1 tablet by mouth every 6 hours as needed for Pain for up to 3 days. This chart was generated using the [address] [place] [address] Corporaation system. I created this record but it may contain dictation errors given the limitations of this technology.        [address] [facility]  11/26/21 1599

## 2021-11-30 ENCOUNTER — HOSPITAL ENCOUNTER (OUTPATIENT)
Dept: WOUND CARE | Age: 41
Discharge: HOME | End: 2021-11-30
Payer: MEDICARE

## 2021-11-30 VITALS — HEART RATE: 100 BPM | DIASTOLIC BLOOD PRESSURE: 86 MMHG | RESPIRATION RATE: 18 BRPM | SYSTOLIC BLOOD PRESSURE: 123 MMHG

## 2021-11-30 DIAGNOSIS — L02.411 ABSCESS OF AXILLA, RIGHT: Primary | ICD-10-CM

## 2021-11-30 PROCEDURE — 87077 CULTURE AEROBIC IDENTIFY: CPT

## 2021-11-30 PROCEDURE — 87070 CULTURE OTHR SPECIMN AEROBIC: CPT

## 2021-11-30 PROCEDURE — 99213 OFFICE O/P EST LOW 20 MIN: CPT | Performed by: NURSE PRACTITIONER

## 2021-11-30 PROCEDURE — 99213 OFFICE O/P EST LOW 20 MIN: CPT

## 2021-11-30 PROCEDURE — 87075 CULTR BACTERIA EXCEPT BLOOD: CPT

## 2021-11-30 PROCEDURE — 87186 SC STD MICRODIL/AGAR DIL: CPT

## 2021-11-30 RX ORDER — LIDOCAINE HYDROCHLORIDE 40 MG/ML
SOLUTION TOPICAL ONCE
Status: DISCONTINUED | OUTPATIENT
Start: 2021-11-30 | End: 2021-12-01 | Stop reason: HOSPADM

## 2021-11-30 RX ORDER — BACITRACIN, NEOMYCIN, POLYMYXIN B 400; 3.5; 5 [USP'U]/G; MG/G; [USP'U]/G
OINTMENT TOPICAL ONCE
Status: CANCELLED | OUTPATIENT
Start: 2021-11-30 | End: 2021-11-30

## 2021-11-30 RX ORDER — LIDOCAINE HYDROCHLORIDE 20 MG/ML
JELLY TOPICAL ONCE
Status: CANCELLED | OUTPATIENT
Start: 2021-11-30 | End: 2021-11-30

## 2021-11-30 RX ORDER — GINSENG 100 MG
CAPSULE ORAL ONCE
Status: CANCELLED | OUTPATIENT
Start: 2021-11-30 | End: 2021-11-30

## 2021-11-30 RX ORDER — LIDOCAINE HYDROCHLORIDE 40 MG/ML
SOLUTION TOPICAL ONCE
Status: CANCELLED | OUTPATIENT
Start: 2021-11-30 | End: 2021-11-30

## 2021-11-30 RX ORDER — OXYCODONE HYDROCHLORIDE AND ACETAMINOPHEN 5; 325 MG/1; MG/1
1 TABLET ORAL EVERY 6 HOURS PRN
Qty: 12 TABLET | Refills: 0 | Status: SHIPPED | OUTPATIENT
Start: 2021-11-30 | End: 2021-12-03

## 2021-11-30 RX ORDER — BETAMETHASONE DIPROPIONATE 0.05 %
OINTMENT (GRAM) TOPICAL ONCE
Status: CANCELLED | OUTPATIENT
Start: 2021-11-30 | End: 2021-11-30

## 2021-11-30 RX ORDER — BACITRACIN ZINC AND POLYMYXIN B SULFATE 500; 1000 [USP'U]/G; [USP'U]/G
OINTMENT TOPICAL ONCE
Status: CANCELLED | OUTPATIENT
Start: 2021-11-30 | End: 2021-11-30

## 2021-11-30 RX ORDER — GENTAMICIN SULFATE 1 MG/G
OINTMENT TOPICAL ONCE
Status: CANCELLED | OUTPATIENT
Start: 2021-11-30 | End: 2021-11-30

## 2021-11-30 RX ORDER — CLOBETASOL PROPIONATE 0.5 MG/G
OINTMENT TOPICAL ONCE
Status: CANCELLED | OUTPATIENT
Start: 2021-11-30 | End: 2021-11-30

## 2021-11-30 RX ORDER — LIDOCAINE 40 MG/G
CREAM TOPICAL ONCE
Status: CANCELLED | OUTPATIENT
Start: 2021-11-30 | End: 2021-11-30

## 2021-11-30 RX ORDER — LIDOCAINE 50 MG/G
OINTMENT TOPICAL ONCE
Status: CANCELLED | OUTPATIENT
Start: 2021-11-30 | End: 2021-11-30

## 2021-11-30 NOTE — PROGRESS NOTES
[address] [facility] Initial Visit      Marilyn Bazzi  AGE: 35 y.o. GENDER: female  : 1980  EPISODE DATE:  2021   Referred by: ED Staff, Jber    Subjective:     CHIEF COMPLAINT Abscess to right axilla, post-debridement     HISTORY of PRESENT ILLNESS      Millie Romero is a 35 y.o. female who presents to the [address] [place] [address] for an initial visit for evaluation and treatment of Acute non-healing surgical and draining abscess  ulcer(s) of  Right axilla. The condition is of moderate severity. The ulcer has been present for 5 weeks. The underlying cause is thought to be folliculitis and subsequent abscess formation. The patients care to date has included a trip to the ED on , where she was given bactrim and hibicleanse. She returned on , where a surgical I&D was performed and she was discharged with Cleocin. She came to clinic with 1/4inch packing in place. The patient has significant underlying medical conditions as below. Patient is a diabetic who takes both PO and SubQ medications. She is a former smoker, and is not on any blood thinners. Patient presented with about 70 cm of 1/4 inch packing in place.     Wound Pain Timing/Severity: waxing and waning  Quality of pain: shooting, pressure  Severity of pain:  3 / 10   Modifying Factors: diabetes, poor glucose control and obesity  Associated Signs/Symptoms: edema, erythema, drainage, odor and pain        PAST MEDICAL HISTORY        Diagnosis Date    Allergic rhinitis     allergy shots- Dr. Janny Butler    Allergic rhinitis     Dr. Jorge kwan and flonase    Anxiety 10/24/2012    Atopic dermatitis     Dr. Jorge kwan    Back pain 10/24/2012    Bilateral sciatica 2020    Depression     Diabetes mellitus ([address]     Diabetes mellitus type II, uncontrolled ([address]     Diabetic eye exam: 2011, Dr. Paco Edwards , needs record [V72.0T][    GERD (gastroesophageal reflux disease)     Headache(784.0)     Hyperlipidemia LDL goal < 100 2012    Hypertension     Morbid obesity ([facility]     No retinopathy on exam     Dr Ignacio Parker 13 Diabetes with no significatn diabetic retinopathy found in both eyes    Non compliance w medication regimen 2014    Sprain of neck 10/4/2011       PAST SURGICAL HISTORY    Past Surgical History:   Procedure Laterality Date     SECTION      HYSTERECTOMY      OTHER SURGICAL HISTORY  2017    I&D of right perineal abcess     TOE AMPUTATION Right 2021    RIGHT 5TH TOE AMPUTATION performed by Jorge Lyle MD at [facility] [place], operated by [facility] History   Problem Relation Age of Onset    Heart Disease Mother     Diabetes Mother     Asthma Mother     High Cholesterol Mother     Depression Mother     Heart Disease Father     High Cholesterol Father        SOCIAL HISTORY    Social History     Tobacco Use    Smoking status: Former Smoker     Packs/day: 0.25     Types: Cigarettes     Quit date: 5/10/2021     Years since quittin.5    Smokeless tobacco: Never Used   Vaping Use    Vaping Use: Never used   Substance Use Topics    Alcohol use: Yes     Comment: rare    Drug use: No       ALLERGIES    Allergies   Allergen Reactions    Zosyn [Piperacillin Sod-Tazobactam So] Rash     Rash and severe \"being poked by needles all over\" feeling    Penicillins Rash       MEDICATIONS    Current Outpatient Medications on File Prior to Encounter   Medication Sig Dispense Refill    clindamycin (CLEOCIN) 150 MG capsule Take 2 capsules by mouth 4 times daily for 7 days Pharmacist: May substitute 150mg tabs and take 2 tabs per dose.  56 capsule 0    glipiZIDE (GLUCOTROL XL) 10 MG extended release tablet Take 1 tablet by mouth every morning 90 tablet 1    insulin glargine (LANTUS SOLOSTAR) 100 UNIT/ML injection pen Inject 15 Units into the skin nightly 5 pen 5    TRULICITY 1.5 VX/6.1QY SOPN INJECT ONE AND ONE-HALF (1 AND 1/2) MG UNDER THE SKIN ONCE A WEEK 2 pen 5    lisinopril (PRINIVIL;ZESTRIL) 2.5 MG tablet Take 1 tablet by mouth every morning 90 tablet 3    atorvastatin (LIPITOR) 40 MG tablet Take 1 tablet by mouth every morning 90 tablet 3    Lancets MISC To test up to 4 times per day- nighttime and with each meal 300 each 5    blood glucose test strips (EXACTECH TEST) strip 1 each by In Vitro route daily Embrace test strips to test BS 4x daily 300 each 6    Insulin Pen Needle 32G X 5 MM MISC To use with Lantus pen and Novolog pen 30 each 11    PREDNISONE PO Take by mouth      chlorhexidine (HIBICLENS) 4 % external liquid Apply topically daily as needed.>>> used isolated scrub brush and wash your arm pits daily x 30 days for 10 minutes in the shower 236 mL 1     No current facility-administered medications on file prior to encounter.        PROBLEM LIST    Patient Active Problem List   Diagnosis    Uncontrolled type 2 diabetes mellitus without complication, with long-term current use of insulin    Essential hypertension    Gastroesophageal reflux disease without esophagitis    Anxiety    Chronic neck pain    Chronic back pain    Hyperlipidemia associated with type 2 diabetes mellitus (HCC)    Allergic rhinitis    Atopic dermatitis    Class 2 severe obesity due to excess calories with serious comorbidity and body mass index (BMI) of 38.0 to 38.9 in adult [facility])    Uncontrolled type 2 diabetes mellitus with complication, with long-term current use of insulin (HCC)    Poor compliance with medication- due to financial issues    Bilateral sciatica    Smoker    WD-Diabetic ulcer of right heel with fat layer exposed ([address]    WD-Diabetic ulcer of right fifth toe ([address]    Open wound of foot, right, sequela    Osteomyelitis ([address]    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 ([address]    WD-Diabetic ulcer of toe of right foot associated with type 2 diabetes mellitus, with fat layer exposed ([address]    WD-Abscess of axilla, right       REVIEW OF SYSTEMS    Constitutional: negative for anorexia, chills, fatigue, fevers, malaise, sweats and weight loss  Respiratory: negative for pleurisy/chest pain, pneumonia, shortness of breath, sputum, stridor and wheezing  Cardiovascular: negative for near-syncope, orthopnea, palpitations, paroxysmal nocturnal dyspnea, syncope and tachypnea  Integument/breast: positive for skin lesion(s)      Objective:      /86   Pulse 100   Resp 18     PHYSICAL EXAM  General Appearance: alert and oriented to person, place and time, well-developed and well-nourished, in no acute distress  Pulmonary/Chest: clear to auscultation bilaterally- no wheezes, rales or rhonchi, normal air movement, no respiratory distress  Cardiovascular: normal rate, normal S1 and S2, no gallops, intact distal pulses and no carotid bruits  Dermatologic exam: Visual inspection of the periwound reveals the skin to be dry and edematous  Wound exam: see wound description below in procedure note      Assessment:       Marilyn Aguayo  appears to have a non-healing wound of the right axilla. The etiology of the wound is felt to be non-healing surgical and abscess. There are multiple complicating factors including diabetes, poor glucose control and obesity. A comprehensive wound management program would be helpful to heal this wound. Assessments completed include fall risk and nutritional, functional,and psychological status. At this time appropriate care would include: periodic debridement and wound care as below.        Problem List Items Addressed This Visit     WD-Abscess of axilla, right - Primary    Relevant Medications    oxyCODONE-acetaminophen (ENDOCET) 5-325 MG per tablet    Other Relevant Orders    Culture, Wound          Wound 11/30/21 Brachial Right #1 ARMPIT (Active)   Wound Image   11/30/21 1352   Dressing Status New dressing applied 11/30/21 1410   Wound Cleansed Wound cleanser; Cleansed with saline 11/30/21 1352   Wound Length (cm) 0.5 cm 11/30/21 1352   Wound Width (cm) 0.2 cm 11/30/21 1352   Wound Depth (cm) 9 cm 11/30/21 1352   Wound Surface Area (cm^2) 0.1 cm^2 11/30/21 1352   Wound Volume (cm^3) 0.9 cm^3 11/30/21 1352   Wound Assessment Pink/red 11/30/21 1352   Drainage Amount Copious 11/30/21 1352   Drainage Description Yellow; Serosanguinous 11/30/21 1352   Odor Mild 11/30/21 1352   Yamilex-wound Assessment Intact 11/30/21 1352   Margins Defined edges 11/30/21 1352   Wound Thickness Description not for Pressure Injury Full thickness 11/30/21 1352   Number of days: 0       Culture drawn, and we will review when resulted. Home health ordered, and we added orders for packing x3 a week. We will prescribe patient opioid pain medication at this time. Patient understands all side effects and contraindications of opioids. No indication of abuse or seeking behavior. OARRS report checked at this time. We will continue to monitor. Plan:     Discharge instructions:    Discharge Instructions       PHYSICIAN ORDERS AND DISCHARGE INSTRUCTIONS    NOTE: Upon discharge from the [address], you will receive a patient experience survey via E-mail. We would be grateful if you would take the time to fill this survey out. Wound care order history:     Cultures: 11/30/21. Antibiotics: . HbA1c:  .10/26/21 10.2               Compression/Lymph Pumps: Moody Crumble Grafts: Harlon Grates: . Continuing wound care orders and information:              Residence: . Private              Continue home health care with:. [facility]    Your wound-care supplies will be provided by: . [address] [facility]              Pharmacy: . Wound cleansing:      Do not scrub or use excessive force. Wash hands with soap and water before and after dressing changes. Prior to applying a clean dressing, cleanse wound with normal saline,    wound cleanser, or mild soap and water. Ask your physician or nurse before getting the wound(s) wet in the shower.    Daily Wound management:    Keep weight off wounds and reposition every 2 hours. Avoid standing for long periods of time. Apply wraps/stockings in AM and remove at bedtime. Elevate legs to the level of the heart or above for 30 minutes 4-5 times a day and/or when sitting. When taking antibiotics take entire prescription as ordered by MD do not stop taking until medicine is all gone. Orders for this week (11/30/21): Right arm- Wash with mild soap and water, rinse with saline, pat dry with 4x4  Gently pack with 1/2 in iodoform gauze  Cover with qwick  Secure with gentac border gauze  Change Tuesday, Thursday and Saturday     Follow up with Ele Whitaker CNP in 1 weeks in the wound care center  Call [phone] for any questions or concerns.   Date__________   Time____________                  Treatment Note Wound 11/30/21 Brachial Right #1 ARMPIT-Dressing/Treatment:  (1/2 in iodoform;qwick;gentac)    Written Patient Dismissal Instructions Given            Electronically signed by ALISTAIR Biggs CNP on 11/30/2021 at 3:21 PM

## 2021-12-05 LAB
CULTURE: ABNORMAL
CULTURE: ABNORMAL
Lab: ABNORMAL
SPECIMEN: ABNORMAL

## 2021-12-06 RX ORDER — SULFAMETHOXAZOLE AND TRIMETHOPRIM 800; 160 MG/1; MG/1
1 TABLET ORAL 2 TIMES DAILY
Qty: 20 TABLET | Refills: 0 | Status: SHIPPED | OUTPATIENT
Start: 2021-12-06 | End: 2021-12-16

## 2021-12-06 NOTE — MANAGEMENT PLAN
Reviewed patient culture results. Called patient to inform her to  her new rx ASAP today and begin taking. She may also stop taking the antibiotics given at the ED.   Plan to see patient tomorrow in clinic and follow up

## 2021-12-07 ENCOUNTER — HOSPITAL ENCOUNTER (OUTPATIENT)
Dept: WOUND CARE | Age: 41
Discharge: HOME | End: 2021-12-07
Payer: MEDICARE

## 2021-12-07 VITALS
DIASTOLIC BLOOD PRESSURE: 84 MMHG | HEART RATE: 120 BPM | RESPIRATION RATE: 18 BRPM | SYSTOLIC BLOOD PRESSURE: 126 MMHG | TEMPERATURE: 99.1 F

## 2021-12-07 DIAGNOSIS — L02.411 ABSCESS OF AXILLA, RIGHT: Primary | ICD-10-CM

## 2021-12-07 PROCEDURE — 11042 DBRDMT SUBQ TIS 1ST 20SQCM/<: CPT

## 2021-12-07 PROCEDURE — 11042 DBRDMT SUBQ TIS 1ST 20SQCM/<: CPT | Performed by: NURSE PRACTITIONER

## 2021-12-07 RX ORDER — LIDOCAINE 40 MG/G
CREAM TOPICAL ONCE
Status: CANCELLED | OUTPATIENT
Start: 2021-12-07 | End: 2021-12-07

## 2021-12-07 RX ORDER — LIDOCAINE HYDROCHLORIDE 20 MG/ML
JELLY TOPICAL ONCE
Status: CANCELLED | OUTPATIENT
Start: 2021-12-07 | End: 2021-12-07

## 2021-12-07 RX ORDER — LIDOCAINE 50 MG/G
OINTMENT TOPICAL ONCE
Status: CANCELLED | OUTPATIENT
Start: 2021-12-07 | End: 2021-12-07

## 2021-12-07 RX ORDER — BACITRACIN ZINC AND POLYMYXIN B SULFATE 500; 1000 [USP'U]/G; [USP'U]/G
OINTMENT TOPICAL ONCE
Status: CANCELLED | OUTPATIENT
Start: 2021-12-07 | End: 2021-12-07

## 2021-12-07 RX ORDER — BACITRACIN, NEOMYCIN, POLYMYXIN B 400; 3.5; 5 [USP'U]/G; MG/G; [USP'U]/G
OINTMENT TOPICAL ONCE
Status: CANCELLED | OUTPATIENT
Start: 2021-12-07 | End: 2021-12-07

## 2021-12-07 RX ORDER — CLOBETASOL PROPIONATE 0.5 MG/G
OINTMENT TOPICAL ONCE
Status: CANCELLED | OUTPATIENT
Start: 2021-12-07 | End: 2021-12-07

## 2021-12-07 RX ORDER — LIDOCAINE HYDROCHLORIDE 40 MG/ML
SOLUTION TOPICAL ONCE
Status: CANCELLED | OUTPATIENT
Start: 2021-12-07 | End: 2021-12-07

## 2021-12-07 RX ORDER — GINSENG 100 MG
CAPSULE ORAL ONCE
Status: CANCELLED | OUTPATIENT
Start: 2021-12-07 | End: 2021-12-07

## 2021-12-07 RX ORDER — BETAMETHASONE DIPROPIONATE 0.05 %
OINTMENT (GRAM) TOPICAL ONCE
Status: CANCELLED | OUTPATIENT
Start: 2021-12-07 | End: 2021-12-07

## 2021-12-07 RX ORDER — GENTAMICIN SULFATE 1 MG/G
OINTMENT TOPICAL ONCE
Status: CANCELLED | OUTPATIENT
Start: 2021-12-07 | End: 2021-12-07

## 2021-12-07 NOTE — PROGRESS NOTES
10/24/2012    Bilateral sciatica 2020    Depression     Diabetes mellitus ([facility]     Diabetes mellitus type II, uncontrolled ([facility]     Diabetic eye exam: 2011, Dr. Reed Bradford , needs record [V72.0T][    GERD (gastroesophageal reflux disease)     Headache(784.0)     Hyperlipidemia LDL goal < 100 2012    Hypertension     Morbid obesity ([facility]     No retinopathy on exam     Dr Reed Bradford 13 Diabetes with no significatn diabetic retinopathy found in both eyes    Non compliance w medication regimen 2014    Sprain of neck 10/4/2011       PAST SURGICAL HISTORY    Past Surgical History:   Procedure Laterality Date     SECTION      HYSTERECTOMY      OTHER SURGICAL HISTORY  2017    I&D of right perineal abcess     TOE AMPUTATION Right 2021    RIGHT 5TH TOE AMPUTATION performed by Dorota Christina MD at [address] [place] [address] History   Problem Relation Age of Onset    Heart Disease Mother     Diabetes Mother     Asthma Mother     High Cholesterol Mother     Depression Mother     Heart Disease Father     High Cholesterol Father        SOCIAL HISTORY    Social History     Tobacco Use    Smoking status: Former Smoker     Packs/day: 0.25     Types: Cigarettes     Quit date: 5/10/2021     Years since quittin.5    Smokeless tobacco: Never Used   Vaping Use    Vaping Use: Never used   Substance Use Topics    Alcohol use: Yes     Comment: rare    Drug use: No       ALLERGIES    Allergies   Allergen Reactions    Zosyn [Piperacillin Sod-Tazobactam So] Rash     Rash and severe \"being poked by needles all over\" feeling    Penicillins Rash       MEDICATIONS    Current Outpatient Medications on File Prior to Encounter   Medication Sig Dispense Refill    sulfamethoxazole-trimethoprim (BACTRIM DS;SEPTRA DS) 800-160 MG per tablet Take 1 tablet by mouth 2 times daily for 10 days 20 tablet 0    PREDNISONE PO Take by mouth      glipiZIDE (GLUCOTROL XL) 10 MG extended release tablet Take 1 tablet by mouth every morning 90 tablet 1    insulin glargine (LANTUS SOLOSTAR) 100 UNIT/ML injection pen Inject 15 Units into the skin nightly 5 pen 5    TRULICITY 1.5 SN/4.8IQ SOPN INJECT ONE AND ONE-HALF (1 AND 1/2) MG UNDER THE SKIN ONCE A WEEK 2 pen 5    lisinopril (PRINIVIL;ZESTRIL) 2.5 MG tablet Take 1 tablet by mouth every morning 90 tablet 3    atorvastatin (LIPITOR) 40 MG tablet Take 1 tablet by mouth every morning 90 tablet 3    Lancets MISC To test up to 4 times per day- nighttime and with each meal 300 each 5    blood glucose test strips (EXACTECH TEST) strip 1 each by In Vitro route daily Embrace test strips to test BS 4x daily 300 each 6    Insulin Pen Needle 32G X 5 MM MISC To use with Lantus pen and Novolog pen 30 each 11     No current facility-administered medications on file prior to encounter. REVIEW OF SYSTEMS    Pertinent items are noted in HPI. Constitutional: Negative for systemic symptoms including fever, chills and malaise. Objective:      /84   Pulse 120   Temp 99.1 °F (37.3 °C) (Temporal)   Resp 18     PHYSICAL EXAM      General: The patient is in no acute distress. Mental status:  Patient is appropriate, is  oriented to place and plan of care. Dermatologic exam: Visual inspection of the periwound reveals the skin to be normal in turgor and texture, dry and edematous  Wound exam: see wound description below in procedure note      Assessment:     Problem List Items Addressed This Visit     WD-Abscess of axilla, right - Primary    Relevant Orders    Initiate Outpatient Wound Care Protocol        Procedure Note    Indications:  Based on my examination of this patient's wound(s) today, sharp excision into necrotic subcutaneous tissue is required to promote healing and evaluate the extent of previous healing.     Performed by: ALISTAIR Reid - CNP    Consent obtained: Yes    Time out taken:  Yes    Pain Control: N/A Debridement:Excisional Debridement    Using forceps the wound(s) was/were sharply debrided down through and including the removal of subcutaneous tissue. Devitalized Tissue Debrided:  fibrin, biofilm, slough and exudate    Pre Debridement Measurements:  Are located in the Wound Documentation Flow Sheet    All active wounds listed below with today's date are evaluated  Wound(s)    debrided this date include # : 1     Post  Debridement Measurements:  Wound 11/30/21 Brachial Right #1 ARMPIT (Active)   Wound Image   11/30/21 1352   Dressing Status New dressing applied 12/07/21 1342   Wound Cleansed Irrigated with saline 12/07/21 1326   Wound Length (cm) 0.5 cm 12/07/21 1326   Wound Width (cm) 0.5 cm 12/07/21 1326   Wound Depth (cm) 5 cm 12/07/21 1326   Wound Surface Area (cm^2) 0.25 cm^2 12/07/21 1326   Change in Wound Size % (l*w) -150 12/07/21 1326   Wound Volume (cm^3) 1.25 cm^3 12/07/21 1326   Wound Healing % -39 12/07/21 1326   Post-Procedure Length (cm) 0.5 cm 12/07/21 1341   Post-Procedure Width (cm) 0.5 cm 12/07/21 1341   Post-Procedure Depth (cm) 5.5 cm 12/07/21 1341   Post-Procedure Surface Area (cm^2) 0.25 cm^2 12/07/21 1341   Post-Procedure Volume (cm^3) 1.375 cm^3 12/07/21 1341   Distance Tunneling (cm) 0 cm 12/07/21 1326   Tunneling Position ___ O'Clock 0 12/07/21 1326   Undermining Starts ___ O'Clock 0 12/07/21 1326   Undermining Ends___ O'Clock 0 12/07/21 1326   Undermining Maxium Distance (cm) 0 12/07/21 1326   Wound Assessment Pink/red 12/07/21 1326   Drainage Amount Copious 12/07/21 1326   Drainage Description Purulent 12/07/21 1326   Odor Mild 12/07/21 1326   Yamilex-wound Assessment Edematous;  Blanchable erythema; Fluctulant 12/07/21 1326   Margins Undefined edges 12/07/21 1326   Wound Thickness Description not for Pressure Injury Full thickness 12/07/21 1326   Number of days: 7       Percent of Wound(s) Debrided: approximately 100%    Total  Area  Debrided:  0.25 sq cm     Bleeding: Minimal    Hemostasis Achieved:  by pressure    Procedural Pain:  4  / 10     Post Procedural Pain:  0 / 10     Response to treatment:  Well tolerated by patient. Status of wound progress and description from last visit:   There was a copious amount drainage that was evacuated from wound opening. Heavy amount of tan, thin drainage. This was done for an extended period, and eventually most of it appeared to have been removed. We will look for drainage to improve next week after she takes most of her antibiotics course. We may have her see a surgeon if no improvement, as an extensive work up and more involved debridement may be needed. Consider repeat US? Plan:       Discharge Instructions       PHYSICIAN ORDERS AND DISCHARGE INSTRUCTIONS     NOTE: Upon discharge from the [address], you will receive a patient experience survey via E-mail. We would be grateful if you would take the time to fill this survey out.     Wound care order history:                 Cultures: 11/30/21. Antibiotics: . HbA1c:  .10/26/21 10.2               Compression/Lymph Pumps: Ezequiel Garcia Grafts: Lex Izquierdo: . Continuing wound care orders and information:              Residence: . Private              Continue home health care with:. [address] [place] [address]               Your wound[facility]care supplies [facility]: . [facility]              Pharmacy: . Wound cleansing:                           Do not scrub or use excessive force. Wash hands with soap and water before and after dressing changes. Prior to applying a clean dressing, cleanse wound with normal saline,                          wound cleanser, or mild soap and water. Ask your physician or nurse before getting the wound(s) wet in the shower.               Daily Wound management:                          Keep weight off wounds and

## 2021-12-14 ENCOUNTER — HOSPITAL ENCOUNTER (OUTPATIENT)
Dept: WOUND CARE | Age: 41
Discharge: HOME | End: 2021-12-14
Payer: MEDICARE

## 2021-12-14 VITALS
DIASTOLIC BLOOD PRESSURE: 81 MMHG | HEART RATE: 105 BPM | SYSTOLIC BLOOD PRESSURE: 143 MMHG | RESPIRATION RATE: 18 BRPM | TEMPERATURE: 97.7 F

## 2021-12-14 DIAGNOSIS — L02.411 ABSCESS OF AXILLA, RIGHT: Primary | ICD-10-CM

## 2021-12-14 PROCEDURE — 11042 DBRDMT SUBQ TIS 1ST 20SQCM/<: CPT

## 2021-12-14 PROCEDURE — 11042 DBRDMT SUBQ TIS 1ST 20SQCM/<: CPT | Performed by: NURSE PRACTITIONER

## 2021-12-14 RX ORDER — CLOBETASOL PROPIONATE 0.5 MG/G
OINTMENT TOPICAL ONCE
Status: CANCELLED | OUTPATIENT
Start: 2021-12-14 | End: 2021-12-14

## 2021-12-14 RX ORDER — LIDOCAINE HYDROCHLORIDE 20 MG/ML
JELLY TOPICAL ONCE
Status: CANCELLED | OUTPATIENT
Start: 2021-12-14 | End: 2021-12-14

## 2021-12-14 RX ORDER — BETAMETHASONE DIPROPIONATE 0.05 %
OINTMENT (GRAM) TOPICAL ONCE
Status: CANCELLED | OUTPATIENT
Start: 2021-12-14 | End: 2021-12-14

## 2021-12-14 RX ORDER — GINSENG 100 MG
CAPSULE ORAL ONCE
Status: CANCELLED | OUTPATIENT
Start: 2021-12-14 | End: 2021-12-14

## 2021-12-14 RX ORDER — LIDOCAINE 50 MG/G
OINTMENT TOPICAL ONCE
Status: CANCELLED | OUTPATIENT
Start: 2021-12-14 | End: 2021-12-14

## 2021-12-14 RX ORDER — GENTAMICIN SULFATE 1 MG/G
OINTMENT TOPICAL ONCE
Status: CANCELLED | OUTPATIENT
Start: 2021-12-14 | End: 2021-12-14

## 2021-12-14 RX ORDER — LIDOCAINE 50 MG/G
OINTMENT TOPICAL ONCE
Status: DISCONTINUED | OUTPATIENT
Start: 2021-12-14 | End: 2021-12-15 | Stop reason: HOSPADM

## 2021-12-14 RX ORDER — BACITRACIN ZINC AND POLYMYXIN B SULFATE 500; 1000 [USP'U]/G; [USP'U]/G
OINTMENT TOPICAL ONCE
Status: CANCELLED | OUTPATIENT
Start: 2021-12-14 | End: 2021-12-14

## 2021-12-14 RX ORDER — LIDOCAINE HYDROCHLORIDE 40 MG/ML
SOLUTION TOPICAL ONCE
Status: CANCELLED | OUTPATIENT
Start: 2021-12-14 | End: 2021-12-14

## 2021-12-14 RX ORDER — BACITRACIN, NEOMYCIN, POLYMYXIN B 400; 3.5; 5 [USP'U]/G; MG/G; [USP'U]/G
OINTMENT TOPICAL ONCE
Status: CANCELLED | OUTPATIENT
Start: 2021-12-14 | End: 2021-12-14

## 2021-12-14 RX ORDER — LIDOCAINE 40 MG/G
CREAM TOPICAL ONCE
Status: CANCELLED | OUTPATIENT
Start: 2021-12-14 | End: 2021-12-14

## 2021-12-14 ASSESSMENT — PAIN SCALES - GENERAL: PAINLEVEL_OUTOF10: 4

## 2021-12-14 ASSESSMENT — PAIN DESCRIPTION - LOCATION: LOCATION: ARM

## 2021-12-14 ASSESSMENT — PAIN DESCRIPTION - DESCRIPTORS: DESCRIPTORS: STABBING

## 2021-12-14 NOTE — PROGRESS NOTES
Wound Care Center Progress Note With Procedure    Marilyn Myers  AGE: 35 y.o. GENDER: female  : 1980  EPISODE DATE:  2021     Subjective:     Chief Complaint   Patient presents with    Wound Check     R armpit          HISTORY of PRESENT ILLNESS      Marilyn Rahman is a 35 y.o. female who presents to the Wound Clinic for an initial visit for evaluation and treatment of Acute non-healing surgical and draining abscess  ulcer(s) of  Right axilla.  The condition is of moderate severity. The ulcer has been present for 5 weeks.  The underlying cause is thought to be folliculitis and subsequent abscess formation.  The patients care to date has included a trip to the ED on , where she was given bactrim and hibicleanse. She returned on , where a surgical I&D was performed and she was discharged with Cleocin. She came to clinic with 1/4inch packing in place. The patient has significant underlying medical conditions as below. Patient is a diabetic who takes both PO and SubQ medications. She is a former smoker, and is not on any blood thinners.     Redness and swelling and firm nodule to bicep have all improved. Patient is still having excessive drainage, and having trouble with keeping up with dressings as a result. She is having \"pepto-bismal\" colored drainage this week and there is a small piece of necrotic tissue from wound that occurred when packing was removed. She has been taking her bactrim as prescribed, and I believe she is improving overall.   Would like her to see a surgeon, as she may need more imaging or a more invasive debridement     Wound Pain Timing/Severity: waxing and waning  Quality of pain: shooting, pressure  Severity of pain:  3 / 10   Modifying Factors: diabetes, poor glucose control and obesity  Associated Signs/Symptoms: edema, erythema, drainage, odor and pain        PAST MEDICAL HISTORY        Diagnosis Date    Allergic rhinitis     allergy shots- Dr. Aditya Del Valle Allergic rhinitis     Dr. Emmett Clipper- claritin and flonase    Anxiety 10/24/2012    Atopic dermatitis     Dr. Emmett Aj- claritin    Back pain 10/24/2012    Bilateral sciatica 2020    Depression     [facility]     Diabetes mellitus type II, uncontrolled ([facility]     Diabetic eye exam: 2011, Dr. Adina Baird , needs record [V72.0T][    GERD (gastroesophageal reflux disease)     Headache(784.0)     Hyperlipidemia LDL goal < 100 2012    Hypertension     Morbid obesity ([facility]     No retinopathy on exam     Dr Adina Baird 13 Diabetes with no significatn diabetic retinopathy found in both eyes    Non compliance w medication regimen 2014    Sprain of neck 10/4/2011       PAST SURGICAL HISTORY    Past Surgical History:   Procedure Laterality Date     SECTION      HYSTERECTOMY      OTHER SURGICAL HISTORY  2017    I&D of right perineal abcess     TOE AMPUTATION Right 2021    RIGHT 5TH TOE AMPUTATION performed by Fadi Corcoran MD at [address] [place] [address] History   Problem Relation Age of Onset    Heart Disease Mother     Diabetes Mother     Asthma Mother     High Cholesterol Mother     Depression Mother     Heart Disease Father     High Cholesterol Father        SOCIAL HISTORY    Social History     Tobacco Use    Smoking status: Former Smoker     Packs/day: 0.25     Types: Cigarettes     Quit date: 5/10/2021     Years since quittin.5    Smokeless tobacco: Never Used   Vaping Use    Vaping Use: Never used   Substance Use Topics    Alcohol use: Yes     Comment: rare    Drug use: No       ALLERGIES    Allergies   Allergen Reactions    Zosyn [Piperacillin Sod-Tazobactam So] Rash     Rash and severe \"being poked by needles all over\" feeling    Penicillins Rash       MEDICATIONS    Current Outpatient Medications on File Prior to Encounter   Medication Sig Dispense Refill    sulfamethoxazole-trimethoprim (BACTRIM DS;SEPTRA DS) 800-160 MG per tablet Take 1 tablet by mouth 2 times daily for 10 days 20 tablet 0    PREDNISONE PO Take by mouth      glipiZIDE (GLUCOTROL XL) 10 MG extended release tablet Take 1 tablet by mouth every morning 90 tablet 1    insulin glargine (LANTUS SOLOSTAR) 100 UNIT/ML injection pen Inject 15 Units into the skin nightly 5 pen 5    TRULICITY 1.5 DA/6.2LD SOPN INJECT ONE AND ONE-HALF (1 AND 1/2) MG UNDER THE SKIN ONCE A WEEK 2 pen 5    lisinopril (PRINIVIL;ZESTRIL) 2.5 MG tablet Take 1 tablet by mouth every morning 90 tablet 3    atorvastatin (LIPITOR) 40 MG tablet Take 1 tablet by mouth every morning 90 tablet 3    Lancets MISC To test up to 4 times per day- nighttime and with each meal 300 each 5    blood glucose test strips (EXACTECH TEST) strip 1 each by In Vitro route daily Embrace test strips to test BS 4x daily 300 each 6    Insulin Pen Needle 32G X 5 MM MISC To use with Lantus pen and Novolog pen 30 each 11     No current facility-administered medications on file prior to encounter. REVIEW OF SYSTEMS    Pertinent items are noted in HPI. Constitutional: Negative for systemic symptoms including fever, chills and malaise. Objective:      BP (!) 143/81   Pulse 105   Temp 97.7 °F (36.5 °C) (Temporal)   Resp 18     PHYSICAL EXAM      General: The patient is in no acute distress. Mental status:  Patient is appropriate, is  oriented to place and plan of care.   Dermatologic exam: Visual inspection of the periwound reveals the skin to be normal in turgor and texture and dry  Wound exam: see wound description below in procedure note      Assessment:     Problem List Items Addressed This Visit     WD-Abscess of axilla, right - Primary    Relevant Medications    lidocaine (XYLOCAINE) 5 % ointment    Other Relevant Orders    Initiate Outpatient Wound Care Protocol        Procedure Note    Indications:  Based on my examination of this patient's wound(s) today, sharp excision into necrotic subcutaneous tissue is required to promote healing and evaluate the extent of previous healing. Performed by: ALISTAIR Hassan CNP    Consent obtained: Yes    Time out taken:  Yes    Pain Control: Anesthetic  Anesthetic: 5% Lidocaine Ointment Topical     Debridement:Excisional Debridement    Using forceps the wound(s) was/were sharply debrided down through and including the removal of subcutaneous tissue. Devitalized Tissue Debrided:  fibrin, biofilm, slough, necrotic/eschar, exudate and clots    Pre Debridement Measurements:  Are located in the Wound Documentation Flow Sheet    All active wounds listed below with today's date are evaluated  Wound(s)    debrided this date include # : 1     Post  Debridement Measurements:  Wound 11/30/21 Brachial Right #1 ARMPIT (Active)   Wound Image   11/30/21 1352   Dressing Status New dressing applied 12/07/21 1342   Wound Cleansed Cleansed with saline; Wound cleanser 12/14/21 1314   Wound Length (cm) 0.5 cm 12/14/21 1314   Wound Width (cm) 0.5 cm 12/14/21 1314   Wound Depth (cm) 8.5 cm 12/14/21 1314   Wound Surface Area (cm^2) 0.25 cm^2 12/14/21 1314   Change in Wound Size % (l*w) -150 12/14/21 1314   Wound Volume (cm^3) 2.125 cm^3 12/14/21 1314   Wound Healing % -136 12/14/21 1314   Post-Procedure Length (cm) 0.5 cm 12/14/21 1328   Post-Procedure Width (cm) 0.5 cm 12/14/21 1328   Post-Procedure Depth (cm) 8.5 cm 12/14/21 1328   Post-Procedure Surface Area (cm^2) 0.25 cm^2 12/14/21 1328   Post-Procedure Volume (cm^3) 2.125 cm^3 12/14/21 1328   Distance Tunneling (cm) 0 cm 12/07/21 1326   Tunneling Position ___ O'Clock 0 12/07/21 1326   Undermining Starts ___ O'Clock 0 12/07/21 1326   Undermining Ends___ O'Clock 0 12/07/21 1326   Undermining Maxium Distance (cm) 0 12/07/21 1326   Wound Assessment Pink/red 12/14/21 1314   Drainage Amount Copious 12/14/21 1314   Drainage Description Alinda Rutland; Thick;  Yellow; Pink 12/14/21 1314   Odor Mild 12/14/21 1314   Yamilex-wound Assessment Edematous 12/14/21 1314   Margins Undefined edges 12/14/21 1314   Wound Thickness Description not for Pressure Injury Full thickness 12/14/21 1314   Number of days: 14       Percent of Wound(s) Debrided: approximately 100%    Total  Area  Debrided:  0.25 sq cm     Bleeding:  Minimal    Hemostasis Achieved:  by pressure    Procedural Pain:  3  / 10     Post Procedural Pain:  0 / 10     Response to treatment:  Well tolerated by patient. Status of wound progress and description from last visit:   Will continue plan of care for now. Patient to see Dr. Manuel Sawyer on Thursday. He may determine if she needs more antibiotics, or more detailed imaging       Plan:       Discharge Instructions       [address]     NOTE: Upon discharge from the [address], you will receive a patient experience survey via E-mail. We would be grateful if you would take the time to fill this survey out.     Wound care order history:                 Cultures: 11/30/21.               Antibiotics: Antwan Ip              HbA1c:  .10/26/21 10.2               Compression/Lymph Pumps: .              Grafts:  .              Emmie: .                Continuing wound care orders and information:              Residence: [address]              Your wound-care supplies will be provided by: . [address]              Pharmacy: .                            LSTZH cleansing:                           ZD not scrub or use excessive force.                          Wash hands with soap and water before and after dressing changes.                           Prior to applying a clean dressing, cleanse wound with normal saline,                          wound cleanser, or mild soap and water.                           Ask your physician or nurse before getting the wound(s) wet in the shower.              Daily Wound management:                          Keep weight off wounds and reposition every 2

## 2021-12-16 ENCOUNTER — HOSPITAL ENCOUNTER (OUTPATIENT)
Dept: WOUND CARE | Age: 41
Discharge: HOME | End: 2021-12-16
Payer: MEDICARE

## 2021-12-16 DIAGNOSIS — L02.411 ABSCESS OF AXILLA, RIGHT: Primary | ICD-10-CM

## 2021-12-16 PROCEDURE — 10060 I&D ABSCESS SIMPLE/SINGLE: CPT | Performed by: SURGERY

## 2021-12-16 PROCEDURE — 10060 I&D ABSCESS SIMPLE/SINGLE: CPT

## 2021-12-16 RX ORDER — BETAMETHASONE DIPROPIONATE 0.05 %
OINTMENT (GRAM) TOPICAL ONCE
Status: CANCELLED | OUTPATIENT
Start: 2021-12-16 | End: 2021-12-16

## 2021-12-16 RX ORDER — BACITRACIN ZINC AND POLYMYXIN B SULFATE 500; 1000 [USP'U]/G; [USP'U]/G
OINTMENT TOPICAL ONCE
Status: CANCELLED | OUTPATIENT
Start: 2021-12-16 | End: 2021-12-16

## 2021-12-16 RX ORDER — LIDOCAINE 50 MG/G
OINTMENT TOPICAL ONCE
Status: CANCELLED | OUTPATIENT
Start: 2021-12-16 | End: 2021-12-16

## 2021-12-16 RX ORDER — LIDOCAINE HYDROCHLORIDE 20 MG/ML
JELLY TOPICAL ONCE
Status: CANCELLED | OUTPATIENT
Start: 2021-12-16 | End: 2021-12-16

## 2021-12-16 RX ORDER — BACITRACIN, NEOMYCIN, POLYMYXIN B 400; 3.5; 5 [USP'U]/G; MG/G; [USP'U]/G
OINTMENT TOPICAL ONCE
Status: CANCELLED | OUTPATIENT
Start: 2021-12-16 | End: 2021-12-16

## 2021-12-16 RX ORDER — GENTAMICIN SULFATE 1 MG/G
OINTMENT TOPICAL ONCE
Status: CANCELLED | OUTPATIENT
Start: 2021-12-16 | End: 2021-12-16

## 2021-12-16 RX ORDER — LIDOCAINE HYDROCHLORIDE 40 MG/ML
SOLUTION TOPICAL ONCE
Status: CANCELLED | OUTPATIENT
Start: 2021-12-16 | End: 2021-12-16

## 2021-12-16 RX ORDER — CLOBETASOL PROPIONATE 0.5 MG/G
OINTMENT TOPICAL ONCE
Status: CANCELLED | OUTPATIENT
Start: 2021-12-16 | End: 2021-12-16

## 2021-12-16 RX ORDER — LIDOCAINE HYDROCHLORIDE 40 MG/ML
SOLUTION TOPICAL ONCE
Status: DISCONTINUED | OUTPATIENT
Start: 2021-12-16 | End: 2021-12-17 | Stop reason: HOSPADM

## 2021-12-16 RX ORDER — LIDOCAINE 40 MG/G
CREAM TOPICAL ONCE
Status: CANCELLED | OUTPATIENT
Start: 2021-12-16 | End: 2021-12-16

## 2021-12-16 RX ORDER — GINSENG 100 MG
CAPSULE ORAL ONCE
Status: CANCELLED | OUTPATIENT
Start: 2021-12-16 | End: 2021-12-16

## 2021-12-17 NOTE — PROGRESS NOTES
Wound Care Center Progress Note With Procedure    Marilyn Jones Ala  AGE: 35 y.o. GENDER: female  : 1980  EPISODE DATE:  2021     Subjective:     Chief Complaint   Patient presents with    Wound Check     Right Axillary         HISTORY of PRESENT ILLNESS      Marilyn Jones Ala is a 35 y.o. female who presents today for wound evaluation of Acute infection/abscess related ulcer(s) of the right axilla. The ulcer is of moderate severity. The underlying cause of the wound is abscess that was lanced in the ED. Wound has been present for ~3weeks but continues to drain copious necrotic tissue and purulence.   Wound Pain Timing/Severity: waxing and waning, moderate  Quality of pain: aching, burning  Severity of pain:  3 / 10   Modifying Factors: edema, diabetes, poor glucose control and obesity  Associated Signs/Symptoms: edema, drainage, odor and pain        PAST MEDICAL HISTORY        Diagnosis Date    Allergic rhinitis     allergy shots- Dr. Azeb Gutierrez    Allergic rhinitis     Dr. Jermaine kwan and flonase    Anxiety 10/24/2012    Atopic dermatitis     Dr. Jermaine kwan    Back pain 10/24/2012    Bilateral sciatica 2020    Depression     Diabetes mellitus ([address]     Diabetes mellitus type II, uncontrolled ([address]     Diabetic eye exam: 2011, Dr. Alis Mendoza , needs record [V72.0T][    GERD (gastroesophageal reflux disease)     Headache(784.0)     Hyperlipidemia LDL goal < 100 2012    Hypertension     Morbid obesity ([address]     No retinopathy on exam     Dr Alis Mendoza 13 Diabetes with no significatn diabetic retinopathy found in both eyes    Non compliance w medication regimen 2014    Sprain of neck 10/4/2011       PAST SURGICAL HISTORY    Past Surgical History:   Procedure Laterality Date     SECTION      HYSTERECTOMY      OTHER SURGICAL HISTORY  2017    I&D of right perineal abcess     TOE AMPUTATION Right 2021    RIGHT 5TH TOE AMPUTATION performed by Aramis Man MD at [address] [facility]    Family History   Problem Relation Age of Onset    Heart Disease Mother     Diabetes Mother     Asthma Mother     High Cholesterol Mother     Depression Mother     Heart Disease Father     High Cholesterol Father        SOCIAL HISTORY    Social History     Tobacco Use    Smoking status: Former Smoker     Packs/day: 0.25     Types: Cigarettes     Quit date: 5/10/2021     Years since quittin.6    Smokeless tobacco: Never Used   Vaping Use    Vaping Use: Never used   Substance Use Topics    Alcohol use: Yes     Comment: rare    Drug use: No       ALLERGIES    Allergies   Allergen Reactions    Zosyn [Piperacillin Sod-Tazobactam So] Rash     Rash and severe \"being poked by needles all over\" feeling    Penicillins Rash       MEDICATIONS    Current Outpatient Medications on File Prior to Encounter   Medication Sig Dispense Refill    sulfamethoxazole-trimethoprim (BACTRIM DS;SEPTRA DS) 800-160 MG per tablet Take 1 tablet by mouth 2 times daily for 10 days 20 tablet 0    PREDNISONE PO Take by mouth      glipiZIDE (GLUCOTROL XL) 10 MG extended release tablet Take 1 tablet by mouth every morning 90 tablet 1    insulin glargine (LANTUS SOLOSTAR) 100 UNIT/ML injection pen Inject 15 Units into the skin nightly 5 pen 5    TRULICITY 1.5 CJ/1.9FS SOPN INJECT ONE AND ONE-HALF (1 AND 1/2) MG UNDER THE SKIN ONCE A WEEK 2 pen 5    lisinopril (PRINIVIL;ZESTRIL) 2.5 MG tablet Take 1 tablet by mouth every morning 90 tablet 3    atorvastatin (LIPITOR) 40 MG tablet Take 1 tablet by mouth every morning 90 tablet 3    Lancets MISC To test up to 4 times per day- nighttime and with each meal 300 each 5    blood glucose test strips (EXACTECH TEST) strip 1 each by In Vitro route daily Embrace test strips to test BS 4x daily 300 each 6    Insulin Pen Needle 32G X 5 MM MISC To use with Lantus pen and Novolog pen 30 each 11     No current facility-administered medications on file prior to encounter. REVIEW OF SYSTEMS    Pertinent items are noted in HPI. Constitutional: Negative for systemic symptoms including fever, chills and malaise. Objective: There were no vitals taken for this visit. PHYSICAL EXAM  General Appearance: alert and oriented to person, place and time, well-developed and well-nourished, in no acute distress    General: The patient is in no acute distress. Mental status:  Patient is appropriate, is  oriented to place and plan of care. Dermatologic exam: Visual inspection of the periwound reveals the skin to be edematous  Wound exam: see wound description below in procedure note      Assessment:   35 y.o. female with draining wound in the right axilla from abscess drained about 3 weeks ago. Has has some improvement but still with copious drianage. Problem List Items Addressed This Visit     WD-Abscess of axilla, right - Primary    Relevant Medications    lidocaine (XYLOCAINE) 4 % external solution    Other Relevant Orders    Initiate Outpatient Wound Care Protocol        Procedure Note    Indications:  Based on my examination of this patient's wound today incision and drainage is required to promote healing and evaluate the extent of previous healing. Performed by: Richardson Cagle MD    Consent obtained: Yes    Time out taken:  Yes    Pain Control: Anesthetic  Anesthetic: 4% Lidocaine Liquid Topical and injectable 2% lidocaine with epinephrine    Debridement:Other (comment) Incision and drainage of axillary abscess    Incision and Drainage (I&D) Procedure Note    Indications: axillary abscess     Pre-operative Diagnosis: axillary abscess    Post-operative Diagnosis: Same    Procedure:  I&D of axillary abscess    Surgeon: Richardson Cagle MD     Findings:  Copious thick bloody purulence    Estimated Blood Loss:  30 cc          Complications:  None; patient tolerated the procedure well.            Condition: stable    Procedure Details: The patient was positioned appropriately and the skin over the abscess site was prepped with betadine and draped in a sterile fashion. Local anesthesia was obtained by infiltration using 2% Lidocaine with epinephrine. An elliptical incision was then made over the prior small I&D site. A piece of skin was removed to allow more adequate packing. The wound was then explored with a finger and found to track deep into the axilla as well as some tunneling distal along the upper arm musculature. Purulent material was expressed. Loculations were not present. Wound was previously cultured and is growing strep. The drainage cavity was then irrigated and dressed with gauze. The patient tolerated the procedure well. Post  Debridement Measurements:  Wound 11/30/21 Brachial Right #1 ARMPIT (Active)   Wound Image   12/16/21 1330   Dressing Status Clean; Dry; New dressing applied 12/16/21 1449   Wound Cleansed Cleansed with saline;  Wound cleanser 12/16/21 1330   Offloading for Diabetic Foot Ulcers No offloading required 12/16/21 1330   Wound Length (cm) 0.5 cm 12/16/21 1330   Wound Width (cm) 0.5 cm 12/16/21 1330   Wound Depth (cm) 8.5 cm 12/16/21 1330   Wound Surface Area (cm^2) 0.25 cm^2 12/16/21 1330   Change in Wound Size % (l*w) -150 12/16/21 1330   Wound Volume (cm^3) 2.125 cm^3 12/16/21 1330   Wound Healing % -136 12/16/21 1330   Post-Procedure Length (cm) 2.5 cm 12/16/21 1417   Post-Procedure Width (cm) 3 cm 12/16/21 1417   Post-Procedure Depth (cm) 8.5 cm 12/16/21 1417   Post-Procedure Surface Area (cm^2) 7.5 cm^2 12/16/21 1417   Post-Procedure Volume (cm^3) 63.75 cm^3 12/16/21 1417   Distance Tunneling (cm) 0 cm 12/07/21 1326   Tunneling Position ___ O'Clock 0 12/07/21 1326   Undermining Starts ___ O'Clock 0 12/07/21 1326   Undermining Ends___ O'Clock 0 12/07/21 1326   Undermining Maxium Distance (cm) 0 12/07/21 1326   Wound Assessment Pink/red 12/16/21 5818 Drainage Amount Large 12/16/21 1330   Drainage Description Yellow; [place]; Thick; Pink 12/16/21 1330   Odor Mild 12/16/21 1330   Yamilex-wound Assessment Edematous 12/16/21 1330   Margins Defined edges 12/16/21 1330   Wound Thickness Description not for Pressure Injury Full thickness 12/16/21 1330   Number of days: 16       Hemostasis Achieved:  by pressure    Procedural Pain:  4  / 10     Post Procedural Pain:  1 / 10     Response to treatment:  Well tolerated by patient. Status of wound progress and description from last visit:   Stable with continued large drainage. .      Plan:       Discharge Instructions       PHYSICIAN ORDERS AND DISCHARGE INSTRUCTIONS     NOTE: Upon discharge from the [address], you will receive a patient experience survey via E-mail. We would be grateful if you would take the time to fill this survey out.     Wound care order history:                 Cultures: 11/30/21.               Antibiotics: .Bactrim                 HbA1c:  .10/26/21 10.2               Compression/Lymph Pumps: .              Grafts:  .              Emmie: .                Continuing wound care orders and information:              Residence: [address]              Your wound-care supplies will be provided by: . [address]              Pharmacy: .                            CWDVA cleansing:                           KM not scrub or use excessive force.                          Wash hands with soap and water before and after dressing changes.                           Prior to applying a clean dressing, cleanse wound with normal saline,                          wound cleanser, or mild soap and water.                           Ask your physician or nurse before getting the wound(s) wet in the shower.              Daily Wound management:                          Keep weight off wounds and reposition every 2 hours.                          EVUHO standing for long periods of time.                          QOKUT wraps/stockings in AM and remove at bedtime.                          Elevate legs to the level of the heart or above for 30 minutes 4-5 times a day and/or when sitting.                                               When taking antibiotics take entire prescription as ordered by MD do not stop taking until medicine is all gone.                                                                 Orders for this week (12/16/21): Right arm- Wash with mild soap and water, rinse with saline, pat dry with 4x4  Gently pack with 1 in iodoform gauze (primary)  Cover with double calcium alginate (Secondary), qwick (tertiary), and sorbex  Secure with hyperfix tape  Wrap ace bandage or stretch net around to help keep in palce  Change 2 times daily     Script for Antibiotic sent to pharmacy     Follow up with Elisha Dc CNP in 1 weeks in the wound care center  Call [phone] for any questions or concerns.   Date__________   Time____________           Treatment Note Wound 11/30/21 Brachial Right #1 ARMPIT-Dressing/Treatment:  (1 in. iodoform, ca alg, qwick, sorbex, hyperfix tape, ace)    Written Patient Dismissal Instructions Given     - recommend more frequent dressing changes, will increase to BID dressing changes until wound is        Electronically signed by Guanako Ling MD on 12/16/2021 at 8:41 PM

## 2021-12-20 RX ORDER — SULFAMETHOXAZOLE AND TRIMETHOPRIM 800; 160 MG/1; MG/1
1 TABLET ORAL 2 TIMES DAILY
Qty: 20 TABLET | Refills: 0 | Status: SHIPPED | OUTPATIENT
Start: 2021-12-20 | End: 2021-12-30

## 2021-12-23 ENCOUNTER — HOSPITAL ENCOUNTER (OUTPATIENT)
Dept: WOUND CARE | Age: 41
Discharge: HOME | End: 2021-12-23
Payer: MEDICARE

## 2021-12-23 DIAGNOSIS — L02.411 ABSCESS OF AXILLA, RIGHT: Primary | ICD-10-CM

## 2021-12-23 PROCEDURE — 99213 OFFICE O/P EST LOW 20 MIN: CPT

## 2021-12-23 PROCEDURE — 99213 OFFICE O/P EST LOW 20 MIN: CPT | Performed by: NURSE PRACTITIONER

## 2021-12-23 NOTE — PROGRESS NOTES
Wound Care Center Progress Note       Marilyn Cardenas  AGE: 35 y.o. GENDER: female  : 1980  TODAY'S DATE:  2021        Subjective:     Chief Complaint   Patient presents with    Wound Check     R armpit          HISTORY of PRESENT ILLNESS     Marilyn Rahman is a 35 y.o. female who presents to the Wound Clinic for a visit for evaluation and treatment of Acute non-healing surgical and draining abscess  ulcer(s) of  Right axilla.  The condition is of moderate severity.   The underlying cause is thought to be folliculitis and subsequent abscess formation.  The patients care to date has included a trip to the ED on , where she was given bactrim and hibicleanse. She returned on , where a surgical I&D was performed and she was discharged with Cleocin. She came to clinic with 1/4inch packing in place. The patient has significant underlying medical conditions as below. Patient is a diabetic who takes both PO and SubQ medications. She is a former smoker, and is not on any blood thinners. Much improved today. Patient saw Dr. Nadia Nguyễn last week, who did a more involved debridement in clinic and removed a large amount of necrotic tissue and drainage. Her dressing was changed Tuesday, and it is not saturated. Patient reports less pain, swelling, and drainage overall.  Patient was prescribed x2 weeks of antibiotics, and has been taking as ordered     Wound Pain Timing/Severity: waxing and waning  Quality of pain: shooting, pressure  Severity of pain:  3 / 10   Modifying Factors: diabetes, poor glucose control and obesity  Associated Signs/Symptoms: edema, erythema, drainage, odor and pain        PAST MEDICAL HISTORY        Diagnosis Date    Allergic rhinitis     allergy shots- Dr. Bharath Mitchell Allergic rhinitis     Dr. Richardson kwan and flonase    Anxiety 10/24/2012    Atopic dermatitis     Dr. Richardson kwan    Back pain 10/24/2012    Bilateral sciatica 2020    Depression     Diabetes mellitus ([facility]     Diabetes mellitus type II, uncontrolled ([facility]     Diabetic eye exam: 2011, Dr. Romina Silva , needs record [V72.0T][    GERD (gastroesophageal reflux disease)     Headache(784.0)     Hyperlipidemia LDL goal < 100 2012    Hypertension     Morbid obesity ([facility]     No retinopathy on exam     Dr Romina Silva 13 Diabetes with no significatn diabetic retinopathy found in both eyes    Non compliance w medication regimen 2014    Sprain of neck 10/4/2011       PAST SURGICAL HISTORY    Past Surgical History:   Procedure Laterality Date     SECTION      HYSTERECTOMY      OTHER SURGICAL HISTORY  2017    I&D of right perineal abcess     TOE AMPUTATION Right 2021    RIGHT 5TH TOE AMPUTATION performed by Jessica Solorio MD at [address] [place] [address] History   Problem Relation Age of Onset    Heart Disease Mother     Diabetes Mother     Asthma Mother     High Cholesterol Mother     Depression Mother     Heart Disease Father     High Cholesterol Father        SOCIAL HISTORY    Social History     Tobacco Use    Smoking status: Former Smoker     Packs/day: 0.25     Types: Cigarettes     Quit date: 5/10/2021     Years since quittin.6    Smokeless tobacco: Never Used   Vaping Use    Vaping Use: Never used   Substance Use Topics    Alcohol use: Yes     Comment: rare    Drug use: No       ALLERGIES    Allergies   Allergen Reactions    Zosyn [Piperacillin Sod-Tazobactam So] Rash     Rash and severe \"being poked by needles all over\" feeling    Penicillins Rash       MEDICATIONS    Current Outpatient Medications on File Prior to Encounter   Medication Sig Dispense Refill    sulfamethoxazole-trimethoprim (BACTRIM DS;SEPTRA DS) 800-160 MG per tablet Take 1 tablet by mouth 2 times daily for 10 days 20 tablet 0    PREDNISONE PO Take by mouth      glipiZIDE (GLUCOTROL XL) 10 MG extended release tablet Take 1 tablet by mouth every morning 90 tablet 1    insulin glargine (LANTUS SOLOSTAR) 100 UNIT/ML injection pen Inject 15 Units into the skin nightly 5 pen 5    TRULICITY 1.5 ZI/5.9TK SOPN INJECT ONE AND ONE-HALF (1 AND 1/2) MG UNDER THE SKIN ONCE A WEEK 2 pen 5    lisinopril (PRINIVIL;ZESTRIL) 2.5 MG tablet Take 1 tablet by mouth every morning 90 tablet 3    atorvastatin (LIPITOR) 40 MG tablet Take 1 tablet by mouth every morning 90 tablet 3    Lancets MISC To test up to 4 times per day- nighttime and with each meal 300 each 5    blood glucose test strips (EXACTECH TEST) strip 1 each by In Vitro route daily Embrace test strips to test BS 4x daily 300 each 6    Insulin Pen Needle 32G X 5 MM MISC To use with Lantus pen and Novolog pen 30 each 11     No current facility-administered medications on file prior to encounter. REVIEW OF SYSTEMS    Pertinent items are noted in HPI. Constitutional: Negative for systemic symptoms including fever, chills and malaise. Objective: There were no vitals taken for this visit. PHYSICAL EXAM      General: The patient is in no acute distress. Mental status:  Patient is appropriate, is  oriented to place and plan of care. Dermatologic exam: Visual inspection of the periwound reveals the skin to be normal in turgor and texture and dry. Wound exam:  see wound description below     All active wounds listed below with today's date are evaluated      Wound 11/30/21 Brachial Right #1 ARMPIT (Active)   Wound Image   12/23/21 1345   Dressing Status Clean;Dry;New dressing applied 12/16/21 1449   Wound Cleansed Soap and water; Wound cleanser;Cleansed with saline 12/23/21 1345   Offloading for Diabetic Foot Ulcers No offloading required 12/23/21 1345   Wound Length (cm) 0.6 cm 12/23/21 1345   Wound Width (cm) 2 cm 12/23/21 1345   Wound Depth (cm) 5 cm 12/23/21 1345   Wound Surface Area (cm^2) 1.2 cm^2 12/23/21 1345   Change in Wound Size % (l*w) -1100 12/23/21 1345   Wound Volume (cm^3) 6 cm^3 12/23/21 1345   Wound Healing % -567 12/23/21 1345   Post-Procedure Length (cm) 2.5 cm 12/16/21 1417   Post-Procedure Width (cm) 3 cm 12/16/21 1417   Post-Procedure Depth (cm) 8.5 cm 12/16/21 1417   Post-Procedure Surface Area (cm^2) 7.5 cm^2 12/16/21 1417   Post-Procedure Volume (cm^3) 63.75 cm^3 12/16/21 1417   Distance Tunneling (cm) 0 cm 12/07/21 1326   Tunneling Position ___ O'Clock 0 12/07/21 1326   Undermining Starts ___ O'Clock 0 12/23/21 1345   Undermining Ends___ O'Clock 0 12/23/21 1345   Undermining Maxium Distance (cm) 0 12/23/21 1345   Wound Assessment Pink/red;Slough 12/23/21 1345   Drainage Amount Large 12/23/21 1345   Drainage Description Yellow;Brown;Sanguinous 12/23/21 1345   Odor None 12/23/21 1345   Yamilex-wound Assessment Edematous 12/23/21 1345   Margins Defined edges 12/23/21 1345   Wound Thickness Description not for Pressure Injury Full thickness 12/23/21 1345   Number of days: 23       Assessment:       Problem List Items Addressed This Visit     WD-Abscess of axilla, right - Primary          Status of wound progress and description from last visit:   Much improved. Only keri, red blood was able to be expressed, and there was zero purulent drainage. Clean overall, and depth somewhat improved. We will continue plan of care for now and follow up 1 week         Plan:     Discharge Instructions       [address]     NOTE: Upon discharge from the [address], you will receive a patient experience survey via E-mail. We would be grateful if you would take the time to fill this survey out.     Wound care order history:                 Cultures: 11/30/21.               Antibiotics: .Bactrim                 HbA1c:  .10/26/21 10.2               Compression/Lymph Pumps:  .              Grafts:  .              Emmie: .                Continuing wound care orders and information:              Residence: .Private              [address]  wound-care supplies will be provided by: . [address]              Pharmacy: .                            JGCDR cleansing:                           NZ not scrub or use excessive force.                          Wash hands with soap and water before and after dressing changes.                         Prior to applying a clean dressing, cleanse wound with normal saline,                          wound cleanser, or mild soap and water.                           Ask your physician or nurse before getting the wound(s) wet in the shower.              Daily Wound management:                          Keep weight off wounds and reposition every 2 hours.                          TERHR standing for long periods of time.                          Apply wraps/stockings in AM and remove at bedtime.                          Elevate legs to the level of the heart or above for 30 minutes 4-5 times a day and/or when sitting.                                               When taking antibiotics take entire prescription as ordered by MD do not stop taking until medicine is all gone.                                                                 Orders for this week (12/23/21): Right arm- Wash with mild soap and water, rinse with saline, pat dry with 4x4  A&D to Arm   Gently pack with 1 in iodoform gauze (primary)  Cover with double calcium alginate (Secondary), and ABD  Secure with hyperfix tape  Wrap ace bandage or stretch net around to help keep in palce  Change 2 times daily      Follow up with Michael Whelan CNP in 1 weeks in the wound care center  Call [phone] for any questions or concerns.   Date__________   Time____________           Treatment Note      Written Patient Dismissal Instructions Given            Electronically signed by ALISTAIR Sanchez CNP on 12/23/2021 at 2:01 PM

## 2021-12-29 ENCOUNTER — HOSPITAL ENCOUNTER (OUTPATIENT)
Dept: WOUND CARE | Age: 41
Discharge: HOME | End: 2021-12-29
Payer: MEDICARE

## 2021-12-29 VITALS — RESPIRATION RATE: 16 BRPM | TEMPERATURE: 98 F

## 2021-12-29 DIAGNOSIS — L02.411 ABSCESS OF AXILLA, RIGHT: Primary | ICD-10-CM

## 2021-12-29 PROCEDURE — 99213 OFFICE O/P EST LOW 20 MIN: CPT

## 2021-12-29 PROCEDURE — 99213 OFFICE O/P EST LOW 20 MIN: CPT | Performed by: NURSE PRACTITIONER

## 2021-12-29 RX ORDER — LIDOCAINE HYDROCHLORIDE 40 MG/ML
SOLUTION TOPICAL ONCE
Status: DISCONTINUED | OUTPATIENT
Start: 2021-12-29 | End: 2021-12-30 | Stop reason: HOSPADM

## 2021-12-29 RX ORDER — LIDOCAINE HYDROCHLORIDE 20 MG/ML
JELLY TOPICAL ONCE
Status: CANCELLED | OUTPATIENT
Start: 2021-12-29 | End: 2021-12-29

## 2021-12-29 RX ORDER — GINSENG 100 MG
CAPSULE ORAL ONCE
Status: CANCELLED | OUTPATIENT
Start: 2021-12-29 | End: 2021-12-29

## 2021-12-29 RX ORDER — BETAMETHASONE DIPROPIONATE 0.05 %
OINTMENT (GRAM) TOPICAL ONCE
Status: CANCELLED | OUTPATIENT
Start: 2021-12-29 | End: 2021-12-29

## 2021-12-29 RX ORDER — LIDOCAINE HYDROCHLORIDE 40 MG/ML
SOLUTION TOPICAL ONCE
Status: CANCELLED | OUTPATIENT
Start: 2021-12-29 | End: 2021-12-29

## 2021-12-29 RX ORDER — LIDOCAINE 40 MG/G
CREAM TOPICAL ONCE
Status: CANCELLED | OUTPATIENT
Start: 2021-12-29 | End: 2021-12-29

## 2021-12-29 RX ORDER — BACITRACIN, NEOMYCIN, POLYMYXIN B 400; 3.5; 5 [USP'U]/G; MG/G; [USP'U]/G
OINTMENT TOPICAL ONCE
Status: CANCELLED | OUTPATIENT
Start: 2021-12-29 | End: 2021-12-29

## 2021-12-29 RX ORDER — BACITRACIN ZINC AND POLYMYXIN B SULFATE 500; 1000 [USP'U]/G; [USP'U]/G
OINTMENT TOPICAL ONCE
Status: CANCELLED | OUTPATIENT
Start: 2021-12-29 | End: 2021-12-29

## 2021-12-29 RX ORDER — CLOBETASOL PROPIONATE 0.5 MG/G
OINTMENT TOPICAL ONCE
Status: CANCELLED | OUTPATIENT
Start: 2021-12-29 | End: 2021-12-29

## 2021-12-29 RX ORDER — GENTAMICIN SULFATE 1 MG/G
OINTMENT TOPICAL ONCE
Status: CANCELLED | OUTPATIENT
Start: 2021-12-29 | End: 2021-12-29

## 2021-12-29 RX ORDER — LIDOCAINE 50 MG/G
OINTMENT TOPICAL ONCE
Status: CANCELLED | OUTPATIENT
Start: 2021-12-29 | End: 2021-12-29

## 2021-12-29 NOTE — PROGRESS NOTES
Wound Care Center Progress Note       Marilyn Tse  AGE: 35 y.o. GENDER: female  : 1980  TODAY'S DATE:  2021        Subjective:     Chief Complaint   Patient presents with    Wound Check         HISTORY of PRESENT ILLNESS    Araceli Rahman is a 35 y.o. female who presents to the Wound Clinic for a visit for evaluation and treatment of Acute non-healing surgical and draining abscess  ulcer(s) of  Right axilla.  The condition is of moderate severity.   The underlying cause is thought to be folliculitis and subsequent abscess formation.  The patients care to date has included a trip to the ED on , where she was given bactrim and hibicleanse. She returned on , where a surgical I&D was performed and she was discharged with Cleocin. She came to clinic with 1/4inch packing in place. The patient has significant underlying medical conditions as below. Patient is a diabetic who takes both PO and SubQ medications. She is a former smoker, and is not on any blood thinners.     2021: Much improved today. Patient saw Dr. Mayra Sweeney last week, who did a more involved debridement in clinic and removed a large amount of necrotic tissue and drainage. Her dressing was changed Tuesday, and it is not saturated. Patient reports less pain, swelling, and drainage overall. Patient was prescribed x2 weeks of antibiotics, and has been taking as ordered    2021: Continued improvement. Little to no drainage this week and patient has been taking antibiotics as ordered. She is almost done with set.  May consider adding collagen this week     Wound Pain Timing/Severity: waxing and waning  Quality of pain: shooting, pressure  Severity of pain:  3 / 10   Modifying Factors: diabetes, poor glucose control and obesity  Associated Signs/Symptoms: edema, erythema, drainage, odor and pain        PAST MEDICAL HISTORY        Diagnosis Date    Allergic rhinitis     allergy shots- Dr. Katherine Garvey Allergic rhinitis      Aleta kwan and flonase    Anxiety 10/24/2012    Atopic dermatitis     Dr. Aleta kwan    Back pain 10/24/2012    Bilateral sciatica 2020    Depression     [facility]     Diabetes mellitus type II, uncontrolled ([facility]     Diabetic eye exam: 2011, Dr. Odessa Mesa , needs record [V72.0T][    GERD (gastroesophageal reflux disease)     Headache(784.0)     Hyperlipidemia LDL goal < 100 2012    Hypertension     Morbid obesity ([facility]     No retinopathy on exam     Dr Odessa Mesa 13 Diabetes with no significatn diabetic retinopathy found in both eyes    Non compliance w medication regimen 2014    Sprain of neck 10/4/2011       PAST SURGICAL HISTORY    Past Surgical History:   Procedure Laterality Date     SECTION      HYSTERECTOMY      OTHER SURGICAL HISTORY  2017    I&D of right perineal abcess     TOE AMPUTATION Right 2021    RIGHT 5TH TOE AMPUTATION performed by Day Keith MD at [address] [place] [address] History   Problem Relation Age of Onset    Heart Disease Mother     Diabetes Mother     Asthma Mother     High Cholesterol Mother     Depression Mother     Heart Disease Father     High Cholesterol Father        SOCIAL HISTORY    Social History     Tobacco Use    Smoking status: Former Smoker     Packs/day: 0.25     Types: Cigarettes     Quit date: 5/10/2021     Years since quittin.6    Smokeless tobacco: Never Used   Vaping Use    Vaping Use: Never used   Substance Use Topics    Alcohol use: Yes     Comment: rare    Drug use: No       ALLERGIES    Allergies   Allergen Reactions    Zosyn [Piperacillin Sod-Tazobactam So] Rash     Rash and severe \"being poked by needles all over\" feeling    Penicillins Rash       MEDICATIONS    Current Outpatient Medications on File Prior to Encounter   Medication Sig Dispense Refill    sulfamethoxazole-trimethoprim (BACTRIM DS;SEPTRA DS) 800-160 MG per tablet Take 1 tablet by mouth 2 times daily for 10 days 20 tablet 0    PREDNISONE PO Take by mouth      glipiZIDE (GLUCOTROL XL) 10 MG extended release tablet Take 1 tablet by mouth every morning 90 tablet 1    insulin glargine (LANTUS SOLOSTAR) 100 UNIT/ML injection pen Inject 15 Units into the skin nightly 5 pen 5    TRULICITY 1.5 EP/3.9AP SOPN INJECT ONE AND ONE-HALF (1 AND 1/2) MG UNDER THE SKIN ONCE A WEEK 2 pen 5    lisinopril (PRINIVIL;ZESTRIL) 2.5 MG tablet Take 1 tablet by mouth every morning 90 tablet 3    atorvastatin (LIPITOR) 40 MG tablet Take 1 tablet by mouth every morning 90 tablet 3    Lancets MISC To test up to 4 times per day- nighttime and with each meal 300 each 5    blood glucose test strips (EXACTECH TEST) strip 1 each by In Vitro route daily Embrace test strips to test BS 4x daily 300 each 6    Insulin Pen Needle 32G X 5 MM MISC To use with Lantus pen and Novolog pen 30 each 11     No current facility-administered medications on file prior to encounter. REVIEW OF SYSTEMS    Pertinent items are noted in HPI. Constitutional: Negative for systemic symptoms including fever, chills and malaise. Objective:      Temp 98 °F (36.7 °C) (Temporal)   Resp 16     PHYSICAL EXAM      General: The patient is in no acute distress. Mental status:  Patient is appropriate, is  oriented to place and plan of care. Dermatologic exam: Visual inspection of the periwound reveals the skin to be normal in turgor and texture.   Wound exam:  see wound description below     All active wounds listed below with today's date are evaluated      Wound 11/30/21 Brachial Right #1 ARMPIT (Active)   Wound Image   12/23/21 1345   Dressing Status Clean;Dry;New dressing applied 12/23/21 1404   Wound Cleansed Soap and water 12/29/21 1035   Offloading for Diabetic Foot Ulcers No offloading required 12/29/21 1035   Wound Length (cm) 1 cm 12/29/21 1035   Wound Width (cm) 0.7 cm 12/29/21 1035   Wound Depth (cm) 2.5 cm 12/29/21 1035   Wound Surface Area (cm^2) 0.7 cm^2 12/29/21 1035   Change in Wound Size % (l*w) -600 12/29/21 1035   Wound Volume (cm^3) 1.75 cm^3 12/29/21 1035   Wound Healing % -94 12/29/21 1035   Post-Procedure Length (cm) 2.5 cm 12/16/21 1417   Post-Procedure Width (cm) 3 cm 12/16/21 1417   Post-Procedure Depth (cm) 8.5 cm 12/16/21 1417   Post-Procedure Surface Area (cm^2) 7.5 cm^2 12/16/21 1417   Post-Procedure Volume (cm^3) 63.75 cm^3 12/16/21 1417   Distance Tunneling (cm) 6.5 cm 12/29/21 1035   Tunneling Position ___ O'Clock 2 12/29/21 1035   Undermining Starts ___ O'Clock 0 12/29/21 1035   Undermining Ends___ O'Clock 0 12/29/21 1035   Undermining Maxium Distance (cm) 0 12/29/21 1035   Wound Assessment Granulation tissue 12/29/21 1035   Drainage Amount Moderate 12/29/21 1035   Drainage Description Serosanguinous 12/29/21 1035   Odor None 12/29/21 1035   Yamilex-wound Assessment Intact 12/29/21 1035   Margins Defined edges 12/29/21 1035   Wound Thickness Description not for Pressure Injury Full thickness 12/29/21 1035   Number of days: 28       Assessment:       Problem List Items Addressed This Visit     WD-Abscess of axilla, right - Primary    Relevant Medications    lidocaine (XYLOCAINE) 4 % external solution (Start on 12/29/2021 11:00 AM)    Other Relevant Orders    Initiate Outpatient Wound Care Protocol          Status of wound progress and description from last visit:   Improving. Adding collagen and we will focus on reducing depth and making sure wound is filling in with good, clean tissue. Continue to monitor and follow up 1 week         Plan:     Discharge Instructions       [address]     NOTE: Upon discharge from the [address], you will receive a patient experience survey via E-mail.  We would be grateful if you would take the time to fill this survey out.     Wound care order history:                 Cultures: 11/30/21.               Antibiotics: .Bactrim Balaji Bob              HbA1c:  .10/26/21 10.2               Compression/Lymph Pumps: .              Grafts:  .              Emmie: .                Continuing wound care orders and information:              Residence: .[facility] [address] [facility]              Your wound-care supplies will be provided by: . [facility]              Pharmacy: .                            OQSTX cleansing:                           DH not scrub or use excessive force.                          Wash hands with soap and water before and after dressing changes.                         Prior to applying a clean dressing, cleanse wound with normal saline,                          wound cleanser, or mild soap and water.                           Ask your physician or nurse before getting the wound(s) wet in the shower.              Daily Wound management:                          Keep weight off wounds and reposition every 2 hours.                          JXTCN standing for long periods of time.                          YIUYP wraps/stockings in AM and remove at bedtime.                          Elevate legs to the level of the heart or above for 30 minutes 4-5 times a day and/or when sitting.                                               When taking antibiotics take entire prescription as ordered by MD do not stop taking until medicine is all gone.                                                                 Orders for this week (12/29/21): Right arm- Wash with mild soap and water, rinse with saline, pat dry with 4x4  A&D to Arm   Gently pack with 1 in iodoform gauze (primary) sprinkled with stimulen   Cover with ABD  Change Daily     Follow up with Christie Lo CNP in 1 weeks in the wound care center  Call [phone] for any questions or concerns.   Date__________   Time____________           Treatment Note      Written Patient Dismissal Instructions Given            Electronically signed by Madelene Cogan, APRN - CNP on 12/29/2021 at 10:50 AM

## 2022-01-05 ENCOUNTER — HOSPITAL ENCOUNTER (OUTPATIENT)
Dept: WOUND CARE | Age: 42
Discharge: HOME | End: 2022-01-05
Payer: MEDICARE

## 2022-01-05 VITALS
HEART RATE: 87 BPM | DIASTOLIC BLOOD PRESSURE: 89 MMHG | RESPIRATION RATE: 16 BRPM | TEMPERATURE: 96.9 F | SYSTOLIC BLOOD PRESSURE: 120 MMHG

## 2022-01-05 DIAGNOSIS — L02.411 ABSCESS OF AXILLA, RIGHT: Primary | ICD-10-CM

## 2022-01-05 PROCEDURE — 17250 CHEM CAUT OF GRANLTJ TISSUE: CPT

## 2022-01-05 PROCEDURE — 17250 CHEM CAUT OF GRANLTJ TISSUE: CPT | Performed by: NURSE PRACTITIONER

## 2022-01-05 RX ORDER — BACITRACIN ZINC AND POLYMYXIN B SULFATE 500; 1000 [USP'U]/G; [USP'U]/G
OINTMENT TOPICAL ONCE
Status: CANCELLED | OUTPATIENT
Start: 2022-01-05 | End: 2022-01-05

## 2022-01-05 RX ORDER — BACITRACIN, NEOMYCIN, POLYMYXIN B 400; 3.5; 5 [USP'U]/G; MG/G; [USP'U]/G
OINTMENT TOPICAL ONCE
Status: CANCELLED | OUTPATIENT
Start: 2022-01-05 | End: 2022-01-05

## 2022-01-05 RX ORDER — GINSENG 100 MG
CAPSULE ORAL ONCE
Status: CANCELLED | OUTPATIENT
Start: 2022-01-05 | End: 2022-01-05

## 2022-01-05 RX ORDER — BETAMETHASONE DIPROPIONATE 0.05 %
OINTMENT (GRAM) TOPICAL ONCE
Status: CANCELLED | OUTPATIENT
Start: 2022-01-05 | End: 2022-01-05

## 2022-01-05 RX ORDER — LIDOCAINE HYDROCHLORIDE 40 MG/ML
SOLUTION TOPICAL ONCE
Status: DISCONTINUED | OUTPATIENT
Start: 2022-01-05 | End: 2022-01-06 | Stop reason: HOSPADM

## 2022-01-05 RX ORDER — LIDOCAINE 50 MG/G
OINTMENT TOPICAL ONCE
Status: CANCELLED | OUTPATIENT
Start: 2022-01-05 | End: 2022-01-05

## 2022-01-05 RX ORDER — LIDOCAINE HYDROCHLORIDE 40 MG/ML
SOLUTION TOPICAL ONCE
Status: CANCELLED | OUTPATIENT
Start: 2022-01-05 | End: 2022-01-05

## 2022-01-05 RX ORDER — LIDOCAINE 40 MG/G
CREAM TOPICAL ONCE
Status: CANCELLED | OUTPATIENT
Start: 2022-01-05 | End: 2022-01-05

## 2022-01-05 RX ORDER — GENTAMICIN SULFATE 1 MG/G
OINTMENT TOPICAL ONCE
Status: CANCELLED | OUTPATIENT
Start: 2022-01-05 | End: 2022-01-05

## 2022-01-05 RX ORDER — CLOBETASOL PROPIONATE 0.5 MG/G
OINTMENT TOPICAL ONCE
Status: CANCELLED | OUTPATIENT
Start: 2022-01-05 | End: 2022-01-05

## 2022-01-05 RX ORDER — LIDOCAINE HYDROCHLORIDE 20 MG/ML
JELLY TOPICAL ONCE
Status: CANCELLED | OUTPATIENT
Start: 2022-01-05 | End: 2022-01-05

## 2022-01-05 NOTE — PROGRESS NOTES
Wound Care Center Progress Note and Chemical Cautery      Marilyn Rahman  AGE: 35 y.o. GENDER: female  : 1980  EPISODE DATE:  2022     Subjective:     Chief Complaint   Patient presents with    Wound Check     R armpit          HISTORY of PRESENT ILLNESS      Marilyn Rahman is a 35 y.o. female who presents to the Wound Clinic for a visit for evaluation and treatment of Acute non-healing surgical and draining abscess  ulcer(s) of  Right axilla.  The condition is of moderate severity.   The underlying cause is thought to be folliculitis and subsequent abscess formation.  The patients care to date has included a trip to the ED on , where she was given bactrim and hibicleanse. She returned on , where a surgical I&D was performed and she was discharged with Cleocin. She came to clinic with 1/4inch packing in place. The patient has significant underlying medical conditions as below. Patient is a diabetic who takes both PO and SubQ medications. She is a former smoker, and is not on any blood thinners.     Patient is improved in depth and tunneling today. Wound is clean and dry, and patient reports little to no drainage.   There is some hypergranulation coming out from wound on inspection     Wound Pain Timing/Severity: waxing and waning  Quality of pain: shooting, pressure  Severity of pain:  3 / 10   Modifying Factors: diabetes, poor glucose control and obesity  Associated Signs/Symptoms: edema, erythema, drainage, odor and pain          PAST MEDICAL HISTORY        Diagnosis Date    Allergic rhinitis     allergy shots- Dr. Marilee Vail Allergic rhinitis     Dr. Prudence Schlatter- claritin and flonase    Anxiety 10/24/2012    Atopic dermatitis     Dr. Prudence Schlatter- claritin    Back pain 10/24/2012    Bilateral sciatica 2020    Depression     Diabetes mellitus ([address]     Diabetes mellitus type II, uncontrolled ([address]     Diabetic eye exam: 2011, Dr. Sonja Willams , needs record [V72.0T][    GERD (gastroesophageal reflux disease)     Headache(784.0)     Hyperlipidemia LDL goal < 100 2012    Hypertension     Morbid obesity ([address]     No retinopathy on exam     Dr Sage Christina 13 Diabetes with no significatn diabetic retinopathy found in both eyes    Non compliance w medication regimen 2014    Sprain of neck 10/4/2011       PAST SURGICAL HISTORY    Past Surgical History:   Procedure Laterality Date     SECTION      HYSTERECTOMY      OTHER SURGICAL HISTORY  2017    I&D of right perineal abcess     TOE AMPUTATION Right 2021    RIGHT 5TH TOE AMPUTATION performed by Narciso Segal MD at [facility] History   Problem Relation Age of Onset    Heart Disease Mother     Diabetes Mother     Asthma Mother     High Cholesterol Mother     Depression Mother     Heart Disease Father     High Cholesterol Father        SOCIAL HISTORY    Social History     Tobacco Use    Smoking status: Former Smoker     Packs/day: 0.25     Types: Cigarettes     Quit date: 5/10/2021     Years since quittin.6    Smokeless tobacco: Never Used   Vaping Use    Vaping Use: Never used   Substance Use Topics    Alcohol use: Yes     Comment: rare    Drug use: No       ALLERGIES    Allergies   Allergen Reactions    Zosyn [Piperacillin Sod-Tazobactam So] Rash     Rash and severe \"being poked by needles all over\" feeling    Penicillins Rash       MEDICATIONS    Current Outpatient Medications on File Prior to Encounter   Medication Sig Dispense Refill    PREDNISONE PO Take by mouth      glipiZIDE (GLUCOTROL XL) 10 MG extended release tablet Take 1 tablet by mouth every morning 90 tablet 1    insulin glargine (LANTUS SOLOSTAR) 100 UNIT/ML injection pen Inject 15 Units into the skin nightly 5 pen 5    TRULICITY 1.5 NF/6.7GQ SOPN INJECT ONE AND ONE-HALF (1 AND 1/2) MG UNDER THE SKIN ONCE A WEEK 2 pen 5    lisinopril (PRINIVIL;ZESTRIL) 2.5 MG tablet Take 1 tablet by mouth every morning 90 tablet 3    atorvastatin (LIPITOR) 40 MG tablet Take 1 tablet by mouth every morning 90 tablet 3    Lancets MISC To test up to 4 times per day- nighttime and with each meal 300 each 5    blood glucose test strips (EXACTECH TEST) strip 1 each by In Vitro route daily Embrace test strips to test BS 4x daily 300 each 6    Insulin Pen Needle 32G X 5 MM MISC To use with Lantus pen and Novolog pen 30 each 11     No current facility-administered medications on file prior to encounter. REVIEW OF SYSTEMS    Pertinent items are noted in HPI. Constitutional: Negative for systemic symptoms including fever, chills and malaise. Objective:      /89   Pulse 87   Temp 96.9 °F (36.1 °C) (Temporal)   Resp 16     PHYSICAL EXAM      General: The patient is in no acute distress. Mental status:  Patient is appropriate, is  oriented to place and plan of care. Dermatologic exam: Visual inspection of the periwound reveals the skin to be normal in turgor and texture and dry  Wound exam: see wound description below in procedure note      Assessment:     Problem List Items Addressed This Visit     WD-Abscess of axilla, right - Primary    Relevant Medications    lidocaine (XYLOCAINE) 4 % external solution (Start on 1/5/2022 11:00 AM)    Other Relevant Orders    Initiate Outpatient Wound Care Protocol            Chemical Cautery    Performed by: ALISTAIR Espinal CNP    Consent obtained?  Yes    Time out taken: Yes    Tissue:  [x] Hypergranulation   [] Hyperkeratotic      Pain Control: Anesthetic: 4% Lidocaine Liquid Topical     Method: silver nitrate    Location of Chemical Cautery:  Wound Number(s)   Wound #1     Wound 11/30/21 Brachial Right #1 ARMPIT (Active)   Wound Image   12/23/21 1345   Dressing Status New dressing applied 01/05/22 1043   Wound Cleansed Soap and water;Cleansed with saline 01/05/22 1035   Offloading for Diabetic Foot Ulcers No offloading required 01/05/22 1035   Wound Length (cm) 1.4 cm 01/05/22 1035   Wound Width (cm) 0.4 cm 01/05/22 1035   Wound Depth (cm) 2 cm 01/05/22 1035   Wound Surface Area (cm^2) 0.56 cm^2 01/05/22 1035   Change in Wound Size % (l*w) -460 01/05/22 1035   Wound Volume (cm^3) 1.12 cm^3 01/05/22 1035   Wound Healing % -24 01/05/22 1035   Post-Procedure Length (cm) 2.5 cm 12/16/21 1417   Post-Procedure Width (cm) 3 cm 12/16/21 1417   Post-Procedure Depth (cm) 8.5 cm 12/16/21 1417   Post-Procedure Surface Area (cm^2) 7.5 cm^2 12/16/21 1417   Post-Procedure Volume (cm^3) 63.75 cm^3 12/16/21 1417   Distance Tunneling (cm) 4 cm 01/05/22 1035   Tunneling Position ___ O'Clock 2 01/05/22 1035   Undermining Starts ___ O'Clock 0 01/05/22 1035   Undermining Ends___ O'Clock 0 01/05/22 1035   Undermining Maxium Distance (cm) 0 01/05/22 1035   Wound Assessment Granulation tissue 01/05/22 1035   Drainage Amount Moderate 01/05/22 1035   Drainage Description Sanguinous; Serosanguinous 01/05/22 1035   Odor None 01/05/22 1035   Yamilex-wound Assessment Intact 01/05/22 1035   Margins Defined edges 01/05/22 1035   Wound Thickness Description not for Pressure Injury Full thickness 01/05/22 1035   Number of days: 35       Procedural Pain: 0  / 10     Post Procedural Pain: 0 / 10     Response to treatment: Well tolerated by patient. Status of wound progress and description from last visit:   Improving. We will see how the tissue looks next week after applying silver nitrate. We may need to cut this tissue away if not improved. Chemical cautery this week was a more conservative treatment. Plan:     Discharge instructions:  Discharge Instructions       PHYSICIAN ORDERS AND DISCHARGE INSTRUCTIONS     NOTE: [facility] you will receive a patient experience survey via E-mail.  We would be grateful if you would take the time to fill this survey out.     Wound care order history:                 Cultures: 11/30/21.               Antibiotics: .Bactrim Britni Mode              HbA1c:  .10/26/21 10.2               Compression/Lymph Pumps: .              Grafts:  .              Emmie: .                Continuing wound care orders and information:              Residence: .[facility] [address]              Your wound-care supplies will be provided by: . [id number]              Pharmacy: .                            TGDAI cleansing:                           ZG not scrub or use excessive force.                          Wash hands with soap and water before and after dressing changes.                         Prior to applying a clean dressing, cleanse wound with normal saline,                          wound cleanser, or mild soap and water.                           Ask your physician or nurse before getting the wound(s) wet in the shower.              Daily Wound management:                          Keep weight off wounds and reposition every 2 hours.                          BGGQM standing for long periods of time.                          Apply wraps/stockings in AM and remove at bedtime.                          Elevate legs to the level of the heart or above for 30 minutes 4-5 times a day and/or when sitting.                                               When taking antibiotics take entire prescription as ordered by MD do not stop taking until medicine is all gone.                                                                 Orders for this week (1/5/22): Right arm- Wash with mild soap and water, rinse with saline, pat dry with 4x4  A&D to Arm   Gently pack with 1/4 in iodoform gauze (primary) sprinkled with stimulen   Cover with ABD  Change Daily     Follow up with Jordyn Mars CNP in 1 weeks in the wound care center  Call [phone] for any questions or concerns.   Date__________   Time____________           Treatment Note Wound 11/30/21 Brachial Right #1 ARMPIT-Dressing/Treatment:  (iodoform;stimulen;abd)    Written Patient Dismissal Instructions Given         Electronically signed by ALISTAIR Amador CNP on 1/5/2022 at 10:45 AM

## 2022-01-12 ENCOUNTER — HOSPITAL ENCOUNTER (OUTPATIENT)
Dept: WOUND CARE | Age: 42
Discharge: HOME | End: 2022-01-12
Payer: MEDICARE

## 2022-01-12 VITALS
TEMPERATURE: 96.9 F | RESPIRATION RATE: 17 BRPM | HEART RATE: 77 BPM | DIASTOLIC BLOOD PRESSURE: 74 MMHG | SYSTOLIC BLOOD PRESSURE: 108 MMHG

## 2022-01-12 DIAGNOSIS — L02.411 ABSCESS OF AXILLA, RIGHT: Primary | ICD-10-CM

## 2022-01-12 PROCEDURE — 99213 OFFICE O/P EST LOW 20 MIN: CPT | Performed by: NURSE PRACTITIONER

## 2022-01-12 PROCEDURE — 99213 OFFICE O/P EST LOW 20 MIN: CPT

## 2022-01-12 NOTE — PROGRESS NOTES
Wound Care Center Progress Note       Marilyn Rahman  AGE: 35 y.o. GENDER: female  : 1980  TODAY'S DATE:  2022        Subjective:     Chief Complaint   Patient presents with    Wound Check         HISTORY of PRESENT ILLNESS    Miracle Rahman is a 35 y.o. female who presents to the Wound Clinic for a visit for evaluation and treatment of Acute non-healing surgical and draining abscess  ulcer(s) of  Right axilla.  The condition is of moderate severity.   The underlying cause is thought to be folliculitis and subsequent abscess formation.  The patients care to date has included a trip to the ED on , where she was given bactrim and hibicleanse. She returned on , where a surgical I&D was performed and she was discharged with Cleocin. She came to clinic with 1/4inch packing in place. The patient has significant underlying medical conditions as below. Patient is a diabetic who takes both PO and SubQ medications. She is a former smoker, and is not on any blood thinners.     Patient wound continues to improve and wound appears clean and dry. There is still depth and tunneling present however, even as wound opening continues to close.    We will need to focus on collagen therapy this week to help tunnel close     Wound Pain Timing/Severity: waxing and waning  Quality of pain: shooting, pressure  Severity of pain:  3 / 10   Modifying Factors: diabetes, poor glucose control and obesity  Associated Signs/Symptoms: edema, erythema, drainage, odor and pain        PAST MEDICAL HISTORY        Diagnosis Date    Allergic rhinitis     allergy shots- Dr. Clari Soni Allergic rhinitis     Dr. Emerita kwan and flonase    Anxiety 10/24/2012    Atopic dermatitis     Dr. Emerita kwan    Back pain 10/24/2012    Bilateral sciatica 2020    Depression     Diabetes mellitus ([address]     Diabetes mellitus type II, uncontrolled ([address]     Diabetic eye exam: 2011, Dr. Wendy Bella , needs record [V72.0T][    GERD (gastroesophageal reflux disease)     Headache(784.0)     Hyperlipidemia LDL goal < 100 2012    Hypertension     Morbid obesity ([address]     No retinopathy on exam     Dr Joyce Christopher 13 Diabetes with no significatn diabetic retinopathy found in both eyes    Non compliance w medication regimen 2014    Sprain of neck 10/4/2011       PAST SURGICAL HISTORY    Past Surgical History:   Procedure Laterality Date     SECTION      HYSTERECTOMY      OTHER SURGICAL HISTORY  2017    I&D of right perineal abcess     TOE AMPUTATION Right 2021    RIGHT 5TH TOE AMPUTATION performed [facility] at  [facility] History   Problem Relation Age of Onset    Heart Disease Mother     Diabetes Mother     Asthma Mother     High Cholesterol Mother     Depression Mother     Heart Disease Father     High Cholesterol Father        SOCIAL HISTORY    Social History     Tobacco Use    Smoking status: Former Smoker     Packs/day: 0.25     Types: Cigarettes     Quit date: 5/10/2021     Years since quittin.6    Smokeless tobacco: Never Used   Vaping Use    Vaping Use: Never used   Substance Use Topics    Alcohol use: Yes     Comment: rare    Drug use: No       ALLERGIES    Allergies   Allergen Reactions    Zosyn [Piperacillin Sod-Tazobactam So] Rash     Rash and severe \"being poked by needles all over\" feeling    Penicillins Rash       MEDICATIONS    Current Outpatient Medications on File Prior to Encounter   Medication Sig Dispense Refill    PREDNISONE PO Take by mouth      glipiZIDE (GLUCOTROL XL) 10 MG extended release tablet Take 1 tablet by mouth every morning 90 tablet 1    insulin glargine (LANTUS SOLOSTAR) 100 UNIT/ML injection pen Inject 15 Units into the skin nightly 5 pen 5    TRULICITY 1.5 KU/1.8GW SOPN INJECT ONE AND ONE-HALF (1 AND 1/2) MG UNDER THE SKIN ONCE A WEEK 2 pen 5    lisinopril (PRINIVIL;ZESTRIL) 2.5 MG tablet Take 1 tablet by mouth every morning 90 tablet 3    atorvastatin (LIPITOR) 40 MG tablet Take 1 tablet by mouth every morning 90 tablet 3    Lancets MISC To test up to 4 times per day- nighttime and with each meal 300 each 5    blood glucose test strips (EXACTECH TEST) strip 1 each by In Vitro route daily Embrace test strips to test BS 4x daily 300 each 6    Insulin Pen Needle 32G X 5 MM MISC To use with Lantus pen and Novolog pen 30 each 11     No current facility-administered medications on file prior to encounter. REVIEW OF SYSTEMS    Pertinent items are noted in HPI. Constitutional: Negative for systemic symptoms including fever, chills and malaise. Objective:      /74   Pulse 77   Temp 96.9 °F (36.1 °C) (Temporal)   Resp 17     PHYSICAL EXAM      General: The patient is in no acute distress. Mental status:  Patient is appropriate, is  oriented to place and plan of care. Dermatologic exam: Visual inspection of the periwound reveals the skin to be normal in turgor and texture and dry.   Wound exam:  see wound description below     All active wounds listed below with today's date are evaluated      Wound 11/30/21 Brachial Right #1 ARMPIT (Active)   Wound Image   12/23/21 1345   Dressing Status New dressing applied 01/05/22 1043   Wound Cleansed Wound cleanser 01/12/22 1030   Offloading for Diabetic Foot Ulcers No offloading required 01/12/22 1030   Wound Length (cm) 0.7 cm 01/12/22 1030   Wound Width (cm) 0.7 cm 01/12/22 1030   Wound Depth (cm) 2.4 cm 01/12/22 1030   Wound Surface Area (cm^2) 0.49 cm^2 01/12/22 1030   Change in Wound Size % (l*w) -390 01/12/22 1030   Wound Volume (cm^3) 1.176 cm^3 01/12/22 1030   Wound Healing % -31 01/12/22 1030   Post-Procedure Length (cm) 2.5 cm 12/16/21 1417   Post-Procedure Width (cm) 3 cm 12/16/21 1417   Post-Procedure Depth (cm) 8.5 cm 12/16/21 1417   Post-Procedure Surface Area (cm^2) 7.5 cm^2 12/16/21 1417   Post-Procedure Volume (cm^3) 63.75 cm^3 12/16/21 1417   Distance Tunneling (cm) 5.7 cm 01/12/22 1030   Tunneling Position ___ O'Clock 2 01/12/22 1030   Undermining Starts ___ O'Clock 0 01/12/22 1030   Undermining Ends___ O'Clock 0 01/12/22 1030   Undermining Maxium Distance (cm) 0 01/12/22 1030   Wound Assessment Granulation tissue 01/12/22 1030   Drainage Amount Moderate 01/12/22 1030   Drainage Description Serosanguinous 01/12/22 1030   Odor None 01/12/22 1030   Yamilex-wound Assessment Intact 01/12/22 1030   Margins Defined edges 01/12/22 1030   Wound Thickness Description not for Pressure Injury Full thickness 01/12/22 1030   Number of days: 42       Assessment:       Problem List Items Addressed This Visit     WD-Abscess of axilla, right - Primary          Status of wound progress and description from last visit:   Improving. Focus on improving depth going forward. No signs or symptoms  Of infection at this time. Plan:     Discharge Instructions          PHYSICIAN ORDERS AND DISCHARGE INSTRUCTIONS     NOTE: Upon discharge from the [address], you will receive a patient experience survey via E-mail. We would be grateful if you would take the time to fill this survey out.     Wound care order history:                 Cultures: 11/30/21.               Antibiotics: .Bactrim                 HbA1c:  .10/26/21 10.2               Compression/Lymph Pumps: .              Grafts:  .              Emmie: .                Continuing wound care orders and information:              Residence: .[facility] care [address]              Your wound-care supplies will be provided by: . [address]              Pharmacy: .                            NWWNO cleansing:                           RN not scrub or use excessive force.                          Wash hands with soap and water before and after dressing changes.                           Prior to applying a clean dressing, cleanse wound with normal saline,                          wound cleanser, or mild soap and water.                           Ask your physician or nurse before getting the wound(s) wet in the shower.              Daily Wound management:                          Keep weight off wounds and reposition every 2 hours.                          FLMDY standing for long periods of time.                          Apply wraps/stockings in AM and remove at bedtime.                          Elevate legs to the level of the heart or above for 30 minutes 4-5 times a day and/or when sitting.                                               When taking antibiotics take entire prescription as ordered by MD do not stop taking until medicine is all gone.                                                                 Orders for this week (1/12/22): Right arm- Wash with mild soap and water, rinse with saline, pat dry with 4x4  A&D to Arm   Gently pack with ludivina  Cover with ABD  Change Daily     Follow up with Cezar Liao CNP in 1 weeks in the wound care center  Call [phone] for any questions or concerns.   Date__________   Time____________             Treatment Note      Written Patient Dismissal Instructions Given            Electronically signed by ALISTAIR Rodriguez CNP on 1/12/2022 at 10:41 AM

## 2022-01-17 ENCOUNTER — TELEPHONE (OUTPATIENT)
Dept: FAMILY MEDICINE CLINIC | Age: 42
End: 2022-01-17

## 2022-01-17 ENCOUNTER — OFFICE VISIT (OUTPATIENT)
Dept: FAMILY MEDICINE CLINIC | Age: 42
End: 2022-01-17
Payer: MEDICARE

## 2022-01-17 VITALS
SYSTOLIC BLOOD PRESSURE: 126 MMHG | BODY MASS INDEX: 40.19 KG/M2 | WEIGHT: 218.4 LBS | DIASTOLIC BLOOD PRESSURE: 80 MMHG | HEART RATE: 92 BPM | HEIGHT: 62 IN | OXYGEN SATURATION: 98 %

## 2022-01-17 DIAGNOSIS — E11.69 HYPERLIPIDEMIA ASSOCIATED WITH TYPE 2 DIABETES MELLITUS (HCC): ICD-10-CM

## 2022-01-17 DIAGNOSIS — Z89.421 STATUS POST AMPUTATION OF LESSER TOE OF RIGHT FOOT (HCC): ICD-10-CM

## 2022-01-17 DIAGNOSIS — E66.01 CLASS 2 SEVERE OBESITY DUE TO EXCESS CALORIES WITH SERIOUS COMORBIDITY AND BODY MASS INDEX (BMI) OF 37.0 TO 37.9 IN ADULT (HCC): ICD-10-CM

## 2022-01-17 DIAGNOSIS — K21.9 GASTROESOPHAGEAL REFLUX DISEASE WITHOUT ESOPHAGITIS: ICD-10-CM

## 2022-01-17 DIAGNOSIS — G89.29 CHRONIC BILATERAL LOW BACK PAIN WITH BILATERAL SCIATICA: ICD-10-CM

## 2022-01-17 DIAGNOSIS — F41.9 ANXIETY: ICD-10-CM

## 2022-01-17 DIAGNOSIS — M54.41 CHRONIC BILATERAL LOW BACK PAIN WITH BILATERAL SCIATICA: ICD-10-CM

## 2022-01-17 DIAGNOSIS — E78.5 HYPERLIPIDEMIA ASSOCIATED WITH TYPE 2 DIABETES MELLITUS (HCC): ICD-10-CM

## 2022-01-17 DIAGNOSIS — M54.42 CHRONIC BILATERAL LOW BACK PAIN WITH BILATERAL SCIATICA: ICD-10-CM

## 2022-01-17 DIAGNOSIS — I10 ESSENTIAL HYPERTENSION: ICD-10-CM

## 2022-01-17 DIAGNOSIS — M25.551 RIGHT HIP PAIN: ICD-10-CM

## 2022-01-17 DIAGNOSIS — Z00.00 ROUTINE GENERAL MEDICAL EXAMINATION AT A HEALTH CARE FACILITY: Primary | ICD-10-CM

## 2022-01-17 DIAGNOSIS — L02.411 ABSCESS OF AXILLA, RIGHT: ICD-10-CM

## 2022-01-17 DIAGNOSIS — I73.9 PVD (PERIPHERAL VASCULAR DISEASE) (HCC): ICD-10-CM

## 2022-01-17 DIAGNOSIS — F17.200 SMOKER: ICD-10-CM

## 2022-01-17 PROBLEM — E11.621 DIABETIC ULCER OF TOE OF RIGHT FOOT ASSOCIATED WITH TYPE 2 DIABETES MELLITUS, WITH FAT LAYER EXPOSED (HCC): Status: RESOLVED | Noted: 2021-07-08 | Resolved: 2022-01-17

## 2022-01-17 PROBLEM — L97.512 DIABETIC ULCER OF TOE OF RIGHT FOOT ASSOCIATED WITH TYPE 2 DIABETES MELLITUS, WITH FAT LAYER EXPOSED (HCC): Status: RESOLVED | Noted: 2021-07-08 | Resolved: 2022-01-17

## 2022-01-17 LAB — HBA1C MFR BLD: 8.7 %

## 2022-01-17 PROCEDURE — 99214 OFFICE O/P EST MOD 30 MIN: CPT | Performed by: NURSE PRACTITIONER

## 2022-01-17 PROCEDURE — 3046F HEMOGLOBIN A1C LEVEL >9.0%: CPT | Performed by: NURSE PRACTITIONER

## 2022-01-17 PROCEDURE — G8482 FLU IMMUNIZE ORDER/ADMIN: HCPCS | Performed by: NURSE PRACTITIONER

## 2022-01-17 PROCEDURE — G0439 PPPS, SUBSEQ VISIT: HCPCS | Performed by: NURSE PRACTITIONER

## 2022-01-17 RX ORDER — DULAGLUTIDE 3 MG/.5ML
3 INJECTION, SOLUTION SUBCUTANEOUS WEEKLY
Qty: 4 PEN | Refills: 3 | Status: SHIPPED | OUTPATIENT
Start: 2022-01-17 | End: 2022-04-19 | Stop reason: SDUPTHER

## 2022-01-17 RX ORDER — DULOXETIN HYDROCHLORIDE 30 MG/1
30 CAPSULE, DELAYED RELEASE ORAL DAILY
Qty: 30 CAPSULE | Refills: 1 | Status: SHIPPED | OUTPATIENT
Start: 2022-01-17 | End: 2022-04-19 | Stop reason: SDUPTHER

## 2022-01-17 ASSESSMENT — PATIENT HEALTH QUESTIONNAIRE - PHQ9
SUM OF ALL RESPONSES TO PHQ QUESTIONS 1-9: 0
SUM OF ALL RESPONSES TO PHQ QUESTIONS 1-9: 0
SUM OF ALL RESPONSES TO PHQ9 QUESTIONS 1 & 2: 0
1. LITTLE INTEREST OR PLEASURE IN DOING THINGS: 0
2. FEELING DOWN, DEPRESSED OR HOPELESS: 0
SUM OF ALL RESPONSES TO PHQ QUESTIONS 1-9: 0
SUM OF ALL RESPONSES TO PHQ QUESTIONS 1-9: 0

## 2022-01-17 ASSESSMENT — LIFESTYLE VARIABLES: HOW OFTEN DO YOU HAVE A DRINK CONTAINING ALCOHOL: 0

## 2022-01-17 NOTE — PATIENT INSTRUCTIONS
Lower back and right hip pain   Dr Nancy Arreguin   [phone]       Learning About [address]  What is a medical power of ? A medical power of , also called a durable power of  for health care, is one type of the legal forms called advance directives. It lets you name the person you want to make treatment decisions for you if you can't speak or decide for yourself. The person you choose is called your health care agent. This person is also called a health care proxy or health care surrogate. A medical power of  may be called something else in your state. How do you choose a health care agent? Choose your health care agent carefully. This person may or may not be a family member. Talk to the person before you make your final decision. Make sure he or she is comfortable with this responsibility. It's a good idea to choose someone who:  · Is at least 25years old. · Knows you well and understands what makes life meaningful for you. · Understands your [employer] and moral values. · Will do what you want, not what he or she wants. · Will be able to make difficult choices at a stressful time. · Will be able to refuse or stop treatment, if that is what you would want, even if you could die. · Will be firm and confident with health professionals if needed. · Will ask questions to get needed information. · Lives near you or agrees to travel to you if needed. Your family may help you make medical decisions while you can still be part of that process. But it's important to choose one person to be your health care agent in case you aren't able to make decisions for yourself. If you don't fill out the legal form and name a health care agent, the decisions your family can make may be limited. A health care agent may be called something else in your state. Who will make decisions for you if you don't have a health care agent?   If you don't have a health care agent or a living will, you may not get the care you want. Decisions may be made by family members who disagree about your medical care. Or decisions may be made by a medical professional who doesn't know you well. In some cases, a  makes the decisions. When you name a health care agent, it is very clear who has the power to make health decisions for you. How do you name a health care agent? You name your health care agent on a legal form. This form is usually called a medical power of . Ask your hospital, state bar association, or office on aging where to find these forms. You must sign the form to make it legal. Some states require you to get the form notarized. This means that a person called a  watches you sign the form and then he or she signs the form. Some states also require that two or more witnesses sign the form. Be sure to tell your family members and doctors who your health care agent is. Where can you learn more? Go to https://SoftWriters Holdingspemarieweb.Vericant. org and sign in to your [employer] account. Enter [id number] in the [employer] box to learn more about \"Learning About [address]. \"     If you do not have an account, please click on the \"Sign Up Now\" link. Current as of: March 17, 2021               Content Version: 13.1  © 7381-6373 Healthwise, Incorporated. Care instructions adapted under license by [facility] ([facility]). If you have questions about a medical condition or this instruction, always ask your healthcare professional. [place] [address] any warranty or liability for your use of this information. DASH Diet: Care Instructions  Your Care Instructions     The DASH diet is an eating plan that can help lower your blood pressure. DASH stands for Dietary Approaches to Stop Hypertension. Hypertension is high blood pressure. The DASH diet focuses on eating foods that are high in calcium, potassium, and magnesium.  These nutrients can and split peas) each week. A serving is 1/3 cup of nuts, 2 tablespoons of seeds, or ½ cup of cooked beans or peas. · Limit fats and oils to 2 to 3 servings each day. A serving is 1 teaspoon of vegetable oil or 2 tablespoons of salad dressing. · Limit sweets and added sugars to 5 servings or less a week. A serving is 1 tablespoon jelly or jam, ½ cup sorbet, or 1 cup of lemonade. · Eat less than 2,300 milligrams (mg) of sodium a day. If you limit your sodium to 1,500 mg a day, you can lower your blood pressure even more. · Be aware that all of these are the suggested number of servings for people who eat 1,800 to 2,000 calories a day. Your recommended number of servings may be different if you need more or fewer calories. Tips for success  · Start small. Do not try to make dramatic changes to your diet all at once. You might feel that you are missing out on your favorite foods and then be more likely to not follow the plan. Make small changes, and stick with them. Once those changes become habit, add a few more changes. · Try some of the following:  ? Make it a goal to eat a fruit or vegetable at every meal and at snacks. This will make it easy to get the recommended amount of fruits and vegetables each day. ? Try yogurt topped with fruit and nuts for a snack or healthy dessert. ? Add lettuce, tomato, cucumber, and onion to sandwiches. ? Combine a ready-made pizza crust with low-fat mozzarella cheese and lots of vegetable toppings. Try using tomatoes, squash, spinach, broccoli, carrots, cauliflower, and onions. ? Have a variety of cut-up vegetables with a low-fat dip as an appetizer instead of chips and dip. ? Sprinkle sunflower seeds or chopped almonds over salads. Or try adding chopped walnuts or almonds to cooked vegetables. ? Try some vegetarian meals using beans and peas. Add garbanzo or kidney beans to salads. Make burritos and tacos with mashed nelson beans or black beans.   Where can you learn more?  Go to https://[email or web address].[email or web address] org and sign in to your [employer] account. Enter W265 in the [facility] box to learn more about \"DASH Diet: Care Instructions. \"     If you do not have an account, please click on the \"Sign Up Now\" link. Current as of: April 29, 2021               Content Version: 13.1  © 2006-2021 [employer]. Care instructions adapted under license by [facility] ([facility]). If you have questions about a medical condition or this instruction, always ask your healthcare professional. [address] any warranty or liability for your use of this information. Learning About Low-Carbohydrate Diets  What is a low-carbohydrate diet? A low-carbohydrate (or \"low-carb\") diet limits foods and drinks that have carbohydrates. This includes grains, fruits, milk and yogurt, and starchy vegetables like potatoes, beans, and corn. It also avoids foods and drinks that have added sugar. Instead, low-carb diets include foods that are high in protein and fat. Why might you follow a low-carb diet? Low-carb diets may be used for a variety of reasons, such as for weight loss. People who have diabetes may use a low-carb diet to help manage their blood sugar levels. What should you do before you start the diet? Talk to your doctor before you try any diet. This is even more important if you have health problems like kidney disease, heart disease, or diabetes. Your doctor may suggest that you meet with a registered dietitian. A dietitian can help you make an eating plan that works for you. What foods do you eat on a low-carb diet? On a low-carb diet, you choose foods that are high in protein and fat. Examples of these are:  · Meat, poultry, and fish. · Eggs. · Nuts, such as walnuts, pecans, almonds, and peanuts. · Peanut butter and other nut butters. · Tofu. · Avocado. · Verneda Cord.   · Non-starchy vegetables like broccoli, cauliflower, green beans, mushrooms, peppers, lettuce, and spinach. · Unsweetened non-dairy milks like almond milk and coconut milk. · Cheese, cottage cheese, and cream cheese. Current as of: September 8, 2021               Content Version: 13.1  © 2006-2021 Healthwise, Incorporated. Care instructions adapted under license by [facility] ([facility]). If you have questions about a medical condition or this instruction, always ask your healthcare professional. [address] any warranty or liability for your use of this information. Eating Healthy Foods: Care Instructions  Your Care Instructions     Eating healthy foods can help lower your risk for disease. Healthy food gives you energy and keeps your heart strong, your brain active, your muscles working, and your bones strong. A healthy diet includes a variety of foods from the basic food groups: grains, vegetables, fruits, milk and milk products, and meat and beans. Some people may eat more of their favorite foods from only one food group and, as a result, miss getting the nutrients they need. So, it is important to pay attention not only to what you eat but also to what you are missing from your diet. You can eat a healthy, balanced diet by making a few small changes. Follow-up care is a key part of your treatment and safety. Be sure to make and go to all appointments, and call your doctor if you are having problems. It's also a good idea to know your test results and keep a list of the medicines you take. How can you care for yourself at home? Look at what you eat  · Keep a food diary for a week or two and record everything you eat or drink. Track the number of servings you eat from each food group. · For a balanced diet every day, eat a variety of:  ? 6 or more ounce-equivalents of grains, such as cereals, breads, crackers, rice, or pasta, every day.  An ounce-equivalent is 1 slice of bread, 1 cup of ready-to-eat cereal, or ½ cup of cooked rice, cooked pasta, or cooked cereal.  ? 2½ cups of vegetables, especially:  § Dark-green vegetables such as broccoli and spinach. § Orange vegetables such as carrots and sweet potatoes. § Dry beans (such as nelson and kidney beans) and peas (such as lentils). ? 2 cups of fresh, frozen, or canned fruit. A small apple or 1 banana or orange equals 1 cup. ? 3 cups of nonfat or low-fat milk, yogurt, or other milk products. ? 5½ ounces of meat and beans, such as chicken, fish, lean meat, beans, nuts, and seeds. One egg, 1 tablespoon of peanut butter, ½ ounce nuts or seeds, or ¼ cup of cooked beans equals 1 ounce of meat. · Learn how to read food labels for serving sizes and ingredients. Fast-food and convenience-food meals often contain few or no fruits or vegetables. Make sure you eat some fruits and vegetables to make the meal more nutritious. · Look at your food diary. For each food group, add up what you have eaten and then divide the total by the number of days. This will give you an idea of how much you are eating from each food group. See if you can find some ways to change your diet to make it more healthy. Start small  · Do not try to make dramatic changes to your diet all at once. You might feel that you are missing out on your favorite foods and then be more likely to fail. · Start slowly, and gradually change your habits. Try some of the following:  ? Use whole wheat bread instead of white bread. ? Use nonfat or low-fat milk instead of whole milk. ? Eat brown rice instead of white rice, and eat whole wheat pasta instead of white-flour pasta. ? Try low-fat cheeses and low-fat yogurt. ? Add more fruits and vegetables to meals and have them for snacks. ? Add lettuce, tomato, cucumber, and onion to sandwiches. ? Add fruit to yogurt and cereal.  Enjoy food  · You can still eat your favorite foods. You just may need to eat less of them.  If your favorite foods are high in fat, salt, and sugar, limit how often you eat them, but do not cut them out entirely. · Eat a wide variety of foods. Make healthy choices when eating out  · The type of restaurant you choose can help you make healthy choices. Even fast-food chains are now offering more low-fat or healthier choices on the menu. · Choose smaller portions, or take half of your meal home. · When eating out, try:  ? A veggie pizza with a whole wheat crust or grilled chicken (instead of sausage or pepperoni). ? Pasta with roasted vegetables, grilled chicken, or marinara sauce instead of cream sauce. ? A vegetable wrap or grilled chicken wrap. ? Broiled or poached food instead of fried or breaded items. Make healthy choices easy  · Buy packaged, prewashed, ready-to-eat fresh vegetables and fruits, such as baby carrots, salad mixes, and chopped or shredded broccoli and cauliflower. · Buy packaged, presliced fruits, such as melon or pineapple. · Choose 100% fruit or vegetable juice instead of soda. Limit juice intake to 4 to 6 oz (½ to ¾ cup) a day. · Blend low-fat yogurt, fruit juice, and canned or frozen fruit to make a smoothie for breakfast or a snack. Where can you learn more? Go to https://[employer].healthGenius Blends. org and sign in to your [employer] account. Enter D230 in the [facility] box to learn more about \"Eating Healthy Foods: Care Instructions. \"     If you do not have an account, please click on the \"Sign Up Now\" link. Current as of: September 8, 2021               Content Version: 13.1  © 6149-5983 Healthwise, Incorporated. Care instructions adapted under license by [facility] ([facility]). If you have questions about a medical condition or this instruction, always ask your healthcare professional. Damaso Cheema any warranty or liability for your use of this information. Personalized Preventive Plan for Curt  - 1/17/2022  Medicare offers a range of preventive health benefits.  Some of the tests and screenings are paid in full while other may be subject to a deductible, co-insurance, and/or copay. Some of these benefits include a comprehensive review of your medical history including lifestyle, illnesses that may run in your family, and various assessments and screenings as appropriate. After reviewing your medical record and screening and assessments performed today your provider may have ordered immunizations, labs, imaging, and/or referrals for you. A list of these orders (if applicable) as well as your Preventive Care list are included within your After Visit Summary for your review. Other Preventive Recommendations:    · A preventive eye exam performed by an eye specialist is recommended every 1-2 years to screen for glaucoma; cataracts, macular degeneration, and other eye disorders. · A preventive dental visit is recommended every 6 months. · Try to get at least 150 minutes of exercise per week or 10,000 steps per day on a pedometer . · Order or download the FREE \"Exercise & Physical Activity: Your Everyday Guide\" from [employer]. Call 4-576.620.6959 or search [employer] online. · You need 3799-9547 mg of calcium and 0724-3171 IU of vitamin D per day. It is possible to meet your calcium requirement with diet alone, but a vitamin D supplement is usually necessary to meet this goal.  · When exposed to the sun, use a sunscreen that protects against both UVA and UVB radiation with an SPF of 30 or greater. Reapply every 2 to 3 hours or after sweating, drying off with a towel, or swimming. · Always wear a seat belt when traveling in a car. Always wear a helmet when riding a bicycle or motorcycle.

## 2022-01-17 NOTE — Clinical Note
HGBA1C 8.7 today -- increase trulicity to 3mg weekly. Watch for low glucose levels. Call PCP if less than 110. May need to back off on lantus as trulicity increase starts working.

## 2022-01-17 NOTE — PROGRESS NOTES
2022     Marilyn Rahman (:  1980) is a 35 y.o. female, here for evaluation of the following medical concerns:      Presents for follow-up on chronic conditions :     Diabetes type 2   Trulicity 1.5 weekly  EQDZMJ 12 JTXOK before bed.   glipizide 10mg XL once daily  Reports she has been consistent with her medicine.   hemoglobin A1c today 8.7  10.2 in oct 2021  7.3 2021  Diabetic dermopathy bilateral lower extremities  neuropathy in feet . Denies numbness. \"feels like needles sticking in my feet\"   Goes away on its own. Happens when she is up and moving around or driving.   Reports she has all of the supplies she needs to check her glucose. Last checked three days ago --- 117 non fasting.            Right foot fifth toe amputation 2021 at [facility] with Dr. Lorrie Ward. No longer seeing wound care for this issue   reports Dr. Lorrie Ward has ordered her orthotics due to balance issues and pain in her hips due to altered gait with a missing fifth toe but she has not received anything. Denies pain associated with the right foot.         Hypertension-controlled and takes blood pressure at home.  Less than 140/90.  Tolerating medicine well.       Hyperlipidemia - tolerating statin well without side effects       Anxiety is doing well. not medicated. Denies suicidal thought plan idea, insomnia. Denies need for medicine or therapy   reports \"some depression when i'm in pain\"      Obesity. no diet or exercise. Chronic back pain- right side sciatica intermittent.  was using zanaflex PRN, no longer taking as it did not help. This started after her right toe amputation. Orthotics were supposed to be done for right foot, not done. She has not been doing anything to help her pain. Laying down flat and resting helps. Activity worsens the pain. There is sharp shooting pain from the right glute to the right calf. Aching pain in right lower back.  No numbness tingling outside of DM2 neuropathy of the right foot. ADL's not limited. No loss of bowel or bladder function.    XRAY done oct 2021 shows mild degen changes bilat SI joints. Smoker- quit may 4th 2021             Axilla abscess, right. Still seeing wound care. Last seen 22            Review of Systems    Prior to Visit Medications    Medication Sig Taking?  Authorizing Provider   DULoxetine (CYMBALTA) 30 MG extended release capsule Take 1 capsule by mouth daily Yes ALISTAIR Bautista NP   glipiZIDE (GLUCOTROL XL) 10 MG extended release tablet Take 1 tablet by mouth every morning Yes ALISTAIR Bautista NP   insulin glargine (LANTUS SOLOSTAR) 100 UNIT/ML injection pen Inject 15 Units into the skin nightly Yes ALISTAIR Bautista NP   TRULICITY 1.5 EZ/3.1RK SOPN INJECT ONE AND ONE-HALF (1 AND 1/2) MG UNDER THE SKIN ONCE A WEEK Yes Nikunj Isaacs MD   lisinopril (PRINIVIL;ZESTRIL) 2.5 MG tablet Take 1 tablet by mouth every morning Yes ALISTAIR Bautista NP   atorvastatin (LIPITOR) 40 MG tablet Take 1 tablet by mouth every morning Yes ALISTAIR Bautista NP   Lancets MISC To test up to 4 times per day- nighttime and with each meal Yes ALISTAIR Bautista NP   blood glucose test strips (EXACTECH TEST) strip 1 each by In Vitro route daily Embrace test strips to test BS 4x daily Yes ALISTAIR Bautista NP   Insulin Pen Needle 32G X 5 MM MISC To use with Lantus pen and Novolog pen Yes ALISTAIR Bautista NP        Social History     Tobacco Use    Smoking status: Former Smoker     Packs/day: 0.25     Types: Cigarettes     Quit date: 5/10/2021     Years since quittin.6    Smokeless tobacco: Never Used   Substance Use Topics    Alcohol use: Yes     Comment: rare        Vitals:    22 1042   BP: 126/80   Site: Left Upper Arm   Position: Sitting   Pulse: 92   SpO2: 98%   Weight: 218 lb 6.4 oz (99.1 kg)   Height: 5' 2\" (1.575 m)     Estimated body mass index is 39.95 kg/m² as calculated from the following:    Height as of this encounter: 5' 2\" (1.575 m). Weight as of this encounter: 218 lb 6.4 oz (99.1 kg). Physical Exam  Vitals reviewed. Constitutional:       General: She is not in acute distress. Appearance: Normal appearance. She is obese. She is not ill-appearing, toxic-appearing or diaphoretic. HENT:      Head: Normocephalic and atraumatic. Nose: Nose normal.   Eyes:      Extraocular Movements: Extraocular movements intact. Pupils: Pupils are equal, round, and reactive to light. Cardiovascular:      Rate and Rhythm: Normal rate and regular rhythm. Heart sounds: Normal heart sounds. Pulmonary:      Effort: Pulmonary effort is normal.      Breath sounds: Normal breath sounds. Abdominal:      General: Bowel sounds are normal. There is no distension. Palpations: Abdomen is soft. There is no mass. Tenderness: There is no abdominal tenderness. Hernia: No hernia is present. Musculoskeletal:         General: Normal range of motion. Cervical back: Normal range of motion and neck supple. Right lower leg: No edema. Left lower leg: No edema. Comments: Right lower back pain with right leg raise 60 degrees. She is able to raise 90 degrees with both legs, but has the pain with the right. Otherwise, no limited ROM. Denies tenderness to lower back, glute, hamstring, anterior or posterior right hip. Pt able to stand, walk, step up and down from exam table, lay back and sit up on her own without obvious pain limitations. Skin:     General: Skin is warm and dry. Neurological:      General: No focal deficit present. Mental Status: She is alert and oriented to person, place, and time. Mental status is at baseline. Psychiatric:         Mood and Affect: Mood normal.         Behavior: Behavior normal.         Thought Content: Thought content normal.         Judgment: Judgment normal.         ASSESSMENT/PLAN:  1.  Routine general medical examination at a health care facility    2. Uncontrolled type 2 diabetes mellitus with complication, with long-term current use of insulin ([facility])  HGBA1C 8.7 today   Increase trulicity to 3mg weekly   Cont. Glipizide 10mg XL daily   lantus 15 units nightly. --can decrease insulin use and glucose comes down with higher trulicity dose. Call PCP if having glucose <110  - MICROALBUMIN / CREATININE URINE RATIO; Future  - Basic Metabolic Panel; Future  - POCT glycosylated hemoglobin (Hb A1C)    3. Right hip pain  Trial cymbalta   Likely due to right toe amputation   - Amb External Referral To Orthopedic Surgery    4. Hyperlipidemia associated with type 2 diabetes mellitus (HCC)  Low cholesterol diet. Avoid fast food, fried food, processed foods. Exercise as tolerated. Start with walking 20 minutes three times per week. Increase fiber and omega 3 fatty acids in diet. - elevated cholesterol increases risk for heart attack and stroke    Cont statin   - LIPID PANEL; Future    5. Class 2 severe obesity due to excess calories with serious comorbidity and body mass index (BMI) of 37.0 to 37.9 in adult ([facility]    6. Essential hypertension  Controlled. Continue current medication regimen. DASH diet. BP goal less than 140/90.      7. Gastroesophageal reflux disease without esophagitis  TUMS PRN     8. Anxiety  Controlled without medication. 9. Chronic bilateral low back pain with bilateral sciatica  - Amb External Referral To Orthopedic Surgery      11. WD-Abscess of axilla, right  Following with wound care   Improving     12. PVD (peripheral vascular disease) ([address]    13. Status post amputation of lesser toe of right foot ([address]  Resolved wound. PPC to Send dr ramirez a message about ordering orthotics       Does not go to the dentist - has dentures.    Eye exam sched 1/25/22  Needs living will POA packet   Discussed pain - sending to ortho/ spine   Start cymbalta for pain - depression, denies but does get upset when she is in pain. HGBA1C 8.7 today -- increase trulicity to 3mg weekly. Watch for low glucose levels. Call PCP if less than 110. May need to back off on lantus. All care gaps addressed     All questions answered    Discussed use, benefit, and side effects of prescribed medications. Barriers to compliance discussed. All patient questions answered. Pt voiced understanding. Present to the ER for any emergent or acute symptoms not managed at home or in office. Please note that this chart was generated using dragon dictation software. Although every effort was made to ensure the accuracy of this automated transcription, some errors in transcription may have occurred. Return in 1 year (on 2023) for Medicare Annual Wellness Visit in 1 year. An electronic signature was used to authenticate this note. --ALISTAIR Dumont NP on 2022 at 3:17 PM                 -----------------------------------            Medicare Annual Wellness Visit  Name: Chrissy Fuentes Date: 2022   MRN: [id number] Sex: Female   Age: 35 y.o. Ethnicity: Non- / Non    : 1980 Race: White (non-)      Aaron Martins is here for Medicare AWV and Foot Pain (states ongoing foot pain since toe was amputated)    Screenings for behavioral, psychosocial and functional/safety risks, and cognitive dysfunction are all negative except as indicated below. These results, as well as other patient data from the [address] form, are documented in Flowsheets linked to this Encounter. Allergies   Allergen Reactions    Zosyn [Piperacillin Sod-Tazobactam So] Rash     Rash and severe \"being poked by needles all over\" feeling    Penicillins Rash         Prior to Visit Medications    Medication Sig Taking?  Authorizing Provider   DULoxetine (CYMBALTA) 30 MG extended release capsule Take 1 capsule by mouth daily Yes ALISTAIR Dumont NP   glipiZIDE (GLUCOTROL XL) 10 MG extended release tablet Take 1 tablet by mouth every morning Yes ALISTAIR Andino NP   insulin glargine (LANTUS SOLOSTAR) 100 UNIT/ML injection pen Inject 15 Units into the skin nightly Yes ALISTAIR Andino NP   TRULICITY 1.5 IQ/4.4HJ SOPN INJECT ONE AND ONE-HALF (1 AND 1/2) MG UNDER THE SKIN ONCE A WEEK Yes Abdi Mckee MD   lisinopril (PRINIVIL;ZESTRIL) 2.5 MG tablet Take 1 tablet by mouth every morning Yes ALISTAIR Andino NP   atorvastatin (LIPITOR) 40 MG tablet Take 1 tablet by mouth every morning Yes ALISTAIR Andino NP   Lancets MISC To test up to 4 times per day- nighttime and with each meal Yes ALISTAIR Andino NP   blood glucose test strips (EXACTECH TEST) strip 1 each by In Vitro route daily Embrace test strips to test BS 4x daily Yes ALISTAIR Andino NP   Insulin Pen Needle 32G X 5 MM MISC To use with Lantus pen and Novolog pen Yes ALISTAIR Andino NP         Past Medical History:   Diagnosis Date    Allergic rhinitis     allergy shots- Dr. Josafat Maldonado Allergic rhinitis     Dr. Del kwan and flonase    Anxiety 10/24/2012    Atopic dermatitis     Dr. Del kwan    Back pain 10/24/2012    Bilateral sciatica 2020    Depression     Diabetes mellitus ([address]     Diabetes mellitus type II, uncontrolled ([address]     Diabetic eye exam: 2011, Dr. Muna Drake , needs record [V72.0T][    GERD (gastroesophageal reflux disease)     Headache(784.0)     Hyperlipidemia LDL goal < 100 2012    Hypertension     Morbid obesity ([address]     No retinopathy on exam     Dr Muna Drake 13 Diabetes with no significatn diabetic retinopathy found in both eyes    Non compliance w medication regimen 2014    Sprain of neck 10/4/2011       Past Surgical History:   Procedure Laterality Date     SECTION      HYSTERECTOMY      OTHER SURGICAL HISTORY  2017    I&D of right perineal abcess     TOE AMPUTATION Right 6/16/2021    RIGHT 5TH TOE AMPUTATION performed by Lisandro Crockett MD at [address], [place]. \Hospital [facility] a [facility] Name and Outlook.\"" OR         Family History   Problem Relation Age of Onset    Heart Disease Mother     Diabetes Mother     Asthma Mother     High Cholesterol Mother     Depression Mother     Heart Disease Father     High Cholesterol Father        CareTeam (Including outside providers/suppliers regularly involved in providing care):   Patient Care Team:  Bharti Cuenca MD as PCP - General (Family Medicine)  Bharti Cuenca MD as PCP - [facility] Empaneled Provider  VALENTINO Ortiz as Consulting Physician (Optometry)    Wt Readings from Last 3 Encounters:   01/17/22 218 lb 6.4 oz (99.1 kg)   11/26/21 225 lb (102.1 kg)   11/22/21 220 lb (99.8 kg)     Vitals:    01/17/22 1042   BP: 126/80   Site: Left Upper Arm   Position: Sitting   Pulse: 92   SpO2: 98%   Weight: 218 lb 6.4 oz (99.1 kg)   Height: 5' 2\" (1.575 m)     Body mass index is 39.95 kg/m². Based upon direct observation of the patient, evaluation of cognition reveals recent and remote memory intact. Patient's complete Health Risk Assessment and screening values have been reviewed and are found in Flowsheets. The following problems were reviewed today and where indicated follow up appointments were made and/or referrals ordered. Positive Risk Factor Screenings with Interventions:              General Health and ACP:  General  In general, how would you say your health is?: Fair  In the past 7 days, have you experienced any of the following?  New or Increased Pain, New or Increased Fatigue, Loneliness, Social Isolation, Stress or Anger?: (!) New or Increased Pain  Do you get the social and emotional support that you need?: Yes  Do you have a Living Will?: (!) No  Advance Directives     Power of  Living Will ACP-Advance Directive ACP-Power of     Not on File Not on File Not on File Not on [address] [facility] Interventions:  · Pain issues: refer to ortho.  add cymbalta   · No Living Will: Advance Care Planning addressed with patient today    Health Habits/Nutrition:  Health Habits/Nutrition  Do you exercise for at least 20 minutes 2-3 times per week?: Yes  Have you lost any weight without trying in the past 3 months?: No  Do you eat only one meal per day?: (!) Yes  Have you seen the dentist within the past year?: (!) No  Body mass index: (!) 39.94  Health Habits/Nutrition Interventions:  · Nutritional issues:  educational materials for healthy, well-balanced diet provided  · Dental exam overdue:  patient declines dental evaluation         Personalized Preventive Plan   Current Health Maintenance Status  Immunization History   Administered Date(s) Administered    Influenza 11/04/2013    Influenza Virus Vaccine 09/13/2011, 09/15/2012, 11/04/2013, 10/06/2014, 10/13/2015    Influenza, MDCK Quadv, IM, PF (Flucelvax 2 yrs and older) 10/20/2021    Influenza, Raad Hum, IM, (6 mo and older Fluzone, Flulaval, Fluarix and 3 yrs and older Afluria) 09/20/2016, 10/02/2017    Influenza, Raad Hum, IM, PF (6 mo and older Fluzone, Flulaval, Fluarix, and 3 yrs and older Afluria) 10/15/2018, 10/08/2019, 01/13/2021    Pneumococcal Polysaccharide ([id number]) 10/13/2015    Tdap (Boostrix, Adacel) 09/20/2016        Health Maintenance   Topic Date Due    COVID-19 Vaccine (1) Never done    Hepatitis B vaccine (1 of 3 - Risk 3-dose series) Never done    Diabetic retinal exam  12/07/2021    Diabetic microalbuminuria test  01/13/2022    Lipid screen  01/13/2022    Depression Screen  01/13/2022    A1C test (Diabetic or Prediabetic)  01/26/2022    Diabetic foot exam  10/22/2022    Potassium monitoring  11/26/2022    Creatinine monitoring  11/26/2022    DTaP/Tdap/Td vaccine (2 - Td or Tdap) 09/20/2026    Pneumococcal 0-64 years Vaccine (2 of 2 - PPSV23) 09/22/2043    Flu vaccine  Completed    Hepatitis A vaccine  Aged Out    Hib vaccine  Aged Out    Meningococcal (ACWY) vaccine  Aged Out    Hepatitis C screen  Discontinued    HIV screen  Discontinued     Recommendations for Preventive Services Due: see orders and patient instructions/AVS.  . Recommended screening schedule for the next 5-10 years is provided to the patient in written form: see Patient Instructions/AVS.    Marilyn was seen today for medicare awv and foot pain. Diagnoses and all orders for this visit:    Routine general medical examination at a health care facility    Uncontrolled type 2 diabetes mellitus with complication, with long-term current use of insulin ([facility] [address]  -     MICROALBUMIN / CREATININE URINE RATIO; Future  -     Basic Metabolic Panel; Future  -     POCT glycosylated hemoglobin (Hb A1C)    Right hip pain  -     Amb External Referral To Orthopedic Surgery    Hyperlipidemia associated with type 2 diabetes mellitus ([facility]  -     LIPID PANEL; Future    Class 2 severe obesity due to excess calories with serious comorbidity and body mass index (BMI) of 37.0 to 37.9 in [facility])    Essential hypertension    Gastroesophageal reflux disease without esophagitis    Anxiety    Chronic bilateral low back pain with bilateral sciatica  -     Amb External Referral To Orthopedic Surgery    Smoker    WD-Abscess of axilla, right    PVD (peripheral vascular disease) ([address]    Status post amputation of lesser toe of right foot ([facility]    Other orders  -     DULoxetine (CYMBALTA) 30 MG extended release capsule; Take 1 capsule by mouth daily                   Advance Care Planning   Advanced Care Planning: Discussed the patients choices for care and treatment in case of a health event that adversely affects decision-making abilities. Also discussed the patients long-term treatment options.  Reviewed with the patient the [facility] of [facility] Will Declaration forms  Reviewed the process of designating a competent adult as an Agent (or -in-fact) that

## 2022-01-17 NOTE — TELEPHONE ENCOUNTER
----- Message from Aubrey Narayanan, [address] - NP sent at 1/17/2022  3:26 PM EST -----  HGBA1C 8.7 today -- increase trulicity to 3mg weekly. Watch for low glucose levels. Call PCP if less than 110. May need to back off on lantus as trulicity increase starts working.

## 2022-01-18 ENCOUNTER — TELEPHONE (OUTPATIENT)
Dept: FAMILY MEDICINE CLINIC | Age: 42
End: 2022-01-18

## 2022-01-19 ENCOUNTER — HOSPITAL ENCOUNTER (OUTPATIENT)
Dept: WOUND CARE | Age: 42
Discharge: HOME | End: 2022-01-19
Payer: MEDICARE

## 2022-01-19 VITALS — RESPIRATION RATE: 18 BRPM | SYSTOLIC BLOOD PRESSURE: 134 MMHG | DIASTOLIC BLOOD PRESSURE: 89 MMHG | HEART RATE: 92 BPM

## 2022-01-19 DIAGNOSIS — L97.519 DIABETIC ULCER OF RIGHT FIFTH TOE (HCC): ICD-10-CM

## 2022-01-19 DIAGNOSIS — E11.621 DIABETIC ULCER OF RIGHT FIFTH TOE (HCC): ICD-10-CM

## 2022-01-19 DIAGNOSIS — L02.411 ABSCESS OF AXILLA, RIGHT: Primary | ICD-10-CM

## 2022-01-19 PROCEDURE — 99213 OFFICE O/P EST LOW 20 MIN: CPT

## 2022-01-19 PROCEDURE — 99213 OFFICE O/P EST LOW 20 MIN: CPT | Performed by: NURSE PRACTITIONER

## 2022-01-19 RX ORDER — LIDOCAINE 50 MG/G
OINTMENT TOPICAL ONCE
Status: DISCONTINUED | OUTPATIENT
Start: 2022-01-19 | End: 2022-01-20 | Stop reason: HOSPADM

## 2022-01-19 RX ORDER — LIDOCAINE 40 MG/G
CREAM TOPICAL ONCE
Status: CANCELLED | OUTPATIENT
Start: 2022-01-19 | End: 2022-01-19

## 2022-01-19 RX ORDER — GINSENG 100 MG
CAPSULE ORAL ONCE
Status: CANCELLED | OUTPATIENT
Start: 2022-01-19 | End: 2022-01-19

## 2022-01-19 RX ORDER — GENTAMICIN SULFATE 1 MG/G
OINTMENT TOPICAL ONCE
Status: CANCELLED | OUTPATIENT
Start: 2022-01-19 | End: 2022-01-19

## 2022-01-19 RX ORDER — LIDOCAINE 50 MG/G
OINTMENT TOPICAL ONCE
Status: CANCELLED | OUTPATIENT
Start: 2022-01-19 | End: 2022-01-19

## 2022-01-19 RX ORDER — BACITRACIN ZINC AND POLYMYXIN B SULFATE 500; 1000 [USP'U]/G; [USP'U]/G
OINTMENT TOPICAL ONCE
Status: CANCELLED | OUTPATIENT
Start: 2022-01-19 | End: 2022-01-19

## 2022-01-19 RX ORDER — BACITRACIN, NEOMYCIN, POLYMYXIN B 400; 3.5; 5 [USP'U]/G; MG/G; [USP'U]/G
OINTMENT TOPICAL ONCE
Status: CANCELLED | OUTPATIENT
Start: 2022-01-19 | End: 2022-01-19

## 2022-01-19 RX ORDER — LIDOCAINE HYDROCHLORIDE 20 MG/ML
JELLY TOPICAL ONCE
Status: CANCELLED | OUTPATIENT
Start: 2022-01-19 | End: 2022-01-19

## 2022-01-19 RX ORDER — LIDOCAINE HYDROCHLORIDE 40 MG/ML
SOLUTION TOPICAL ONCE
Status: CANCELLED | OUTPATIENT
Start: 2022-01-19 | End: 2022-01-19

## 2022-01-19 RX ORDER — CLOBETASOL PROPIONATE 0.5 MG/G
OINTMENT TOPICAL ONCE
Status: CANCELLED | OUTPATIENT
Start: 2022-01-19 | End: 2022-01-19

## 2022-01-19 RX ORDER — BETAMETHASONE DIPROPIONATE 0.05 %
OINTMENT (GRAM) TOPICAL ONCE
Status: CANCELLED | OUTPATIENT
Start: 2022-01-19 | End: 2022-01-19

## 2022-01-19 NOTE — PROGRESS NOTES
Wound Care Center Progress Note       Marilyn Rahman  AGE: 35 y.o. GENDER: female  : 1980  TODAY'S DATE:  2022        Subjective:     Chief Complaint   Patient presents with    Wound Check         HISTORY of PRESENT ILLNESS     Marilyn Rahman is a 35 y.o. female who presents to the Wound Clinic for a visit for evaluation and treatment of Acute non-healing surgical and draining abscess  ulcer(s) of  Right axilla.  The condition is of moderate severity.   The underlying cause is thought to be folliculitis and subsequent abscess formation.      Patient wound is close to healing, but we are looking at some significant depth. This goes straight down, so there is not a real, practical way to open this tract up for treatment.  Likely will close to the point where we cannot pack anymore     Wound Pain Timing/Severity: waxing and waning  Quality of pain: shooting, pressure  Severity of pain:  3 / 10   Modifying Factors: diabetes, poor glucose control and obesity  Associated Signs/Symptoms: edema, erythema, drainage, odor and pain        PAST MEDICAL HISTORY        Diagnosis Date    Allergic rhinitis     allergy shots- Dr. Elana Lund Allergic rhinitis     Dr. Aristeo kwan and flonase    Anxiety 10/24/2012    Atopic dermatitis     Dr. Aristeo kwan    Back pain 10/24/2012    Bilateral sciatica 2020    Depression     Diabetes mellitus ([address]     Diabetes mellitus type II, uncontrolled ([address]     Diabetic eye exam: 2011, Dr. Rudolfo Duverney , needs record [V72.0T][    GERD (gastroesophageal reflux disease)     Headache(784.0)     Hyperlipidemia LDL goal < 100 2012    Hypertension     Morbid obesity ([address]     No retinopathy on exam     Dr Rudolfo Duverney 13 Diabetes with no significatn diabetic retinopathy found in both eyes    Non compliance w medication regimen 2014    Sprain of neck 10/4/2011       PAST SURGICAL HISTORY    Past Surgical History:   Procedure Laterality Date     SECTION      HYSTERECTOMY      OTHER SURGICAL HISTORY  2017    I&D of right perineal abcess     TOE AMPUTATION Right 2021    RIGHT 5TH TOE AMPUTATION performed by eJrry Black MD at [facility] History   Problem Relation Age of Onset    Heart Disease Mother     Diabetes Mother     Asthma Mother     High Cholesterol Mother     Depression Mother     Heart Disease Father     High Cholesterol Father        SOCIAL HISTORY    Social History     Tobacco Use    Smoking status: Former Smoker     Packs/day: 0.25     Types: Cigarettes     Quit date: 5/10/2021     Years since quittin.6    Smokeless tobacco: Never Used   Vaping Use    Vaping Use: Never used   Substance Use Topics    Alcohol use: Yes     Comment: rare    Drug use: No       ALLERGIES    Allergies   Allergen Reactions    Zosyn [Piperacillin Sod-Tazobactam So] Rash     Rash and severe \"being poked by needles all over\" feeling    Penicillins Rash       MEDICATIONS    Current Outpatient Medications on File Prior to Encounter   Medication Sig Dispense Refill    DULoxetine (CYMBALTA) 30 MG extended release capsule Take 1 capsule by mouth daily 30 capsule 1    Dulaglutide (TRULICITY) 3 NS/5.3ZA SOPN Inject 3 mg into the skin once a week 4 pen 3    glipiZIDE (GLUCOTROL XL) 10 MG extended release tablet Take 1 tablet by mouth every morning 90 tablet 1    insulin glargine (LANTUS SOLOSTAR) 100 UNIT/ML injection pen Inject 15 Units into the skin nightly 5 pen 5    lisinopril (PRINIVIL;ZESTRIL) 2.5 MG tablet Take 1 tablet by mouth every morning 90 tablet 3    atorvastatin (LIPITOR) 40 MG tablet Take 1 tablet by mouth every morning 90 tablet 3    Lancets MISC To test up to 4 times per day- nighttime and with each meal 300 each 5    blood glucose test strips (EXACTECH TEST) strip 1 each by In Vitro route daily Embrace test strips to test BS 4x daily 300 each 6    Insulin Pen Needle 32G X 5 MM MISC To use with Lantus pen and Novolog pen 30 each 11     No current facility-administered medications on file prior to encounter. REVIEW OF SYSTEMS    Pertinent items are noted in HPI. Constitutional: Negative for systemic symptoms including fever, chills and malaise. Objective:      /89   Pulse 92   Resp 18     PHYSICAL EXAM      General: The patient is in no acute distress. Mental status:  Patient is appropriate, is  oriented to place and plan of care. Dermatologic exam: Visual inspection of the periwound reveals the skin to be normal in turgor and texture and dry.   Wound exam:  see wound description below     All active wounds listed below with today's date are evaluated      Wound 11/30/21 Brachial Right #1 ARMPIT (Active)   Wound Image   12/23/21 1345   Dressing Status New dressing applied 01/12/22 1101   Wound Cleansed Wound cleanser 01/19/22 1036   Offloading for Diabetic Foot Ulcers No offloading required 01/19/22 1036   Wound Length (cm) 0.4 cm 01/19/22 1036   Wound Width (cm) 0.3 cm 01/19/22 1036   Wound Depth (cm) 0.3 cm 01/19/22 1036   Wound Surface Area (cm^2) 0.12 cm^2 01/19/22 1036   Change in Wound Size % (l*w) -20 01/19/22 1036   Wound Volume (cm^3) 0.036 cm^3 01/19/22 1036   Wound Healing % 96 01/19/22 1036   Post-Procedure Length (cm) 2.5 cm 12/16/21 1417   Post-Procedure Width (cm) 3 cm 12/16/21 1417   Post-Procedure Depth (cm) 8.5 cm 12/16/21 1417   Post-Procedure Surface Area (cm^2) 7.5 cm^2 12/16/21 1417   Post-Procedure Volume (cm^3) 63.75 cm^3 12/16/21 1417   Distance Tunneling (cm) 5.8 cm 01/19/22 1036   Tunneling Position ___ O'Clock 2 01/19/22 1036   Undermining Starts ___ O'Clock 0 01/19/22 1036   Undermining Ends___ O'Clock 0 01/19/22 1036   Undermining Maxium Distance (cm) 0 01/19/22 1036   Wound Assessment Granulation tissue 01/19/22 1036   Drainage Amount Moderate 01/19/22 1036   Drainage Description Serosanguinous 01/19/22 1036   Odor None 01/19/22 1036   Yamilex-wound Assessment Intact 01/19/22 1036   Margins Defined edges 01/19/22 1036   Wound Thickness Description not for Pressure Injury Full thickness 01/19/22 1036   Number of days: 49       Assessment:       Problem List Items Addressed This Visit     WD-Diabetic ulcer of right fifth toe (HCC)    WD-Abscess of axilla, right - Primary    Relevant Medications    lidocaine (XYLOCAINE) 5 % ointment (Start on 1/19/2022 11:00 AM)    Other Relevant Orders    Initiate Outpatient Wound Care Protocol          Status of wound progress and description from last visit:   Stable and improving at opening. Likely considering antibiotics therapy for when we cannot pack this wound anymore. From the onset there was significant depth and undermining from when wound was extremely infected and putting out copious drainage. If this becomes the case, I will likely place patient on the antibiotics required earlier in her treatment, and keep a very close eye on her until we are confident the entire lesion has resolved. Continue to monitor and follow up 1 week. Plan:     Discharge Instructions         PHYSICIAN ORDERS AND DISCHARGE INSTRUCTIONS     NOTE: Upon discharge from the [address], you will receive a patient experience survey via E-mail. We would be grateful if you would take the time to fill this survey out.     Wound care order history:                 Cultures: 11/30/21.               Antibiotics: .Bactrim                 HbA1c:  .10/26/21 10.2               Compression/Lymph Pumps: .              Grafts:  .              Emmie: .                Continuing wound care orders and information:              Residence: .[address]              Your wound-care supplies will be provided by: . [address]              Pharmacy: .                            WALT cleansing:                           FI not scrub or use excessive force.                          Wash hands with soap and water before and after dressing changes.                         Prior to applying a clean dressing, cleanse wound with normal saline,                          wound cleanser, or mild soap and water.                           Ask your physician or nurse before getting the wound(s) wet in the shower.              Daily Wound management:                          Keep weight off wounds and reposition every 2 hours.                          Avoid standing for long periods of time.                          Apply wraps/stockings in AM and remove at bedtime.                          Elevate legs to the level of the heart or above for 30 minutes 4-5 times a day and/or when sitting.                                               When taking antibiotics take entire prescription as ordered by MD do not stop taking until medicine is all gone.                                                                 Orders for this week (1/19/22): Right arm- Wash with mild soap and water, rinse with saline, pat dry with 4x4  A&D to Arm   Gently pack with ludivina  Cover with ABD  Change Daily     Follow up with Sonam Al CNP in 1 weeks in the wound care center  Call [phone] for any questions or concerns.   Date__________   Time____________              Treatment Note      Written Patient Dismissal Instructions Given            Electronically signed by ALISTAIR Rebollar CNP on 1/19/2022 at 10:50 AM

## 2022-01-26 ENCOUNTER — HOSPITAL ENCOUNTER (OUTPATIENT)
Dept: WOUND CARE | Age: 42
Discharge: HOME | End: 2022-01-26
Payer: MEDICARE

## 2022-01-26 VITALS
SYSTOLIC BLOOD PRESSURE: 154 MMHG | DIASTOLIC BLOOD PRESSURE: 92 MMHG | RESPIRATION RATE: 16 BRPM | TEMPERATURE: 96.8 F | HEART RATE: 92 BPM

## 2022-01-26 DIAGNOSIS — L02.411 ABSCESS OF AXILLA, RIGHT: Primary | ICD-10-CM

## 2022-01-26 PROCEDURE — 99213 OFFICE O/P EST LOW 20 MIN: CPT | Performed by: NURSE PRACTITIONER

## 2022-01-26 PROCEDURE — 99213 OFFICE O/P EST LOW 20 MIN: CPT

## 2022-01-26 RX ORDER — LIDOCAINE HYDROCHLORIDE 20 MG/ML
JELLY TOPICAL ONCE
Status: CANCELLED | OUTPATIENT
Start: 2022-01-26 | End: 2022-01-26

## 2022-01-26 RX ORDER — GINSENG 100 MG
CAPSULE ORAL ONCE
Status: CANCELLED | OUTPATIENT
Start: 2022-01-26 | End: 2022-01-26

## 2022-01-26 RX ORDER — LIDOCAINE HYDROCHLORIDE 40 MG/ML
SOLUTION TOPICAL ONCE
Status: CANCELLED | OUTPATIENT
Start: 2022-01-26 | End: 2022-01-26

## 2022-01-26 RX ORDER — LIDOCAINE 50 MG/G
OINTMENT TOPICAL ONCE
Status: CANCELLED | OUTPATIENT
Start: 2022-01-26 | End: 2022-01-26

## 2022-01-26 RX ORDER — LIDOCAINE 40 MG/G
CREAM TOPICAL ONCE
Status: CANCELLED | OUTPATIENT
Start: 2022-01-26 | End: 2022-01-26

## 2022-01-26 RX ORDER — GENTAMICIN SULFATE 1 MG/G
OINTMENT TOPICAL ONCE
Status: CANCELLED | OUTPATIENT
Start: 2022-01-26 | End: 2022-01-26

## 2022-01-26 RX ORDER — LIDOCAINE 50 MG/G
OINTMENT TOPICAL ONCE
Status: DISCONTINUED | OUTPATIENT
Start: 2022-01-26 | End: 2022-01-27 | Stop reason: HOSPADM

## 2022-01-26 RX ORDER — BACITRACIN ZINC AND POLYMYXIN B SULFATE 500; 1000 [USP'U]/G; [USP'U]/G
OINTMENT TOPICAL ONCE
Status: CANCELLED | OUTPATIENT
Start: 2022-01-26 | End: 2022-01-26

## 2022-01-26 RX ORDER — BACITRACIN, NEOMYCIN, POLYMYXIN B 400; 3.5; 5 [USP'U]/G; MG/G; [USP'U]/G
OINTMENT TOPICAL ONCE
Status: CANCELLED | OUTPATIENT
Start: 2022-01-26 | End: 2022-01-26

## 2022-01-26 RX ORDER — BETAMETHASONE DIPROPIONATE 0.05 %
OINTMENT (GRAM) TOPICAL ONCE
Status: CANCELLED | OUTPATIENT
Start: 2022-01-26 | End: 2022-01-26

## 2022-01-26 RX ORDER — CLOBETASOL PROPIONATE 0.5 MG/G
OINTMENT TOPICAL ONCE
Status: CANCELLED | OUTPATIENT
Start: 2022-01-26 | End: 2022-01-26

## 2022-01-26 NOTE — PROGRESS NOTES
Wound Care Center Progress Note       Marilyn Rahman  AGE: 35 y.o. GENDER: female  : 1980  TODAY'S DATE:  2022        Subjective:     Chief Complaint   Patient presents with    Wound Check     right axilla         HISTORY of PRESENT ILLNESS    Marilyn Rahman is a 35 y.o. female who presents to the Wound Clinic for a visit for evaluation and treatment of Acute non-healing surgical and draining abscess  ulcer(s) of  Right axilla.  The condition is of moderate severity.   The underlying cause is thought to be folliculitis and subsequent abscess formation.       Depth improved this week. There was also a small bud of tissue that was protruding last week, which has disappeared.  There is only a small clean wound opening remaining.     Wound Pain Timing/Severity: waxing and waning  Quality of pain: shooting, pressure  Severity of pain:  3 / 10   Modifying Factors: diabetes, poor glucose control and obesity  Associated Signs/Symptoms: edema, erythema, drainage, odor and pain        PAST MEDICAL HISTORY        Diagnosis Date    Allergic rhinitis     allergy shots- Dr. Ping Allen Allergic rhinitis     Dr. Rich kwan and flonase    Anxiety 10/24/2012    Atopic dermatitis     Dr. Rich kwan    Back pain 10/24/2012    Bilateral sciatica 2020    Depression     Diabetes mellitus ([address]     Diabetes mellitus type II, uncontrolled ([address]     Diabetic eye exam: 2011, Dr. Elena Steel , needs record [V72.0T][    GERD (gastroesophageal reflux disease)     Headache(784.0)     Hyperlipidemia LDL goal < 100 2012    Hypertension     Morbid obesity ([address]     No retinopathy on exam     Dr Elena Steel 13 Diabetes with no significatn diabetic retinopathy found in both eyes    Non compliance w medication regimen 2014    Sprain of neck 10/4/2011       PAST SURGICAL HISTORY    Past Surgical History:   Procedure Laterality Date     SECTION      HYSTERECTOMY      OTHER SURGICAL HISTORY  2017    I&D of right perineal abcess     TOE AMPUTATION Right 2021    RIGHT 5TH TOE AMPUTATION performed by Sloane Segundo MD at [facility] History   Problem Relation Age of Onset    Heart Disease Mother     Diabetes Mother     Asthma Mother     High Cholesterol Mother     Depression Mother     Heart Disease Father     High Cholesterol Father        SOCIAL HISTORY    Social History     Tobacco Use    Smoking status: Former Smoker     Packs/day: 0.25     Types: Cigarettes     Quit date: 5/10/2021     Years since quittin.7    Smokeless tobacco: Never Used   Vaping Use    Vaping Use: Never used   Substance Use Topics    Alcohol use: Yes     Comment: rare    Drug use: No       ALLERGIES    Allergies   Allergen Reactions    Zosyn [Piperacillin Sod-Tazobactam So] Rash     Rash and severe \"being poked by needles all over\" feeling    Penicillins Rash       MEDICATIONS    Current Outpatient Medications on File Prior to Encounter   Medication Sig Dispense Refill    DULoxetine (CYMBALTA) 30 MG extended release capsule Take 1 capsule by mouth daily 30 capsule 1    Dulaglutide (TRULICITY) 3 XP/7.2QU SOPN Inject 3 mg into the skin once a week 4 pen 3    glipiZIDE (GLUCOTROL XL) 10 MG extended release tablet Take 1 tablet by mouth every morning 90 tablet 1    insulin glargine (LANTUS SOLOSTAR) 100 UNIT/ML injection pen Inject 15 Units into the skin nightly 5 pen 5    lisinopril (PRINIVIL;ZESTRIL) 2.5 MG tablet Take 1 tablet by mouth every morning 90 tablet 3    atorvastatin (LIPITOR) 40 MG tablet Take 1 tablet by mouth every morning 90 tablet 3    Lancets MISC To test up to 4 times per day- nighttime and with each meal 300 each 5    blood glucose test strips (EXACTECH TEST) strip 1 each by In Vitro route daily Embrace test strips to test BS 4x daily 300 each 6    Insulin Pen Needle 32G X 5 MM MISC To use with Lantus pen and Novolog pen 30 each 11 No current facility-administered medications on file prior to encounter. REVIEW OF SYSTEMS    Pertinent items are noted in HPI. Constitutional: Negative for systemic symptoms including fever, chills and malaise. Objective:      BP (!) 154/92   Pulse 92   Temp 96.8 °F (36 °C) (Temporal)   Resp 16     PHYSICAL EXAM      General: The patient is in no acute distress. Mental status:  Patient is appropriate, is  oriented to place and plan of care. Dermatologic exam: Visual inspection of the periwound reveals the skin to be normal in turgor and texture and dry.   Wound exam:  see wound description below     All active wounds listed below with today's date are evaluated      Wound 11/30/21 #1 Right Axilla (Active)   Wound Image   01/26/22 1037   Dressing Status New dressing applied 01/19/22 1051   Wound Cleansed Wound cleanser 01/26/22 1037   Offloading for Diabetic Foot Ulcers No offloading required 01/26/22 1037   Wound Length (cm) 0.5 cm 01/26/22 1037   Wound Width (cm) 0.4 cm 01/26/22 1037   Wound Depth (cm) 0.9 cm 01/26/22 1037   Wound Surface Area (cm^2) 0.2 cm^2 01/26/22 1037   Change in Wound Size % (l*w) -100 01/26/22 1037   Wound Volume (cm^3) 0.18 cm^3 01/26/22 1037   Wound Healing % 80 01/26/22 1037   Post-Procedure Length (cm) 2.5 cm 12/16/21 1417   Post-Procedure Width (cm) 3 cm 12/16/21 1417   Post-Procedure Depth (cm) 8.5 cm 12/16/21 1417   Post-Procedure Surface Area (cm^2) 7.5 cm^2 12/16/21 1417   Post-Procedure Volume (cm^3) 63.75 cm^3 12/16/21 1417   Distance Tunneling (cm) 4.6 cm 01/26/22 1037   Tunneling Position ___ O'Clock 0200 01/26/22 1037   Undermining Starts ___ O'Clock 0 01/26/22 1037   Undermining Ends___ O'Clock 0 01/26/22 1037   Undermining Maxium Distance (cm) 0 01/26/22 1037   Wound Assessment Granulation tissue 01/26/22 1037   Drainage Amount Moderate 01/26/22 1037   Drainage Description Sanguinous 01/26/22 1037   Odor None 01/26/22 1037   Yamilex-wound Assessment Hyperpigmented 01/26/22 1037   Margins Defined edges; Unattached edges 01/26/22 1037   Wound Thickness Description not for Pressure Injury Full thickness 01/26/22 1037   Number of days: 56       Assessment:       Problem List Items Addressed This Visit     WD-Abscess of axilla, right - Primary    Relevant Medications    lidocaine (XYLOCAINE) 5 % ointment    Other Relevant Orders    Initiate Outpatient Wound Care Protocol          Status of wound progress and description from last visit:   Stable. Continue plan of care for now and follow up 1 week         Plan:     Discharge Instructions       [address]     NOTE: Upon discharge from the [address], you will receive a patient experience survey via E-mail. We would be grateful if you would take the time to fill this survey out.     Wound care order history:                 Cultures: 11/30/21.               Antibiotics: .Bactrim                 HbA1c:  .10/26/21 10.2               Compression/Lymph Pumps: .              Grafts:  .              Emmie: .                Continuing wound care orders and information:              Residence: [address]              Your wound-care supplies will be provided by: . [address]              Pharmacy: .                            GJPAYTON cleansing:                           OA not scrub or use excessive force.                          Wash hands with soap and water before and after dressing changes.                           Prior to applying a clean dressing, cleanse wound with normal saline,                          wound cleanser, or mild soap and water.                           Ask your physician or nurse before getting the wound(s) wet in the shower.              Daily Wound management:                          Keep weight off wounds and reposition every 2 hours.                          MCJFW standing for long periods of time.                          MQZOU wraps/stockings in AM and remove at bedtime.                          Elevate legs to the level of the heart or above for 30 minutes 4-5 times a day and/or when sitting.                                               When taking antibiotics take entire prescription as ordered by MD do not stop taking until medicine is all gone.                                                                 Orders for this week (1/26/22): Right arm- Wash with mild soap and water, rinse with saline, pat dry with 4x4  A&D to Arm   Gently pack with ludivina  Cover with ABD  Change Daily     Follow up with Gregory Wilson CNP in 1 weeks in the wound care center  Call [phone] for any questions or concerns.   Date__________   Time____________        Treatment Note      Written Patient Dismissal Instructions Given            Electronically signed by ALISTAIR Veliz CNP on 1/26/2022 at 10:52 AM

## 2022-02-02 ENCOUNTER — HOSPITAL ENCOUNTER (OUTPATIENT)
Dept: WOUND CARE | Age: 42
Discharge: HOME | End: 2022-02-02
Payer: MEDICARE

## 2022-02-02 VITALS — TEMPERATURE: 96.7 F

## 2022-02-02 DIAGNOSIS — L02.411 ABSCESS OF AXILLA, RIGHT: Primary | ICD-10-CM

## 2022-02-02 PROCEDURE — 99213 OFFICE O/P EST LOW 20 MIN: CPT | Performed by: NURSE PRACTITIONER

## 2022-02-02 PROCEDURE — 99213 OFFICE O/P EST LOW 20 MIN: CPT

## 2022-02-02 NOTE — PLAN OF CARE
Problem: Wound:  Goal: Will show signs of wound healing; wound closure and no evidence of infection  Description: Will show signs of wound healing; wound closure and no evidence of infection  2/2/2022 1030 by Lio Esquivel LPN  Outcome: Ongoing  2/2/2022 1030 by Lio Esquivel LPN  Outcome: Ongoing

## 2022-02-02 NOTE — PROGRESS NOTES
SURGICAL HISTORY  2017    I&D of right perineal abcess     TOE AMPUTATION Right 2021    RIGHT 5TH TOE AMPUTATION performed by David Oliveros MD at [facility] History   Problem Relation Age of Onset    Heart Disease Mother     Diabetes Mother     Asthma Mother     High Cholesterol Mother     Depression Mother     Heart Disease Father     High Cholesterol Father        SOCIAL HISTORY    Social History     Tobacco Use    Smoking status: Former Smoker     Packs/day: 0.25     Types: Cigarettes     Quit date: 5/10/2021     Years since quittin.7    Smokeless tobacco: Never Used   Vaping Use    Vaping Use: Never used   Substance Use Topics    Alcohol use: Yes     Comment: rare    Drug use: No       ALLERGIES    Allergies   Allergen Reactions    Zosyn [Piperacillin Sod-Tazobactam So] Rash     Rash and severe \"being poked by needles all over\" feeling    Penicillins Rash       MEDICATIONS    Current Outpatient Medications on File Prior to Encounter   Medication Sig Dispense Refill    DULoxetine (CYMBALTA) 30 MG extended release capsule Take 1 capsule by mouth daily 30 capsule 1    Dulaglutide (TRULICITY) 3 PC/4.7RV SOPN Inject 3 mg into the skin once a week 4 pen 3    glipiZIDE (GLUCOTROL XL) 10 MG extended release tablet Take 1 tablet by mouth every morning 90 tablet 1    insulin glargine (LANTUS SOLOSTAR) 100 UNIT/ML injection pen Inject 15 Units into the skin nightly 5 pen 5    lisinopril (PRINIVIL;ZESTRIL) 2.5 MG tablet Take 1 tablet by mouth every morning 90 tablet 3    atorvastatin (LIPITOR) 40 MG tablet Take 1 tablet by mouth every morning 90 tablet 3    Lancets MISC To test up to 4 times per day- nighttime and with each meal 300 each 5    blood glucose test strips (EXACTECH TEST) strip 1 each by In Vitro route daily Embrace test strips to test BS 4x daily 300 each 6    Insulin Pen Needle 32G X 5 MM MISC To use with Lantus pen and Novolog pen 30 each 11     No current facility-administered medications on file prior to encounter. REVIEW OF SYSTEMS    Pertinent items are noted in HPI. Constitutional: Negative for systemic symptoms including fever, chills and malaise. Objective:      Temp 96.7 °F (35.9 °C) (Temporal)     PHYSICAL EXAM      General: The patient is in no acute distress. Mental status:  Patient is appropriate, is  oriented to place and plan of care. Dermatologic exam: Visual inspection of the periwound reveals the skin to be normal in turgor and texture and dry. Wound exam:  see wound description below     All active wounds listed below with today's date are evaluated      Wound 11/30/21 #1 Right Axilla (Active)   Wound Image   01/26/22 1037   Dressing Status Clean;Dry;New dressing applied 02/02/22 1030   Wound Cleansed Soap and water; Wound cleanser;Cleansed with saline 02/02/22 1020   Offloading for Diabetic Foot Ulcers No offloading required 02/02/22 1020   Wound Length (cm) 0.2 cm 02/02/22 1020   Wound Width (cm) 0.2 cm 02/02/22 1020   Wound Depth (cm) 2 cm 02/02/22 1020   Wound Surface Area (cm^2) 0.04 cm^2 02/02/22 1020   Change in Wound Size % (l*w) 60 02/02/22 1020   Wound Volume (cm^3) 0.08 cm^3 02/02/22 1020   Wound Healing % 91 02/02/22 1020   Post-Procedure Length (cm) 2.5 cm 12/16/21 1417   Post-Procedure Width (cm) 3 cm 12/16/21 1417   Post-Procedure Depth (cm) 8.5 cm 12/16/21 1417   Post-Procedure Surface Area (cm^2) 7.5 cm^2 12/16/21 1417   Post-Procedure Volume (cm^3) 63.75 cm^3 12/16/21 1417   Distance Tunneling (cm) 0 cm 02/02/22 1020   Tunneling Position ___ O'Clock 0 02/02/22 1020   Undermining Starts ___ O'Clock 0 02/02/22 1020   Undermining Ends___ O'Clock 0 02/02/22 1020   Undermining Maxium Distance (cm) 0 02/02/22 1020   Wound Assessment Pink/red 02/02/22 1020   Drainage Amount Moderate 01/26/22 1037   Drainage Description Sanguinous 01/26/22 1037   Odor None 02/02/22 1020   Yamilex-wound Assessment Hyperpigmented 01/26/22 1037   Margins Defined edges; Unattached edges 01/26/22 1037   Wound Thickness Description not for Pressure Injury Full thickness 01/26/22 1037   Number of days: 63       Assessment:       Problem List Items Addressed This Visit     WD-Abscess of axilla, right - Primary          Status of wound progress and description from last visit:   Added some clobetasol to valerie-wound in clinic. Hope to discharge next week. Continue plan of care for now     Plan:     Discharge Instructions       [address]     NOTE: Upon discharge from the [address], you will receive a patient experience survey via E-mail. We would be grateful if you would take the time to fill this survey out.     Wound care order history:                 Cultures: 11/30/21.               Antibiotics: .Bactrim                 HbA1c:  .10/26/21 10.2               Compression/Lymph Pumps: .              Grafts:  .              Emmie: .                Continuing wound care orders and information:              Residence: .[place] [id number] [facility]              Your wound-care supplies will be provided by: . [address]              Pharmacy: .                            WGIKH cleansing:                           HD not scrub or use excessive force.                          Wash hands with soap and water before and after dressing changes.                           Prior to applying a clean dressing, cleanse wound with normal saline,                          wound cleanser, or mild soap and water.                           Ask your physician or nurse before getting the wound(s) wet in the shower.              Daily Wound management:                          Keep weight off wounds and reposition every 2 hours.                          MXGXB standing for long periods of time.                          ZBDFV wraps/stockings in AM and remove at bedtime.                          Elevate legs to the level of the heart or above for 30 minutes 4-5 times a day and/or when sitting.                                               When taking antibiotics take entire prescription as ordered by MD do not stop taking until medicine is all gone.                                                                 Orders for this week (2/2/22): Right arm- Wash with mild soap and water, rinse with saline, pat dry with 4x4  Clobetasol to periwound  Gently pack with ludivina  Cover with ABD  Change Daily     Follow up with Bobby Chung CNP in 1 weeks in the wound care center  Call [phone] for any questions or concerns.   Date__________   Time____________        Treatment Note Wound 11/30/21 #1 Right Axilla-Dressing/Treatment:  (clobetasol, ludivina, abd )    Written Patient Dismissal Instructions Given            Electronically signed by ALISTAIR Amador CNP on 2/2/2022 at 10:30 AM

## 2022-02-09 ENCOUNTER — HOSPITAL ENCOUNTER (OUTPATIENT)
Dept: WOUND CARE | Age: 42
Discharge: HOME | End: 2022-02-09
Payer: MEDICARE

## 2022-02-09 VITALS
TEMPERATURE: 97.5 F | HEART RATE: 88 BPM | DIASTOLIC BLOOD PRESSURE: 74 MMHG | SYSTOLIC BLOOD PRESSURE: 128 MMHG | RESPIRATION RATE: 16 BRPM

## 2022-02-09 DIAGNOSIS — L02.411 ABSCESS OF AXILLA, RIGHT: Primary | ICD-10-CM

## 2022-02-09 PROCEDURE — 99213 OFFICE O/P EST LOW 20 MIN: CPT | Performed by: NURSE PRACTITIONER

## 2022-02-09 PROCEDURE — 99212 OFFICE O/P EST SF 10 MIN: CPT

## 2022-02-09 RX ORDER — GENTAMICIN SULFATE 1 MG/G
OINTMENT TOPICAL ONCE
Status: CANCELLED | OUTPATIENT
Start: 2022-02-09 | End: 2022-02-09

## 2022-02-09 RX ORDER — BETAMETHASONE DIPROPIONATE 0.05 %
OINTMENT (GRAM) TOPICAL ONCE
Status: CANCELLED | OUTPATIENT
Start: 2022-02-09 | End: 2022-02-09

## 2022-02-09 RX ORDER — LIDOCAINE HYDROCHLORIDE 40 MG/ML
SOLUTION TOPICAL ONCE
Status: DISCONTINUED | OUTPATIENT
Start: 2022-02-09 | End: 2022-02-10 | Stop reason: HOSPADM

## 2022-02-09 RX ORDER — LIDOCAINE HYDROCHLORIDE 20 MG/ML
JELLY TOPICAL ONCE
Status: CANCELLED | OUTPATIENT
Start: 2022-02-09 | End: 2022-02-09

## 2022-02-09 RX ORDER — LIDOCAINE 40 MG/G
CREAM TOPICAL ONCE
Status: CANCELLED | OUTPATIENT
Start: 2022-02-09 | End: 2022-02-09

## 2022-02-09 RX ORDER — BACITRACIN ZINC AND POLYMYXIN B SULFATE 500; 1000 [USP'U]/G; [USP'U]/G
OINTMENT TOPICAL ONCE
Status: CANCELLED | OUTPATIENT
Start: 2022-02-09 | End: 2022-02-09

## 2022-02-09 RX ORDER — LIDOCAINE HYDROCHLORIDE 40 MG/ML
SOLUTION TOPICAL ONCE
Status: CANCELLED | OUTPATIENT
Start: 2022-02-09 | End: 2022-02-09

## 2022-02-09 RX ORDER — GINSENG 100 MG
CAPSULE ORAL ONCE
Status: CANCELLED | OUTPATIENT
Start: 2022-02-09 | End: 2022-02-09

## 2022-02-09 RX ORDER — CLOBETASOL PROPIONATE 0.5 MG/G
OINTMENT TOPICAL ONCE
Status: CANCELLED | OUTPATIENT
Start: 2022-02-09 | End: 2022-02-09

## 2022-02-09 RX ORDER — LIDOCAINE 50 MG/G
OINTMENT TOPICAL ONCE
Status: CANCELLED | OUTPATIENT
Start: 2022-02-09 | End: 2022-02-09

## 2022-02-09 RX ORDER — BACITRACIN, NEOMYCIN, POLYMYXIN B 400; 3.5; 5 [USP'U]/G; MG/G; [USP'U]/G
OINTMENT TOPICAL ONCE
Status: CANCELLED | OUTPATIENT
Start: 2022-02-09 | End: 2022-02-09

## 2022-02-09 NOTE — PROGRESS NOTES
Wound Care Center Progress Note       Marilyn Rahman  AGE: 35 y.o. GENDER: female  : 1980  TODAY'S DATE:  2022        Subjective:     Chief Complaint   Patient presents with    Wound Check     R armpit          HISTORY of PRESENT ILLNESS    Marilyn Rahman is a 35 y.o. female who presents to the Wound Clinic for a visit for evaluation and treatment of Acute non-healing surgical and draining abscess  ulcer(s) of  Right axilla.  The condition is of moderate severity.   The underlying cause is thought to be folliculitis and subsequent abscess formation.       Wound could not be found to have any depth. Will not be able to pack going forward.  Likely near the end of our treatment       Wound Pain Timing/Severity: waxing and waning  Quality of pain: shooting, pressure  Severity of pain:  3 / 10   Modifying Factors: diabetes, poor glucose control and obesity  Associated Signs/Symptoms: edema, erythema, drainage, odor and pain        PAST MEDICAL HISTORY        Diagnosis Date    Allergic rhinitis     allergy shots- Dr. Violeta Chew Allergic rhinitis     Dr. Kam Post- claritin and flonase    Anxiety 10/24/2012    Atopic dermatitis     Dr. Kam Post- claritin    Back pain 10/24/2012    Bilateral sciatica 2020    Depression     Diabetes mellitus ([address]     Diabetes mellitus type II, uncontrolled ([address]     Diabetic eye exam: 2011, Dr. Jovi Claire , needs record [V72.0T][    GERD (gastroesophageal reflux disease)     Headache(784.0)     Hyperlipidemia LDL goal < 100 2012    Hypertension     Morbid obesity ([address]     No retinopathy on exam     Dr Jovi Claire 13 Diabetes with no significatn diabetic retinopathy found in both eyes    Non compliance w medication regimen 2014    Sprain of neck 10/4/2011       PAST SURGICAL HISTORY    Past Surgical History:   Procedure Laterality Date     SECTION      HYSTERECTOMY      OTHER SURGICAL HISTORY  2017    I&D of right perineal abcess     TOE AMPUTATION Right 2021    RIGHT 5TH TOE AMPUTATION performed by Sloane Segundo MD at [facility] History   Problem Relation Age of Onset    Heart Disease Mother     Diabetes Mother     Asthma Mother     High Cholesterol Mother     Depression Mother     Heart Disease Father     High Cholesterol Father        SOCIAL HISTORY    Social History     Tobacco Use    Smoking status: Former Smoker     Packs/day: 0.25     Types: Cigarettes     Quit date: 5/10/2021     Years since quittin.7    Smokeless tobacco: Never Used   Vaping Use    Vaping Use: Never used   Substance Use Topics    Alcohol use: Yes     Comment: rare    Drug use: No       ALLERGIES    Allergies   Allergen Reactions    Zosyn [Piperacillin Sod-Tazobactam So] Rash     Rash and severe \"being poked by needles all over\" feeling    Penicillins Rash       MEDICATIONS    Current Outpatient Medications on File Prior to Encounter   Medication Sig Dispense Refill    DULoxetine (CYMBALTA) 30 MG extended release capsule Take 1 capsule by mouth daily 30 capsule 1    Dulaglutide (TRULICITY) 3 QZ/4.7HO SOPN Inject 3 mg into the skin once a week 4 pen 3    glipiZIDE (GLUCOTROL XL) 10 MG extended release tablet Take 1 tablet by mouth every morning 90 tablet 1    insulin glargine (LANTUS SOLOSTAR) 100 UNIT/ML injection pen Inject 15 Units into the skin nightly 5 pen 5    lisinopril (PRINIVIL;ZESTRIL) 2.5 MG tablet Take 1 tablet by mouth every morning 90 tablet 3    atorvastatin (LIPITOR) 40 MG tablet Take 1 tablet by mouth every morning 90 tablet 3    Lancets MISC To test up to 4 times per day- nighttime and with each meal 300 each 5    blood glucose test strips (EXACTECH TEST) strip 1 each by In Vitro route daily Embrace test strips to test BS 4x daily 300 each 6    Insulin Pen Needle 32G X 5 MM MISC To use with Lantus pen and Novolog pen 30 each 11     No current facility-administered medications on file prior to encounter. REVIEW OF SYSTEMS    Pertinent items are noted in HPI. Constitutional: Negative for systemic symptoms including fever, chills and malaise. Objective:      /74   Pulse 88   Temp 97.5 °F (36.4 °C) (Temporal)   Resp 16     PHYSICAL EXAM      General: The patient is in no acute distress. Mental status:  Patient is appropriate, is  oriented to place and plan of care. Dermatologic exam: Visual inspection of the periwound reveals the skin to be normal in turgor and texture and dry. Wound exam:  see wound description below     All active wounds listed below with today's date are evaluated      Wound 11/30/21 #1 Right Axilla (Active)   Wound Image   02/09/22 1028   Dressing Status Clean;Dry;New dressing applied 02/02/22 1030   Wound Cleansed Soap and water; Wound cleanser;Cleansed with saline 02/09/22 1028   Offloading for Diabetic Foot Ulcers No offloading required 02/09/22 1028   Wound Length (cm) 0.1 cm 02/09/22 1028   Wound Width (cm) 0.1 cm 02/09/22 1028   Wound Depth (cm) 0.1 cm 02/09/22 1028   Wound Surface Area (cm^2) 0.01 cm^2 02/09/22 1028   Change in Wound Size % (l*w) 90 02/09/22 1028   Wound Volume (cm^3) 0.001 cm^3 02/09/22 1028   Wound Healing % 100 02/09/22 1028   Post-Procedure Length (cm) 2.5 cm 12/16/21 1417   Post-Procedure Width (cm) 3 cm 12/16/21 1417   Post-Procedure Depth (cm) 8.5 cm 12/16/21 1417   Post-Procedure Surface Area (cm^2) 7.5 cm^2 12/16/21 1417   Post-Procedure Volume (cm^3) 63.75 cm^3 12/16/21 1417   Distance Tunneling (cm) 0 cm 02/09/22 1028   Tunneling Position ___ O'Clock 0 02/09/22 1028   Undermining Starts ___ O'Clock 0 02/09/22 1028   Undermining Ends___ O'Clock 0 02/09/22 1028   Undermining Maxium Distance (cm) 0 02/09/22 1028   Wound Assessment Pink/red 02/09/22 1028   Drainage Amount None 02/09/22 1028   Drainage Description Sanguinous 01/26/22 1037   Odor None 02/09/22 1028   Yamilex-wound Assessment Hyperpigmented 01/26/22 1037 Margins Defined edges; Unattached edges 01/26/22 1037   Wound Thickness Description not for Pressure Injury Full thickness 01/26/22 1037   Number of days: 70       Assessment:       Problem List Items Addressed This Visit     WD-Abscess of axilla, right - Primary    Relevant Medications    lidocaine (XYLOCAINE) 4 % external solution (Start on 2/9/2022 11:00 AM)    Other Relevant Orders    Initiate Outpatient Wound Care Protocol          Status of wound progress and description from last visit:   Essentially healed. There is only the small pinpoint lesion where depth used to be. Told patient she could start wearing deodorant and shaving again. Told to use a brand new razor each time, and to cleanse thoroughly before and after each time. Overall focus on hygiene. Follow up in 2 weeks for final check up then likely discharge            Plan:     Discharge Instructions         [address]     NOTE: Upon discharge from the [address], you will receive a patient experience survey via E-mail. We would be grateful if you would take the time to fill this survey out.     Wound care order history:                 Cultures: 11/30/21.               Antibiotics: .Bactrim                 HbA1c:  .10/26/21 10.2               Compression/Lymph Pumps: .              Grafts:  .              Emmie: .                Continuing wound care orders and information:              Residence: .Private              [facility] [address] [place] [address]              Your wound-care supplies will be provided by: . [address]              Pharmacy: .                            RADHAMES cleansing:                           ZY not scrub or use excessive force.                          Wash hands with soap and water before and after dressing changes.                         Prior to applying a clean dressing, cleanse wound with normal saline,                          wound cleanser, or mild soap and water.                         MDJ your physician or nurse before getting the wound(s) wet in the shower.              Daily Wound management:                          Keep weight off wounds and reposition every 2 hours.                          WVUPU standing for long periods of time.                          Apply wraps/stockings in AM and remove at bedtime.                          Elevate legs to the level of the heart or above for 30 minutes 4-5 times a day and/or when sitting.                                               When taking antibiotics take entire prescription as ordered by MD do not stop taking until medicine is all gone.                                                                 Orders for this week (2/9/22): Right arm- Wash with mild soap and water, rinse with saline, pat dry with 4x4  Clobetasol to periwound  Gently pack with ludivina  Cover with ABD  Change Daily     Follow up with Cezar Liao CNP in 2 weeks in the wound care center  Call [phone] for any questions or concerns.   Date__________   Time____________                   Treatment Note      Written Patient Dismissal Instructions Given            Electronically signed by ALISTAIR Rodriguez CNP on 2/9/2022 at 10:41 AM

## 2022-02-23 ENCOUNTER — HOSPITAL ENCOUNTER (OUTPATIENT)
Dept: WOUND CARE | Age: 42
Discharge: HOME | End: 2022-02-23
Payer: MEDICARE

## 2022-02-23 VITALS
SYSTOLIC BLOOD PRESSURE: 113 MMHG | DIASTOLIC BLOOD PRESSURE: 78 MMHG | TEMPERATURE: 97.4 F | HEART RATE: 85 BPM | RESPIRATION RATE: 16 BRPM

## 2022-02-23 DIAGNOSIS — L02.411 ABSCESS OF AXILLA, RIGHT: Primary | ICD-10-CM

## 2022-02-23 PROCEDURE — 99211 OFF/OP EST MAY X REQ PHY/QHP: CPT

## 2022-02-23 PROCEDURE — 99212 OFFICE O/P EST SF 10 MIN: CPT | Performed by: NURSE PRACTITIONER

## 2022-02-23 NOTE — PROGRESS NOTES
Wound Care Center Progress Note       Marilyn Rahman  AGE: 35 y.o.    GENDER: female  : 1980  TODAY'S DATE:  2022        Subjective:     Chief Complaint   Patient presents with    Wound Check     right axilla          HISTORY of PRESENT ILLNESS    Marilyn Rahman is a 35 y.o. female who presents to the Wound Clinic for a visit for evaluation and treatment of Acute non-healing surgical and draining abscess  ulcer(s) of  Right axilla.  The condition is of moderate severity.   The underlying cause is thought to be folliculitis and subsequent abscess formation.       Healed today       Wound Pain Timing/Severity: waxing and waning  Quality of pain: shooting, pressure  Severity of pain:  3 / 10   Modifying Factors: diabetes, poor glucose control and obesity  Associated Signs/Symptoms: edema, erythema, drainage, odor and pain        PAST MEDICAL HISTORY        Diagnosis Date    Allergic rhinitis     allergy shots- Dr. Elana Lund Allergic rhinitis     Dr. Aristeo kwan and flonase    Anxiety 10/24/2012    Atopic dermatitis     Dr. Aristeo kwan    Back pain 10/24/2012    Bilateral sciatica 2020    Depression     Diabetes mellitus ([address]     Diabetes mellitus type II, uncontrolled ([address]     Diabetic eye exam: 2011, Dr. Rudolfo Duverney , needs record [V72.0T][    GERD (gastroesophageal reflux disease)     Headache(784.0)     Hyperlipidemia LDL goal < 100 2012    Hypertension     Morbid obesity ([address]     No retinopathy on exam     Dr Rudolfo Duverney 13 Diabetes with no significatn diabetic retinopathy found in both eyes    Non compliance w medication regimen 2014    Sprain of neck 10/4/2011       PAST SURGICAL HISTORY    Past Surgical History:   Procedure Laterality Date     SECTION      HYSTERECTOMY      OTHER SURGICAL HISTORY  2017    I&D of right perineal abcess     TOE AMPUTATION Right 2021    RIGHT 5TH TOE AMPUTATION performed by Kimber Hopkins MD at [facility] OR       FAMILY HISTORY    Family History   Problem Relation Age of Onset    Heart Disease Mother     Diabetes Mother     Asthma Mother     High Cholesterol Mother     Depression Mother     Heart Disease Father     High Cholesterol Father        SOCIAL HISTORY    Social History     Tobacco Use    Smoking status: Former Smoker     Packs/day: 0.25     Types: Cigarettes     Quit date: 5/10/2021     Years since quittin.7    Smokeless tobacco: Never Used   Vaping Use    Vaping Use: Never used   Substance Use Topics    Alcohol use: Yes     Comment: rare    Drug use: No       ALLERGIES    Allergies   Allergen Reactions    Zosyn [Piperacillin Sod-Tazobactam So] Rash     Rash and severe \"being poked by needles all over\" feeling    Penicillins Rash       MEDICATIONS    Current Outpatient Medications on File Prior to Encounter   Medication Sig Dispense Refill    DULoxetine (CYMBALTA) 30 MG extended release capsule Take 1 capsule by mouth daily 30 capsule 1    Dulaglutide (TRULICITY) 3 JH/1.6JQ SOPN Inject 3 mg into the skin once a week 4 pen 3    glipiZIDE (GLUCOTROL XL) 10 MG extended release tablet Take 1 tablet by mouth every morning 90 tablet 1    insulin glargine (LANTUS SOLOSTAR) 100 UNIT/ML injection pen Inject 15 Units into the skin nightly 5 pen 5    lisinopril (PRINIVIL;ZESTRIL) 2.5 MG tablet Take 1 tablet by mouth every morning 90 tablet 3    atorvastatin (LIPITOR) 40 MG tablet Take 1 tablet by mouth every morning 90 tablet 3    Lancets MISC To test up to 4 times per day- nighttime and with each meal 300 each 5    blood glucose test strips (EXACTECH TEST) strip 1 each by In Vitro route daily Embrace test strips to test BS 4x daily 300 each 6    Insulin Pen Needle 32G X 5 MM MISC To use with Lantus pen and Novolog pen 30 each 11     No current facility-administered medications on file prior to encounter. REVIEW OF SYSTEMS    Pertinent items are noted in HPI.     Constitutional: Negative for systemic symptoms including fever, chills and malaise. Objective:      /78   Pulse 85   Temp 97.4 °F (36.3 °C) (Temporal)   Resp 16     PHYSICAL EXAM      General: The patient is in no acute distress. Mental status:  Patient is appropriate, is  oriented to place and plan of care. Dermatologic exam: Visual inspection of the periwound reveals the skin to be normal in turgor and texture and dry. Wound exam:  see wound description below     All active wounds listed below with today's date are evaluated      Wound 11/30/21 #1 Right Axilla (Active)   Wound Image   02/23/22 1024   Dressing Status Clean;Dry;New dressing applied 02/02/22 1030   Wound Cleansed Wound cleanser 02/23/22 1024   Offloading for Diabetic Foot Ulcers Offloading not required 02/23/22 1024   Wound Length (cm) 0 cm 02/23/22 1024   Wound Width (cm) 0 cm 02/23/22 1024   Wound Depth (cm) 0 cm 02/23/22 1024   Wound Surface Area (cm^2) 0 cm^2 02/23/22 1024   Change in Wound Size % (l*w) 100 02/23/22 1024   Wound Volume (cm^3) 0 cm^3 02/23/22 1024   Wound Healing % 100 02/23/22 1024   Post-Procedure Length (cm) 2.5 cm 12/16/21 1417   Post-Procedure Width (cm) 3 cm 12/16/21 1417   Post-Procedure Depth (cm) 8.5 cm 12/16/21 1417   Post-Procedure Surface Area (cm^2) 7.5 cm^2 12/16/21 1417   Post-Procedure Volume (cm^3) 63.75 cm^3 12/16/21 1417   Distance Tunneling (cm) 0 cm 02/23/22 1024   Tunneling Position ___ O'Clock 0 02/23/22 1024   Undermining Starts ___ O'Clock 0 02/23/22 1024   Undermining Ends___ O'Clock 0 02/23/22 1024   Undermining Maxium Distance (cm) 0 02/23/22 1024   Wound Assessment Pink/red 02/23/22 1024   Drainage Amount None 02/23/22 1024   Drainage Description Sanguinous 01/26/22 1037   Odor None 02/23/22 1024   Yamilex-wound Assessment Hyperpigmented 01/26/22 1037   Margins Defined edges; Unattached edges 01/26/22 1037   Wound Thickness Description not for Pressure Injury Full thickness 01/26/22 1037   Number of days: 84       Assessment:       Problem List Items Addressed This Visit     WD-Abscess of axilla, right - Primary          Status of wound progress and description from last visit:   Healed. Discharge at this time. Patient knows that they may return or call with any questions, comments, or concerns. Discussed strategies for preventing future wounds, including regular compression, daily moisturizing, regular exercise, and performing regular foot checks. Patient verbalized understanding         Plan:     Discharge Instructions       PHYSICIAN ORDERS AND DISCHARGE INSTRUCTIONS     NOTE: Upon discharge from the [address], you will receive a patient experience survey via E-mail. We would be grateful if you would take the time to fill this survey out.     Wound care order history:                 Cultures: 11/30/21.               Antibiotics: .Bactrim                 HbA1c:  .10/26/21 10.2               Compression/Lymph Pumps: .              Grafts:  .              Emmie: .                Continuing wound care orders and information:              Residence: .[address]              Your wound-care supplies will be provided by: . [address]              Pharmacy: .                            NPXWZ cleansing:                           WF not scrub or use excessive force.                          Wash hands with soap and water before and after dressing changes.                           Prior to applying a clean dressing, cleanse wound with normal saline,                          wound cleanser, or mild soap and water.                           Ask your physician or nurse before getting the wound(s) wet in the shower.              Daily Wound management:                          Keep weight off wounds and reposition every 2 hours.                          WARSN standing for long periods of time.                          TQZHC wraps/stockings in AM and remove at bedtime.                          Elevate legs to the level of the heart or above for 30 minutes 4-5 times a day and/or when sitting.                                               When taking antibiotics take entire prescription as ordered by MD do not stop taking until medicine is all gone.                                                                 Orders for this week (2/23/22): Right arm- Wash with mild soap and water, rinse with saline, pat dry with 4x4  Lotion daily     Follow up with Josh Klinefelter, CNP if neededin the wound care center  Call [phone] for any questions or concerns.   Date__________   Time____________        Treatment Note      Written Patient Dismissal Instructions Given            Electronically signed by ALISTAIR Adame CNP on 2/23/2022 at 10:33 AM

## 2022-03-02 ENCOUNTER — OFFICE VISIT (OUTPATIENT)
Dept: FAMILY MEDICINE CLINIC | Age: 42
End: 2022-03-02
Payer: MEDICARE

## 2022-03-02 VITALS
HEIGHT: 62 IN | DIASTOLIC BLOOD PRESSURE: 74 MMHG | SYSTOLIC BLOOD PRESSURE: 126 MMHG | HEART RATE: 92 BPM | BODY MASS INDEX: 41.07 KG/M2 | OXYGEN SATURATION: 98 % | WEIGHT: 223.2 LBS

## 2022-03-02 DIAGNOSIS — M54.41 CHRONIC BILATERAL LOW BACK PAIN WITH BILATERAL SCIATICA: ICD-10-CM

## 2022-03-02 DIAGNOSIS — R82.90 FOUL SMELLING URINE: Primary | ICD-10-CM

## 2022-03-02 DIAGNOSIS — R82.998 LEUKOCYTES IN URINE: ICD-10-CM

## 2022-03-02 DIAGNOSIS — M25.551 RIGHT HIP PAIN: ICD-10-CM

## 2022-03-02 DIAGNOSIS — Z89.421 STATUS POST AMPUTATION OF LESSER TOE OF RIGHT FOOT (HCC): ICD-10-CM

## 2022-03-02 DIAGNOSIS — M54.42 CHRONIC BILATERAL LOW BACK PAIN WITH BILATERAL SCIATICA: ICD-10-CM

## 2022-03-02 DIAGNOSIS — G89.29 CHRONIC BILATERAL LOW BACK PAIN WITH BILATERAL SCIATICA: ICD-10-CM

## 2022-03-02 LAB
BILIRUBIN, POC: NEGATIVE
BLOOD URINE, POC: ABNORMAL
CLARITY, POC: ABNORMAL
COLOR, POC: YELLOW
GLUCOSE URINE, POC: ABNORMAL
KETONES, POC: NEGATIVE
LEUKOCYTE EST, POC: ABNORMAL
NITRITE, POC: POSITIVE
PH, POC: 5.5
PROTEIN, POC: NEGATIVE
SPECIFIC GRAVITY, POC: 1.02
UROBILINOGEN, POC: ABNORMAL

## 2022-03-02 PROCEDURE — 2022F DILAT RTA XM EVC RTNOPTHY: CPT | Performed by: NURSE PRACTITIONER

## 2022-03-02 PROCEDURE — 99214 OFFICE O/P EST MOD 30 MIN: CPT | Performed by: NURSE PRACTITIONER

## 2022-03-02 PROCEDURE — 81002 URINALYSIS NONAUTO W/O SCOPE: CPT | Performed by: NURSE PRACTITIONER

## 2022-03-02 PROCEDURE — G8417 CALC BMI ABV UP PARAM F/U: HCPCS | Performed by: NURSE PRACTITIONER

## 2022-03-02 PROCEDURE — 1036F TOBACCO NON-USER: CPT | Performed by: NURSE PRACTITIONER

## 2022-03-02 PROCEDURE — G8482 FLU IMMUNIZE ORDER/ADMIN: HCPCS | Performed by: NURSE PRACTITIONER

## 2022-03-02 PROCEDURE — G8427 DOCREV CUR MEDS BY ELIG CLIN: HCPCS | Performed by: NURSE PRACTITIONER

## 2022-03-02 PROCEDURE — 3052F HG A1C>EQUAL 8.0%<EQUAL 9.0%: CPT | Performed by: NURSE PRACTITIONER

## 2022-03-02 RX ORDER — SULFAMETHOXAZOLE AND TRIMETHOPRIM 800; 160 MG/1; MG/1
1 TABLET ORAL 2 TIMES DAILY
Qty: 6 TABLET | Refills: 0 | Status: SHIPPED | OUTPATIENT
Start: 2022-03-02 | End: 2022-03-05

## 2022-03-02 NOTE — PROGRESS NOTES
3/2/2022     Marilyn Rahman (:  1980) is a 35 y.o. female, here for evaluation of the following medical concerns:      Chronic lower back pain and hip pain  intermittent  sciatica  Reports limited activity due to pain  XRAY only showing arthritis- sent to [address], ortho. They want her to do PT. She does not want to. Agreeable to see pain management   no change in bladder bowel  Habits, incontinence     Started cymbalta for pain 2022-also to help with depression. Reports it is not working. She declined to increase dose, states she wants to stop it. Denies any side effects. Declines any medication for her moods. Uncontrolled type 2 diabetes  HGBA1C 8.7---- -- increased trulicity to 3mg weekly. Glucose 101 this AM  Report she has seen a decline in glucose since increasing med. No lows <90   Highest glucose 270       Labs ordered 2022, not done    Reports pain in hips since last week. Thinks she has a UTI. Reports foul smell to urine. Denies dysuria, urgency, frequency, pelvic pain, fever sweating chills, nausea. No history of recurrent UTI. Review of Systems    Prior to Visit Medications    Medication Sig Taking?  Authorizing Provider   DULoxetine (CYMBALTA) 30 MG extended release capsule Take 1 capsule by mouth daily Yes ALISTAIR Okeefe NP   Dulaglutide (TRULICITY) 3 JU/8.4DY SOPN Inject 3 mg into the skin once a week Yes ALISTAIR Okeefe NP   glipiZIDE (GLUCOTROL XL) 10 MG extended release tablet Take 1 tablet by mouth every morning Yes ALISTAIR Okeefe NP   insulin glargine (LANTUS SOLOSTAR) 100 UNIT/ML injection pen Inject 15 Units into the skin nightly Yes ALISTAIR Okeefe NP   lisinopril (PRINIVIL;ZESTRIL) 2.5 MG tablet Take 1 tablet by mouth every morning Yes ALISTAIR Okeefe NP   atorvastatin (LIPITOR) 40 MG tablet Take 1 tablet by mouth every morning Yes ALISTAIR Okeefe NP   Lancets MISC To test up to 4 times per day- nighttime and with each meal Yes Deb Median, APRN - NP   blood glucose test strips (EXACTECH TEST) strip 1 each by In Vitro route daily Embrace test strips to test BS 4x daily Yes Deb Median, APRN - NP   Insulin Pen Needle 32G X 5 MM MISC To use with Lantus pen and Novolog pen Yes Deb Median, APRN - NP        Social History     Tobacco Use    Smoking status: Former Smoker     Packs/day: 0.25     Types: Cigarettes     Quit date: 5/10/2021     Years since quittin.8    Smokeless tobacco: Never Used   Substance Use Topics    Alcohol use: Yes     Comment: rare        Vitals:    22 1052   BP: 126/74   Site: Left Upper Arm   Position: Sitting   Cuff Size: Large Adult   Pulse: 92   SpO2: 98%   Weight: 223 lb 3.2 oz (101.2 kg)   Height: 5' 2\" (1.575 m)     Estimated body mass index is 40.82 kg/m² as calculated from the following:    Height as of this encounter: 5' 2\" (1.575 m). Weight as of this encounter: 223 lb 3.2 oz (101.2 kg). Physical Exam  Constitutional:       General: She is not in acute distress. Appearance: Normal appearance. She is obese. She is not ill-appearing, toxic-appearing or diaphoretic. Cardiovascular:      Rate and Rhythm: Normal rate. Pulmonary:      Effort: Pulmonary effort is normal.   Abdominal:      General: There is no distension. Palpations: There is no mass. Tenderness: There is no abdominal tenderness. There is no right CVA tenderness or left CVA tenderness. Hernia: No hernia is present. Musculoskeletal:         General: Tenderness (Right SI joint) present. Normal range of motion. Cervical back: Normal range of motion. Skin:     General: Skin is warm and dry. Neurological:      General: No focal deficit present. Mental Status: She is alert and oriented to person, place, and time. Mental status is at baseline. Psychiatric:      Comments: Flat, depressed         ASSESSMENT/PLAN:  1.  Foul smelling urine  UA in office today - nitrites and leuks, glucose and moderate blood. Will order ATB, bactrim  Discussed increase water intake  Send urine for culture  Call if symptoms not resolved      2. Right hip pain  Send to PM   Tried Ortho  Tried Cymbalta  Wean off Cymbalta. Discussed weaning regimen. Patient states understanding. Call with side effects. 3. Chronic bilateral low back pain with bilateral sciatica  Send to pain management    4. 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 Uncontrolled type 2 diabetes mellitus with complication, with long-term current use of insulin (HCC)  Cont. Current meds. All care gaps addressed     All questions answered    Discussed use, benefit, and side effects of prescribed medications. Barriers to compliance discussed. All patient questions answered. Pt voiced understanding. Present to the ER for any emergent or acute symptoms not managed at home or in office. Please note that this chart was generated using dragon dictation software. Although every effort was made to ensure the accuracy of this automated transcription, some errors in transcription may have occurred. No follow-ups on file. An electronic signature was used to authenticate this note.     --Arley Jones, APRN - NP on 3/2/2022 at 11:35 AM

## 2022-03-03 DIAGNOSIS — E11.69 HYPERLIPIDEMIA ASSOCIATED WITH TYPE 2 DIABETES MELLITUS (HCC): ICD-10-CM

## 2022-03-03 DIAGNOSIS — E78.5 HYPERLIPIDEMIA ASSOCIATED WITH TYPE 2 DIABETES MELLITUS (HCC): ICD-10-CM

## 2022-03-03 LAB
ANION GAP SERPL CALCULATED.3IONS-SCNC: 14 MMOL/L (ref 3–16)
BUN BLDV-MCNC: 9 MG/DL (ref 7–20)
CALCIUM SERPL-MCNC: 9.5 MG/DL (ref 8.3–10.6)
CHLORIDE BLD-SCNC: 100 MMOL/L (ref 99–110)
CHOLESTEROL, TOTAL: 153 MG/DL (ref 0–199)
CO2: 25 MMOL/L (ref 21–32)
CREAT SERPL-MCNC: 0.6 MG/DL (ref 0.6–1.1)
CREATININE URINE: 75.3 MG/DL (ref 28–259)
GFR AFRICAN AMERICAN: >60
GFR NON-AFRICAN AMERICAN: >60
GLUCOSE BLD-MCNC: 236 MG/DL (ref 70–99)
HDLC SERPL-MCNC: 49 MG/DL (ref 40–60)
LDL CHOLESTEROL CALCULATED: 85 MG/DL
MICROALBUMIN UR-MCNC: 2.2 MG/DL
MICROALBUMIN/CREAT UR-RTO: 29.2 MG/G (ref 0–30)
POTASSIUM SERPL-SCNC: 4.2 MMOL/L (ref 3.5–5.1)
SODIUM BLD-SCNC: 139 MMOL/L (ref 136–145)
TRIGL SERPL-MCNC: 97 MG/DL (ref 0–150)
VLDLC SERPL CALC-MCNC: 19 MG/DL

## 2022-03-04 LAB
ORGANISM: ABNORMAL
URINE CULTURE, ROUTINE: ABNORMAL

## 2022-03-09 ENCOUNTER — TELEPHONE (OUTPATIENT)
Dept: FAMILY MEDICINE CLINIC | Age: 42
End: 2022-03-09

## 2022-03-09 RX ORDER — NITROFURANTOIN 25; 75 MG/1; MG/1
100 CAPSULE ORAL 2 TIMES DAILY
Qty: 14 CAPSULE | Refills: 0 | Status: SHIPPED | OUTPATIENT
Start: 2022-03-09 | End: 2022-03-16

## 2022-03-15 ENCOUNTER — TELEPHONE (OUTPATIENT)
Dept: FAMILY MEDICINE CLINIC | Age: 42
End: 2022-03-15

## 2022-03-15 NOTE — TELEPHONE ENCOUNTER
----- Message from Debbie Reardon sent at 3/15/2022  1:07 PM EDT -----  Subject: Referral Request    QUESTIONS   Reason for referral request? PAIN MANAGEMENT   Has the physician seen you for this condition before? No   Preferred Specialist (if applicable)? Do you already have an appointment scheduled? No  Additional Information for Provider? PATIENT STATED SHE'S BEEN TRYING TO   [facility] MANAGEMENT [email or web address], NO ANSWER, UNABLE TO SCHEDULE   APPOINTMENT WOULD LIKE IF ALISTAIR GRUBBS COULD GIVE A CALL BACK  ---------------------------------------------------------------------------  --------------  CALL BACK INFO  What is the best way for the office to contact you? OK to leave message on   voicemail  Preferred Call Back Phone Number?  [phone]

## 2022-03-15 NOTE — TELEPHONE ENCOUNTER
PATIENT STATED SHE'S BEEN TRYING TO   REACH PAIN MANAGEMENT [email or web address], NO ANSWER, UNABLE TO SCHEDULE   APPOINTMENT WOULD LIKE IF APRN KUMAR [facility] and spoke to pt and gave her the number to call for Pain Management.

## 2022-04-19 ENCOUNTER — OFFICE VISIT (OUTPATIENT)
Dept: FAMILY MEDICINE CLINIC | Age: 42
End: 2022-04-19
Payer: MEDICARE

## 2022-04-19 VITALS
BODY MASS INDEX: 40.08 KG/M2 | WEIGHT: 217.8 LBS | HEIGHT: 62 IN | DIASTOLIC BLOOD PRESSURE: 74 MMHG | SYSTOLIC BLOOD PRESSURE: 126 MMHG | OXYGEN SATURATION: 98 % | HEART RATE: 95 BPM

## 2022-04-19 DIAGNOSIS — M54.42 CHRONIC BILATERAL LOW BACK PAIN WITH BILATERAL SCIATICA: ICD-10-CM

## 2022-04-19 DIAGNOSIS — I10 ESSENTIAL HYPERTENSION: ICD-10-CM

## 2022-04-19 DIAGNOSIS — M54.41 CHRONIC BILATERAL LOW BACK PAIN WITH BILATERAL SCIATICA: ICD-10-CM

## 2022-04-19 DIAGNOSIS — E78.5 HYPERLIPIDEMIA ASSOCIATED WITH TYPE 2 DIABETES MELLITUS (HCC): ICD-10-CM

## 2022-04-19 DIAGNOSIS — G89.29 CHRONIC BILATERAL LOW BACK PAIN WITH BILATERAL SCIATICA: ICD-10-CM

## 2022-04-19 DIAGNOSIS — E11.69 HYPERLIPIDEMIA ASSOCIATED WITH TYPE 2 DIABETES MELLITUS (HCC): ICD-10-CM

## 2022-04-19 PROBLEM — S91.301S OPEN WOUND OF FOOT, RIGHT, SEQUELA: Status: RESOLVED | Noted: 2021-04-14 | Resolved: 2022-04-19

## 2022-04-19 PROBLEM — M86.9 OSTEOMYELITIS (HCC): Status: RESOLVED | Noted: 2021-06-15 | Resolved: 2022-04-19

## 2022-04-19 PROBLEM — E11.621 DIABETIC ULCER OF RIGHT FIFTH TOE (HCC): Status: RESOLVED | Noted: 2021-04-13 | Resolved: 2022-04-19

## 2022-04-19 PROBLEM — L97.519 DIABETIC ULCER OF RIGHT FIFTH TOE (HCC): Status: RESOLVED | Noted: 2021-04-13 | Resolved: 2022-04-19

## 2022-04-19 PROBLEM — L97.412 DIABETIC ULCER OF RIGHT HEEL WITH FAT LAYER EXPOSED (HCC): Status: RESOLVED | Noted: 2021-04-13 | Resolved: 2022-04-19

## 2022-04-19 PROBLEM — L02.411 ABSCESS OF AXILLA, RIGHT: Status: RESOLVED | Noted: 2021-11-30 | Resolved: 2022-04-19

## 2022-04-19 PROBLEM — E11.621 DIABETIC ULCER OF RIGHT HEEL WITH FAT LAYER EXPOSED (HCC): Status: RESOLVED | Noted: 2021-04-13 | Resolved: 2022-04-19

## 2022-04-19 PROCEDURE — G8427 DOCREV CUR MEDS BY ELIG CLIN: HCPCS | Performed by: FAMILY MEDICINE

## 2022-04-19 PROCEDURE — 1036F TOBACCO NON-USER: CPT | Performed by: FAMILY MEDICINE

## 2022-04-19 PROCEDURE — 3052F HG A1C>EQUAL 8.0%<EQUAL 9.0%: CPT | Performed by: FAMILY MEDICINE

## 2022-04-19 PROCEDURE — 99214 OFFICE O/P EST MOD 30 MIN: CPT | Performed by: FAMILY MEDICINE

## 2022-04-19 PROCEDURE — G8417 CALC BMI ABV UP PARAM F/U: HCPCS | Performed by: FAMILY MEDICINE

## 2022-04-19 PROCEDURE — 2022F DILAT RTA XM EVC RTNOPTHY: CPT | Performed by: FAMILY MEDICINE

## 2022-04-19 RX ORDER — ATORVASTATIN CALCIUM 40 MG/1
40 TABLET, FILM COATED ORAL EVERY MORNING
Qty: 90 TABLET | Refills: 3 | Status: SHIPPED | OUTPATIENT
Start: 2022-04-19

## 2022-04-19 RX ORDER — LISINOPRIL 2.5 MG/1
2.5 TABLET ORAL EVERY MORNING
Qty: 90 TABLET | Refills: 3 | Status: SHIPPED | OUTPATIENT
Start: 2022-04-19

## 2022-04-19 RX ORDER — GLIPIZIDE 10 MG/1
10 TABLET, FILM COATED, EXTENDED RELEASE ORAL EVERY MORNING
Qty: 90 TABLET | Refills: 1 | Status: SHIPPED | OUTPATIENT
Start: 2022-04-19

## 2022-04-19 RX ORDER — DULOXETIN HYDROCHLORIDE 30 MG/1
60 CAPSULE, DELAYED RELEASE ORAL DAILY
Qty: 180 CAPSULE | Refills: 3 | Status: SHIPPED | OUTPATIENT
Start: 2022-04-19

## 2022-04-19 RX ORDER — DULAGLUTIDE 3 MG/.5ML
3 INJECTION, SOLUTION SUBCUTANEOUS WEEKLY
Qty: 4 PEN | Refills: 3 | Status: SHIPPED | OUTPATIENT
Start: 2022-04-19

## 2022-04-19 ASSESSMENT — PATIENT HEALTH QUESTIONNAIRE - PHQ9
SUM OF ALL RESPONSES TO PHQ QUESTIONS 1-9: 0
1. LITTLE INTEREST OR PLEASURE IN DOING THINGS: 0
SUM OF ALL RESPONSES TO PHQ QUESTIONS 1-9: 0
2. FEELING DOWN, DEPRESSED OR HOPELESS: 0
SUM OF ALL RESPONSES TO PHQ QUESTIONS 1-9: 0
SUM OF ALL RESPONSES TO PHQ9 QUESTIONS 1 & 2: 0
SUM OF ALL RESPONSES TO PHQ QUESTIONS 1-9: 0

## 2022-04-19 NOTE — ASSESSMENT & PLAN NOTE
Patient did better on ozempic 3mg weekly and will continue on glipizide 10mg daily. Recheck A1c in a few months.

## 2022-04-19 NOTE — PROGRESS NOTES
Plan:   1. Uncontrolled type 2 diabetes mellitus with complication, with long-term current use of insulin (HCC)  -     lisinopril (PRINIVIL;ZESTRIL) 2.5 MG tablet; Take 1 tablet by mouth every morning, Disp-90 tablet, R-3Normal  -     glipiZIDE (GLUCOTROL XL) 10 MG extended release tablet; Take 1 tablet by mouth every morning, Disp-90 tablet, R-1Normal  -     Dulaglutide (TRULICITY) 3 FP/3.8JE SOPN; Inject 3 mg into the skin once a week Please give 3mg dose as patient doing well on 3 mg without side effect, Disp-4 pen, R-3Normal  2. Hyperlipidemia associated with type 2 diabetes mellitus (HCC)  -     atorvastatin (LIPITOR) 40 MG tablet; Take 1 tablet by mouth every morning, Disp-90 tablet, R-3Normal  3. Essential hypertension  Assessment & Plan:  Stable on current regimen. Refill given on current medication     Orders:  -     lisinopril (PRINIVIL;ZESTRIL) 2.5 MG tablet; Take 1 tablet by mouth every morning, Disp-90 tablet, R-3Normal  4. Chronic bilateral low back pain with bilateral sciatica  -     DULoxetine (CYMBALTA) 30 MG extended release capsule; Take 2 capsules by mouth daily Dose increase as of 4/19/22, Disp-180 capsule, R-3Normal    Stable condition as above. She will continue to work on healthy diet since her activity has decreased due to back pain issues managed by Dr. Stephen Bailey. She will trial an increase in Cymbalta to 60 mg total daily. All patient questions answered. Pt voiced understanding. Return in about 6 months (around 10/19/2022) for Med refills- DM, etc will get lab work A1c-  .  -----------------------------------------------------------------------------------------------            Chief Complaint   Patient presents with   [place] Dr. Jr Pong for chronic back pain and next step with physical therapy and flexeril as needed. Taking cymbalta. S/p Right 1 MTP secondary to osteomyelitis.   by Dr. Adrianna Galindo and overall doing well but still has phantom pain on lateral right foot. Overall she is taking her medicines although she reports the pharmacy did not give her the right dose of her Ozempic and she was taking 1 mg instead of 3 but she would like to go back to 3 mg again. She has unable to stay as active as normal because of her chronic back pain. No side effects from her statin or blood pressure medication. Lab Results   Component Value Date    LABA1C 8.7 01/17/2022    LABA1C 10.2 (H) 10/26/2021    LABA1C 7.3 (H) 07/24/2021     Lab Results   Component Value Date    LABMICR 2.20 (H) 03/03/2022    CREATININE 0.6 03/03/2022     Lab Results   Component Value Date    ALT 17 11/26/2021    AST 10 (L) 11/26/2021     Lab Results   Component Value Date    CHOL 153 03/03/2022    TRIG 97 03/03/2022    HDL 49 03/03/2022    LDLCALC 85 03/03/2022    LDLDIRECT 114 (H) 07/13/2012          ROS: Pertinent items are noted in HPI. All other ROS negative     reports that she quit smoking about 11 months ago. Her smoking use included cigarettes. She smoked 0.25 packs per day. She has never used smokeless tobacco.      Physical Exam   Nursing note reviewed  /74 (Site: Left Upper Arm, Position: Sitting)   Pulse 95   Ht 5' 2\" (1.575 m)   Wt 217 lb 12.8 oz (98.8 kg)   SpO2 98%   BMI 39.84 kg/m²   BP Readings from Last 3 Encounters:   04/19/22 126/74   03/02/22 126/74   02/23/22 113/78     Wt Readings from Last 3 Encounters:   04/19/22 217 lb 12.8 oz (98.8 kg)   03/02/22 223 lb 3.2 oz (101.2 kg)   01/17/22 218 lb 6.4 oz (99.1 kg)     No results found for this visit on 04/19/22. General appearance: cooperative   Neck: supple, symmetrical, trachea midline  Lungs: clear to auscultation bilaterally  Heart: regular rate and rhythm, S1, S2 normal,  Abdomen:  bowel sounds normal and soft, non-tender  MSK no LE edema, well healed 5th right toe amputation site.    Skin: Skin color, texture, turgor normal. No rashes or lesions  Neurologic: Grossly normal   Psych: Alert and oriented, appropriate affect.     Assessment and Plan: See above

## 2022-12-06 ENCOUNTER — OFFICE VISIT (OUTPATIENT)
Dept: FAMILY MEDICINE CLINIC | Age: 42
End: 2022-12-06
Payer: MEDICARE

## 2022-12-06 VITALS
BODY MASS INDEX: 39.87 KG/M2 | HEART RATE: 98 BPM | SYSTOLIC BLOOD PRESSURE: 126 MMHG | OXYGEN SATURATION: 98 % | DIASTOLIC BLOOD PRESSURE: 74 MMHG | WEIGHT: 218 LBS

## 2022-12-06 DIAGNOSIS — G89.29 CHRONIC BILATERAL LOW BACK PAIN WITH BILATERAL SCIATICA: ICD-10-CM

## 2022-12-06 DIAGNOSIS — E11.65 UNCONTROLLED TYPE 2 DIABETES MELLITUS WITH HYPERGLYCEMIA (HCC): Primary | ICD-10-CM

## 2022-12-06 DIAGNOSIS — Z91.199 CURRENT NON-ADHERENCE TO MEDICAL TREATMENT: ICD-10-CM

## 2022-12-06 DIAGNOSIS — I10 ESSENTIAL HYPERTENSION: ICD-10-CM

## 2022-12-06 DIAGNOSIS — M54.41 CHRONIC BILATERAL LOW BACK PAIN WITH BILATERAL SCIATICA: ICD-10-CM

## 2022-12-06 DIAGNOSIS — E78.5 HYPERLIPIDEMIA ASSOCIATED WITH TYPE 2 DIABETES MELLITUS (HCC): ICD-10-CM

## 2022-12-06 DIAGNOSIS — E11.69 HYPERLIPIDEMIA ASSOCIATED WITH TYPE 2 DIABETES MELLITUS (HCC): ICD-10-CM

## 2022-12-06 DIAGNOSIS — M54.42 CHRONIC BILATERAL LOW BACK PAIN WITH BILATERAL SCIATICA: ICD-10-CM

## 2022-12-06 LAB — HBA1C MFR BLD: 10.9 %

## 2022-12-06 PROCEDURE — 99213 OFFICE O/P EST LOW 20 MIN: CPT | Performed by: FAMILY MEDICINE

## 2022-12-06 PROCEDURE — G8427 DOCREV CUR MEDS BY ELIG CLIN: HCPCS | Performed by: FAMILY MEDICINE

## 2022-12-06 PROCEDURE — 83036 HEMOGLOBIN GLYCOSYLATED A1C: CPT | Performed by: FAMILY MEDICINE

## 2022-12-06 PROCEDURE — 3074F SYST BP LT 130 MM HG: CPT | Performed by: FAMILY MEDICINE

## 2022-12-06 PROCEDURE — G8417 CALC BMI ABV UP PARAM F/U: HCPCS | Performed by: FAMILY MEDICINE

## 2022-12-06 PROCEDURE — 2022F DILAT RTA XM EVC RTNOPTHY: CPT | Performed by: FAMILY MEDICINE

## 2022-12-06 PROCEDURE — G8484 FLU IMMUNIZE NO ADMIN: HCPCS | Performed by: FAMILY MEDICINE

## 2022-12-06 PROCEDURE — 3046F HEMOGLOBIN A1C LEVEL >9.0%: CPT | Performed by: FAMILY MEDICINE

## 2022-12-06 PROCEDURE — 3078F DIAST BP <80 MM HG: CPT | Performed by: FAMILY MEDICINE

## 2022-12-06 PROCEDURE — 1036F TOBACCO NON-USER: CPT | Performed by: FAMILY MEDICINE

## 2022-12-06 RX ORDER — DULOXETIN HYDROCHLORIDE 30 MG/1
60 CAPSULE, DELAYED RELEASE ORAL DAILY
Qty: 180 CAPSULE | Refills: 3 | Status: SHIPPED | OUTPATIENT
Start: 2022-12-06

## 2022-12-06 RX ORDER — DULAGLUTIDE 3 MG/.5ML
3 INJECTION, SOLUTION SUBCUTANEOUS WEEKLY
Qty: 12 ADJUSTABLE DOSE PRE-FILLED PEN SYRINGE | Refills: 3 | Status: SHIPPED | OUTPATIENT
Start: 2022-12-06

## 2022-12-06 RX ORDER — HYDROCODONE BITARTRATE AND ACETAMINOPHEN 5; 325 MG/1; MG/1
1 TABLET ORAL 3 TIMES DAILY PRN
COMMUNITY
Start: 2022-11-22 | End: 2022-12-06 | Stop reason: ALTCHOICE

## 2022-12-06 RX ORDER — LISINOPRIL 2.5 MG/1
2.5 TABLET ORAL EVERY MORNING
Qty: 90 TABLET | Refills: 3 | Status: SHIPPED | OUTPATIENT
Start: 2022-12-06

## 2022-12-06 RX ORDER — ATORVASTATIN CALCIUM 40 MG/1
40 TABLET, FILM COATED ORAL EVERY MORNING
Qty: 90 TABLET | Refills: 3 | Status: SHIPPED | OUTPATIENT
Start: 2022-12-06

## 2022-12-06 RX ORDER — INSULIN GLARGINE 100 [IU]/ML
25 INJECTION, SOLUTION SUBCUTANEOUS NIGHTLY
Qty: 5 ADJUSTABLE DOSE PRE-FILLED PEN SYRINGE | Refills: 5 | Status: SHIPPED | OUTPATIENT
Start: 2022-12-06

## 2022-12-06 RX ORDER — GLIPIZIDE 10 MG/1
10 TABLET, FILM COATED, EXTENDED RELEASE ORAL EVERY MORNING
Qty: 90 TABLET | Refills: 1 | Status: SHIPPED | OUTPATIENT
Start: 2022-12-06

## 2022-12-06 SDOH — ECONOMIC STABILITY: FOOD INSECURITY: WITHIN THE PAST 12 MONTHS, YOU WORRIED THAT YOUR FOOD WOULD RUN OUT BEFORE YOU GOT MONEY TO BUY MORE.: PATIENT DECLINED

## 2022-12-06 SDOH — ECONOMIC STABILITY: FOOD INSECURITY: WITHIN THE PAST 12 MONTHS, THE FOOD YOU BOUGHT JUST DIDN'T LAST AND YOU DIDN'T HAVE MONEY TO GET MORE.: PATIENT DECLINED

## 2022-12-06 ASSESSMENT — SOCIAL DETERMINANTS OF HEALTH (SDOH): HOW HARD IS IT FOR YOU TO PAY FOR THE VERY BASICS LIKE FOOD, HOUSING, MEDICAL CARE, AND HEATING?: PATIENT DECLINED

## 2022-12-06 NOTE — PROGRESS NOTES
Plan:     1. Uncontrolled type 2 diabetes mellitus with hyperglycemia (HCC)  -     POCT glycosylated hemoglobin (Hb A1C)  -     Dulaglutide (TRULICITY) 3 TK/2.8YP SOPN; Inject 3 mg into the skin once a week Please give 3mg dose as patient doing well on 3 mg without side effect, Disp-12 Adjustable Dose Pre-filled Pen Syringe, R-3Normal  -     glipiZIDE (GLUCOTROL XL) 10 MG extended release tablet; Take 1 tablet by mouth every morning, Disp-90 tablet, R-1Normal  -     insulin glargine (LANTUS SOLOSTAR) 100 UNIT/ML injection pen; Inject 25 Units into the skin nightly Dose increase as of 12/6/2022, Disp-5 Adjustable Dose Pre-filled Pen Syringe, R-5Normal  2. Current non-adherence to medical treatment  3. Hyperlipidemia associated with type 2 diabetes mellitus (HCC)  -     POCT glycosylated hemoglobin (Hb A1C)  -     atorvastatin (LIPITOR) 40 MG tablet; Take 1 tablet by mouth every morning, Disp-90 tablet, R-3Normal  4. Chronic bilateral low back pain with bilateral sciatica  -     DULoxetine (CYMBALTA) 30 MG extended release capsule; Take 2 capsules by mouth daily Dose increase as of 4/19/22, Disp-180 capsule, R-3Normal  5. Essential hypertension  -     lisinopril (PRINIVIL;ZESTRIL) 2.5 MG tablet; Take 1 tablet by mouth every morning, Disp-90 tablet, R-3Normal    Only using lantus 3 days per week, encourage daily usage of medication to avoid severe hyperglycemia and end organ damage. Meds as above. BP stable on current regimen. Refills given as above      All patient questions answered. Pt voiced understanding. Return for cancel Nelson sunny and move to 6 months Dual: Medicare AWV and Chronic conditions.  -----------------------------------------------------------------------------------------------            Chief Complaint   Patient presents with    Follow-up    Diabetes    Sinus Problem   Blood sugars range 130-98. Denies any hypoglycemic episodes. Lantus 18 units nightly but only 2-3 nights per week.      She does take her Trulicity most weeks. She has not really been eating a diabetic diet. There is stress at home with caring with her grandkids. She is sleeping okay. mood's have been fine. She reports some sinus congestion over the last few days. No sick contacts. Has not gotten any of the COVID vaccines or flu vaccines yet. Plan to get the flu shot once her sinus congestion improves. Denies any fevers, chills. Appetite is fine. ROS: Pertinent items are noted in HPI. All other ROS negative     reports that she quit smoking about 19 months ago. Her smoking use included cigarettes. She smoked an average of .25 packs per day.  She has never used smokeless tobacco.    Lab Results   Component Value Date    LABA1C 10.9 12/06/2022    LABA1C 8.7 01/17/2022    LABA1C 10.2 (H) 10/26/2021     Lab Results   Component Value Date    LABMICR 2.20 (H) 03/03/2022    CREATININE 0.6 03/03/2022     Lab Results   Component Value Date    ALT 17 11/26/2021    AST 10 (L) 11/26/2021     Lab Results   Component Value Date    CHOL 153 03/03/2022    TRIG 97 03/03/2022    HDL 49 03/03/2022    LDLCALC 85 03/03/2022    LDLDIRECT 114 (H) 07/13/2012            Physical Exam   Nursing note reviewed  /74   Pulse 98   Wt 218 lb (98.9 kg)   SpO2 98%   BMI 39.87 kg/m²   BP Readings from Last 3 Encounters:   12/06/22 126/74   04/19/22 126/74   03/02/22 126/74     Wt Readings from Last 3 Encounters:   12/06/22 218 lb (98.9 kg)   04/19/22 217 lb 12.8 oz (98.8 kg)   03/02/22 223 lb 3.2 oz (101.2 kg)     Results for POC orders placed in visit on 12/06/22   POCT glycosylated hemoglobin (Hb A1C)   Result Value Ref Range    Hemoglobin A1C 10.9 %       General appearance: cooperative , non toxic appearing  No sinus tenderness   Neck: supple, symmetrical, trachea midline  Lungs: clear to auscultation bilaterally, no wheezing or ronchi   Heart: regular rate and rhythm, S1, S2 normal,  Abdomen:  bowel sounds normal and soft, non-tender  MSK no LE edema  Skin: Skin color, texture, turgor normal. No rashes or lesions  Neurologic: Grossly normal   Psych: Alert and oriented, appropriate affect.     Assessment and Plan: See above

## 2023-01-01 NOTE — TELEPHONE ENCOUNTER
Pt was seen by hospitalist, but not us when she was in. Do you want her to see her PCP for f/u care? Statement Selected

## 2023-04-28 ENCOUNTER — TELEPHONE (OUTPATIENT)
Dept: FAMILY MEDICINE CLINIC | Age: 43
End: 2023-04-28

## 2023-04-28 DIAGNOSIS — M54.42 CHRONIC BILATERAL LOW BACK PAIN WITH BILATERAL SCIATICA: Primary | ICD-10-CM

## 2023-04-28 DIAGNOSIS — G89.29 CHRONIC NECK PAIN: ICD-10-CM

## 2023-04-28 DIAGNOSIS — M54.2 CHRONIC NECK PAIN: ICD-10-CM

## 2023-04-28 DIAGNOSIS — G89.29 CHRONIC BILATERAL LOW BACK PAIN WITH BILATERAL SCIATICA: Primary | ICD-10-CM

## 2023-04-28 DIAGNOSIS — M54.41 CHRONIC BILATERAL LOW BACK PAIN WITH BILATERAL SCIATICA: Primary | ICD-10-CM

## 2023-05-02 NOTE — TELEPHONE ENCOUNTER
Please have patient check with insurance if Dr. Maximilian Hurd is in her insurance plan so I can make the referral to his office

## 2023-06-06 ENCOUNTER — OFFICE VISIT (OUTPATIENT)
Dept: FAMILY MEDICINE CLINIC | Age: 43
End: 2023-06-06
Payer: MEDICARE

## 2023-06-06 VITALS
HEART RATE: 88 BPM | WEIGHT: 226.6 LBS | OXYGEN SATURATION: 98 % | HEIGHT: 62 IN | DIASTOLIC BLOOD PRESSURE: 74 MMHG | SYSTOLIC BLOOD PRESSURE: 128 MMHG | BODY MASS INDEX: 41.7 KG/M2

## 2023-06-06 DIAGNOSIS — M54.42 CHRONIC BILATERAL LOW BACK PAIN WITH BILATERAL SCIATICA: ICD-10-CM

## 2023-06-06 DIAGNOSIS — G89.29 CHRONIC BILATERAL LOW BACK PAIN WITH BILATERAL SCIATICA: ICD-10-CM

## 2023-06-06 DIAGNOSIS — M54.41 CHRONIC BILATERAL LOW BACK PAIN WITH BILATERAL SCIATICA: ICD-10-CM

## 2023-06-06 DIAGNOSIS — E11.69 HYPERLIPIDEMIA ASSOCIATED WITH TYPE 2 DIABETES MELLITUS (HCC): ICD-10-CM

## 2023-06-06 DIAGNOSIS — I73.9 PVD (PERIPHERAL VASCULAR DISEASE) (HCC): ICD-10-CM

## 2023-06-06 DIAGNOSIS — L08.9 INFECTED BLISTER OF LEFT FOOT, INITIAL ENCOUNTER: Primary | ICD-10-CM

## 2023-06-06 DIAGNOSIS — E66.01 OBESITY, CLASS III, BMI 40-49.9 (MORBID OBESITY) (HCC): ICD-10-CM

## 2023-06-06 DIAGNOSIS — Z89.421 STATUS POST AMPUTATION OF LESSER TOE OF RIGHT FOOT (HCC): ICD-10-CM

## 2023-06-06 DIAGNOSIS — E78.5 HYPERLIPIDEMIA ASSOCIATED WITH TYPE 2 DIABETES MELLITUS (HCC): ICD-10-CM

## 2023-06-06 DIAGNOSIS — S90.822A INFECTED BLISTER OF LEFT FOOT, INITIAL ENCOUNTER: Primary | ICD-10-CM

## 2023-06-06 PROCEDURE — 3078F DIAST BP <80 MM HG: CPT | Performed by: FAMILY MEDICINE

## 2023-06-06 PROCEDURE — 99214 OFFICE O/P EST MOD 30 MIN: CPT | Performed by: FAMILY MEDICINE

## 2023-06-06 PROCEDURE — 3074F SYST BP LT 130 MM HG: CPT | Performed by: FAMILY MEDICINE

## 2023-06-06 RX ORDER — CEPHALEXIN 500 MG/1
CAPSULE ORAL EVERY 12 HOURS
COMMUNITY
Start: 2023-06-02 | End: 2023-06-09

## 2023-06-06 SDOH — ECONOMIC STABILITY: FOOD INSECURITY: WITHIN THE PAST 12 MONTHS, THE FOOD YOU BOUGHT JUST DIDN'T LAST AND YOU DIDN'T HAVE MONEY TO GET MORE.: PATIENT DECLINED

## 2023-06-06 SDOH — ECONOMIC STABILITY: FOOD INSECURITY: WITHIN THE PAST 12 MONTHS, YOU WORRIED THAT YOUR FOOD WOULD RUN OUT BEFORE YOU GOT MONEY TO BUY MORE.: PATIENT DECLINED

## 2023-06-06 SDOH — ECONOMIC STABILITY: INCOME INSECURITY: HOW HARD IS IT FOR YOU TO PAY FOR THE VERY BASICS LIKE FOOD, HOUSING, MEDICAL CARE, AND HEATING?: PATIENT DECLINED

## 2023-06-06 SDOH — ECONOMIC STABILITY: HOUSING INSECURITY
IN THE LAST 12 MONTHS, WAS THERE A TIME WHEN YOU DID NOT HAVE A STEADY PLACE TO SLEEP OR SLEPT IN A SHELTER (INCLUDING NOW)?: PATIENT REFUSED

## 2023-06-06 ASSESSMENT — PATIENT HEALTH QUESTIONNAIRE - PHQ9
SUM OF ALL RESPONSES TO PHQ QUESTIONS 1-9: 0
1. LITTLE INTEREST OR PLEASURE IN DOING THINGS: 0
SUM OF ALL RESPONSES TO PHQ9 QUESTIONS 1 & 2: 0
SUM OF ALL RESPONSES TO PHQ QUESTIONS 1-9: 0
2. FEELING DOWN, DEPRESSED OR HOPELESS: 0

## 2023-06-06 NOTE — PROGRESS NOTES
on both feet that is full of fluid    Seen in urgent care and was put on Keflex. She admits to misunderstanding of the direction and in only taking 2 Keflex capsules in the morning. She plans to take her 2 capsules in the evening and continue on with the original directions. She denies any fevers or chills she reports that the open blister on her left foot is starting to heal slowly and that the larger blister has not opened up yet. She is using some antibacterial ointment on the open blister. Her fasting blood sugar this morning was in the mid 200s. She is taking the Trulicity as regularly as she can as well as her 25 units of Lantus at nighttime on most days. She needs a new referral for pain specialist that she was seeing Dr. Amy Bermudez but he is leaving his practice. Chronic back pain. She reports that the nerve blocks did not work. She is on pain medicines from their office. 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 She still has phantom pain. She does not use any cane or walker or assistive device for ambulation. She does have decent balance. She is requesting a renewal of her handicap placard which they gave her after of amputation. She reports getting tired from time to time. She is currently in no home exercise program or back strengthening program due to her chronic back pain. He denies any falls in the last 6 months. Lab Results   Component Value Date    LABA1C 10.9 12/06/2022    LABA1C 8.7 01/17/2022    LABA1C 10.2 (H) 10/26/2021     Lab Results   Component Value Date    LABMICR 2.20 (H) 03/03/2022    CREATININE 0.6 03/03/2022     Lab Results   Component Value Date    ALT 17 11/26/2021    AST 10 (L) 11/26/2021     Lab Results   Component Value Date    CHOL 153 03/03/2022    TRIG 97 03/03/2022    HDL 49 03/03/2022    LDLCALC 85 03/03/2022    LDLDIRECT 114 (H) 07/13/2012            ROS: Pertinent items are noted in HPI.  All other ROS negative     reports that

## 2023-06-06 NOTE — ASSESSMENT & PLAN NOTE
Patient will tamia on what provider is covered under insurance as current pain specialist Dr. Louise Cooper is leaving practice

## 2023-06-07 ENCOUNTER — TELEPHONE (OUTPATIENT)
Dept: PHARMACY | Facility: CLINIC | Age: 43
End: 2023-06-07

## 2023-06-20 ENCOUNTER — HOSPITAL ENCOUNTER (OUTPATIENT)
Dept: WOUND CARE | Age: 43
Discharge: HOME | End: 2023-06-20
Payer: MEDICARE

## 2023-06-20 VITALS
SYSTOLIC BLOOD PRESSURE: 130 MMHG | TEMPERATURE: 97.8 F | DIASTOLIC BLOOD PRESSURE: 81 MMHG | RESPIRATION RATE: 16 BRPM | HEART RATE: 87 BPM

## 2023-06-20 DIAGNOSIS — I83.013 VENOUS STASIS ULCER OF RIGHT ANKLE WITH FAT LAYER EXPOSED WITH VARICOSE VEINS (HCC): ICD-10-CM

## 2023-06-20 DIAGNOSIS — Z89.421 STATUS POST AMPUTATION OF LESSER TOE OF RIGHT FOOT (HCC): Primary | ICD-10-CM

## 2023-06-20 DIAGNOSIS — E11.9 DIABETES MELLITUS TREATED WITH INSULIN AND ORAL MEDICATION (HCC): ICD-10-CM

## 2023-06-20 DIAGNOSIS — E11.621 DIABETIC ULCER OF TOE OF LEFT FOOT ASSOCIATED WITH TYPE 2 DIABETES MELLITUS, WITH FAT LAYER EXPOSED (HCC): ICD-10-CM

## 2023-06-20 DIAGNOSIS — Z79.4 DIABETES MELLITUS TREATED WITH INSULIN AND ORAL MEDICATION (HCC): ICD-10-CM

## 2023-06-20 DIAGNOSIS — L97.312 VENOUS STASIS ULCER OF RIGHT ANKLE WITH FAT LAYER EXPOSED WITH VARICOSE VEINS (HCC): ICD-10-CM

## 2023-06-20 DIAGNOSIS — E66.01 CLASS 3 SEVERE OBESITY WITHOUT SERIOUS COMORBIDITY WITH BODY MASS INDEX (BMI) OF 40.0 TO 44.9 IN ADULT, UNSPECIFIED OBESITY TYPE (HCC): ICD-10-CM

## 2023-06-20 DIAGNOSIS — R60.0 BILATERAL LOWER EXTREMITY EDEMA: ICD-10-CM

## 2023-06-20 DIAGNOSIS — L97.522 DIABETIC ULCER OF TOE OF LEFT FOOT ASSOCIATED WITH TYPE 2 DIABETES MELLITUS, WITH FAT LAYER EXPOSED (HCC): ICD-10-CM

## 2023-06-20 DIAGNOSIS — E66.01 CLASS 3 SEVERE OBESITY DUE TO EXCESS CALORIES WITH SERIOUS COMORBIDITY AND BODY MASS INDEX (BMI) OF 40.0 TO 44.9 IN ADULT (HCC): ICD-10-CM

## 2023-06-20 DIAGNOSIS — Z79.84 DIABETES MELLITUS TREATED WITH INSULIN AND ORAL MEDICATION (HCC): ICD-10-CM

## 2023-06-20 PROBLEM — E66.813 CLASS 3 SEVERE OBESITY WITHOUT SERIOUS COMORBIDITY WITH BODY MASS INDEX (BMI) OF 40.0 TO 44.9 IN ADULT: Status: ACTIVE | Noted: 2023-06-20

## 2023-06-20 PROBLEM — E66.09 OBESITY DUE TO EXCESS CALORIES WITH SERIOUS COMORBIDITY: Status: ACTIVE | Noted: 2023-06-20

## 2023-06-20 PROBLEM — E66.813 CLASS 3 SEVERE OBESITY WITHOUT SERIOUS COMORBIDITY IN ADULT: Status: ACTIVE | Noted: 2023-06-20

## 2023-06-20 PROBLEM — E66.813 CLASS 3 SEVERE OBESITY WITHOUT SERIOUS COMORBIDITY WITH BODY MASS INDEX (BMI) OF 40.0 TO 44.9 IN ADULT: Status: RESOLVED | Noted: 2023-06-20 | Resolved: 2023-06-20

## 2023-06-20 PROCEDURE — 99213 OFFICE O/P EST LOW 20 MIN: CPT

## 2023-06-20 PROCEDURE — 11042 DBRDMT SUBQ TIS 1ST 20SQCM/<: CPT

## 2023-06-20 PROCEDURE — 11042 DBRDMT SUBQ TIS 1ST 20SQCM/<: CPT | Performed by: NURSE PRACTITIONER

## 2023-06-20 PROCEDURE — 99213 OFFICE O/P EST LOW 20 MIN: CPT | Performed by: NURSE PRACTITIONER

## 2023-06-20 RX ORDER — BETAMETHASONE DIPROPIONATE 0.05 %
OINTMENT (GRAM) TOPICAL ONCE
OUTPATIENT
Start: 2023-06-20 | End: 2023-06-20

## 2023-06-20 RX ORDER — GENTAMICIN SULFATE 1 MG/G
OINTMENT TOPICAL ONCE
OUTPATIENT
Start: 2023-06-20 | End: 2023-06-20

## 2023-06-20 RX ORDER — IBUPROFEN 200 MG
TABLET ORAL ONCE
OUTPATIENT
Start: 2023-06-20 | End: 2023-06-20

## 2023-06-20 RX ORDER — LIDOCAINE 40 MG/G
CREAM TOPICAL ONCE
OUTPATIENT
Start: 2023-06-20 | End: 2023-06-20

## 2023-06-20 RX ORDER — SODIUM CHLOR/HYPOCHLOROUS ACID 0.033 %
SOLUTION, IRRIGATION IRRIGATION ONCE
OUTPATIENT
Start: 2023-06-20 | End: 2023-06-20

## 2023-06-20 RX ORDER — LIDOCAINE 50 MG/G
OINTMENT TOPICAL ONCE
OUTPATIENT
Start: 2023-06-20 | End: 2023-06-20

## 2023-06-20 RX ORDER — HYDROCODONE BITARTRATE AND ACETAMINOPHEN 5; 325 MG/1; MG/1
1 TABLET ORAL EVERY 8 HOURS PRN
COMMUNITY

## 2023-06-20 RX ORDER — BACITRACIN ZINC AND POLYMYXIN B SULFATE 500; 1000 [USP'U]/G; [USP'U]/G
OINTMENT TOPICAL ONCE
OUTPATIENT
Start: 2023-06-20 | End: 2023-06-20

## 2023-06-20 RX ORDER — LIDOCAINE HYDROCHLORIDE 40 MG/ML
SOLUTION TOPICAL ONCE
OUTPATIENT
Start: 2023-06-20 | End: 2023-06-20

## 2023-06-20 RX ORDER — GINSENG 100 MG
CAPSULE ORAL ONCE
OUTPATIENT
Start: 2023-06-20 | End: 2023-06-20

## 2023-06-20 RX ORDER — CLOBETASOL PROPIONATE 0.5 MG/G
OINTMENT TOPICAL ONCE
OUTPATIENT
Start: 2023-06-20 | End: 2023-06-20

## 2023-06-20 RX ORDER — SULFAMETHOXAZOLE AND TRIMETHOPRIM 800; 160 MG/1; MG/1
1 TABLET ORAL 2 TIMES DAILY
Qty: 20 TABLET | Refills: 0 | Status: SHIPPED | OUTPATIENT
Start: 2023-06-20 | End: 2023-06-30

## 2023-06-20 NOTE — PROGRESS NOTES
[facility] Initial Visit      Marilyn Rahman  AGE: 38 y.o. GENDER: female  : 1980  EPISODE DATE:  2023   Referred by: Shekhar Bardales MD     Subjective:     CHIEF COMPLAINT  WOUND   Problem List Items Addressed This Visit          Circulatory    WD-Venous stasis ulcer of right ankle with fat layer exposed with varicose veins (HCC)       Endocrine    WD-Diabetic ulcer of toe of left foot associated with type 2 diabetes mellitus, with fat layer exposed ([address]    Diabetes mellitus treated with insulin and oral medication ([address]       Other    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 ([address] - Primary    WD-Class 3 severe obesity without serious comorbidity in [facility])    WD-Obesity due to excess calories with serious comorbidity    Bilateral lower extremity edema     Chief Complaint   Patient presents with    Wound Check        HISTORY of PRESENT ILLNESS      Ravi Omalley is a 38 y.o. female who presents to the [address] [place] [address] for an initial visit for evaluation and treatment of Acute diabetic ulcer of  left hallux. The condition is of moderate severity. The ulcer has been present for approximately 3 weeks at initial visit to the wound clinic 23. The underlying cause is thought to be diabetes and cellulitis. She also had a blister left medial foot/ankle that is resolving that is covered by the blister tissue. Will leave alone for now. She also has a venous wound right ankle, started out as a blister of moderate severity. The patients care to date has included evaluation at the walk in clinic, also seen by her PCP started on Keflex and instructed to use Vasoline. The patient has significant underlying medical conditions as below. Shekhar Bardales MD is PCP last seen 23.     Wound Pain Timing/Severity: intermittent, mild  Quality of pain: aching, tender  Severity of pain:  2 / 10   Modifying Factors: edema, venous stasis, diabetes, poor glucose

## 2023-06-20 NOTE — PROGRESS NOTES
Multilayer Compression Wrap   (Not Unna) Below the Knee    NAME:  Jeannine Odell  YOB: 1980  MEDICAL RECORD NUMBER:  [id number]  DATE:  6/20/2023    Multilayer compression wrap: Removed old Multilayer wrap if indicated and wash leg with mild soap/water. Applied moisturizing agent to dry skin as needed. Applied primary and secondary dressing as ordered. Applied multilayered dressing below the knee to right lower leg. Applied multilayered dressing below the knee to left lower leg. Instructed patient/caregiver not to remove dressing and to keep it clean and dry. Instructed patient/caregiver on complications to report to provider, such as pain, numbness in toes, heavy drainage, and slippage of dressing. Instructed patient on purpose of compression dressing and on activity and exercise recommendations.       Electronically signed by Janett Hairston LPN on 7/45/6222 at 00:84 AM

## 2023-06-20 NOTE — DISCHARGE INSTRUCTIONS
PHYSICIAN ORDERS AND DISCHARGE INSTRUCTIONS  NOTE: Upon discharge from the [address], you will receive a patient experience survey via E-mail. We would be grateful if you would take the time to fill this survey out. Wound care order history:   ROBIN's   Right       Left    Date    Vascular studies/Intervention: . Cultures: . Antibiotics: . HbA1c:  . Compression/Lymph Pumps: Shonna Hogan Grafts: Eino Blade: . Continuing wound care orders and information:              Residence: . Continue home health care with:. Your wound-care supplies will be provided by: [facility]: . Wound cleansing:      Do not scrub or use excessive force. Wash hands with soap and water before and after dressing changes. Prior to applying a clean dressing, cleanse wound with normal saline,    wound cleanser, or mild soap and water. Ask your physician or nurse before getting the wound(s) wet in the shower. Daily Wound management:    Keep weight off wounds and reposition every 2 hours. Avoid standing for long periods of time. Evaluate legs to the level of the heart or above for 30 minutes 4-5 times a day and/or when sitting. When taking antibiotics take entire prescription as ordered by MD do not stop taking until medicine is all gone. Orders for this week (6/20/23): Left Great Toe and Right Ankle - Wash with mild soap and water, rinse with saline, pat dry with 4x4  Apply gentamicin to wound bed  Cover with ludivina, and ioplex  Wrap with coban 2 lite  and secure with tape  Ace wrap at top to help hold in place  Change on Tuesday     Follow up with Mayda Mobley CNP in 1 weeks in the wound care center  Call [phone] for any questions or concerns.   Date__________   Time____________

## 2023-06-27 ENCOUNTER — HOSPITAL ENCOUNTER (OUTPATIENT)
Dept: WOUND CARE | Age: 43
Discharge: HOME | End: 2023-06-27
Payer: MEDICARE

## 2023-06-27 VITALS — TEMPERATURE: 97.3 F | HEART RATE: 87 BPM | SYSTOLIC BLOOD PRESSURE: 127 MMHG | DIASTOLIC BLOOD PRESSURE: 82 MMHG

## 2023-06-27 DIAGNOSIS — I73.9 PVD (PERIPHERAL VASCULAR DISEASE) (HCC): ICD-10-CM

## 2023-06-27 DIAGNOSIS — E11.621 DIABETIC ULCER OF TOE OF LEFT FOOT ASSOCIATED WITH TYPE 2 DIABETES MELLITUS, WITH FAT LAYER EXPOSED (HCC): Primary | ICD-10-CM

## 2023-06-27 DIAGNOSIS — R60.0 BILATERAL LOWER EXTREMITY EDEMA: ICD-10-CM

## 2023-06-27 DIAGNOSIS — Z79.84 DIABETES MELLITUS TREATED WITH INSULIN AND ORAL MEDICATION (HCC): ICD-10-CM

## 2023-06-27 DIAGNOSIS — Z79.4 DIABETES MELLITUS TREATED WITH INSULIN AND ORAL MEDICATION (HCC): ICD-10-CM

## 2023-06-27 DIAGNOSIS — E66.01 CLASS 3 SEVERE OBESITY DUE TO EXCESS CALORIES WITH SERIOUS COMORBIDITY AND BODY MASS INDEX (BMI) OF 40.0 TO 44.9 IN ADULT (HCC): ICD-10-CM

## 2023-06-27 DIAGNOSIS — L97.522 DIABETIC ULCER OF TOE OF LEFT FOOT ASSOCIATED WITH TYPE 2 DIABETES MELLITUS, WITH FAT LAYER EXPOSED (HCC): Primary | ICD-10-CM

## 2023-06-27 DIAGNOSIS — L97.312 VENOUS STASIS ULCER OF RIGHT ANKLE WITH FAT LAYER EXPOSED WITH VARICOSE VEINS (HCC): ICD-10-CM

## 2023-06-27 DIAGNOSIS — I83.013 VENOUS STASIS ULCER OF RIGHT ANKLE WITH FAT LAYER EXPOSED WITH VARICOSE VEINS (HCC): ICD-10-CM

## 2023-06-27 DIAGNOSIS — E11.9 DIABETES MELLITUS TREATED WITH INSULIN AND ORAL MEDICATION (HCC): ICD-10-CM

## 2023-06-27 DIAGNOSIS — Z89.421 STATUS POST AMPUTATION OF LESSER TOE OF RIGHT FOOT (HCC): ICD-10-CM

## 2023-06-27 PROCEDURE — 6370000000 HC RX 637 (ALT 250 FOR IP): Performed by: NURSE PRACTITIONER

## 2023-06-27 PROCEDURE — 11042 DBRDMT SUBQ TIS 1ST 20SQCM/<: CPT

## 2023-06-27 PROCEDURE — 11042 DBRDMT SUBQ TIS 1ST 20SQCM/<: CPT | Performed by: NURSE PRACTITIONER

## 2023-06-27 RX ORDER — LIDOCAINE 50 MG/G
OINTMENT TOPICAL ONCE
OUTPATIENT
Start: 2023-06-27 | End: 2023-06-27

## 2023-06-27 RX ORDER — CLOBETASOL PROPIONATE 0.5 MG/G
OINTMENT TOPICAL ONCE
OUTPATIENT
Start: 2023-06-27 | End: 2023-06-27

## 2023-06-27 RX ORDER — GENTAMICIN SULFATE 1 MG/G
OINTMENT TOPICAL ONCE
Status: COMPLETED | OUTPATIENT
Start: 2023-06-27 | End: 2023-06-27

## 2023-06-27 RX ORDER — GENTAMICIN SULFATE 1 MG/G
OINTMENT TOPICAL ONCE
OUTPATIENT
Start: 2023-06-27 | End: 2023-06-27

## 2023-06-27 RX ORDER — LIDOCAINE 40 MG/G
CREAM TOPICAL ONCE
OUTPATIENT
Start: 2023-06-27 | End: 2023-06-27

## 2023-06-27 RX ORDER — BACITRACIN ZINC AND POLYMYXIN B SULFATE 500; 1000 [USP'U]/G; [USP'U]/G
OINTMENT TOPICAL ONCE
OUTPATIENT
Start: 2023-06-27 | End: 2023-06-27

## 2023-06-27 RX ORDER — BETAMETHASONE DIPROPIONATE 0.05 %
OINTMENT (GRAM) TOPICAL ONCE
OUTPATIENT
Start: 2023-06-27 | End: 2023-06-27

## 2023-06-27 RX ORDER — IBUPROFEN 200 MG
TABLET ORAL ONCE
OUTPATIENT
Start: 2023-06-27 | End: 2023-06-27

## 2023-06-27 RX ORDER — LIDOCAINE HYDROCHLORIDE 40 MG/ML
SOLUTION TOPICAL ONCE
OUTPATIENT
Start: 2023-06-27 | End: 2023-06-27

## 2023-06-27 RX ORDER — GINSENG 100 MG
CAPSULE ORAL ONCE
OUTPATIENT
Start: 2023-06-27 | End: 2023-06-27

## 2023-06-27 RX ORDER — SODIUM CHLOR/HYPOCHLOROUS ACID 0.033 %
SOLUTION, IRRIGATION IRRIGATION ONCE
OUTPATIENT
Start: 2023-06-27 | End: 2023-06-27

## 2023-06-27 RX ADMIN — GENTAMICIN SULFATE: 1 OINTMENT TOPICAL at 08:59

## 2023-07-05 ENCOUNTER — HOSPITAL ENCOUNTER (OUTPATIENT)
Dept: WOUND CARE | Age: 43
Discharge: HOME | End: 2023-07-05
Payer: MEDICARE

## 2023-07-05 VITALS
TEMPERATURE: 97.5 F | HEART RATE: 92 BPM | DIASTOLIC BLOOD PRESSURE: 75 MMHG | RESPIRATION RATE: 16 BRPM | SYSTOLIC BLOOD PRESSURE: 115 MMHG

## 2023-07-05 DIAGNOSIS — L97.312 VENOUS STASIS ULCER OF RIGHT ANKLE WITH FAT LAYER EXPOSED WITH VARICOSE VEINS (HCC): ICD-10-CM

## 2023-07-05 DIAGNOSIS — E11.621 DIABETIC ULCER OF TOE OF LEFT FOOT ASSOCIATED WITH TYPE 2 DIABETES MELLITUS, WITH FAT LAYER EXPOSED (HCC): Primary | ICD-10-CM

## 2023-07-05 DIAGNOSIS — E66.01 CLASS 3 SEVERE OBESITY DUE TO EXCESS CALORIES WITH SERIOUS COMORBIDITY AND BODY MASS INDEX (BMI) OF 40.0 TO 44.9 IN ADULT (HCC): ICD-10-CM

## 2023-07-05 DIAGNOSIS — I83.013 VENOUS STASIS ULCER OF RIGHT ANKLE WITH FAT LAYER EXPOSED WITH VARICOSE VEINS (HCC): ICD-10-CM

## 2023-07-05 DIAGNOSIS — Z89.421 STATUS POST AMPUTATION OF LESSER TOE OF RIGHT FOOT (HCC): ICD-10-CM

## 2023-07-05 DIAGNOSIS — E11.9 DIABETES MELLITUS TREATED WITH INSULIN AND ORAL MEDICATION (HCC): ICD-10-CM

## 2023-07-05 DIAGNOSIS — L97.522 DIABETIC ULCER OF TOE OF LEFT FOOT ASSOCIATED WITH TYPE 2 DIABETES MELLITUS, WITH FAT LAYER EXPOSED (HCC): Primary | ICD-10-CM

## 2023-07-05 DIAGNOSIS — R60.0 BILATERAL LOWER EXTREMITY EDEMA: ICD-10-CM

## 2023-07-05 DIAGNOSIS — Z79.4 DIABETES MELLITUS TREATED WITH INSULIN AND ORAL MEDICATION (HCC): ICD-10-CM

## 2023-07-05 DIAGNOSIS — Z79.84 DIABETES MELLITUS TREATED WITH INSULIN AND ORAL MEDICATION (HCC): ICD-10-CM

## 2023-07-05 PROCEDURE — 11042 DBRDMT SUBQ TIS 1ST 20SQCM/<: CPT | Performed by: NURSE PRACTITIONER

## 2023-07-05 PROCEDURE — 6370000000 HC RX 637 (ALT 250 FOR IP): Performed by: NURSE PRACTITIONER

## 2023-07-05 PROCEDURE — 11042 DBRDMT SUBQ TIS 1ST 20SQCM/<: CPT

## 2023-07-05 RX ORDER — LIDOCAINE 50 MG/G
OINTMENT TOPICAL ONCE
OUTPATIENT
Start: 2023-07-05 | End: 2023-07-05

## 2023-07-05 RX ORDER — IBUPROFEN 200 MG
TABLET ORAL ONCE
OUTPATIENT
Start: 2023-07-05 | End: 2023-07-05

## 2023-07-05 RX ORDER — BETAMETHASONE DIPROPIONATE 0.05 %
OINTMENT (GRAM) TOPICAL ONCE
OUTPATIENT
Start: 2023-07-05 | End: 2023-07-05

## 2023-07-05 RX ORDER — LIDOCAINE 40 MG/G
CREAM TOPICAL ONCE
OUTPATIENT
Start: 2023-07-05 | End: 2023-07-05

## 2023-07-05 RX ORDER — GENTAMICIN SULFATE 1 MG/G
OINTMENT TOPICAL ONCE
OUTPATIENT
Start: 2023-07-05 | End: 2023-07-05

## 2023-07-05 RX ORDER — LIDOCAINE HYDROCHLORIDE 40 MG/ML
SOLUTION TOPICAL ONCE
OUTPATIENT
Start: 2023-07-05 | End: 2023-07-05

## 2023-07-05 RX ORDER — BACITRACIN ZINC AND POLYMYXIN B SULFATE 500; 1000 [USP'U]/G; [USP'U]/G
OINTMENT TOPICAL ONCE
OUTPATIENT
Start: 2023-07-05 | End: 2023-07-05

## 2023-07-05 RX ORDER — GINSENG 100 MG
CAPSULE ORAL ONCE
OUTPATIENT
Start: 2023-07-05 | End: 2023-07-05

## 2023-07-05 RX ORDER — SODIUM CHLOR/HYPOCHLOROUS ACID 0.033 %
SOLUTION, IRRIGATION IRRIGATION ONCE
OUTPATIENT
Start: 2023-07-05 | End: 2023-07-05

## 2023-07-05 RX ORDER — GENTAMICIN SULFATE 1 MG/G
OINTMENT TOPICAL ONCE
Status: COMPLETED | OUTPATIENT
Start: 2023-07-05 | End: 2023-07-05

## 2023-07-05 RX ORDER — CLOBETASOL PROPIONATE 0.5 MG/G
OINTMENT TOPICAL ONCE
OUTPATIENT
Start: 2023-07-05 | End: 2023-07-05

## 2023-07-05 RX ADMIN — GENTAMICIN SULFATE: 1 OINTMENT TOPICAL at 11:22

## 2023-07-05 RX ADMIN — COLLAGENASE SANTYL: 250 OINTMENT TOPICAL at 11:22

## 2023-07-05 NOTE — DISCHARGE INSTRUCTIONS
PHYSICIAN ORDERS AND DISCHARGE INSTRUCTIONS  NOTE: Upon discharge from the  Medical [facility], you will receive a patient experience survey via E-mail. We would be grateful if you would take the time to fill this survey out. Wound care order history:              ROBIN's   Right       Left    Date               Vascular studies/Intervention: . Cultures: . Antibiotics: . HbA1c:  . Compression/Lymph Pumps: Emiliano Martines Grafts: Bearl Fought: . Continuing wound care orders and information:              Residence: . Continue home health care with: Emiliano Martines Your wound-care supplies will be provided by: [facility]: . Wound cleansing:                           Do not scrub or use excessive force. Wash hands with soap and water before and after dressing changes. Prior to applying a clean dressing, cleanse wound with normal saline,                          wound cleanser, or mild soap and water. Ask your physician or nurse before getting the wound(s) wet in the shower. Daily Wound management:                          Keep weight off wounds and reposition every 2 hours. Avoid standing for long periods of time. Evaluate legs to the level of the heart or above for 30 minutes 4-5 times a day and/or when sitting. When taking antibiotics take entire prescription as ordered by MD do not stop taking until medicine is all gone. Orders for this week (7/5/23):   Left Medial Ankle- Wash with mild soap and water  Apply santyl to wound bed  Cover with saline dampened hydrofera blue and ABD  Wrap with Conform and secure with tape  Tubi F  Change on Monday    Left Great Toe and Right Ankle - Wash with mild soap and

## 2023-07-05 NOTE — PROGRESS NOTES
Nonselective enzymatic debridement performed with Santyl per physician order to wound(s) of the left medial ankle  Patient tolerated the procedure well.      Electronically signed by Iban Dee RN on 7/5/2023 at 10:22 AM
07/05/23 0838   Wound Width (cm) 1.7 cm 07/05/23 0838   Wound Depth (cm) 0.1 cm 07/05/23 0838   Wound Surface Area (cm^2) 3.4 cm^2 07/05/23 0838   Wound Volume (cm^3) 0.34 cm^3 07/05/23 0838   Post-Procedure Length (cm) 2 cm 07/05/23 0858   Post-Procedure Width (cm) 1.7 cm 07/05/23 0858   Post-Procedure Depth (cm) 0.1 cm 07/05/23 0858   Post-Procedure Surface Area (cm^2) 3.4 cm^2 07/05/23 0858   Post-Procedure Volume (cm^3) 0.34 cm^3 07/05/23 0858   Distance Tunneling (cm) 0 cm 07/05/23 0838   Tunneling Position ___ O'Clock 0 07/05/23 0838   Undermining Starts ___ O'Clock 0 07/05/23 0838   Undermining Ends___ O'Clock 0 07/05/23 0838   Undermining Maxium Distance (cm) 0 07/05/23 0838   Wound Assessment Slough 07/05/23 0838   Drainage Amount Moderate 07/05/23 0838   Drainage Description Yellow 07/05/23 0838   Odor None 07/05/23 0838   Yamilex-wound Assessment Blanchable erythema;Fragile 07/05/23 0838   Margins Defined edges 07/05/23 0838   Wound Thickness Description not for Pressure Injury Full thickness 07/05/23 0838   Number of days: 0       Percent of Wound(s) Debrided: approximately 100%    Total  Area  Debrided:  5 sq cm     Bleeding:  Minimal    Hemostasis Achieved:  by pressure    Procedural Pain:  0  / 10     Post Procedural Pain:  0 / 10     Response to treatment:  Well tolerated by patient. Status of wound progress and description from last visit:   Improved. Plan:       Discharge Instructions         PHYSICIAN ORDERS AND DISCHARGE INSTRUCTIONS  NOTE: Upon discharge from the [address] [place] [address], you will receive a patient experience survey via E-mail. We would be grateful if you would take the time to fill this survey out. Wound care order history:              ROBIN's   Right       Left    Date               Vascular studies/Intervention: . Cultures: . Antibiotics: . HbA1c:  . Compression/Lymph Pumps: Americo Pancake Grafts:   Tiffany Hyman:

## 2023-07-10 ENCOUNTER — HOSPITAL ENCOUNTER (OUTPATIENT)
Dept: WOUND CARE | Age: 43
Discharge: HOME | End: 2023-07-10
Payer: MEDICARE

## 2023-07-10 VITALS
HEART RATE: 79 BPM | DIASTOLIC BLOOD PRESSURE: 77 MMHG | SYSTOLIC BLOOD PRESSURE: 138 MMHG | TEMPERATURE: 96.8 F | RESPIRATION RATE: 16 BRPM

## 2023-07-10 DIAGNOSIS — L97.312 VENOUS STASIS ULCER OF RIGHT ANKLE WITH FAT LAYER EXPOSED WITH VARICOSE VEINS (HCC): Primary | ICD-10-CM

## 2023-07-10 DIAGNOSIS — Z79.4 DIABETES MELLITUS TREATED WITH INSULIN AND ORAL MEDICATION (HCC): ICD-10-CM

## 2023-07-10 DIAGNOSIS — E66.01 CLASS 3 SEVERE OBESITY DUE TO EXCESS CALORIES WITH SERIOUS COMORBIDITY AND BODY MASS INDEX (BMI) OF 40.0 TO 44.9 IN ADULT (HCC): ICD-10-CM

## 2023-07-10 DIAGNOSIS — R60.0 BILATERAL LOWER EXTREMITY EDEMA: ICD-10-CM

## 2023-07-10 DIAGNOSIS — L97.522 DIABETIC ULCER OF TOE OF LEFT FOOT ASSOCIATED WITH TYPE 2 DIABETES MELLITUS, WITH FAT LAYER EXPOSED (HCC): ICD-10-CM

## 2023-07-10 DIAGNOSIS — I83.013 VENOUS STASIS ULCER OF RIGHT ANKLE WITH FAT LAYER EXPOSED WITH VARICOSE VEINS (HCC): Primary | ICD-10-CM

## 2023-07-10 DIAGNOSIS — I83.013 VENOUS STASIS ULCER OF RIGHT ANKLE WITH FAT LAYER EXPOSED WITH VARICOSE VEINS (HCC): ICD-10-CM

## 2023-07-10 DIAGNOSIS — Z89.421 STATUS POST AMPUTATION OF LESSER TOE OF RIGHT FOOT (HCC): ICD-10-CM

## 2023-07-10 DIAGNOSIS — E11.9 DIABETES MELLITUS TREATED WITH INSULIN AND ORAL MEDICATION (HCC): ICD-10-CM

## 2023-07-10 DIAGNOSIS — E11.621 DIABETIC ULCER OF TOE OF LEFT FOOT ASSOCIATED WITH TYPE 2 DIABETES MELLITUS, WITH FAT LAYER EXPOSED (HCC): ICD-10-CM

## 2023-07-10 DIAGNOSIS — L97.312 VENOUS STASIS ULCER OF RIGHT ANKLE WITH FAT LAYER EXPOSED WITH VARICOSE VEINS (HCC): ICD-10-CM

## 2023-07-10 DIAGNOSIS — Z79.84 DIABETES MELLITUS TREATED WITH INSULIN AND ORAL MEDICATION (HCC): ICD-10-CM

## 2023-07-10 PROCEDURE — 11042 DBRDMT SUBQ TIS 1ST 20SQCM/<: CPT | Performed by: NURSE PRACTITIONER

## 2023-07-10 PROCEDURE — 6370000000 HC RX 637 (ALT 250 FOR IP): Performed by: NURSE PRACTITIONER

## 2023-07-10 PROCEDURE — 11042 DBRDMT SUBQ TIS 1ST 20SQCM/<: CPT

## 2023-07-10 RX ORDER — GINSENG 100 MG
CAPSULE ORAL ONCE
OUTPATIENT
Start: 2023-07-10 | End: 2023-07-10

## 2023-07-10 RX ORDER — GENTAMICIN SULFATE 1 MG/G
OINTMENT TOPICAL ONCE
Status: COMPLETED | OUTPATIENT
Start: 2023-07-10 | End: 2023-07-10

## 2023-07-10 RX ORDER — IBUPROFEN 200 MG
TABLET ORAL ONCE
OUTPATIENT
Start: 2023-07-10 | End: 2023-07-10

## 2023-07-10 RX ORDER — LIDOCAINE 50 MG/G
OINTMENT TOPICAL ONCE
OUTPATIENT
Start: 2023-07-10 | End: 2023-07-10

## 2023-07-10 RX ORDER — BACITRACIN ZINC AND POLYMYXIN B SULFATE 500; 1000 [USP'U]/G; [USP'U]/G
OINTMENT TOPICAL ONCE
OUTPATIENT
Start: 2023-07-10 | End: 2023-07-10

## 2023-07-10 RX ORDER — LIDOCAINE 40 MG/G
CREAM TOPICAL ONCE
OUTPATIENT
Start: 2023-07-10 | End: 2023-07-10

## 2023-07-10 RX ORDER — CLOBETASOL PROPIONATE 0.5 MG/G
OINTMENT TOPICAL ONCE
OUTPATIENT
Start: 2023-07-10 | End: 2023-07-10

## 2023-07-10 RX ORDER — GENTAMICIN SULFATE 1 MG/G
OINTMENT TOPICAL ONCE
OUTPATIENT
Start: 2023-07-10 | End: 2023-07-10

## 2023-07-10 RX ORDER — LIDOCAINE HYDROCHLORIDE 40 MG/ML
SOLUTION TOPICAL ONCE
OUTPATIENT
Start: 2023-07-10 | End: 2023-07-10

## 2023-07-10 RX ORDER — BETAMETHASONE DIPROPIONATE 0.05 %
OINTMENT (GRAM) TOPICAL ONCE
OUTPATIENT
Start: 2023-07-10 | End: 2023-07-10

## 2023-07-10 RX ORDER — SODIUM CHLOR/HYPOCHLOROUS ACID 0.033 %
SOLUTION, IRRIGATION IRRIGATION ONCE
OUTPATIENT
Start: 2023-07-10 | End: 2023-07-10

## 2023-07-10 RX ADMIN — GENTAMICIN SULFATE: 1 OINTMENT TOPICAL at 09:52

## 2023-07-10 RX ADMIN — COLLAGENASE SANTYL: 250 OINTMENT TOPICAL at 09:51

## 2023-07-10 NOTE — PROGRESS NOTES
Wound Care Center Progress Note With Procedure    Marilyn Rahman  AGE: 38 y.o. GENDER: female  : 1980  EPISODE DATE:  7/10/2023     Subjective:     Chief Complaint   Patient presents with    Wound Check     BLE          HISTORY of PRESENT ILLNESS     Maggie Grande is a 38 y.o. female who presents to the [address] [place] [address] for a visit for evaluation and treatment of Acute diabetic ulcer of  left hallux. The condition is of moderate severity. The ulcer has been present for approximately 3 weeks at initial visit to the wound clinic 23. The underlying cause is thought to be diabetes and cellulitis. She also had a blister left medial foot/ankle that is resolving that is covered by the blister tissue. Will leave alone for now. She also has a venous wound right ankle, started out as a blister of moderate severity. The patients care to date has included evaluation at the walk in clinic, also seen by her PCP started on Keflex and instructed to use Vasoline. The patient has significant underlying medical conditions as below. Tl Yu MD is PCP last seen 23.    7-: Patient great improvement this week. She came early as she went to the [employer] this weekend. All wounds improved and slough and fibrin nearly gone from left medial ankle  Patient requesting renewal of handicap car tag, which we can accommodate     2023: Original wounds are healing well. Slightly macerated because wrap got wet. The area of intact blister has sloughed off revealing a small area with tough fibrin that is not ready to come of yet. Will likely add santyl to this for now  Patient will come in Monday next week as she wants to go to the [employer] on Saturday. Will give education on wound care in the meantime. Need tubis added to orders this week given etiology      Patient had a good first week but she removed her wraps early because they got wet from an episode of incontinence.    She is requesting a lesser wrap

## 2023-07-17 ENCOUNTER — HOSPITAL ENCOUNTER (OUTPATIENT)
Dept: WOUND CARE | Age: 43
Discharge: HOME | End: 2023-07-17
Payer: MEDICARE

## 2023-07-17 VITALS
RESPIRATION RATE: 16 BRPM | SYSTOLIC BLOOD PRESSURE: 130 MMHG | HEART RATE: 83 BPM | DIASTOLIC BLOOD PRESSURE: 77 MMHG | TEMPERATURE: 96.2 F

## 2023-07-17 DIAGNOSIS — E11.621 DIABETIC ULCER OF TOE OF LEFT FOOT ASSOCIATED WITH TYPE 2 DIABETES MELLITUS, WITH FAT LAYER EXPOSED (HCC): Primary | ICD-10-CM

## 2023-07-17 DIAGNOSIS — E66.01 CLASS 3 SEVERE OBESITY DUE TO EXCESS CALORIES WITH SERIOUS COMORBIDITY AND BODY MASS INDEX (BMI) OF 40.0 TO 44.9 IN ADULT (HCC): ICD-10-CM

## 2023-07-17 DIAGNOSIS — I83.013 VENOUS STASIS ULCER OF RIGHT ANKLE WITH FAT LAYER EXPOSED WITH VARICOSE VEINS (HCC): ICD-10-CM

## 2023-07-17 DIAGNOSIS — Z79.84 DIABETES MELLITUS TREATED WITH INSULIN AND ORAL MEDICATION (HCC): ICD-10-CM

## 2023-07-17 DIAGNOSIS — Z79.4 DIABETES MELLITUS TREATED WITH INSULIN AND ORAL MEDICATION (HCC): ICD-10-CM

## 2023-07-17 DIAGNOSIS — L97.312 VENOUS STASIS ULCER OF RIGHT ANKLE WITH FAT LAYER EXPOSED WITH VARICOSE VEINS (HCC): ICD-10-CM

## 2023-07-17 DIAGNOSIS — Z89.421 STATUS POST AMPUTATION OF LESSER TOE OF RIGHT FOOT (HCC): ICD-10-CM

## 2023-07-17 DIAGNOSIS — R60.0 BILATERAL LOWER EXTREMITY EDEMA: ICD-10-CM

## 2023-07-17 DIAGNOSIS — E11.9 DIABETES MELLITUS TREATED WITH INSULIN AND ORAL MEDICATION (HCC): ICD-10-CM

## 2023-07-17 DIAGNOSIS — L97.522 DIABETIC ULCER OF TOE OF LEFT FOOT ASSOCIATED WITH TYPE 2 DIABETES MELLITUS, WITH FAT LAYER EXPOSED (HCC): Primary | ICD-10-CM

## 2023-07-17 PROCEDURE — 6370000000 HC RX 637 (ALT 250 FOR IP): Performed by: NURSE PRACTITIONER

## 2023-07-17 PROCEDURE — 11042 DBRDMT SUBQ TIS 1ST 20SQCM/<: CPT

## 2023-07-17 PROCEDURE — 11042 DBRDMT SUBQ TIS 1ST 20SQCM/<: CPT | Performed by: NURSE PRACTITIONER

## 2023-07-17 RX ORDER — GENTAMICIN SULFATE 1 MG/G
OINTMENT TOPICAL ONCE
OUTPATIENT
Start: 2023-07-17 | End: 2023-07-17

## 2023-07-17 RX ORDER — LIDOCAINE 40 MG/G
CREAM TOPICAL ONCE
OUTPATIENT
Start: 2023-07-17 | End: 2023-07-17

## 2023-07-17 RX ORDER — GINSENG 100 MG
CAPSULE ORAL ONCE
OUTPATIENT
Start: 2023-07-17 | End: 2023-07-17

## 2023-07-17 RX ORDER — SODIUM CHLOR/HYPOCHLOROUS ACID 0.033 %
SOLUTION, IRRIGATION IRRIGATION ONCE
OUTPATIENT
Start: 2023-07-17 | End: 2023-07-17

## 2023-07-17 RX ORDER — LIDOCAINE HYDROCHLORIDE 40 MG/ML
SOLUTION TOPICAL ONCE
OUTPATIENT
Start: 2023-07-17 | End: 2023-07-17

## 2023-07-17 RX ORDER — IBUPROFEN 200 MG
TABLET ORAL ONCE
OUTPATIENT
Start: 2023-07-17 | End: 2023-07-17

## 2023-07-17 RX ORDER — CLOBETASOL PROPIONATE 0.5 MG/G
OINTMENT TOPICAL ONCE
OUTPATIENT
Start: 2023-07-17 | End: 2023-07-17

## 2023-07-17 RX ORDER — GENTAMICIN SULFATE 1 MG/G
OINTMENT TOPICAL ONCE
Status: COMPLETED | OUTPATIENT
Start: 2023-07-17 | End: 2023-07-17

## 2023-07-17 RX ORDER — LIDOCAINE 50 MG/G
OINTMENT TOPICAL ONCE
OUTPATIENT
Start: 2023-07-17 | End: 2023-07-17

## 2023-07-17 RX ORDER — BETAMETHASONE DIPROPIONATE 0.05 %
OINTMENT (GRAM) TOPICAL ONCE
OUTPATIENT
Start: 2023-07-17 | End: 2023-07-17

## 2023-07-17 RX ORDER — BACITRACIN ZINC AND POLYMYXIN B SULFATE 500; 1000 [USP'U]/G; [USP'U]/G
OINTMENT TOPICAL ONCE
OUTPATIENT
Start: 2023-07-17 | End: 2023-07-17

## 2023-07-17 RX ADMIN — GENTAMICIN SULFATE: 1 OINTMENT TOPICAL at 10:55

## 2023-07-17 RX ADMIN — COLLAGENASE SANTYL: 250 OINTMENT TOPICAL at 10:54

## 2023-07-17 NOTE — PROGRESS NOTES
Nonselective enzymatic debridement performed with Santyl per physician order to wound(s) of the left medial ankle  Patient tolerated the procedure well.      Electronically signed by Mechelle Aldana RN on 7/17/2023 at 10:02 AM
07/17/23 0831   Yamilex-wound Assessment Blanchable erythema; Intact 07/17/23 0831   Margins Defined edges 07/17/23 0831   Wound Thickness Description not for Pressure Injury Full thickness 07/17/23 0831   Number of days: 12       Percent of Wound(s) Debrided: approximately 100%    Total  Area  Debrided:  0.5 sq cm     Bleeding:  Minimal    Hemostasis Achieved:  by pressure    Procedural Pain:  3  / 10     Post Procedural Pain:  0 / 10     Response to treatment:  Well tolerated by patient. Status of wound progress and description from last visit:   continue plan of care for now. Right foot healed  RN visit next week to accommodate schedule      Plan:       Discharge Instructions         PHYSICIAN ORDERS AND DISCHARGE INSTRUCTIONS  NOTE: Upon discharge from the  [address], you will receive a patient experience survey via E-mail. We would be grateful if you would take the time to fill this survey out. Wound care order history:              ROBIN's   Right       Left    Date               Vascular studies/Intervention: . Cultures: . Antibiotics: . HbA1c:  . Compression/Lymph Pumps: Taya Jacobs Grafts: Marichuy Mcclendon: . Continuing wound care orders and information:              Residence: . Continue home health care with: Taya Jacobs Your wound-care supplies will be provided by: [facility]: . Wound cleansing:                           Do not scrub or use excessive force. Wash hands with soap and water before and after dressing changes. Prior to applying a clean dressing, cleanse wound with normal saline,                          wound cleanser, or mild soap and water. Ask your physician or nurse before getting the wound(s) wet in the shower. Daily Wound management:                          Keep weight off wounds and reposition every 2 hours.

## 2023-07-17 NOTE — DISCHARGE INSTRUCTIONS
PHYSICIAN ORDERS AND DISCHARGE INSTRUCTIONS  NOTE: Upon discharge from the  Medical [facility], you will receive a patient experience survey via E-mail. We would be grateful if you would take the time to fill this survey out. Wound care order history:              ROBIN's   Right       Left    Date               Vascular studies/Intervention: . Cultures: . Antibiotics: . HbA1c:  . Compression/Lymph Pumps: Santy Chung Grafts: Harleen Newton: . Continuing wound care orders and information:              Residence: . Continue home health care with: Santy Chung Your wound-care supplies will be provided by: [facility]: . Wound cleansing:                           Do not scrub or use excessive force. Wash hands with soap and water before and after dressing changes. Prior to applying a clean dressing, cleanse wound with normal saline,                          wound cleanser, or mild soap and water. Ask your physician or nurse before getting the wound(s) wet in the shower. Daily Wound management:                          Keep weight off wounds and reposition every 2 hours. Avoid standing for long periods of time. Evaluate legs to the level of the heart or above for 30 minutes 4-5 times a day and/or when sitting. When taking antibiotics take entire prescription as ordered by MD do not stop taking until medicine is all gone. Orders for this week (7/17/23):   Left Medial Ankle- Wash with mild soap and water  Apply santyl to wound bed  Cover with saline dampened hydrofera blue and ABD  Wrap with Conform and secure with tape  Tubi F  Change on Monday     Left Great Toe - Wash with mild soap and water, rinse

## 2023-07-24 ENCOUNTER — HOSPITAL ENCOUNTER (OUTPATIENT)
Dept: WOUND CARE | Age: 43
Discharge: HOME | End: 2023-07-24
Payer: MEDICARE

## 2023-07-24 VITALS
TEMPERATURE: 97.1 F | SYSTOLIC BLOOD PRESSURE: 134 MMHG | HEART RATE: 80 BPM | RESPIRATION RATE: 16 BRPM | DIASTOLIC BLOOD PRESSURE: 78 MMHG

## 2023-07-24 DIAGNOSIS — I83.013 VENOUS STASIS ULCER OF RIGHT ANKLE WITH FAT LAYER EXPOSED WITH VARICOSE VEINS (HCC): Primary | ICD-10-CM

## 2023-07-24 DIAGNOSIS — E11.621 DIABETIC ULCER OF TOE OF LEFT FOOT ASSOCIATED WITH TYPE 2 DIABETES MELLITUS, WITH FAT LAYER EXPOSED (HCC): ICD-10-CM

## 2023-07-24 DIAGNOSIS — Z79.84 DIABETES MELLITUS TREATED WITH INSULIN AND ORAL MEDICATION (HCC): ICD-10-CM

## 2023-07-24 DIAGNOSIS — L97.312 VENOUS STASIS ULCER OF RIGHT ANKLE WITH FAT LAYER EXPOSED WITH VARICOSE VEINS (HCC): Primary | ICD-10-CM

## 2023-07-24 DIAGNOSIS — E66.01 CLASS 3 SEVERE OBESITY DUE TO EXCESS CALORIES WITH SERIOUS COMORBIDITY AND BODY MASS INDEX (BMI) OF 40.0 TO 44.9 IN ADULT (HCC): ICD-10-CM

## 2023-07-24 DIAGNOSIS — Z79.4 DIABETES MELLITUS TREATED WITH INSULIN AND ORAL MEDICATION (HCC): ICD-10-CM

## 2023-07-24 DIAGNOSIS — R60.0 BILATERAL LOWER EXTREMITY EDEMA: ICD-10-CM

## 2023-07-24 DIAGNOSIS — L97.522 DIABETIC ULCER OF TOE OF LEFT FOOT ASSOCIATED WITH TYPE 2 DIABETES MELLITUS, WITH FAT LAYER EXPOSED (HCC): ICD-10-CM

## 2023-07-24 DIAGNOSIS — Z89.421 STATUS POST AMPUTATION OF LESSER TOE OF RIGHT FOOT (HCC): ICD-10-CM

## 2023-07-24 DIAGNOSIS — E11.9 DIABETES MELLITUS TREATED WITH INSULIN AND ORAL MEDICATION (HCC): ICD-10-CM

## 2023-07-24 PROCEDURE — 99213 OFFICE O/P EST LOW 20 MIN: CPT

## 2023-07-31 ENCOUNTER — HOSPITAL ENCOUNTER (OUTPATIENT)
Dept: WOUND CARE | Age: 43
Discharge: HOME | End: 2023-07-31
Payer: MEDICARE

## 2023-07-31 VITALS
DIASTOLIC BLOOD PRESSURE: 84 MMHG | RESPIRATION RATE: 16 BRPM | TEMPERATURE: 97.3 F | HEART RATE: 84 BPM | SYSTOLIC BLOOD PRESSURE: 126 MMHG

## 2023-07-31 DIAGNOSIS — E11.9 DIABETES MELLITUS TREATED WITH INSULIN AND ORAL MEDICATION (HCC): ICD-10-CM

## 2023-07-31 DIAGNOSIS — Z89.421 STATUS POST AMPUTATION OF LESSER TOE OF RIGHT FOOT (HCC): ICD-10-CM

## 2023-07-31 DIAGNOSIS — I83.013 VENOUS STASIS ULCER OF RIGHT ANKLE WITH FAT LAYER EXPOSED WITH VARICOSE VEINS (HCC): ICD-10-CM

## 2023-07-31 DIAGNOSIS — E11.621 DIABETIC ULCER OF TOE OF LEFT FOOT ASSOCIATED WITH TYPE 2 DIABETES MELLITUS, WITH FAT LAYER EXPOSED (HCC): Primary | ICD-10-CM

## 2023-07-31 DIAGNOSIS — Z79.84 DIABETES MELLITUS TREATED WITH INSULIN AND ORAL MEDICATION (HCC): ICD-10-CM

## 2023-07-31 DIAGNOSIS — E66.01 CLASS 3 SEVERE OBESITY DUE TO EXCESS CALORIES WITH SERIOUS COMORBIDITY AND BODY MASS INDEX (BMI) OF 40.0 TO 44.9 IN ADULT (HCC): ICD-10-CM

## 2023-07-31 DIAGNOSIS — R60.0 BILATERAL LOWER EXTREMITY EDEMA: ICD-10-CM

## 2023-07-31 DIAGNOSIS — L97.522 DIABETIC ULCER OF TOE OF LEFT FOOT ASSOCIATED WITH TYPE 2 DIABETES MELLITUS, WITH FAT LAYER EXPOSED (HCC): Primary | ICD-10-CM

## 2023-07-31 DIAGNOSIS — L97.312 VENOUS STASIS ULCER OF RIGHT ANKLE WITH FAT LAYER EXPOSED WITH VARICOSE VEINS (HCC): ICD-10-CM

## 2023-07-31 DIAGNOSIS — Z79.4 DIABETES MELLITUS TREATED WITH INSULIN AND ORAL MEDICATION (HCC): ICD-10-CM

## 2023-07-31 PROCEDURE — 11042 DBRDMT SUBQ TIS 1ST 20SQCM/<: CPT | Performed by: NURSE PRACTITIONER

## 2023-07-31 PROCEDURE — 11042 DBRDMT SUBQ TIS 1ST 20SQCM/<: CPT

## 2023-07-31 PROCEDURE — 6370000000 HC RX 637 (ALT 250 FOR IP): Performed by: NURSE PRACTITIONER

## 2023-07-31 RX ORDER — BACITRACIN ZINC AND POLYMYXIN B SULFATE 500; 1000 [USP'U]/G; [USP'U]/G
OINTMENT TOPICAL ONCE
OUTPATIENT
Start: 2023-07-31 | End: 2023-07-31

## 2023-07-31 RX ORDER — BETAMETHASONE DIPROPIONATE 0.05 %
OINTMENT (GRAM) TOPICAL ONCE
OUTPATIENT
Start: 2023-07-31 | End: 2023-07-31

## 2023-07-31 RX ORDER — GINSENG 100 MG
CAPSULE ORAL ONCE
OUTPATIENT
Start: 2023-07-31 | End: 2023-07-31

## 2023-07-31 RX ORDER — LIDOCAINE HYDROCHLORIDE 40 MG/ML
SOLUTION TOPICAL ONCE
OUTPATIENT
Start: 2023-07-31 | End: 2023-07-31

## 2023-07-31 RX ORDER — LIDOCAINE 40 MG/G
CREAM TOPICAL ONCE
OUTPATIENT
Start: 2023-07-31 | End: 2023-07-31

## 2023-07-31 RX ORDER — CLOBETASOL PROPIONATE 0.5 MG/G
OINTMENT TOPICAL ONCE
OUTPATIENT
Start: 2023-07-31 | End: 2023-07-31

## 2023-07-31 RX ORDER — GENTAMICIN SULFATE 1 MG/G
OINTMENT TOPICAL ONCE
OUTPATIENT
Start: 2023-07-31 | End: 2023-07-31

## 2023-07-31 RX ORDER — LIDOCAINE 50 MG/G
OINTMENT TOPICAL ONCE
OUTPATIENT
Start: 2023-07-31 | End: 2023-07-31

## 2023-07-31 RX ORDER — IBUPROFEN 200 MG
TABLET ORAL ONCE
OUTPATIENT
Start: 2023-07-31 | End: 2023-07-31

## 2023-07-31 RX ORDER — SODIUM CHLOR/HYPOCHLOROUS ACID 0.033 %
SOLUTION, IRRIGATION IRRIGATION ONCE
OUTPATIENT
Start: 2023-07-31 | End: 2023-07-31

## 2023-07-31 RX ADMIN — COLLAGENASE SANTYL: 250 OINTMENT TOPICAL at 09:01

## 2023-07-31 NOTE — PROGRESS NOTES
Wound Care Center Progress Note With Procedure    Marilyn Rahman  AGE: 38 y.o. GENDER: female  : 1980  EPISODE DATE:  2023     Subjective:     Chief Complaint   Patient presents with    Wound Check     Left ankle         HISTORY of PRESENT ILLNESS     Beckie Santoyo is a 38 y.o. female who presents to the [address] [place] [address] for a visit for evaluation and treatment of Acute diabetic ulcer of  left hallux. The condition is of moderate severity. The ulcer has been present for approximately 3 weeks at initial visit to the wound clinic 23. The underlying cause is thought to be diabetes and cellulitis. She also had a blister left medial foot/ankle that is resolving that is covered by the blister tissue. Will leave alone for now. She also has a venous wound right ankle, started out as a blister of moderate severity. The patients care to date has included evaluation at the walk in clinic, also seen by her PCP started on Keflex and instructed to use Vasoline. The patient has significant underlying medical conditions as below. Enzo Soto MD is PCP last seen 23.    2023: Patient wounds are all healed other than medial ankle, which is much improved and close to healing  No issues reported and had RN visit last week     2023: Patient right foot wound is healed and left toe wound is pinpoint. Ankle wound is improved but stubborn and has thick fibrin     7-: Patient great improvement this week. She came early as she went to the [facility] [facility] this weekend. All wounds improved and slough and fibrin nearly gone from left medial ankle  Patient requesting renewal of handicap car tag, which we can accommodate     2023: Original wounds are healing well. Slightly macerated because wrap got wet. The area of intact blister has sloughed off revealing a small area with tough fibrin that is not ready to come of yet.  Will likely add santyl to this for now  Patient will come in Monday next week as

## 2023-07-31 NOTE — DISCHARGE INSTRUCTIONS
with saline, pat dry with 4x4  Apply gentamicin to wound bed  Cover with ludivina, and ioplex  Wrap with Conform and  secure with tape  Change on Monday     Follow up with Angelo Browne CNP in 1 weeks on Monday in the wound care center  Call [phone] for any questions or concerns.   Date__________   Time____________

## 2023-07-31 NOTE — PROGRESS NOTES
Nonselective enzymatic debridement performed with Santyl per physician order to wound(s) of the left ankle. Patient tolerated the procedure well.      Electronically signed by Andra Oliveira RN on 7/31/2023 at 8:58 AM

## 2023-08-07 ENCOUNTER — HOSPITAL ENCOUNTER (OUTPATIENT)
Dept: WOUND CARE | Age: 43
Discharge: HOME | End: 2023-08-07
Payer: MEDICARE

## 2023-08-07 VITALS
SYSTOLIC BLOOD PRESSURE: 118 MMHG | TEMPERATURE: 96.9 F | DIASTOLIC BLOOD PRESSURE: 82 MMHG | RESPIRATION RATE: 16 BRPM | HEART RATE: 95 BPM

## 2023-08-07 DIAGNOSIS — Z79.84 DIABETES MELLITUS TREATED WITH INSULIN AND ORAL MEDICATION (HCC): ICD-10-CM

## 2023-08-07 DIAGNOSIS — Z89.421 STATUS POST AMPUTATION OF LESSER TOE OF RIGHT FOOT (HCC): ICD-10-CM

## 2023-08-07 DIAGNOSIS — E66.01 CLASS 3 SEVERE OBESITY DUE TO EXCESS CALORIES WITH SERIOUS COMORBIDITY AND BODY MASS INDEX (BMI) OF 40.0 TO 44.9 IN ADULT (HCC): ICD-10-CM

## 2023-08-07 DIAGNOSIS — I83.013 VENOUS STASIS ULCER OF RIGHT ANKLE WITH FAT LAYER EXPOSED WITH VARICOSE VEINS (HCC): ICD-10-CM

## 2023-08-07 DIAGNOSIS — Z79.4 DIABETES MELLITUS TREATED WITH INSULIN AND ORAL MEDICATION (HCC): ICD-10-CM

## 2023-08-07 DIAGNOSIS — R60.0 BILATERAL LOWER EXTREMITY EDEMA: ICD-10-CM

## 2023-08-07 DIAGNOSIS — E11.621 DIABETIC ULCER OF TOE OF LEFT FOOT ASSOCIATED WITH TYPE 2 DIABETES MELLITUS, WITH FAT LAYER EXPOSED (HCC): Primary | ICD-10-CM

## 2023-08-07 DIAGNOSIS — E11.9 DIABETES MELLITUS TREATED WITH INSULIN AND ORAL MEDICATION (HCC): ICD-10-CM

## 2023-08-07 DIAGNOSIS — L97.312 VENOUS STASIS ULCER OF RIGHT ANKLE WITH FAT LAYER EXPOSED WITH VARICOSE VEINS (HCC): ICD-10-CM

## 2023-08-07 DIAGNOSIS — L97.522 DIABETIC ULCER OF TOE OF LEFT FOOT ASSOCIATED WITH TYPE 2 DIABETES MELLITUS, WITH FAT LAYER EXPOSED (HCC): Primary | ICD-10-CM

## 2023-08-07 PROCEDURE — 6370000000 HC RX 637 (ALT 250 FOR IP): Performed by: NURSE PRACTITIONER

## 2023-08-07 PROCEDURE — 11042 DBRDMT SUBQ TIS 1ST 20SQCM/<: CPT

## 2023-08-07 PROCEDURE — 11042 DBRDMT SUBQ TIS 1ST 20SQCM/<: CPT | Performed by: NURSE PRACTITIONER

## 2023-08-07 RX ORDER — CLOBETASOL PROPIONATE 0.5 MG/G
OINTMENT TOPICAL ONCE
OUTPATIENT
Start: 2023-08-07 | End: 2023-08-07

## 2023-08-07 RX ORDER — GINSENG 100 MG
CAPSULE ORAL ONCE
OUTPATIENT
Start: 2023-08-07 | End: 2023-08-07

## 2023-08-07 RX ORDER — BACITRACIN ZINC AND POLYMYXIN B SULFATE 500; 1000 [USP'U]/G; [USP'U]/G
OINTMENT TOPICAL ONCE
OUTPATIENT
Start: 2023-08-07 | End: 2023-08-07

## 2023-08-07 RX ORDER — SODIUM CHLOR/HYPOCHLOROUS ACID 0.033 %
SOLUTION, IRRIGATION IRRIGATION ONCE
OUTPATIENT
Start: 2023-08-07 | End: 2023-08-07

## 2023-08-07 RX ORDER — BETAMETHASONE DIPROPIONATE 0.05 %
OINTMENT (GRAM) TOPICAL ONCE
OUTPATIENT
Start: 2023-08-07 | End: 2023-08-07

## 2023-08-07 RX ORDER — LIDOCAINE 50 MG/G
OINTMENT TOPICAL ONCE
OUTPATIENT
Start: 2023-08-07 | End: 2023-08-07

## 2023-08-07 RX ORDER — LIDOCAINE 40 MG/G
CREAM TOPICAL ONCE
OUTPATIENT
Start: 2023-08-07 | End: 2023-08-07

## 2023-08-07 RX ORDER — LIDOCAINE HYDROCHLORIDE 40 MG/ML
SOLUTION TOPICAL ONCE
OUTPATIENT
Start: 2023-08-07 | End: 2023-08-07

## 2023-08-07 RX ORDER — GENTAMICIN SULFATE 1 MG/G
OINTMENT TOPICAL ONCE
OUTPATIENT
Start: 2023-08-07 | End: 2023-08-07

## 2023-08-07 RX ORDER — IBUPROFEN 200 MG
TABLET ORAL ONCE
OUTPATIENT
Start: 2023-08-07 | End: 2023-08-07

## 2023-08-07 RX ADMIN — COLLAGENASE SANTYL: 250 OINTMENT TOPICAL at 08:50

## 2023-08-07 NOTE — PROGRESS NOTES
Nonselective enzymatic debridement performed with Santyl per physician order to wound(s) of the left medial foot. Patient tolerated the procedure well.
ulcer of right heel with fat layer exposed ([address]) 2021       PAST SURGICAL HISTORY    Past Surgical History:   Procedure Laterality Date     SECTION      HYSTERECTOMY (CERVIX STATUS UNKNOWN)      OTHER SURGICAL HISTORY  2017    I&D of right perineal abcess     TOE AMPUTATION Right 2021    RIGHT 5TH TOE AMPUTATION performed by Skylar Kline MD at [address] History   Problem Relation Age of Onset    Heart Disease Mother     Diabetes Mother     Asthma Mother     High Cholesterol Mother     Depression Mother     Heart Disease Father     High Cholesterol Father        SOCIAL HISTORY    Social History     Tobacco Use    Smoking status: Former     Packs/day: 0.25     Types: Cigarettes     Quit date: 5/10/2021     Years since quittin.2    Smokeless tobacco: Never   Vaping Use    Vaping Use: Never used   Substance Use Topics    Alcohol use: Yes     Comment: rare    Drug use: No       ALLERGIES    Allergies   Allergen Reactions    Zosyn [Piperacillin Sod-Tazobactam So] Rash     Rash and severe \"being poked by needles all over\" feeling    Penicillins Rash       MEDICATIONS    Current Outpatient Medications on File Prior to Encounter   Medication Sig Dispense Refill    Handicap Placard MISC by Does not apply route 1 each 0    HYDROcodone-acetaminophen (NORCO) 5-325 MG per tablet Take 1 tablet by mouth every 8 hours as needed for Pain.       Dulaglutide (TRULICITY) 3 IF/7.5DA SOPN Inject 3 mg into the skin once a week Please give 3mg dose as patient doing well on 3 mg without side effect 12 Adjustable Dose Pre-filled Pen Syringe 3    DULoxetine (CYMBALTA) 30 MG extended release capsule Take 2 capsules by mouth daily Dose increase as of 22 (Patient not taking: Reported on 2023) 180 capsule 3    glipiZIDE (GLUCOTROL XL) 10 MG extended release tablet Take 1 tablet by mouth every morning 90 tablet 1    lisinopril (PRINIVIL;ZESTRIL) 2.5 MG tablet Take 1 tablet by

## 2023-08-07 NOTE — DISCHARGE INSTRUCTIONS
PHYSICIAN ORDERS AND DISCHARGE INSTRUCTIONS  NOTE: Upon discharge from the  Medical [facility], you will receive a patient experience survey via E-mail. We would be grateful if you would take the time to fill this survey out. Wound care order history:              ROBIN's   Right       Left    Date               Vascular studies/Intervention: . Cultures: . Antibiotics: . HbA1c:  . Compression/Lymph Pumps: Selvin Clarke Grafts: Richardson Stage: . Continuing wound care orders and information:              Residence: . Continue home health care with: Selvin Clarke Your wound-care supplies will be provided by: [facility]: . Wound cleansing:                           Do not scrub or use excessive force. Wash hands with soap and water before and after dressing changes. Prior to applying a clean dressing, cleanse wound with normal saline,                          wound cleanser, or mild soap and water. Ask your physician or nurse before getting the wound(s) wet in the shower. Daily Wound management:                          Keep weight off wounds and reposition every 2 hours. Avoid standing for long periods of time. Evaluate legs to the level of the heart or above for 30 minutes 4-5 times a day and/or when sitting. When taking antibiotics take entire prescription as ordered by MD do not stop taking until medicine is all gone. Orders for this week (8/7/23):   Left Medial Ankle- Wash with mild soap and water  Apply santyl to wound bed  Cover with saline dampened hydrofera blue and ABD  Wrap with Conform and secure with tape  Tubi F  Change on Monday     Left Great Toe - Wash with mild soap and water, rinse

## 2023-08-14 ENCOUNTER — HOSPITAL ENCOUNTER (OUTPATIENT)
Dept: WOUND CARE | Age: 43
Discharge: HOME | End: 2023-08-14
Payer: MEDICARE

## 2023-08-14 VITALS
SYSTOLIC BLOOD PRESSURE: 125 MMHG | HEART RATE: 86 BPM | DIASTOLIC BLOOD PRESSURE: 88 MMHG | RESPIRATION RATE: 16 BRPM | TEMPERATURE: 98.6 F

## 2023-08-14 DIAGNOSIS — Z79.84 DIABETES MELLITUS TREATED WITH INSULIN AND ORAL MEDICATION (HCC): ICD-10-CM

## 2023-08-14 DIAGNOSIS — E11.621 DIABETIC ULCER OF TOE OF LEFT FOOT ASSOCIATED WITH TYPE 2 DIABETES MELLITUS, WITH FAT LAYER EXPOSED (HCC): Primary | ICD-10-CM

## 2023-08-14 DIAGNOSIS — R60.0 BILATERAL LOWER EXTREMITY EDEMA: ICD-10-CM

## 2023-08-14 DIAGNOSIS — Z89.421 STATUS POST AMPUTATION OF LESSER TOE OF RIGHT FOOT (HCC): ICD-10-CM

## 2023-08-14 DIAGNOSIS — E66.01 CLASS 3 SEVERE OBESITY DUE TO EXCESS CALORIES WITH SERIOUS COMORBIDITY AND BODY MASS INDEX (BMI) OF 40.0 TO 44.9 IN ADULT (HCC): ICD-10-CM

## 2023-08-14 DIAGNOSIS — L97.312 VENOUS STASIS ULCER OF RIGHT ANKLE WITH FAT LAYER EXPOSED WITH VARICOSE VEINS (HCC): ICD-10-CM

## 2023-08-14 DIAGNOSIS — E11.9 DIABETES MELLITUS TREATED WITH INSULIN AND ORAL MEDICATION (HCC): ICD-10-CM

## 2023-08-14 DIAGNOSIS — Z79.4 DIABETES MELLITUS TREATED WITH INSULIN AND ORAL MEDICATION (HCC): ICD-10-CM

## 2023-08-14 DIAGNOSIS — L97.522 DIABETIC ULCER OF TOE OF LEFT FOOT ASSOCIATED WITH TYPE 2 DIABETES MELLITUS, WITH FAT LAYER EXPOSED (HCC): Primary | ICD-10-CM

## 2023-08-14 DIAGNOSIS — I83.013 VENOUS STASIS ULCER OF RIGHT ANKLE WITH FAT LAYER EXPOSED WITH VARICOSE VEINS (HCC): ICD-10-CM

## 2023-08-14 PROCEDURE — 11042 DBRDMT SUBQ TIS 1ST 20SQCM/<: CPT

## 2023-08-14 PROCEDURE — 97597 DBRDMT OPN WND 1ST 20 CM/<: CPT

## 2023-08-14 RX ORDER — BETAMETHASONE DIPROPIONATE 0.05 %
OINTMENT (GRAM) TOPICAL ONCE
Status: CANCELLED | OUTPATIENT
Start: 2023-08-14 | End: 2023-08-14

## 2023-08-14 RX ORDER — LIDOCAINE HYDROCHLORIDE 40 MG/ML
SOLUTION TOPICAL ONCE
Status: CANCELLED | OUTPATIENT
Start: 2023-08-14 | End: 2023-08-14

## 2023-08-14 RX ORDER — GENTAMICIN SULFATE 1 MG/G
OINTMENT TOPICAL ONCE
Status: CANCELLED | OUTPATIENT
Start: 2023-08-14 | End: 2023-08-14

## 2023-08-14 RX ORDER — IBUPROFEN 200 MG
TABLET ORAL ONCE
Status: CANCELLED | OUTPATIENT
Start: 2023-08-14 | End: 2023-08-14

## 2023-08-14 RX ORDER — CLOBETASOL PROPIONATE 0.5 MG/G
OINTMENT TOPICAL ONCE
Status: CANCELLED | OUTPATIENT
Start: 2023-08-14 | End: 2023-08-14

## 2023-08-14 RX ORDER — LIDOCAINE 40 MG/G
CREAM TOPICAL ONCE
Status: CANCELLED | OUTPATIENT
Start: 2023-08-14 | End: 2023-08-14

## 2023-08-14 RX ORDER — LIDOCAINE 50 MG/G
OINTMENT TOPICAL ONCE
Status: CANCELLED | OUTPATIENT
Start: 2023-08-14 | End: 2023-08-14

## 2023-08-14 RX ORDER — SODIUM CHLOR/HYPOCHLOROUS ACID 0.033 %
SOLUTION, IRRIGATION IRRIGATION ONCE
Status: CANCELLED | OUTPATIENT
Start: 2023-08-14 | End: 2023-08-14

## 2023-08-14 RX ORDER — GINSENG 100 MG
CAPSULE ORAL ONCE
Status: CANCELLED | OUTPATIENT
Start: 2023-08-14 | End: 2023-08-14

## 2023-08-14 RX ORDER — BACITRACIN ZINC AND POLYMYXIN B SULFATE 500; 1000 [USP'U]/G; [USP'U]/G
OINTMENT TOPICAL ONCE
Status: CANCELLED | OUTPATIENT
Start: 2023-08-14 | End: 2023-08-14

## 2023-08-14 NOTE — DISCHARGE INSTRUCTIONS
PHYSICIAN ORDERS AND DISCHARGE INSTRUCTIONS  NOTE: Upon discharge from the  [facility], you will receive a patient experience survey via E-mail. We would be grateful if you would take the time to fill this survey out. Wound care order history:   ROBIN's   Right       Left    Date    Vascular studies/Intervention: . Cultures: . Antibiotics: . HbA1c:  .               Grafts:  .   Juxta Lites & Lymph Pumps:  Continuing wound care orders and information:              Residence: . Private              Continue home health care with:. Your wound-care supplies will be provided by: [facility]: . Wound cleansing:      Do not scrub or use excessive force. Wash hands with soap and water before and after dressing changes. Prior to applying a clean dressing, cleanse wound with normal saline,    wound cleanser, or mild soap and water. Ask your physician or nurse before getting the wound(s) wet in the shower. Daily Wound management:    Keep weight off wounds and reposition every 2 hours. Avoid standing for long periods of time. Evaluate legs to the level of the heart or above for 30 minutes 4-5 times a day and/or when sitting. When taking antibiotics take entire prescription as ordered by MD do not stop taking until medicine is all gone. Documentation:  Compression: . Ordered - [place]    Offloading: . Orders for this week (8/14/23): Left Medial Ankle- Wash with mild soap and water  Apply santyl to wound bed  Cover with saline dampened hydrofera blue and ABD  Wrap with Conform and secure with tape  Tubi F  Change on Monday     Left Great Toe - Wash with mild soap and water, rinse with saline, pat dry with 4x4  Apply gentamicin to wound bed  Cover with ludivina, and ioplex  Wrap with Conform and  secure with tape  Change on Monday    Follow up with Carly Phan CNP in 1  weeks in the wound care center  Call [phone] for any questions or concerns.

## 2023-11-14 ENCOUNTER — HOSPITAL ENCOUNTER (OUTPATIENT)
Age: 43
Discharge: HOME | End: 2023-11-14
Payer: MEDICARE

## 2023-11-14 DIAGNOSIS — E11.65 UNCONTROLLED TYPE 2 DIABETES MELLITUS WITH HYPERGLYCEMIA (HCC): ICD-10-CM

## 2023-11-14 LAB
ALBUMIN SERPL-MCNC: 4 GM/DL (ref 3.4–5)
ALP BLD-CCNC: 90 IU/L (ref 40–128)
ALT SERPL-CCNC: 16 U/L (ref 10–40)
ANION GAP SERPL CALCULATED.3IONS-SCNC: 9 MMOL/L (ref 4–16)
AST SERPL-CCNC: 13 IU/L (ref 15–37)
BILIRUB SERPL-MCNC: 0.5 MG/DL (ref 0–1)
BUN SERPL-MCNC: 9 MG/DL (ref 6–23)
CALCIUM SERPL-MCNC: 9.5 MG/DL (ref 8.3–10.6)
CHLORIDE BLD-SCNC: 103 MMOL/L (ref 99–110)
CHOLEST SERPL-MCNC: 161 MG/DL
CO2: 29 MMOL/L (ref 21–32)
CREAT SERPL-MCNC: 0.7 MG/DL (ref 0.6–1.1)
CREAT UR-MCNC: 214 MG/DL (ref 28–217)
GFR SERPL CREATININE-BSD FRML MDRD: >60 ML/MIN/1.73M2
GLUCOSE SERPL-MCNC: 177 MG/DL (ref 70–99)
HDLC SERPL-MCNC: 55 MG/DL
LDLC SERPL CALC-MCNC: 91 MG/DL
MICROALBUMIN 24H UR-MCNC: 12.6 MG/DL
MICROALBUMIN/CREAT UR-RTO: 58.9 MG/G CREAT (ref 0–30)
POTASSIUM SERPL-SCNC: 4.4 MMOL/L (ref 3.5–5.1)
SODIUM BLD-SCNC: 141 MMOL/L (ref 135–145)
TOTAL PROTEIN: 6.7 GM/DL (ref 6.4–8.2)
TRIGL SERPL-MCNC: 74 MG/DL

## 2023-11-14 PROCEDURE — 80061 LIPID PANEL: CPT

## 2023-11-14 PROCEDURE — 80053 COMPREHEN METABOLIC PANEL: CPT

## 2023-11-14 PROCEDURE — 82570 ASSAY OF URINE CREATININE: CPT

## 2023-11-14 PROCEDURE — 36415 COLL VENOUS BLD VENIPUNCTURE: CPT

## 2023-11-14 PROCEDURE — 82043 UR ALBUMIN QUANTITATIVE: CPT

## 2023-11-15 RX ORDER — GLIPIZIDE 10 MG/1
10 TABLET, FILM COATED, EXTENDED RELEASE ORAL EVERY MORNING
Qty: 90 TABLET | Refills: 3 | Status: SHIPPED | OUTPATIENT
Start: 2023-11-15

## 2023-12-15 ENCOUNTER — HOSPITAL ENCOUNTER (OUTPATIENT)
Age: 43
Discharge: HOME | End: 2023-12-15
Payer: MEDICARE

## 2023-12-15 DIAGNOSIS — E78.5 HYPERLIPIDEMIA ASSOCIATED WITH TYPE 2 DIABETES MELLITUS (HCC): ICD-10-CM

## 2023-12-15 DIAGNOSIS — E11.69 HYPERLIPIDEMIA ASSOCIATED WITH TYPE 2 DIABETES MELLITUS (HCC): ICD-10-CM

## 2023-12-15 DIAGNOSIS — E11.65 UNCONTROLLED TYPE 2 DIABETES MELLITUS WITH HYPERGLYCEMIA (HCC): ICD-10-CM

## 2023-12-15 LAB
ESTIMATED AVERAGE GLUCOSE: 180 MG/DL
HBA1C MFR BLD: 7.9 % (ref 4.2–6.3)

## 2023-12-15 PROCEDURE — 36415 COLL VENOUS BLD VENIPUNCTURE: CPT

## 2023-12-15 PROCEDURE — 83036 HEMOGLOBIN GLYCOSYLATED A1C: CPT

## 2023-12-15 RX ORDER — DULAGLUTIDE 3 MG/.5ML
INJECTION, SOLUTION SUBCUTANEOUS
Qty: 4 ADJUSTABLE DOSE PRE-FILLED PEN SYRINGE | Refills: 5 | Status: SHIPPED | OUTPATIENT
Start: 2023-12-15

## 2023-12-15 NOTE — TELEPHONE ENCOUNTER
Pt out of medication needs sent to [facility] due to [facility] being out of stock   
Rx sent to [facility]  
Range Status   03/02/2022 yellow  Final   11/26/2018 YELLOW UYELL Final     Clarity, UA   Date Value Ref Range Status   03/02/2022 cloudy  Final   11/26/2018 CLEAR CLEAR Final     Glucose, Urine   Date Value Ref Range Status   11/26/2018 >500 (A) NEG MG/DL Final     Bilirubin, UA   Date Value Ref Range Status   03/02/2022 negative  Final     Ketones, Urine   Date Value Ref Range Status   11/26/2018 NEGATIVE NEG MG/DL Final     Ketones, UA   Date Value Ref Range Status   03/02/2022 negative  Final     Specific Gravity, UA   Date Value Ref Range Status   11/26/2018 1.027 1.001 - 1.035 Final     Spec Grav, UA   Date Value Ref Range Status   03/02/2022 1.025  Final     Blood, Urine   Date Value Ref Range Status   11/26/2018 SMALL (A) NEG Final     Blood, UA POC   Date Value Ref Range Status   03/02/2022 moderate  Final     pH, Urine   Date Value Ref Range Status   11/26/2018 5.0 5.0 - 8.0 Final     Protein, UA   Date Value Ref Range Status   11/26/2018 NEGATIVE NEG MG/DL Final     Protein, UA POC   Date Value Ref Range Status   03/02/2022 negative  Final     Urobilinogen, Urine   Date Value Ref Range Status   11/26/2018 NORMAL 0.2 - 1.0 MG/DL Final     Nitrate, UA POC   Date Value Ref Range Status   07/21/2011 NEGATIVE NEG Final     Nitrite Urine, Quantitative   Date Value Ref Range Status   11/26/2018 NEGATIVE NEG Final     Leukocyte Esterase, Urine   Date Value Ref Range Status   11/26/2018 TRACE (A) NEG Final     Leukocytes, UA   Date Value Ref Range Status   03/02/2022 small  Final     RBC, UA   Date Value Ref Range Status   11/26/2018 1 0 - 6 /HPF Final     WBC, UA   Date Value Ref Range Status   11/26/2018 1 0 - 5 /HPF Final     Bacteria, UA   Date Value Ref Range Status   11/26/2018 RARE (A) NEG /HPF Final     Trichomonas   Date Value Ref Range Status   11/26/2018 NONE SEEN NOSEE /HPF Final       Patient Care Team:  Xochilt Crow MD as PCP - General (Family Medicine)  Xochilt Crow

## 2023-12-26 DIAGNOSIS — E11.65 UNCONTROLLED TYPE 2 DIABETES MELLITUS WITH HYPERGLYCEMIA (HCC): ICD-10-CM

## 2023-12-27 NOTE — TELEPHONE ENCOUNTER
Darrius Ramirez MD as PCP - Empaneled Provider  Bette Perez.  Trent Rosa as Consulting Physician (Optometry)     Requested Pharmacy____________________________________________________________________

## 2023-12-28 RX ORDER — BLOOD SUGAR DIAGNOSTIC
1 STRIP MISCELLANEOUS DAILY
Qty: 300 EACH | Refills: 6 | Status: SHIPPED | OUTPATIENT
Start: 2023-12-28

## 2023-12-28 RX ORDER — LANCETS 30 GAUGE
EACH MISCELLANEOUS
Qty: 300 EACH | Refills: 5 | Status: SHIPPED | OUTPATIENT
Start: 2023-12-28

## 2023-12-28 RX ORDER — INSULIN GLARGINE 100 [IU]/ML
25 INJECTION, SOLUTION SUBCUTANEOUS NIGHTLY
Qty: 5 ADJUSTABLE DOSE PRE-FILLED PEN SYRINGE | Refills: 5 | Status: SHIPPED | OUTPATIENT
Start: 2023-12-28

## 2024-01-03 RX ORDER — BLOOD SUGAR DIAGNOSTIC
1 STRIP MISCELLANEOUS DAILY
Qty: 300 EACH | Refills: 6 | Status: SHIPPED | OUTPATIENT
Start: 2024-01-03

## 2024-02-05 PROBLEM — Z79.4 TYPE 2 DIABETES MELLITUS WITH DIABETIC NEPHROPATHY, WITH LONG-TERM CURRENT USE OF INSULIN (HCC): Status: ACTIVE | Noted: 2024-02-05

## 2024-02-05 PROBLEM — E11.21 TYPE 2 DIABETES MELLITUS WITH DIABETIC NEPHROPATHY, WITH LONG-TERM CURRENT USE OF INSULIN (HCC): Status: ACTIVE | Noted: 2024-02-05

## 2024-02-06 ENCOUNTER — HOSPITAL ENCOUNTER (OUTPATIENT)
Age: 44
Discharge: HOME | End: 2024-02-06
Payer: MEDICARE

## 2024-02-06 ENCOUNTER — HOSPITAL ENCOUNTER (OUTPATIENT)
Dept: GENERAL RADIOLOGY | Age: 44
Discharge: HOME | End: 2024-02-06
Payer: MEDICARE

## 2024-02-06 DIAGNOSIS — M54.16 LUMBAR RADICULOPATHY: ICD-10-CM

## 2024-02-06 DIAGNOSIS — M46.1 SACROILIITIS, NOT ELSEWHERE CLASSIFIED (HCC): ICD-10-CM

## 2024-02-06 PROCEDURE — 72100 X-RAY EXAM L-S SPINE 2/3 VWS: CPT

## 2024-02-07 ENCOUNTER — OFFICE VISIT (OUTPATIENT)
Dept: FAMILY MEDICINE CLINIC | Age: 44
End: 2024-02-07
Payer: MEDICARE

## 2024-02-07 VITALS
DIASTOLIC BLOOD PRESSURE: 74 MMHG | HEART RATE: 82 BPM | HEIGHT: 62 IN | BODY MASS INDEX: 42.55 KG/M2 | OXYGEN SATURATION: 98 % | WEIGHT: 231.2 LBS | SYSTOLIC BLOOD PRESSURE: 126 MMHG

## 2024-02-07 DIAGNOSIS — M54.42 CHRONIC BILATERAL LOW BACK PAIN WITH BILATERAL SCIATICA: ICD-10-CM

## 2024-02-07 DIAGNOSIS — F41.9 ANXIETY: ICD-10-CM

## 2024-02-07 DIAGNOSIS — E11.69 HYPERLIPIDEMIA ASSOCIATED WITH TYPE 2 DIABETES MELLITUS (HCC): ICD-10-CM

## 2024-02-07 DIAGNOSIS — M54.41 CHRONIC BILATERAL LOW BACK PAIN WITH BILATERAL SCIATICA: ICD-10-CM

## 2024-02-07 DIAGNOSIS — E66.01 OBESITY, CLASS III, BMI 40-49.9 (MORBID OBESITY) (HCC): ICD-10-CM

## 2024-02-07 DIAGNOSIS — G89.29 CHRONIC NECK PAIN: ICD-10-CM

## 2024-02-07 DIAGNOSIS — E78.5 HYPERLIPIDEMIA ASSOCIATED WITH TYPE 2 DIABETES MELLITUS (HCC): ICD-10-CM

## 2024-02-07 DIAGNOSIS — K21.9 GASTROESOPHAGEAL REFLUX DISEASE WITHOUT ESOPHAGITIS: ICD-10-CM

## 2024-02-07 DIAGNOSIS — Z00.00 MEDICARE ANNUAL WELLNESS VISIT, SUBSEQUENT: Primary | ICD-10-CM

## 2024-02-07 DIAGNOSIS — Z12.31 ENCOUNTER FOR SCREENING MAMMOGRAM FOR BREAST CANCER: ICD-10-CM

## 2024-02-07 DIAGNOSIS — E11.65 UNCONTROLLED TYPE 2 DIABETES MELLITUS WITH HYPERGLYCEMIA (HCC): ICD-10-CM

## 2024-02-07 DIAGNOSIS — M54.2 CHRONIC NECK PAIN: ICD-10-CM

## 2024-02-07 DIAGNOSIS — Z12.11 SCREEN FOR COLON CANCER: ICD-10-CM

## 2024-02-07 DIAGNOSIS — G89.29 CHRONIC BILATERAL LOW BACK PAIN WITH BILATERAL SCIATICA: ICD-10-CM

## 2024-02-07 PROCEDURE — 99214 OFFICE O/P EST MOD 30 MIN: CPT | Performed by: NURSE PRACTITIONER

## 2024-02-07 PROCEDURE — G0439 PPPS, SUBSEQ VISIT: HCPCS | Performed by: NURSE PRACTITIONER

## 2024-02-07 NOTE — PATIENT INSTRUCTIONS
keep a list of the medicines you take.  What should you include in an advance directive?  Many states have a unique advance directive form. (It may ask you to address specific issues.) Or you might use a universal form that's approved by many states.  If your form doesn't tell you what to address, it may be hard to know what to include in your advance directive. Use the questions below to help you get started.  Who do you want to make decisions about your medical care if you are not able to?  What life-support measures do you want if you have a serious illness that gets worse over time or can't be cured?  What are you most afraid of that might happen? (Maybe you're afraid of having pain, losing your independence, or being kept alive by machines.)  Where would you prefer to die? (Your home? A hospital? A nursing home?)  Do you want to donate your organs when you die?  Do you want certain Buddhist practices performed before you die?  When should you call for help?  Be sure to contact your doctor if you have any questions.  Where can you learn more?  Go to https://[email or web address].net/patientEd and enter R264 to learn more about \"Advance Directives: Care Instructions.\"  Current as of: March 26, 2023               Content Version: 13.9  © 3839-7256 [employer].   Care instructions adapted under license by [employer]. If you have questions about a medical condition or this instruction, always ask your healthcare professional. [employer] disclaims any warranty or liability for your use of this information.           Starting a Weight Loss Plan: Care Instructions  Overview     If you're thinking about losing weight, it can be hard to know where to start. Your doctor can help you set up a weight loss plan that best meets your needs. You may want to take a class on nutrition or exercise, or you could join a weight loss support group. If you have questions about how to make changes to your eating

## 2024-02-07 NOTE — PROGRESS NOTES
2024     Marilyn Rahman (:  1980) is a 43 y.o. female, here for evaluation of the following medical concerns:      Annual exam and est care   Prev dr crow pt       Chronic lower back pain with bilateral sciatica.   Cymbalta 60  Dr painter / IPS with pain management - giving norco   XRAY yesterday with pain management - fell down steps two weeks ago. Bruise to left forearm, resolving.            on lisinopril 2.5mg daily   More so for DM2 renal protection.   BP WNL        High chol   Tolerating lipitor 40 without side effects         Uncontrolled DM2   Trulicity 3   Glipizide 10mg daily   Lantus 25 units nightly   Does not see endo   Foot exam   Eye exam  2024 on [address]   Urine protein HIGH 2023   Glucose monotiring supplies - does have supplies. Had to buy out of pocket   A1c 7.9 dec 2023         Chronic wounds   Prev Following with wound clinic -- no wounds current. Last wounds were one year ago, blisters from allergic reaction to antihistamine        Mammo    - has not done recheck due to painful experience   Colon cancer screen - never done. No FH. No personal GI hx   Pap smear --- hysterectomy       Former smoker 1/2ppd but didn't smoke daily   Started 15yo   quit                   Review of Systems    Prior to Visit Medications    Medication Sig Taking? Authorizing Provider   blood glucose test strips (EXACTECH TEST) strip 1 each by In Vitro route daily Embrace test strips to test BS 3x daily Yes Xochilt Crow MD   insulin glargine (LANTUS SOLOSTAR) 100 UNIT/ML injection pen Inject 25 Units into the skin nightly Dose increase as of 2022 Yes Xochilt Crow MD   Lancets MISC To test up to 4 times per day- nighttime and with each meal Yes Xochilt Crow MD   Insulin Pen Needle 32G X 5 MM MISC To use with Lantus pen and Novolog pen Yes Xochilt Crow MD   Dulaglutide (TRULICITY) 3 MG/0.5ML SOPN INJECT 0.5ML (3MG)

## 2024-02-08 LAB
CREAT UR-MCNC: 189.1 MG/DL (ref 28–259)
MICROALBUMIN UR DL<=1MG/L-MCNC: 4.1 MG/DL
MICROALBUMIN/CREAT UR: 21.7 MG/G (ref 0–30)

## 2024-02-12 PROBLEM — E11.65 UNCONTROLLED TYPE 2 DIABETES MELLITUS WITH HYPERGLYCEMIA (HCC): Status: ACTIVE | Noted: 2024-02-12

## 2024-02-12 PROBLEM — L97.522 DIABETIC ULCER OF TOE OF LEFT FOOT ASSOCIATED WITH TYPE 2 DIABETES MELLITUS, WITH FAT LAYER EXPOSED (HCC): Status: RESOLVED | Noted: 2023-06-20 | Resolved: 2024-02-12

## 2024-02-12 PROBLEM — Z89.421 STATUS POST AMPUTATION OF LESSER TOE OF RIGHT FOOT (HCC): Status: RESOLVED | Noted: 2021-06-23 | Resolved: 2024-02-12

## 2024-02-12 PROBLEM — I83.013 VENOUS STASIS ULCER OF RIGHT ANKLE WITH FAT LAYER EXPOSED WITH VARICOSE VEINS (HCC): Status: RESOLVED | Noted: 2023-06-20 | Resolved: 2024-02-12

## 2024-02-12 PROBLEM — E66.813 CLASS 3 SEVERE OBESITY WITHOUT SERIOUS COMORBIDITY IN ADULT: Status: RESOLVED | Noted: 2023-06-20 | Resolved: 2024-02-12

## 2024-02-12 PROBLEM — M54.31 BILATERAL SCIATICA: Status: RESOLVED | Noted: 2020-01-09 | Resolved: 2024-02-12

## 2024-02-12 PROBLEM — F17.200 SMOKER: Status: RESOLVED | Noted: 2021-01-14 | Resolved: 2024-02-12

## 2024-02-12 PROBLEM — E11.621 DIABETIC ULCER OF TOE OF LEFT FOOT ASSOCIATED WITH TYPE 2 DIABETES MELLITUS, WITH FAT LAYER EXPOSED (HCC): Status: RESOLVED | Noted: 2023-06-20 | Resolved: 2024-02-12

## 2024-02-12 PROBLEM — R60.0 BILATERAL LOWER EXTREMITY EDEMA: Status: RESOLVED | Noted: 2023-06-20 | Resolved: 2024-02-12

## 2024-02-12 PROBLEM — Z79.4 TYPE 2 DIABETES MELLITUS WITH DIABETIC NEPHROPATHY, WITH LONG-TERM CURRENT USE OF INSULIN (HCC): Status: RESOLVED | Noted: 2024-02-05 | Resolved: 2024-02-12

## 2024-02-12 PROBLEM — Z79.84 DIABETES MELLITUS TREATED WITH INSULIN AND ORAL MEDICATION (HCC): Status: RESOLVED | Noted: 2023-06-20 | Resolved: 2024-02-12

## 2024-02-12 PROBLEM — E66.09 OBESITY DUE TO EXCESS CALORIES WITH SERIOUS COMORBIDITY: Status: RESOLVED | Noted: 2023-06-20 | Resolved: 2024-02-12

## 2024-02-12 PROBLEM — L97.312 VENOUS STASIS ULCER OF RIGHT ANKLE WITH FAT LAYER EXPOSED WITH VARICOSE VEINS (HCC): Status: RESOLVED | Noted: 2023-06-20 | Resolved: 2024-02-12

## 2024-02-12 PROBLEM — E11.9 DIABETES MELLITUS TREATED WITH INSULIN AND ORAL MEDICATION (HCC): Status: RESOLVED | Noted: 2023-06-20 | Resolved: 2024-02-12

## 2024-02-12 PROBLEM — Z79.4 DIABETES MELLITUS TREATED WITH INSULIN AND ORAL MEDICATION (HCC): Status: RESOLVED | Noted: 2023-06-20 | Resolved: 2024-02-12

## 2024-02-12 PROBLEM — M25.551 RIGHT HIP PAIN: Status: RESOLVED | Noted: 2022-03-02 | Resolved: 2024-02-12

## 2024-02-12 PROBLEM — E11.21 TYPE 2 DIABETES MELLITUS WITH DIABETIC NEPHROPATHY, WITH LONG-TERM CURRENT USE OF INSULIN (HCC): Status: RESOLVED | Noted: 2024-02-05 | Resolved: 2024-02-12

## 2024-02-12 PROBLEM — E66.01 CLASS 3 SEVERE OBESITY WITHOUT SERIOUS COMORBIDITY IN ADULT (HCC): Status: RESOLVED | Noted: 2023-06-20 | Resolved: 2024-02-12

## 2024-02-12 PROBLEM — M54.32 BILATERAL SCIATICA: Status: RESOLVED | Noted: 2020-01-09 | Resolved: 2024-02-12

## 2024-02-16 ENCOUNTER — HOSPITAL ENCOUNTER (OUTPATIENT)
Dept: WOMENS IMAGING | Age: 44
Discharge: HOME | End: 2024-02-16
Payer: MEDICARE

## 2024-02-16 VITALS — HEIGHT: 62 IN | BODY MASS INDEX: 42.33 KG/M2 | WEIGHT: 230 LBS

## 2024-02-16 DIAGNOSIS — Z12.31 ENCOUNTER FOR SCREENING MAMMOGRAM FOR BREAST CANCER: ICD-10-CM

## 2024-02-16 PROCEDURE — 77063 BREAST TOMOSYNTHESIS BI: CPT

## 2024-02-23 LAB — NONINV COLON CA DNA+OCC BLD SCRN STL QL: NEGATIVE

## 2024-02-27 ENCOUNTER — OFFICE VISIT (OUTPATIENT)
Dept: FAMILY MEDICINE CLINIC | Age: 44
End: 2024-02-27
Payer: MEDICARE

## 2024-02-27 VITALS
SYSTOLIC BLOOD PRESSURE: 122 MMHG | HEART RATE: 85 BPM | HEIGHT: 62 IN | OXYGEN SATURATION: 98 % | DIASTOLIC BLOOD PRESSURE: 84 MMHG | WEIGHT: 229 LBS | BODY MASS INDEX: 42.14 KG/M2

## 2024-02-27 DIAGNOSIS — E11.69 HYPERLIPIDEMIA ASSOCIATED WITH TYPE 2 DIABETES MELLITUS (HCC): ICD-10-CM

## 2024-02-27 DIAGNOSIS — E78.5 HYPERLIPIDEMIA ASSOCIATED WITH TYPE 2 DIABETES MELLITUS (HCC): ICD-10-CM

## 2024-02-27 DIAGNOSIS — Z76.89 ENCOUNTER TO ESTABLISH CARE: ICD-10-CM

## 2024-02-27 DIAGNOSIS — E11.65 UNCONTROLLED TYPE 2 DIABETES MELLITUS WITH HYPERGLYCEMIA (HCC): Primary | ICD-10-CM

## 2024-02-27 DIAGNOSIS — I10 ESSENTIAL HYPERTENSION: ICD-10-CM

## 2024-02-27 PROCEDURE — 3074F SYST BP LT 130 MM HG: CPT

## 2024-02-27 PROCEDURE — 99215 OFFICE O/P EST HI 40 MIN: CPT

## 2024-02-27 PROCEDURE — 3079F DIAST BP 80-89 MM HG: CPT

## 2024-02-27 RX ORDER — INSULIN GLARGINE 100 [IU]/ML
30 INJECTION, SOLUTION SUBCUTANEOUS NIGHTLY
Qty: 5 ADJUSTABLE DOSE PRE-FILLED PEN SYRINGE | Refills: 5
Start: 2024-02-27

## 2024-02-27 RX ORDER — ATORVASTATIN CALCIUM 20 MG/1
20 TABLET, FILM COATED ORAL EVERY MORNING
Qty: 30 TABLET | Refills: 2 | Status: SHIPPED | OUTPATIENT
Start: 2024-02-27

## 2024-02-27 RX ORDER — LISINOPRIL 10 MG/1
10 TABLET ORAL EVERY MORNING
Qty: 30 TABLET | Refills: 2 | Status: SHIPPED | OUTPATIENT
Start: 2024-02-27

## 2024-02-27 NOTE — PROGRESS NOTES
Diabetes Mellitus Type II, Initial Visit    CC: Patient was referred by Piedad Wilson for diabetes management.    HPI:  Marilyn Rahman is a 43 y.o. female with a history of diabetes.  Patient presents to establish care of her diabetes at the recommendation of her PCP.  Patient does report having some higher fasting glucose readings. The patient was initially diagnosed with Type 2 diabetes mellitus based on the following criteria:  A1C > 6.5%.  Current treatment includes:  Trulicity when it is in stock, Lantus & Glipizide .  Current nutrition plan and level of physical activity includes:  she is not very active, she does not work or participate in exercise; she is not following a specific nutrition plan .  Current weight trend is described as; there was no change in patient's weight.  She has not received diabetes education and/or dietitian consult previously.  Patient is currently monitoring glucose at home.  Glucose monitoring includes: fasting bedtime.  Most recent A1C result is   Hemoglobin A1C   Date Value Ref Range Status   12/15/2023 7.9 (H) 4.2 - 6.3 % Final     Patient reports that she has not experienced recent episodes of hypoglycemia.  Patient has not had previous episodes of DKA or HHNS.    PMH:   Active Ambulatory Problems     Diagnosis Date Noted    Gastroesophageal reflux disease without esophagitis 01/04/2012    Anxiety 10/24/2012    Chronic neck pain 10/24/2012    Chronic back pain 10/24/2012    Hyperlipidemia associated with type 2 diabetes mellitus ([facility]) 12/17/2012    Allergic rhinitis     PVD (peripheral vascular disease) ([facility]) 01/17/2022    Chronic bilateral low back pain with bilateral sciatica 03/02/2022    Obesity, Class III, BMI 40-49.9 (morbid obesity) ([facility]) 06/20/2023    Uncontrolled type 2 diabetes mellitus with hyperglycemia ([facility]) 02/12/2024     Resolved Ambulatory Problems     Diagnosis Date Noted    Diabetic eye exam: july 2011, Dr. Massey, needs record 09/13/2011    Sprain of neck

## 2024-03-05 ENCOUNTER — TELEPHONE (OUTPATIENT)
Dept: FAMILY MEDICINE CLINIC | Age: 44
End: 2024-03-05

## 2024-03-05 RX ORDER — BLOOD-GLUCOSE SENSOR
EACH MISCELLANEOUS
Qty: 2 EACH | Refills: 11 | Status: SHIPPED | OUTPATIENT
Start: 2024-03-05

## 2024-03-05 NOTE — TELEPHONE ENCOUNTER
Please advise patient that it was reordered at [facility] pharmacy, please let us know if she has any issues filling it.

## 2024-03-05 NOTE — TELEPHONE ENCOUNTER
Pt came into office today. She only has 7 days left of the sample Marv she was given at last appt. She asked if provider ordered the Marv 3. Pharmacy did not have a Script. Pharmacy [address] Please advise

## 2024-04-01 ENCOUNTER — OFFICE VISIT (OUTPATIENT)
Dept: FAMILY MEDICINE CLINIC | Age: 44
End: 2024-04-01
Payer: MEDICARE

## 2024-04-01 VITALS
BODY MASS INDEX: 41.92 KG/M2 | OXYGEN SATURATION: 99 % | DIASTOLIC BLOOD PRESSURE: 88 MMHG | HEART RATE: 87 BPM | WEIGHT: 227.8 LBS | SYSTOLIC BLOOD PRESSURE: 128 MMHG | HEIGHT: 62 IN

## 2024-04-01 DIAGNOSIS — Z97.8 USES SELF-APPLIED CONTINUOUS GLUCOSE MONITORING DEVICE: ICD-10-CM

## 2024-04-01 DIAGNOSIS — I10 ESSENTIAL HYPERTENSION: ICD-10-CM

## 2024-04-01 DIAGNOSIS — E11.65 UNCONTROLLED TYPE 2 DIABETES MELLITUS WITH HYPERGLYCEMIA (HCC): Primary | ICD-10-CM

## 2024-04-01 DIAGNOSIS — Z51.81 THERAPEUTIC DRUG MONITORING: ICD-10-CM

## 2024-04-01 PROCEDURE — 95251 CONT GLUC MNTR ANALYSIS I&R: CPT

## 2024-04-01 PROCEDURE — 99215 OFFICE O/P EST HI 40 MIN: CPT

## 2024-04-01 PROCEDURE — 3074F SYST BP LT 130 MM HG: CPT

## 2024-04-01 PROCEDURE — 3079F DIAST BP 80-89 MM HG: CPT

## 2024-04-01 ASSESSMENT — ENCOUNTER SYMPTOMS
NAUSEA: 0
DIARRHEA: 0

## 2024-04-01 NOTE — PROGRESS NOTES
Diabetes Mellitus Type II, Follow-Up Visit    CC: Patient for routine diabetes care, refills, CGM & GLP-1    HPI:  Marilyn Rahman is a 43 y.o. female with a history of diabetes.  Patient reports a hypoglycemic event recently.  She states she has reduced her regular soda intake, only drinking zero sodas at home but still having some regular when eating out.  Current treatment includes:  glipizide, lantus, trulicity .  Current nutrition plan and level of physical activity includes: she is not very active, no regular exercise.  Current weight trend is described as; lost,  4 lbs.since Feb. Patient is currently monitoring glucose at home.  Glucose monitoring includes: continuous via CGM Marv 3 .  Most recent A1C result is   Hemoglobin A1C   Date Value Ref Range Status   12/15/2023 7.9 (H) 4.2 - 6.3 % Final       PMH:   Encounter Diagnoses   Name Primary?    Uncontrolled type 2 diabetes mellitus with hyperglycemia (HCC) Yes    Essential hypertension     Therapeutic drug monitoring     Uses self-applied continuous glucose monitoring device      Active Ambulatory Problems     Diagnosis Date Noted    Gastroesophageal reflux disease without esophagitis 01/04/2012    Anxiety 10/24/2012    Chronic neck pain 10/24/2012    Chronic back pain 10/24/2012    Hyperlipidemia associated with type 2 diabetes mellitus ([facility]) 12/17/2012    Allergic rhinitis     PVD (peripheral vascular disease) ([facility]) 01/17/2022    Chronic bilateral low back pain with bilateral sciatica 03/02/2022    Obesity, Class III, BMI 40-49.9 (morbid obesity) ([facility]) 06/20/2023    Uncontrolled type 2 diabetes mellitus with hyperglycemia ([facility]) 02/12/2024     Resolved Ambulatory Problems     Diagnosis Date Noted    Diabetic eye exam: july 2011, Dr. Massey, needs record 09/13/2011    Sprain of neck 10/04/2011    Headache 10/04/2011    Uncontrolled type 2 diabetes mellitus without complication, with long-term current use of insulin 10/04/2011    Essential hypertension 01/04/2012
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
## 2024-05-13 ENCOUNTER — OFFICE VISIT (OUTPATIENT)
Dept: FAMILY MEDICINE CLINIC | Age: 44
End: 2024-05-13
Payer: MEDICARE

## 2024-05-13 VITALS
HEART RATE: 90 BPM | SYSTOLIC BLOOD PRESSURE: 128 MMHG | DIASTOLIC BLOOD PRESSURE: 88 MMHG | WEIGHT: 226.4 LBS | BODY MASS INDEX: 41.66 KG/M2 | OXYGEN SATURATION: 98 % | HEIGHT: 62 IN

## 2024-05-13 DIAGNOSIS — Z51.81 THERAPEUTIC DRUG MONITORING: ICD-10-CM

## 2024-05-13 DIAGNOSIS — E11.65 UNCONTROLLED TYPE 2 DIABETES MELLITUS WITH HYPERGLYCEMIA (HCC): Primary | ICD-10-CM

## 2024-05-13 DIAGNOSIS — E11.9 ENCOUNTER FOR DIABETIC FOOT EXAM (HCC): ICD-10-CM

## 2024-05-13 DIAGNOSIS — I10 ESSENTIAL HYPERTENSION: ICD-10-CM

## 2024-05-13 DIAGNOSIS — Z97.8 USES SELF-APPLIED CONTINUOUS GLUCOSE MONITORING DEVICE: ICD-10-CM

## 2024-05-13 LAB — HBA1C MFR BLD: 7.3 %

## 2024-05-13 PROCEDURE — 3074F SYST BP LT 130 MM HG: CPT

## 2024-05-13 PROCEDURE — 3051F HG A1C>EQUAL 7.0%<8.0%: CPT

## 2024-05-13 PROCEDURE — 83036 HEMOGLOBIN GLYCOSYLATED A1C: CPT

## 2024-05-13 PROCEDURE — 95251 CONT GLUC MNTR ANALYSIS I&R: CPT

## 2024-05-13 PROCEDURE — 99215 OFFICE O/P EST HI 40 MIN: CPT

## 2024-05-13 PROCEDURE — 3079F DIAST BP 80-89 MM HG: CPT

## 2024-05-13 RX ORDER — SEMAGLUTIDE 0.68 MG/ML
0.5 INJECTION, SOLUTION SUBCUTANEOUS
Qty: 3 ML | Refills: 1 | Status: SHIPPED | OUTPATIENT
Start: 2024-05-13

## 2024-05-13 ASSESSMENT — ENCOUNTER SYMPTOMS
ABDOMINAL PAIN: 0
CONSTIPATION: 0
NAUSEA: 0

## 2024-05-13 NOTE — PROGRESS NOTES
Diabetes Mellitus Type II, Follow-Up Visit    CC: Patient for routine diabetes care, refills, CGM, DM foot exam    HPI:  Marilyn Rahman is a 43 y.o. female with a history of diabetes.  Patient reports she has had issues with the CGM sensors staying in place, has not had a sensor on since 4/28/2024.  She does report having symptoms at least once over the last month of hypoglycemia, she was feeling nauseated, but had no symptoms during the other occurrences captured on her CGM, suspect sensor malfunction.  Current treatment includes:  Trulicity 4.5mg, Glipizide 10mg, Lantus 30 .  Current nutrition plan and level of physical activity includes: she has switched from regular soda & juice to diet or zero sugar soda & drink packets.  Current weight trend is described as; lost,  5 lbs. Since February.  Patient is not currently monitoring glucose at home.  Glucose monitoring includes: continuous via CGM Marv 3 when it is working .  Most recent A1C result is   Hemoglobin A1C   Date Value Ref Range Status   05/13/2024 7.3 % Final       PMH:   Encounter Diagnoses   Name Primary?    Uncontrolled type 2 diabetes mellitus with hyperglycemia ([facility]) Yes    Essential hypertension     Therapeutic drug monitoring     Uses self-applied continuous glucose monitoring device     Encounter for diabetic foot exam ([facility])      Active Ambulatory Problems     Diagnosis Date Noted    Gastroesophageal reflux disease without esophagitis 01/04/2012    Anxiety 10/24/2012    Chronic neck pain 10/24/2012    Chronic back pain 10/24/2012    Hyperlipidemia associated with type 2 diabetes mellitus ([facility]) 12/17/2012    Allergic rhinitis     PVD (peripheral vascular disease) ([facility]) 01/17/2022    Chronic bilateral low back pain with bilateral sciatica 03/02/2022    Obesity, Class III, BMI 40-49.9 (morbid obesity) ([facility]) 06/20/2023    Uncontrolled type 2 diabetes mellitus with hyperglycemia ([facility]) 02/12/2024     Resolved Ambulatory Problems     Diagnosis Date Noted

## 2024-06-03 DIAGNOSIS — E78.5 HYPERLIPIDEMIA ASSOCIATED WITH TYPE 2 DIABETES MELLITUS (HCC): ICD-10-CM

## 2024-06-03 DIAGNOSIS — E11.69 HYPERLIPIDEMIA ASSOCIATED WITH TYPE 2 DIABETES MELLITUS (HCC): ICD-10-CM

## 2024-06-03 DIAGNOSIS — I10 ESSENTIAL HYPERTENSION: ICD-10-CM

## 2024-06-03 DIAGNOSIS — E11.65 UNCONTROLLED TYPE 2 DIABETES MELLITUS WITH HYPERGLYCEMIA (HCC): ICD-10-CM

## 2024-06-03 RX ORDER — ATORVASTATIN CALCIUM 20 MG/1
20 TABLET, FILM COATED ORAL EVERY MORNING
Qty: 30 TABLET | Refills: 2 | Status: SHIPPED | OUTPATIENT
Start: 2024-06-03

## 2024-06-03 RX ORDER — GLIPIZIDE 10 MG/1
10 TABLET, FILM COATED, EXTENDED RELEASE ORAL EVERY MORNING
Qty: 90 TABLET | Refills: 3 | Status: SHIPPED | OUTPATIENT
Start: 2024-06-03

## 2024-06-03 RX ORDER — LISINOPRIL 10 MG/1
10 TABLET ORAL EVERY MORNING
Qty: 30 TABLET | Refills: 2 | Status: SHIPPED | OUTPATIENT
Start: 2024-06-03

## 2024-06-03 NOTE — TELEPHONE ENCOUNTER
Last visit with PCP February 2024---   patient was taking Lipitor 40 (Luz sent refill for Lipitor 20).... Do we know why?    Patient was taking lisinopril 2.5  (Luz sent refill for 10 mg) .... Do we know why?  
When patient established with me in 2024, neither lisinopril or atorvastatin were on her active medication list.  When I look in her chart today, both were last ordered in  & would have  in .  I would advise to continue at current medication dosing pending updated labs.  
2021 CKD-EPI equation.  Careful clinical correlation is recommended, particularly when comparing to results   calculated using previous equations.  The CKD-EPI equation is less accurate in patients with extremes of muscle mass, extra-renal   metabolism of creatine, excessive creatine ingestion, or following therapy that affects   renal tubular secretion.       GFR    Date Value Ref Range Status   03/03/2022 >60 >60 Final     Comment:     Chronic Kidney Disease: less than 60 ml/min/1.73 sq.m.          Kidney Failure: less than 15 ml/min/1.73 sq.m.  Results valid for patients 18 years and older.       Albumin/Globulin Ratio   Date Value Ref Range Status   01/13/2021 1.6 1.1 - 2.2 Final     Globulin   Date Value Ref Range Status   01/13/2021 2.7 g/dL Final       Lipids:    Cholesterol, Total   Date Value Ref Range Status   03/03/2022 153 0 - 199 mg/dL Final     Cholesterol   Date Value Ref Range Status   11/14/2023 161 <200 MG/DL Final     Comment:     (NOTE)  Cardiovascular risk evaluation guidelines:  Low Risk        <200 mg/dL  Average Risk    200-240 mg/dL  High Risk       >240 mg/dL         Triglycerides   Date Value Ref Range Status   11/14/2023 74 <150 MG/DL Final     HDL   Date Value Ref Range Status   11/14/2023 55 >40 MG/DL Final     VLDL   Date Value Ref Range Status   10/18/2017 15 mg/dL Final     VLDL Cholesterol Calculated   Date Value Ref Range Status   03/03/2022 19 Not Established mg/dL Final     Chol/HDL Ratio   Date Value Ref Range Status   10/18/2017 3.2  Final       A1C:    Hemoglobin A1C   Date Value Ref Range Status   05/13/2024 7.3 % Final       TSH:    TSH, High Sensitivity   Date Value Ref Range Status   12/19/2012 0.762 0.55 - 4.78 uIu/ml Final       UA:  Color, UA   Date Value Ref Range Status   03/02/2022 yellow  Final   11/26/2018 YELLOW UYELL Final     Clarity, UA   Date Value Ref Range Status   03/02/2022 cloudy  Final   11/26/2018 CLEAR CLEAR Final     Bilirubin, UA

## 2024-06-17 ENCOUNTER — OFFICE VISIT (OUTPATIENT)
Dept: FAMILY MEDICINE CLINIC | Age: 44
End: 2024-06-17
Payer: MEDICARE

## 2024-06-17 VITALS
OXYGEN SATURATION: 98 % | BODY MASS INDEX: 41.7 KG/M2 | WEIGHT: 226.6 LBS | HEIGHT: 62 IN | HEART RATE: 90 BPM | DIASTOLIC BLOOD PRESSURE: 84 MMHG | SYSTOLIC BLOOD PRESSURE: 124 MMHG

## 2024-06-17 DIAGNOSIS — G89.29 CHRONIC BILATERAL LOW BACK PAIN WITH BILATERAL SCIATICA: ICD-10-CM

## 2024-06-17 DIAGNOSIS — M54.41 CHRONIC BILATERAL LOW BACK PAIN WITH BILATERAL SCIATICA: ICD-10-CM

## 2024-06-17 DIAGNOSIS — E66.01 OBESITY, CLASS III, BMI 40-49.9 (MORBID OBESITY) (HCC): ICD-10-CM

## 2024-06-17 DIAGNOSIS — E11.65 UNCONTROLLED TYPE 2 DIABETES MELLITUS WITH HYPERGLYCEMIA (HCC): Primary | ICD-10-CM

## 2024-06-17 DIAGNOSIS — M54.42 CHRONIC BILATERAL LOW BACK PAIN WITH BILATERAL SCIATICA: ICD-10-CM

## 2024-06-17 DIAGNOSIS — Z79.85 LONG-TERM CURRENT USE OF INJECTABLE NONINSULIN ANTIDIABETIC MEDICATION: ICD-10-CM

## 2024-06-17 DIAGNOSIS — Z97.8 USES SELF-APPLIED CONTINUOUS GLUCOSE MONITORING DEVICE: ICD-10-CM

## 2024-06-17 PROCEDURE — 3051F HG A1C>EQUAL 7.0%<8.0%: CPT

## 2024-06-17 PROCEDURE — 95251 CONT GLUC MNTR ANALYSIS I&R: CPT

## 2024-06-17 PROCEDURE — 99214 OFFICE O/P EST MOD 30 MIN: CPT

## 2024-06-17 RX ORDER — SEMAGLUTIDE 1.34 MG/ML
1 INJECTION, SOLUTION SUBCUTANEOUS
Qty: 3 ML | Refills: 0 | Status: SHIPPED | OUTPATIENT
Start: 2024-06-17

## 2024-06-17 RX ORDER — GABAPENTIN 300 MG/1
300 CAPSULE ORAL DAILY
COMMUNITY

## 2024-06-17 SDOH — ECONOMIC STABILITY: INCOME INSECURITY: HOW HARD IS IT FOR YOU TO PAY FOR THE VERY BASICS LIKE FOOD, HOUSING, MEDICAL CARE, AND HEATING?: NOT VERY HARD

## 2024-06-17 SDOH — ECONOMIC STABILITY: FOOD INSECURITY: WITHIN THE PAST 12 MONTHS, YOU WORRIED THAT YOUR FOOD WOULD RUN OUT BEFORE YOU GOT MONEY TO BUY MORE.: NEVER TRUE

## 2024-06-17 SDOH — ECONOMIC STABILITY: HOUSING INSECURITY
IN THE LAST 12 MONTHS, WAS THERE A TIME WHEN YOU DID NOT HAVE A STEADY PLACE TO SLEEP OR SLEPT IN A SHELTER (INCLUDING NOW)?: NO

## 2024-06-17 SDOH — ECONOMIC STABILITY: FOOD INSECURITY: WITHIN THE PAST 12 MONTHS, THE FOOD YOU BOUGHT JUST DIDN'T LAST AND YOU DIDN'T HAVE MONEY TO GET MORE.: NEVER TRUE

## 2024-06-17 NOTE — PATIENT INSTRUCTIONS
- when you increase to the 1mg Ozempic, do not take the glipizide because it can make your blood sugar drop too low.    [employer] Patient Portal  Log In  [email or web address]   Call [phone] or email [email or web address] for sensors & overlay patches.

## 2024-06-17 NOTE — PROGRESS NOTES
Diabetes Mellitus Type II, Follow-Up Visit    CC: Patient for routine diabetes care, refills     HPI:  Marilyn Rahman is a 43 y.o. female with a history of diabetes.  Patient is currently monitoring glucose at home.  Glucose monitoring includes: continuous via CGM.  Has had problems with sensors staying on.  Denies worsening constipation with ozempic, feels it was mostly due to chronic opioid use.  Most recent A1C result is   Hemoglobin A1C   Date Value Ref Range Status   05/13/2024 7.3 % Final       PMH:   Encounter Diagnoses   Name Primary?    Uncontrolled type 2 diabetes mellitus with hyperglycemia ([facility]) Yes    Long-term current use of injectable noninsulin antidiabetic medication     Uses self-applied continuous glucose monitoring device     Chronic bilateral low back pain with bilateral sciatica     Obesity, Class III, BMI 40-49.9 (morbid obesity) ([facility])      Active Ambulatory Problems     Diagnosis Date Noted    Gastroesophageal reflux disease without esophagitis 01/04/2012    Anxiety 10/24/2012    Chronic neck pain 10/24/2012    Chronic back pain 10/24/2012    Hyperlipidemia associated with type 2 diabetes mellitus ([facility]) 12/17/2012    Allergic rhinitis     PVD (peripheral vascular disease) ([facility]) 01/17/2022    Chronic bilateral low back pain with bilateral sciatica 03/02/2022    Obesity, Class III, BMI 40-49.9 (morbid obesity) ([facility]) 06/20/2023    Uncontrolled type 2 diabetes mellitus with hyperglycemia ([facility]) 02/12/2024     Resolved Ambulatory Problems     Diagnosis Date Noted    Diabetic eye exam: july 2011, Dr. Massey, needs record 09/13/2011    Sprain of neck 10/04/2011    Headache 10/04/2011    Uncontrolled type 2 diabetes mellitus without complication, with long-term current use of insulin 10/04/2011    Essential hypertension 01/04/2012    Atopic dermatitis     No retinopathy on exam     Class 2 severe obesity due to excess calories with serious comorbidity and body mass index (BMI) of 38.0 to 38.9 in adult

## 2024-07-01 RX ORDER — SEMAGLUTIDE 0.68 MG/ML
INJECTION, SOLUTION SUBCUTANEOUS
Qty: 3 ML | Refills: 0 | OUTPATIENT
Start: 2024-07-01

## 2024-07-30 DIAGNOSIS — E11.65 UNCONTROLLED TYPE 2 DIABETES MELLITUS WITH HYPERGLYCEMIA (HCC): ICD-10-CM

## 2024-08-01 ENCOUNTER — OFFICE VISIT (OUTPATIENT)
Dept: FAMILY MEDICINE CLINIC | Age: 44
End: 2024-08-01
Payer: MEDICARE

## 2024-08-01 VITALS
WEIGHT: 220 LBS | HEART RATE: 80 BPM | SYSTOLIC BLOOD PRESSURE: 118 MMHG | BODY MASS INDEX: 40.48 KG/M2 | OXYGEN SATURATION: 98 % | HEIGHT: 62 IN | DIASTOLIC BLOOD PRESSURE: 82 MMHG

## 2024-08-01 DIAGNOSIS — E11.65 UNCONTROLLED TYPE 2 DIABETES MELLITUS WITH HYPERGLYCEMIA (HCC): Primary | ICD-10-CM

## 2024-08-01 DIAGNOSIS — Z97.8 USES SELF-APPLIED CONTINUOUS GLUCOSE MONITORING DEVICE: ICD-10-CM

## 2024-08-01 DIAGNOSIS — Z79.85 LONG-TERM CURRENT USE OF INJECTABLE NONINSULIN ANTIDIABETIC MEDICATION: ICD-10-CM

## 2024-08-01 PROCEDURE — 3051F HG A1C>EQUAL 7.0%<8.0%: CPT

## 2024-08-01 PROCEDURE — 99213 OFFICE O/P EST LOW 20 MIN: CPT

## 2024-08-01 PROCEDURE — 95251 CONT GLUC MNTR ANALYSIS I&R: CPT

## 2024-08-01 RX ORDER — SEMAGLUTIDE 1.34 MG/ML
INJECTION, SOLUTION SUBCUTANEOUS
Qty: 3 ML | Refills: 0 | OUTPATIENT
Start: 2024-08-01

## 2024-08-01 RX ORDER — SEMAGLUTIDE 2.68 MG/ML
2 INJECTION, SOLUTION SUBCUTANEOUS
Qty: 3 ML | Refills: 1 | Status: SHIPPED | OUTPATIENT
Start: 2024-08-01

## 2024-08-01 NOTE — PROGRESS NOTES
Marilyn Rahman (:  1980) is a 43 y.o. female,Established patient, here for evaluation of the following chief complaint(s):  Diabetes and Follow-up      Assessment & Plan   1. Uncontrolled type 2 diabetes mellitus with hyperglycemia (HCC)  -     blood glucose test strips (ASCENSIA AUTODISC VI;ONE TOUCH ULTRA TEST VI) strip; DAILY Starting Thu 2024, Disp-100 each, R-5, NormalAs needed.  -     semaglutide, 2 MG/DOSE, (OZEMPIC, 2 MG/DOSE,) 8 MG/3ML SOPN sc injection; Inject 2 mg into the skin every 7 days, Disp-3 mL, R-1Normal  2. Uses self-applied continuous glucose monitoring device  -     [place] CONTINUOUS GLUCOSE MONITORING ANALYSIS I&R  3. Long-term current use of injectable noninsulin antidiabetic medication  TIR is improving minimally  patient denies adverse side effects with current GLP-1 therapy, Ozempic at 1mg weekly.      Return in about 8 weeks (around 2024) for A1C, Med check, CGM monitoring.       Subjective   Pt reports continued problems with CGMs remaining in place.      Diabetes  She presents for her follow-up diabetic visit. She has type 2 diabetes mellitus. Her disease course has been improving. There are no hypoglycemic associated symptoms. Pertinent negatives for diabetes include no chest pain, no polydipsia, no polyphagia and no polyuria. Symptoms are stable. Risk factors for coronary artery disease include diabetes mellitus, obesity and sedentary lifestyle. Current diabetic treatment includes insulin injections (GLP-1).       Key Antihyperglycemic Medications               semaglutide, 2 MG/DOSE, (OZEMPIC, 2 MG/DOSE,) 8 MG/3ML SOPN sc injection (Taking) Inject 2 mg into the skin every 7 days    insulin glargine (LANTUS SOLOSTAR) 100 UNIT/ML injection pen (Taking) Inject 30 Units into the skin nightly Dose increase as of 2022            Review of Systems   Cardiovascular:  Negative for chest pain.   Gastrointestinal:  Negative for diarrhea and nausea.   Endocrine: Negative for

## 2024-08-14 ENCOUNTER — OFFICE VISIT (OUTPATIENT)
Dept: FAMILY MEDICINE CLINIC | Age: 44
End: 2024-08-14
Payer: MEDICARE

## 2024-08-14 VITALS
OXYGEN SATURATION: 97 % | HEART RATE: 87 BPM | DIASTOLIC BLOOD PRESSURE: 80 MMHG | WEIGHT: 218 LBS | SYSTOLIC BLOOD PRESSURE: 102 MMHG | BODY MASS INDEX: 39.87 KG/M2

## 2024-08-14 DIAGNOSIS — E66.01 CLASS 2 SEVERE OBESITY DUE TO EXCESS CALORIES WITH SERIOUS COMORBIDITY AND BODY MASS INDEX (BMI) OF 39.0 TO 39.9 IN ADULT (HCC): ICD-10-CM

## 2024-08-14 DIAGNOSIS — R80.9 PROTEINURIA, UNSPECIFIED TYPE: ICD-10-CM

## 2024-08-14 DIAGNOSIS — G89.29 CHRONIC BILATERAL LOW BACK PAIN WITH BILATERAL SCIATICA: ICD-10-CM

## 2024-08-14 DIAGNOSIS — E11.69 HYPERLIPIDEMIA ASSOCIATED WITH TYPE 2 DIABETES MELLITUS (HCC): ICD-10-CM

## 2024-08-14 DIAGNOSIS — E11.65 UNCONTROLLED TYPE 2 DIABETES MELLITUS WITH HYPERGLYCEMIA (HCC): Primary | ICD-10-CM

## 2024-08-14 DIAGNOSIS — E78.5 HYPERLIPIDEMIA ASSOCIATED WITH TYPE 2 DIABETES MELLITUS (HCC): ICD-10-CM

## 2024-08-14 DIAGNOSIS — I73.9 PVD (PERIPHERAL VASCULAR DISEASE) (HCC): ICD-10-CM

## 2024-08-14 DIAGNOSIS — M54.42 CHRONIC BILATERAL LOW BACK PAIN WITH BILATERAL SCIATICA: ICD-10-CM

## 2024-08-14 DIAGNOSIS — M54.41 CHRONIC BILATERAL LOW BACK PAIN WITH BILATERAL SCIATICA: ICD-10-CM

## 2024-08-14 PROCEDURE — 99214 OFFICE O/P EST MOD 30 MIN: CPT | Performed by: NURSE PRACTITIONER

## 2024-08-14 PROCEDURE — 3051F HG A1C>EQUAL 7.0%<8.0%: CPT | Performed by: NURSE PRACTITIONER

## 2024-08-14 NOTE — PROGRESS NOTES
2024     Marilyn Rahman (:  1980) is a 43 y.o. female, here for evaluation of the following medical concerns:      6 month chronics       Chronic lower back pain with bilateral sciatica.   Cymbalta 60 - stopped  Gabapentin 300 daily started  - works sometimes   Norco - stopped   Dr painter / [facility] with pain management           Lisinopril 10mg   More so for DM2 renal protection.   BP WNL           High chol   Tolerating lipitor 20 without side effects   Prev on 40 but restarted at 20 for tolerance check            DM2   Ozempic   Lantus 30 units nightly   Does not see endo   Foot exam   Eye exam  2024 on [address]   Urine protein much improved 2024  Has a CGM and uses finger stick method   A1c 7.3 may 2024   Following with марина             Mammo  2024 benign     Cologuard neg 2024    Pap smear --- hysterectomy         Former smoker   1/2ppd but didn't smoke daily   Started 15yo   quit                           Review of Systems    Prior to Visit Medications    Medication Sig Taking? Authorizing Provider   blood glucose test strips (ASCENSIA AUTODISC VI;ONE TOUCH ULTRA TEST VI) strip 1 each by In Vitro route daily As needed. Yes Renata Arellano APRN - CNP   semaglutide, 2 MG/DOSE, (OZEMPIC, 2 MG/DOSE,) 8 MG/3ML SOPN sc injection Inject 2 mg into the skin every 7 days Yes Renata Arellano APRN - CNP   gabapentin (NEURONTIN) 300 MG capsule Take 1 capsule by mouth daily. Yes Provider, MD Kasie   Insulin Pen Needle 32G X 5 MM MISC To use with Lantus pen and Novolog pen Yes Renata Arellano APRN - CNP   lisinopril (PRINIVIL;ZESTRIL) 10 MG tablet Take 1 tablet by mouth every morning Yes Renata Arellano APRN - CNP   atorvastatin (LIPITOR) 20 MG tablet Take 1 tablet by mouth every morning Yes Renata Arellano APRN - CNP   Ostomy Supplies (SKIN TAC ADHESIVE BARRIER WIPE) MISC 1 each by Does not apply route every 7 days Yes Renata Arellano APRN - CNP   Continuous Blood Gluc Sensor (FREESTYLE

## 2024-08-19 ASSESSMENT — ENCOUNTER SYMPTOMS
NAUSEA: 0
DIARRHEA: 0

## 2024-09-10 ENCOUNTER — HOSPITAL ENCOUNTER (OUTPATIENT)
Age: 44
Discharge: HOME | End: 2024-09-10
Payer: MEDICARE

## 2024-09-10 DIAGNOSIS — E11.65 UNCONTROLLED TYPE 2 DIABETES MELLITUS WITH HYPERGLYCEMIA (HCC): ICD-10-CM

## 2024-09-10 DIAGNOSIS — R80.9 PROTEINURIA, UNSPECIFIED TYPE: ICD-10-CM

## 2024-09-10 LAB
ALBUMIN SERPL-MCNC: 3.9 G/DL (ref 3.4–5)
ALBUMIN/GLOB SERPL: 1.7 {RATIO} (ref 1.1–2.2)
ALP SERPL-CCNC: 90 U/L (ref 40–129)
ALT SERPL-CCNC: 33 U/L (ref 10–40)
ANION GAP SERPL CALCULATED.3IONS-SCNC: 12 MMOL/L (ref 9–17)
AST SERPL-CCNC: 27 U/L (ref 15–37)
BILIRUB SERPL-MCNC: 0.7 MG/DL (ref 0–1)
BUN SERPL-MCNC: 8 MG/DL (ref 7–20)
CALCIUM SERPL-MCNC: 9.1 MG/DL (ref 8.3–10.6)
CHLORIDE SERPL-SCNC: 102 MMOL/L (ref 99–110)
CO2 SERPL-SCNC: 25 MMOL/L (ref 21–32)
CREAT SERPL-MCNC: 0.7 MG/DL (ref 0.6–1.1)
EST. AVERAGE GLUCOSE BLD GHB EST-MCNC: 171 MG/DL
GFR, ESTIMATED: 84 ML/MIN/1.73M2
GLUCOSE SERPL-MCNC: 152 MG/DL (ref 74–99)
HBA1C MFR BLD: 7.6 % (ref 4.2–6.3)
POTASSIUM SERPL-SCNC: 4.3 MMOL/L (ref 3.5–5.1)
PROT SERPL-MCNC: 6.3 G/DL (ref 6.4–8.2)
SODIUM SERPL-SCNC: 138 MMOL/L (ref 136–145)

## 2024-09-10 PROCEDURE — 80053 COMPREHEN METABOLIC PANEL: CPT

## 2024-09-10 PROCEDURE — 83036 HEMOGLOBIN GLYCOSYLATED A1C: CPT

## 2024-09-10 PROCEDURE — 36415 COLL VENOUS BLD VENIPUNCTURE: CPT

## 2024-09-12 ENCOUNTER — HOSPITAL ENCOUNTER (OUTPATIENT)
Age: 44
Setting detail: SPECIMEN
Discharge: HOME | End: 2024-09-12
Payer: MEDICARE

## 2024-09-12 LAB
CREAT UR-MCNC: 289 MG/DL (ref 28–217)
MICROALBUMIN UR-MCNC: 2 MG/L
MICROALBUMIN/CREAT UR-RTO: 1 MCG/MG CREAT (ref 0–2)

## 2024-09-12 PROCEDURE — 82043 UR ALBUMIN QUANTITATIVE: CPT

## 2024-09-12 PROCEDURE — 82570 ASSAY OF URINE CREATININE: CPT

## 2024-09-18 DIAGNOSIS — E11.65 UNCONTROLLED TYPE 2 DIABETES MELLITUS WITH HYPERGLYCEMIA (HCC): ICD-10-CM

## 2024-09-18 RX ORDER — SEMAGLUTIDE 2.68 MG/ML
INJECTION, SOLUTION SUBCUTANEOUS
Qty: 3 ML | Refills: 0 | Status: SHIPPED | OUTPATIENT
Start: 2024-09-18

## 2024-09-26 ENCOUNTER — OFFICE VISIT (OUTPATIENT)
Dept: FAMILY MEDICINE CLINIC | Age: 44
End: 2024-09-26
Payer: MEDICARE

## 2024-09-26 VITALS
HEIGHT: 62 IN | SYSTOLIC BLOOD PRESSURE: 122 MMHG | BODY MASS INDEX: 38.5 KG/M2 | OXYGEN SATURATION: 99 % | WEIGHT: 209.2 LBS | DIASTOLIC BLOOD PRESSURE: 86 MMHG | HEART RATE: 84 BPM

## 2024-09-26 DIAGNOSIS — E11.65 UNCONTROLLED TYPE 2 DIABETES MELLITUS WITH HYPERGLYCEMIA (HCC): Primary | ICD-10-CM

## 2024-09-26 DIAGNOSIS — I10 ESSENTIAL HYPERTENSION: ICD-10-CM

## 2024-09-26 DIAGNOSIS — Z79.85 LONG-TERM CURRENT USE OF INJECTABLE NONINSULIN ANTIDIABETIC MEDICATION: ICD-10-CM

## 2024-09-26 DIAGNOSIS — E11.69 HYPERLIPIDEMIA ASSOCIATED WITH TYPE 2 DIABETES MELLITUS (HCC): ICD-10-CM

## 2024-09-26 DIAGNOSIS — Z23 FLU VACCINE NEED: ICD-10-CM

## 2024-09-26 DIAGNOSIS — E78.5 HYPERLIPIDEMIA ASSOCIATED WITH TYPE 2 DIABETES MELLITUS (HCC): ICD-10-CM

## 2024-09-26 PROCEDURE — G0008 ADMIN INFLUENZA VIRUS VAC: HCPCS

## 2024-09-26 PROCEDURE — 99213 OFFICE O/P EST LOW 20 MIN: CPT

## 2024-09-26 PROCEDURE — 3074F SYST BP LT 130 MM HG: CPT

## 2024-09-26 PROCEDURE — 90661 CCIIV3 VAC ABX FR 0.5 ML IM: CPT

## 2024-09-26 PROCEDURE — 3051F HG A1C>EQUAL 7.0%<8.0%: CPT

## 2024-09-26 PROCEDURE — 3079F DIAST BP 80-89 MM HG: CPT

## 2024-09-26 RX ORDER — ATORVASTATIN CALCIUM 20 MG/1
20 TABLET, FILM COATED ORAL EVERY MORNING
Qty: 90 TABLET | Refills: 1 | Status: SHIPPED | OUTPATIENT
Start: 2024-09-26

## 2024-09-26 RX ORDER — LISINOPRIL 10 MG/1
10 TABLET ORAL EVERY MORNING
Qty: 90 TABLET | Refills: 1 | Status: SHIPPED | OUTPATIENT
Start: 2024-09-26

## 2024-09-26 RX ORDER — SEMAGLUTIDE 2.68 MG/ML
2 INJECTION, SOLUTION SUBCUTANEOUS
Qty: 3 ML | Refills: 2 | Status: SHIPPED | OUTPATIENT
Start: 2024-09-26

## 2024-09-26 NOTE — ASSESSMENT & PLAN NOTE
Chronic, stable and improving.    Orders:    semaglutide, 2 MG/DOSE, (OZEMPIC, 2 MG/DOSE,) 8 MG/3ML SOPN sc injection; Inject 2 mg into the skin every 7 days

## 2024-09-26 NOTE — PROGRESS NOTES
Marilyn Rahman (:  1980) is a 44 y.o. female,Established patient, here for evaluation of the following chief complaint(s):  Diabetes (Follow up )         Assessment & Plan  Uncontrolled type 2 diabetes mellitus with hyperglycemia (HCC)  Chronic, stable and improving.    Orders:    semaglutide, 2 MG/DOSE, (OZEMPIC, 2 MG/DOSE,) 8 MG/3ML SOPN sc injection; Inject 2 mg into the skin every 7 days    Hyperlipidemia associated with type 2 diabetes mellitus (HCC)  Chronic, stable condition.  Continue current treatment.    Orders:    atorvastatin (LIPITOR) 20 MG tablet; Take 1 tablet by mouth every morning    Essential hypertension   Chronic, at goal (stable), continue current treatment plan    Orders:    lisinopril (PRINIVIL;ZESTRIL) 10 MG tablet; Take 1 tablet by mouth every morning    Flu vaccine need    Patient denies previous adverse reactions with flu vaccine.    Orders:    Influenza, FLUCELVAX Trivalent, (age 6 mo+) IM, Preservative Free, 0.5mL    Long-term current use of injectable noninsulin antidiabetic medication    Patient denies adverse effects with current GLP-1 therapy.  Continue Ozempic at 2 mg weekly.           Return in about 3 months (around 2024).       Subjective   Marilyn presents for diabetes follow-up.  She denies any new concerns or questions today.    Diabetes  She presents for her follow-up diabetic visit. She has type 2 diabetes mellitus. Her disease course has been stable. There are no hypoglycemic associated symptoms. There are no diabetic associated symptoms. Risk factors for coronary artery disease include diabetes mellitus, obesity and sedentary lifestyle. Current diabetic treatment includes insulin injections (GLP-1). She is compliant with treatment all of the time. Her weight is decreasing steadily. Diabetic current diet: small portions, reducing soda. She participates in exercise three times a week. She monitors blood glucose at home 1-2 x per day. Blood glucose monitoring

## 2024-09-26 NOTE — ASSESSMENT & PLAN NOTE
Chronic, stable condition.  Continue current treatment.    Orders:    atorvastatin (LIPITOR) 20 MG tablet; Take 1 tablet by mouth every morning

## 2024-10-15 DIAGNOSIS — E11.65 UNCONTROLLED TYPE 2 DIABETES MELLITUS WITH HYPERGLYCEMIA (HCC): Primary | ICD-10-CM

## 2024-10-15 RX ORDER — BLOOD-GLUCOSE SENSOR
EACH MISCELLANEOUS
Qty: 2 EACH | Refills: 5 | Status: SHIPPED | OUTPATIENT
Start: 2024-10-15

## (undated) DEVICE — KIT NEG PRSS M W12.5XH3.2XL18CM 2 SHT STD DRP 1 SENSATRAC

## (undated) DEVICE — DRAPE,EXTREMITY,89X128,STERILE: Brand: MEDLINE

## (undated) DEVICE — DRESSING NEG PRSS SM 10X7.5X3.3CM POLYUR FOR WND THER VAC

## (undated) DEVICE — INTENDED FOR TISSUE SEPARATION, AND OTHER PROCEDURES THAT REQUIRE A SHARP SURGICAL BLADE TO PUNCTURE OR CUT.: Brand: BARD-PARKER ® STAINLESS STEEL BLADES

## (undated) DEVICE — SUTURE ETHLN SZ 4-0 L18IN NONABSORBABLE BLK L19MM FS-2 3/8 662G

## (undated) DEVICE — DRAPE SHEET ULTRAGARD: Brand: MEDLINE

## (undated) DEVICE — CANISTER VAC 500ML TBNG RESVR FOR INFOVAC W O GEL CLMP CONN

## (undated) DEVICE — SUTURE NONABSORBABLE MONOFILAMENT 4-0 FS-2 18 IN ETHILON 662H

## (undated) DEVICE — SUTURE 3-0 VCRL CTD SH-1 J219H